# Patient Record
Sex: FEMALE | URBAN - METROPOLITAN AREA
[De-identification: names, ages, dates, MRNs, and addresses within clinical notes are randomized per-mention and may not be internally consistent; named-entity substitution may affect disease eponyms.]

---

## 2019-05-13 ENCOUNTER — TELEPHONE (OUTPATIENT)
Dept: TRANSPLANT | Facility: CLINIC | Age: 69
End: 2019-05-13

## 2019-05-13 NOTE — TELEPHONE ENCOUNTER
----- Message from Víctor Estrada sent at 5/13/2019  1:18 PM CDT -----    We have the pt records and they are now pending review by the referral nurse.  By:Víctor Estrada

## 2019-05-13 NOTE — TELEPHONE ENCOUNTER
"----- Message from Víctor Estrada sent at 5/13/2019 12:32 PM CDT -----     Returned call to pt daughter and told her that we did not rec any recs on the pt. Called Dr. Webre office and they will re-fax to [phone]. Will also send 'Post Tsp Care Agreement" Letter:    Miguel Angel Weber MD, Gastroenterology [address] [place] [address] Phone: [phone] Fax: [phone]    -------------------------------------------------------------------------------  Message   Received: Today   Message Contents   Celia Keen sent to [facility] Liver Referral Pool  Caller: Tegan [phone] (Today, 12:00 PM)         Calling to get status on referral that was sent by Dr. Miguel Angel Weber   Pt referred for liver and kidney xplant     NP   Michelle Ojeda   [birth year]     Contact Tegan       "

## 2019-05-14 ENCOUNTER — TELEPHONE (OUTPATIENT)
Dept: TRANSPLANT | Facility: CLINIC | Age: 69
End: 2019-05-14

## 2019-05-14 NOTE — TELEPHONE ENCOUNTER
----- Message from Víctor Estrada sent at 5/14/2019  3:48 PM CDT -----  Contact: pt daughter/Tegan    Returned call to pt prosper and told her that we did not need any other recs on her. The labs her GI Md sent had liver and kidney labs. Explained to her how the ref process works her and we once her chart has been reviewed the nurse will call her.     ----- Message -----  From: Arash Kraft  Sent: 5/14/2019   3:42 PM  To: [facility] Liver Referral Pool    Attn Víctor    Please call pt daughter at [phone]    Has a transplant related question    Thank you

## 2019-05-17 ENCOUNTER — TELEPHONE (OUTPATIENT)
Dept: TRANSPLANT | Facility: CLINIC | Age: 69
End: 2019-05-17

## 2019-05-17 NOTE — TELEPHONE ENCOUNTER
----- Message from Víctor Estrada sent at 5/17/2019  2:35 PM CDT -----  Contact: Tegan    Returned call to pt daughter, but there was no answer. LVM stating that the nurse still has her ref and once she is done with someone will giver her a call on whiter she will be coming for full liver txp workup, or consult only.     ----- Message -----  From: Lupillo Hagan  Sent: 5/17/2019   2:25 PM  To: [facility] Liver Referral Pool    Contact: Patient daughter Tegan    Message: Tegan calling about an updated status on her mother     Communication Preference: [phone]    Additional Information:     Thanks!

## 2019-05-21 ENCOUNTER — TELEPHONE (OUTPATIENT)
Dept: TRANSPLANT | Facility: CLINIC | Age: 69
End: 2019-05-21

## 2019-05-21 NOTE — TELEPHONE ENCOUNTER
----- Message from Víctor Estrada sent at 5/21/2019 10:29 AM CDT -----  Contact: Tegan John    Returned call to pt daughter at [phone] and she told me that she the pt had labs done. She will e-mail them to [email or web address]    ----- Message -----  From: Jeni Millard  Sent: 5/21/2019  10:16 AM  To: [facility] Liver Referral Pool    Needs Advice    Reason for call: Patient has updated lab work that Tegan wishes to discuss with Víctor.         Communication Preference: [phone]    Additional Information: n/a

## 2019-05-23 ENCOUNTER — TELEPHONE (OUTPATIENT)
Dept: TRANSPLANT | Facility: CLINIC | Age: 69
End: 2019-05-23

## 2019-05-23 NOTE — TELEPHONE ENCOUNTER
Pt referred for liver txp eval. Question need for kidney as well. Called pt and requested labs from last 3 mths with creatinine levels. She requested I call her daughter Tegan ranjeettrena with same request.

## 2019-05-24 ENCOUNTER — TELEPHONE (OUTPATIENT)
Dept: TRANSPLANT | Facility: CLINIC | Age: 69
End: 2019-05-24

## 2019-05-24 NOTE — TELEPHONE ENCOUNTER
----- Message from Víctor Estrada sent at 5/24/2019  1:54 PM CDT -----  Contact: Tegan pt's daughter     Returned call to pt daughter and told her that we have the info she sent and I put it on the nurse desk for her to review Monday.    ----- Message -----  From: Van Silva  Sent: 5/24/2019   1:42 PM  To: [facility] Liver Referral Pool    Needs Advice    Reason for call: Speak with Cande regarding additional labs and records that were sent         Communication Preference: [phone]    Additional Information: N/A

## 2019-05-29 ENCOUNTER — TELEPHONE (OUTPATIENT)
Dept: TRANSPLANT | Facility: CLINIC | Age: 69
End: 2019-05-29

## 2019-05-29 NOTE — TELEPHONE ENCOUNTER
----- Message from Víctor Estrada sent at 5/29/2019  2:30 PM CDT -----  Contact: Tegan (Daughter)    Returned call to pt daughter and told her that I will check with Cande for an update on the pt ref.     ----- Message -----  From: Jeni Millard  Sent: 5/29/2019   1:15 PM  To: [facility] Liver Referral Pool    Needs Advice    Reason for call: Called for an update on her mom's case review.        Communication Preference: [phone]    Additional Information: n/a

## 2019-05-31 ENCOUNTER — TELEPHONE (OUTPATIENT)
Dept: TRANSPLANT | Facility: CLINIC | Age: 69
End: 2019-05-31

## 2019-05-31 DIAGNOSIS — K74.60 LIVER CIRRHOSIS SECONDARY TO NASH: ICD-10-CM

## 2019-05-31 DIAGNOSIS — K75.81 LIVER CIRRHOSIS SECONDARY TO NASH: ICD-10-CM

## 2019-05-31 NOTE — TELEPHONE ENCOUNTER
Referral received from Dr Miguel Angel THOMAS MD  [facility] GASTROENTEROLOGY  [address], [address]  [place] [address]   [phone]  [phone]          Patient with IZAGUIRRE. MELD 27   ICD-10-CM: K75.81, K74.60    Referred for LIVER AND KIDNEY transplant for  EVALUATION.    Referral completed and forwarded to Transplant [facility].          Insurance:   PRIMARY: Medicare Part B [place]  ID: [id number]  Contact # [phone]    SECONDARY: [facility]  ID:[id number]  Contact # [phone]    [facility]

## 2019-06-03 ENCOUNTER — TELEPHONE (OUTPATIENT)
Dept: TRANSPLANT | Facility: CLINIC | Age: 69
End: 2019-06-03

## 2019-06-03 NOTE — TELEPHONE ENCOUNTER
----- Message from Celia Keen sent at 6/3/2019 12:43 PM CDT -----  Calling to speak w/Cande regarding result of the varices banding test     Pt contact [phone]

## 2019-06-03 NOTE — TELEPHONE ENCOUNTER
Returned call. Just wanted to verify receipt of records.  EGD will need to be redone as last was 4/2018. Informed will need updated EGD either before eval here or shortly after they return.

## 2019-06-06 ENCOUNTER — TELEPHONE (OUTPATIENT)
Dept: TRANSPLANT | Facility: CLINIC | Age: 69
End: 2019-06-06

## 2019-06-06 NOTE — TELEPHONE ENCOUNTER
----- Message from Víctor Estrada sent at 6/6/2019 10:17 AM CDT -----  Contact: Patient Daughter     Returned call to pt daughter and told her that we are still pending financial clr from her [employer]. Told her that we are booking in to July for FAST PASS and STANDARD appts.    ----- Message -----  From: Jeni Millard  Sent: 6/6/2019   9:30 AM  To: [facility] Liver Referral Pool    Needs Advice    Reason for call: Called to f/u with Cande about authorization for liver evaluations.          Communication Preference: Tegan [phone]    Additional Information: n/a

## 2019-06-11 ENCOUNTER — TELEPHONE (OUTPATIENT)
Dept: TRANSPLANT | Facility: CLINIC | Age: 69
End: 2019-06-11

## 2019-06-11 NOTE — TELEPHONE ENCOUNTER
----- Message from Víctor Estrada sent at 6/11/2019 11:35 AM CDT -----  Contact: pt daughter/Tgean    Returned call to pt daughter and told her that the insur is still pending. She also asked about donell appt. Told her that 7/11 is the 1st available for liver, but since the pt needs to see kidney that is not a guarantee that they will be abke to see her in that time frame.     ----- Message -----  From: Arash Kraft  Sent: 6/11/2019  11:27 AM  To: [facility] Liver Referral Pool    Attn Víctor    Please call pt daughter at [phone]    Patient daughter is returning your call from today    Thank you

## 2019-06-11 NOTE — TELEPHONE ENCOUNTER
----- Message from Víctor Estrada sent at 6/11/2019 11:16 AM CDT -----  Contact: Pt. Daughter     Returned call to pt daughter, but there was no answer. LVM for her stating that she was is still pending clr from finance     ----- Message -----  From: Celina Wright  Sent: 6/11/2019  11:12 AM  To: [facility] Pre-Liver Transplant Non-Clinical    Needs Advice    Reason for call: Calling to check the status of insurance clearance and finalize eval         Communication Preference: [phone]    Additional Information: n/a

## 2019-06-13 ENCOUNTER — TELEPHONE (OUTPATIENT)
Dept: TRANSPLANT | Facility: CLINIC | Age: 69
End: 2019-06-13

## 2019-06-13 NOTE — TELEPHONE ENCOUNTER
----- Message from Víctor Estrada sent at 6/13/2019 12:00 PM CDT -----    Pt daughter called asking for an update on the pt [place] clr. Told her that her ref has been sent to [facility]. Told her that I will send a message to the finance coord to give her a call to explain what that means.

## 2019-06-17 ENCOUNTER — TELEPHONE (OUTPATIENT)
Dept: TRANSPLANT | Facility: CLINIC | Age: 69
End: 2019-06-17

## 2019-06-17 NOTE — TELEPHONE ENCOUNTER
----- Message from Víctor Estrada sent at 6/17/2019  9:20 AM CDT -----    Rec'bryce call from pt daughter and She would like the pt to be sooner than later and she would prefer not to stay the weekend. She asked me what if they kept the appts for 7/11 & 7/12 that they would have a cancellation for the kidney appt for 7/11 & 7/12. Told her that would be up to her. She could keep the appts for 7/11 & 7/12 and maybe she gets an appt with kidney, or she keeps the appts donell'ed for 7/11 & 7/12 and then will have to come back on 7/22 to see kidney. She will call back and let me know.

## 2019-06-17 NOTE — TELEPHONE ENCOUNTER
----- Message from Víctor Estrada sent at 6/17/2019  9:12 AM CDT -----    Called pt daughter to let her know that we will not be able to donell the pt for FAST PASS: 7/11 & 7/12 since the kidney appt has been donell'ed for 7/22, but there was no answer. LVM stating that we will donell appts for 7/18 & 7/19

## 2019-06-19 ENCOUNTER — TELEPHONE (OUTPATIENT)
Dept: TRANSPLANT | Facility: CLINIC | Age: 69
End: 2019-06-19

## 2019-06-19 NOTE — TELEPHONE ENCOUNTER
----- Message from Víctor Estrada sent at 6/19/2019  4:46 PM CDT -----    Called pt daughter at [phone] to see if she was ready to donell appts, but there was no answer. LVM for her to call back.

## 2019-06-27 DIAGNOSIS — Z76.82 ORGAN TRANSPLANT CANDIDATE: ICD-10-CM

## 2019-06-27 DIAGNOSIS — K74.60 LIVER CIRRHOSIS SECONDARY TO NASH: Primary | ICD-10-CM

## 2019-06-27 DIAGNOSIS — K75.81 LIVER CIRRHOSIS SECONDARY TO NASH: Primary | ICD-10-CM

## 2019-07-02 DIAGNOSIS — K74.60 LIVER CIRRHOSIS SECONDARY TO NASH: Primary | ICD-10-CM

## 2019-07-02 DIAGNOSIS — K75.81 LIVER CIRRHOSIS SECONDARY TO NASH (NONALCOHOLIC STEATOHEPATITIS): Primary | ICD-10-CM

## 2019-07-02 DIAGNOSIS — Z76.82 ORGAN TRANSPLANT CANDIDATE: ICD-10-CM

## 2019-07-02 DIAGNOSIS — K74.60 LIVER CIRRHOSIS SECONDARY TO NASH (NONALCOHOLIC STEATOHEPATITIS): Primary | ICD-10-CM

## 2019-07-02 DIAGNOSIS — K75.81 LIVER CIRRHOSIS SECONDARY TO NASH: Primary | ICD-10-CM

## 2019-07-15 ENCOUNTER — TELEPHONE (OUTPATIENT)
Dept: TRANSPLANT | Facility: CLINIC | Age: 69
End: 2019-07-15

## 2019-07-15 NOTE — TELEPHONE ENCOUNTER
----- Message from Víctor Estrada sent at 7/15/2019 11:13 AM CDT -----  Contact: Tegan pt's daughter     Returned call to pt daughter and she just wanted to confirm that the pt appts donell'ed for 7/18-7/22. Told her that the pt appts are still donell'ed.    ----- Message -----  From: Van Silva  Sent: 7/15/2019  11:04 AM  To: [facility] Liver Referral Pool    Needs Advice    Reason for call: Speak with Víctor regarding pt's appts        Communication Preference: [phone]    Additional Information: N/A

## 2019-07-16 ENCOUNTER — TELEPHONE (OUTPATIENT)
Dept: TRANSPLANT | Facility: CLINIC | Age: 69
End: 2019-07-16

## 2019-07-16 NOTE — TELEPHONE ENCOUNTER
----- Message from Víctor Estrada sent at 7/16/2019  2:27 PM CDT -----  Contact: pts daughter Tegan     Returned call to pt daughter and told her that I moved all of the appts that were donell'ed on 7/22 to 7/18 and 7/19. E-mailed a new copy of the appts.     ----- Message -----  From: Antonietta Barber  Sent: 7/16/2019   2:05 PM  To: [facility] Pre-Liver Transplant Non-Clinical    Needs Advice    Reason for call: pts daughter was wanting to speak with Víctor in ref to the pts up coming appts in Transplant         Communication Preference: can you please pts daughter Tegan call pt [phone]    Additional Information: none    JOSEPH

## 2019-07-18 ENCOUNTER — HOSPITAL ENCOUNTER (INPATIENT)
Facility: HOSPITAL | Age: 69
LOS: 23 days | Discharge: HOSPICE | DRG: 432 | End: 2019-08-10
Attending: EMERGENCY MEDICINE | Admitting: HOSPITALIST
Payer: MEDICARE

## 2019-07-18 ENCOUNTER — TELEPHONE (OUTPATIENT)
Dept: TRANSPLANT | Facility: CLINIC | Age: 69
End: 2019-07-18

## 2019-07-18 ENCOUNTER — CLINICAL SUPPORT (OUTPATIENT)
Dept: TRANSPLANT | Facility: CLINIC | Age: 69
DRG: 432 | End: 2019-07-18
Payer: MEDICARE

## 2019-07-18 ENCOUNTER — OFFICE VISIT (OUTPATIENT)
Dept: TRANSPLANT | Facility: CLINIC | Age: 69
DRG: 432 | End: 2019-07-18
Payer: MEDICARE

## 2019-07-18 VITALS
TEMPERATURE: 98 F | DIASTOLIC BLOOD PRESSURE: 63 MMHG | SYSTOLIC BLOOD PRESSURE: 105 MMHG | WEIGHT: 125.44 LBS | DIASTOLIC BLOOD PRESSURE: 63 MMHG | HEIGHT: 60 IN | HEIGHT: 60 IN | OXYGEN SATURATION: 100 % | BODY MASS INDEX: 24.63 KG/M2 | HEART RATE: 71 BPM | OXYGEN SATURATION: 100 % | TEMPERATURE: 98 F | RESPIRATION RATE: 16 BRPM | WEIGHT: 125.44 LBS | RESPIRATION RATE: 16 BRPM | SYSTOLIC BLOOD PRESSURE: 105 MMHG | OXYGEN SATURATION: 100 % | DIASTOLIC BLOOD PRESSURE: 63 MMHG | RESPIRATION RATE: 16 BRPM | HEART RATE: 71 BPM | BODY MASS INDEX: 24.63 KG/M2 | HEART RATE: 71 BPM | TEMPERATURE: 98 F | WEIGHT: 125.44 LBS | BODY MASS INDEX: 24.63 KG/M2 | HEIGHT: 60 IN | SYSTOLIC BLOOD PRESSURE: 105 MMHG

## 2019-07-18 DIAGNOSIS — K74.60 LIVER CIRRHOSIS SECONDARY TO NASH (NONALCOHOLIC STEATOHEPATITIS): ICD-10-CM

## 2019-07-18 DIAGNOSIS — G93.41 ACUTE METABOLIC ENCEPHALOPATHY: ICD-10-CM

## 2019-07-18 DIAGNOSIS — R94.31 QT PROLONGATION: ICD-10-CM

## 2019-07-18 DIAGNOSIS — D61.818 PANCYTOPENIA: ICD-10-CM

## 2019-07-18 DIAGNOSIS — E87.1 HYPONATREMIA WITH DECREASED SERUM OSMOLALITY: ICD-10-CM

## 2019-07-18 DIAGNOSIS — Z76.82 KIDNEY TRANSPLANT CANDIDATE: ICD-10-CM

## 2019-07-18 DIAGNOSIS — D69.59 THROMBOCYTOPENIA DUE TO HYPERSPLENISM: ICD-10-CM

## 2019-07-18 DIAGNOSIS — D73.1 THROMBOCYTOPENIA DUE TO HYPERSPLENISM: ICD-10-CM

## 2019-07-18 DIAGNOSIS — K70.31 ASCITES DUE TO ALCOHOLIC CIRRHOSIS: ICD-10-CM

## 2019-07-18 DIAGNOSIS — K92.0 HEMATEMESIS, PRESENCE OF NAUSEA NOT SPECIFIED: ICD-10-CM

## 2019-07-18 DIAGNOSIS — N17.9 ACUTE KIDNEY INJURY SUPERIMPOSED ON CHRONIC KIDNEY DISEASE: ICD-10-CM

## 2019-07-18 DIAGNOSIS — K75.81 LIVER CIRRHOSIS SECONDARY TO NASH: Primary | ICD-10-CM

## 2019-07-18 DIAGNOSIS — K74.60 LIVER CIRRHOSIS SECONDARY TO NASH: ICD-10-CM

## 2019-07-18 DIAGNOSIS — Z01.818 ENCOUNTER FOR PRE-TRANSPLANT EVALUATION FOR CHRONIC LIVER DISEASE: Primary | ICD-10-CM

## 2019-07-18 DIAGNOSIS — D68.9 COAGULOPATHY: ICD-10-CM

## 2019-07-18 DIAGNOSIS — R13.19 OTHER DYSPHAGIA: ICD-10-CM

## 2019-07-18 DIAGNOSIS — Z76.82 ORGAN TRANSPLANT CANDIDATE: ICD-10-CM

## 2019-07-18 DIAGNOSIS — K75.81 LIVER CIRRHOSIS SECONDARY TO NASH: ICD-10-CM

## 2019-07-18 DIAGNOSIS — E87.1 HYPONATREMIA: ICD-10-CM

## 2019-07-18 DIAGNOSIS — E43 SEVERE MALNUTRITION: ICD-10-CM

## 2019-07-18 DIAGNOSIS — K76.6 PORTAL HYPERTENSION: ICD-10-CM

## 2019-07-18 DIAGNOSIS — J96.01 ACUTE HYPOXEMIC RESPIRATORY FAILURE: ICD-10-CM

## 2019-07-18 DIAGNOSIS — Z01.818 PRE-TRANSPLANT EVALUATION FOR LIVER TRANSPLANT: ICD-10-CM

## 2019-07-18 DIAGNOSIS — R53.81 PHYSICAL DEBILITY: ICD-10-CM

## 2019-07-18 DIAGNOSIS — R60.1 ANASARCA: ICD-10-CM

## 2019-07-18 DIAGNOSIS — N18.9 ACUTE KIDNEY INJURY SUPERIMPOSED ON CHRONIC KIDNEY DISEASE: ICD-10-CM

## 2019-07-18 DIAGNOSIS — N18.4 STAGE 4 CHRONIC KIDNEY DISEASE: ICD-10-CM

## 2019-07-18 DIAGNOSIS — K74.60 LIVER CIRRHOSIS SECONDARY TO NASH: Primary | ICD-10-CM

## 2019-07-18 DIAGNOSIS — K72.90 LIVER FAILURE: ICD-10-CM

## 2019-07-18 DIAGNOSIS — R18.8 OTHER ASCITES: ICD-10-CM

## 2019-07-18 DIAGNOSIS — K75.81 LIVER CIRRHOSIS SECONDARY TO NASH (NONALCOHOLIC STEATOHEPATITIS): ICD-10-CM

## 2019-07-18 DIAGNOSIS — K76.82 ACUTE HEPATIC ENCEPHALOPATHY: ICD-10-CM

## 2019-07-18 LAB
ALBUMIN SERPL BCP-MCNC: 2.8 G/DL (ref 3.5–5.2)
ALP SERPL-CCNC: 137 U/L (ref 55–135)
ALT SERPL W/O P-5'-P-CCNC: 44 U/L (ref 10–44)
ANION GAP SERPL CALC-SCNC: 8 MMOL/L (ref 8–16)
AST SERPL-CCNC: 49 U/L (ref 10–40)
BILIRUB SERPL-MCNC: 1.8 MG/DL (ref 0.1–1)
BILIRUB UR QL STRIP: NEGATIVE
BUN SERPL-MCNC: 34 MG/DL (ref 8–23)
CALCIUM SERPL-MCNC: 8.4 MG/DL (ref 8.7–10.5)
CHLORIDE SERPL-SCNC: 92 MMOL/L (ref 95–110)
CLARITY UR REFRACT.AUTO: CLEAR
CO2 SERPL-SCNC: 22 MMOL/L (ref 23–29)
COLOR UR AUTO: YELLOW
CREAT SERPL-MCNC: 1.9 MG/DL (ref 0.5–1.4)
EST. GFR  (AFRICAN AMERICAN): 30.4 ML/MIN/1.73 M^2
EST. GFR  (NON AFRICAN AMERICAN): 26.3 ML/MIN/1.73 M^2
GLUCOSE SERPL-MCNC: 183 MG/DL (ref 70–110)
GLUCOSE UR QL STRIP: NEGATIVE
HGB UR QL STRIP: NEGATIVE
KETONES UR QL STRIP: NEGATIVE
LEUKOCYTE ESTERASE UR QL STRIP: ABNORMAL
MICROSCOPIC COMMENT: ABNORMAL
NITRITE UR QL STRIP: NEGATIVE
OSMOLALITY SERPL: 262 MOSM/KG (ref 275–295)
OSMOLALITY UR: 299 MOSM/KG (ref 50–1200)
PH UR STRIP: 6 [PH] (ref 5–8)
POTASSIUM SERPL-SCNC: 4.5 MMOL/L (ref 3.5–5.1)
PROT SERPL-MCNC: 5.7 G/DL (ref 6–8.4)
PROT UR QL STRIP: NEGATIVE
RBC #/AREA URNS AUTO: 3 /HPF (ref 0–4)
SODIUM SERPL-SCNC: 122 MMOL/L (ref 136–145)
SODIUM UR-SCNC: <20 MMOL/L (ref 20–250)
SP GR UR STRIP: 1.01 (ref 1–1.03)
SQUAMOUS #/AREA URNS AUTO: 5 /HPF
URN SPEC COLLECT METH UR: ABNORMAL
WBC #/AREA URNS AUTO: 15 /HPF (ref 0–5)

## 2019-07-18 PROCEDURE — 99999 PR PBB SHADOW E&M-EST. PATIENT-LVL III: ICD-10-PCS | Mod: PBBFAC,TXP,,

## 2019-07-18 PROCEDURE — 99284 PR EMERGENCY DEPT VISIT,LEVEL IV: ICD-10-PCS | Mod: NTX,,, | Performed by: EMERGENCY MEDICINE

## 2019-07-18 PROCEDURE — 84300 ASSAY OF URINE SODIUM: CPT | Mod: NTX

## 2019-07-18 PROCEDURE — 99999 PR PBB SHADOW E&M-EST. PATIENT-LVL III: CPT | Mod: PBBFAC,TXP,, | Performed by: INTERNAL MEDICINE

## 2019-07-18 PROCEDURE — 99999 PR PBB SHADOW E&M-EST. PATIENT-LVL III: CPT | Mod: PBBFAC,TXP,,

## 2019-07-18 PROCEDURE — 83935 ASSAY OF URINE OSMOLALITY: CPT | Mod: NTX

## 2019-07-18 PROCEDURE — 99205 PR OFFICE/OUTPT VISIT, NEW, LEVL V, 60-74 MIN: ICD-10-PCS | Mod: S$PBB,TXP,, | Performed by: INTERNAL MEDICINE

## 2019-07-18 PROCEDURE — 99285 EMERGENCY DEPT VISIT HI MDM: CPT | Mod: 27,NTX,25

## 2019-07-18 PROCEDURE — 25000003 PHARM REV CODE 250: Mod: NTX | Performed by: HOSPITALIST

## 2019-07-18 PROCEDURE — P9047 ALBUMIN (HUMAN), 25%, 50ML: HCPCS | Mod: JG,NTX | Performed by: HOSPITALIST

## 2019-07-18 PROCEDURE — 36415 COLL VENOUS BLD VENIPUNCTURE: CPT | Mod: NTX

## 2019-07-18 PROCEDURE — 80053 COMPREHEN METABOLIC PANEL: CPT | Mod: NTX

## 2019-07-18 PROCEDURE — 99223 1ST HOSP IP/OBS HIGH 75: CPT | Mod: AI,NTX,, | Performed by: HOSPITALIST

## 2019-07-18 PROCEDURE — 99213 OFFICE O/P EST LOW 20 MIN: CPT | Mod: PBBFAC,27,TXP,25 | Performed by: INTERNAL MEDICINE

## 2019-07-18 PROCEDURE — 63600175 PHARM REV CODE 636 W HCPCS: Mod: JG,NTX | Performed by: HOSPITALIST

## 2019-07-18 PROCEDURE — 99223 PR INITIAL HOSPITAL CARE,LEVL III: ICD-10-PCS | Mod: AI,NTX,, | Performed by: HOSPITALIST

## 2019-07-18 PROCEDURE — 99284 EMERGENCY DEPT VISIT MOD MDM: CPT | Mod: NTX,,, | Performed by: EMERGENCY MEDICINE

## 2019-07-18 PROCEDURE — 99999 PR PBB SHADOW E&M-EST. PATIENT-LVL III: ICD-10-PCS | Mod: PBBFAC,NTX,,

## 2019-07-18 PROCEDURE — 99999 PR PBB SHADOW E&M-EST. PATIENT-LVL III: ICD-10-PCS | Mod: PBBFAC,TXP,, | Performed by: INTERNAL MEDICINE

## 2019-07-18 PROCEDURE — 99213 OFFICE O/P EST LOW 20 MIN: CPT | Mod: PBBFAC,25,27,NTX

## 2019-07-18 PROCEDURE — 99999 PR PBB SHADOW E&M-EST. PATIENT-LVL III: CPT | Mod: PBBFAC,NTX,,

## 2019-07-18 PROCEDURE — 83930 ASSAY OF BLOOD OSMOLALITY: CPT | Mod: NTX

## 2019-07-18 PROCEDURE — 99205 OFFICE O/P NEW HI 60 MIN: CPT | Mod: S$PBB,TXP,, | Performed by: INTERNAL MEDICINE

## 2019-07-18 PROCEDURE — 20600001 HC STEP DOWN PRIVATE ROOM: Mod: NTX

## 2019-07-18 PROCEDURE — 87086 URINE CULTURE/COLONY COUNT: CPT | Mod: NTX

## 2019-07-18 PROCEDURE — 99213 OFFICE O/P EST LOW 20 MIN: CPT | Mod: PBBFAC,TXP,25

## 2019-07-18 PROCEDURE — 81001 URINALYSIS AUTO W/SCOPE: CPT | Mod: NTX

## 2019-07-18 RX ORDER — GLUCAGON 1 MG
1 KIT INJECTION
Status: DISCONTINUED | OUTPATIENT
Start: 2019-07-18 | End: 2019-08-10 | Stop reason: HOSPADM

## 2019-07-18 RX ORDER — IBUPROFEN 200 MG
24 TABLET ORAL
Status: DISCONTINUED | OUTPATIENT
Start: 2019-07-18 | End: 2019-08-10 | Stop reason: HOSPADM

## 2019-07-18 RX ORDER — LEVOTHYROXINE SODIUM 88 UG/1
88 TABLET ORAL
Status: DISCONTINUED | OUTPATIENT
Start: 2019-07-19 | End: 2019-07-24

## 2019-07-18 RX ORDER — FUROSEMIDE 40 MG/1
40 TABLET ORAL DAILY
Status: ON HOLD | COMMUNITY
End: 2019-07-31 | Stop reason: HOSPADM

## 2019-07-18 RX ORDER — IBUPROFEN 200 MG
16 TABLET ORAL
Status: DISCONTINUED | OUTPATIENT
Start: 2019-07-18 | End: 2019-08-10 | Stop reason: HOSPADM

## 2019-07-18 RX ORDER — SODIUM CHLORIDE 0.9 % (FLUSH) 0.9 %
10 SYRINGE (ML) INJECTION
Status: DISCONTINUED | OUTPATIENT
Start: 2019-07-18 | End: 2019-08-05

## 2019-07-18 RX ORDER — LACTULOSE 10 G/15ML
30 SOLUTION ORAL 3 TIMES DAILY
Status: DISCONTINUED | OUTPATIENT
Start: 2019-07-18 | End: 2019-07-19

## 2019-07-18 RX ORDER — LEVOTHYROXINE SODIUM 88 UG/1
88 TABLET ORAL
COMMUNITY

## 2019-07-18 RX ORDER — ACETAMINOPHEN 500 MG
2000 TABLET ORAL DAILY
COMMUNITY

## 2019-07-18 RX ORDER — MAGNESIUM 250 MG
250 TABLET ORAL DAILY
Status: ON HOLD | COMMUNITY
End: 2019-07-31 | Stop reason: HOSPADM

## 2019-07-18 RX ORDER — SPIRONOLACTONE 50 MG/1
50 TABLET, FILM COATED ORAL DAILY
Status: ON HOLD | COMMUNITY
End: 2019-07-31 | Stop reason: HOSPADM

## 2019-07-18 RX ORDER — ELECTROLYTES/DEXTROSE
5000 SOLUTION, ORAL ORAL DAILY
COMMUNITY

## 2019-07-18 RX ORDER — ONDANSETRON 8 MG/1
8 TABLET, ORALLY DISINTEGRATING ORAL EVERY 8 HOURS PRN
Status: DISCONTINUED | OUTPATIENT
Start: 2019-07-18 | End: 2019-07-24

## 2019-07-18 RX ORDER — ALBUMIN HUMAN 250 G/1000ML
25 SOLUTION INTRAVENOUS 3 TIMES DAILY
Status: DISCONTINUED | OUTPATIENT
Start: 2019-07-18 | End: 2019-07-19

## 2019-07-18 RX ORDER — ACETAMINOPHEN 325 MG/1
650 TABLET ORAL EVERY 4 HOURS PRN
Status: DISCONTINUED | OUTPATIENT
Start: 2019-07-18 | End: 2019-07-22

## 2019-07-18 RX ORDER — RIFAXIMIN 200 MG/1
200 TABLET ORAL 2 TIMES DAILY
Refills: 2 | Status: ON HOLD | COMMUNITY
Start: 2019-07-03 | End: 2019-07-31 | Stop reason: HOSPADM

## 2019-07-18 RX ORDER — CIPROFLOXACIN 500 MG/1
500 TABLET ORAL DAILY
Refills: 0 | Status: ON HOLD | COMMUNITY
Start: 2019-07-17 | End: 2019-07-31 | Stop reason: HOSPADM

## 2019-07-18 RX ADMIN — RIFAXIMIN 550 MG: 550 TABLET ORAL at 09:07

## 2019-07-18 RX ADMIN — ALBUMIN (HUMAN) 25 G: 12.5 SOLUTION INTRAVENOUS at 03:07

## 2019-07-18 RX ADMIN — ALBUMIN (HUMAN) 25 G: 12.5 SOLUTION INTRAVENOUS at 09:07

## 2019-07-18 NOTE — TELEPHONE ENCOUNTER
Returned call to daughter, Tegan. Tegan wished to let the team know that pt is in the ER awaiting admission. Explained that once pt is admitted, she will be followed by the IML service & will likely have much of her evaluation completed inpatient. Team notified via email. Understanding expressed. No other questions at this time.

## 2019-07-18 NOTE — PROGRESS NOTES
Transplant Hepatology (Transplant Evaluation) Consult Note    Referring provider:   Chief complaint:   Chief Complaint   Patient presents with    Liver/Kidney Transplant Evaluation       HPI:  Michelle Ojeda is a 68 y.o. female that presents to Transplant Hepatology Clinic for consultation of had concerns including Liver/Kidney Transplant Evaluation.  She  is accompanied by her daughter and son-in-law.    Background  She was diagnosed with IZAGUIRRE cirrhosis by CT scan and blood tests in 2015. She had a liver biopsy in  - steatosis but ?fibrosis.  She has developed grade 1 hepatic encephalopathy, recently started on rifaximin. She has developed severe ascites, sarcopenia, esophageal varices and hyponatremia.    Alcohol: no, never  DUI: no  Rehab: no  Tobacco: no, never  THC use: no  IVDU: no  Intranasal cocaine: no  Tattoo: no  Blood transfusion prior to : no    Work: retired, office work at a lab.   Marital status: , she lives with her  - 83 y/o.  Family: 1 son and 3 daughters    PMH:  Diabetes type 2  Hypertension  Hypothyroidism    Denies CAD or heart attack or stroke.    PSH:  No prior abdominal surgeries    Family History:  Father:  at 92 year old from cancer  Mother:  of old age 88 year  Sister: hx of lung cancer      MELD-Na score: 27 at 2019  8:10 AM  MELD score: 18 at 2019  8:10 AM  Calculated from:  Serum Creatinine: 1.9 mg/dL at 2019  8:10 AM  Serum Sodium: 120 mmol/L (Rounded to 125 mmol/L) at 2019  8:10 AM  Total Bilirubin: 1.9 mg/dL at 2019  8:10 AM  INR(ratio): 1.3 at 2019  8:10 AM  Age: 68 years    Liver disease:                   IZAGUIRRE    MELD-Na:                         27  Child-Lopez Class:             C    Blood type:                        n/a    Transplant status:             under evaluation    Cirrhosis is decompensated with:    Ascites:                                                       yes large, LVP every 2 weeks since March  2019  Spontaneous bacterial peritonitis:              no   Hepatic Encephalopathy:                           yes, [place] grading grade 1-2  Portal bleeding:                                           Yes, 11/2015 - required blood transfusion, EVs - EVL done, now controlled, not on NSBB - due to large ascites/CPT C  Hepatocellular carcinoma:                          no    Hepatorenal syndrome:                              no  Hyponatremia:                                            yes  Sarcopenia:                                                yes   Portal vein thrombosis:                               no      Screening:  Last EGD:  6/19/19: small varices PHG.  4/6/18: at OSH - small varices    Last imaging study:   3/12/2019: US: massive ascites, small cirrhotic liver, no liver tumor.      Patient Active Problem List   Diagnosis    Liver cirrhosis secondary to IZAGUIRRE       Past Medical History:   Diagnosis Date    Cirrhosis     Diabetes mellitus, type 2     Disorder of kidney and ureter     Hypertension     Hypothyroidism     NAFLD (nonalcoholic fatty liver disease)        History reviewed. No pertinent surgical history.    No family history on file.    Social History     Socioeconomic History    Marital status: Unknown     Spouse name: Not on file    Number of children: Not on file    Years of education: Not on file    Highest education level: Not on file   Occupational History    Not on file   Social Needs    Financial resource strain: Not on file    Food insecurity:     Worry: Not on file     Inability: Not on file    Transportation needs:     Medical: Not on file     Non-medical: Not on file   Tobacco Use    Smoking status: Not on file   Substance and Sexual Activity    Alcohol use: Not on file    Drug use: Not on file    Sexual activity: Not on file   Lifestyle    Physical activity:     Days per week: Not on file     Minutes per session: Not on file    Stress: Not on file   Relationships     Social connections:     Talks on phone: Not on file     Gets together: Not on file     Attends Denominational service: Not on file     Active member of club or organization: Not on file     Attends meetings of clubs or organizations: Not on file     Relationship status: Not on file   Other Topics Concern    Not on file   Social History Narrative    Not on file       No current outpatient medications on file.     No current facility-administered medications for this visit.        Review of patient's allergies indicates:  Allergies not on file    Review of Systems   Constitutional: Positive for malaise/fatigue and weight loss. Negative for chills and fever.   HENT: Negative for congestion, nosebleeds and sore throat.    Eyes: Negative for blurred vision, double vision and photophobia.   Respiratory: Negative for cough and shortness of breath.    Cardiovascular: Positive for leg swelling. Negative for chest pain, palpitations and orthopnea.   Gastrointestinal: Positive for abdominal pain. Negative for blood in stool, constipation, diarrhea, melena and vomiting.   Genitourinary: Negative for dysuria.   Musculoskeletal: Negative for falls and joint pain.   Skin: Negative for itching and rash.   Neurological: Positive for weakness. Negative for dizziness and tremors.   Endo/Heme/Allergies: Does not bruise/bleed easily.   Psychiatric/Behavioral: Negative for depression and substance abuse. The patient has insomnia. The patient is not nervous/anxious.        Vitals:    07/18/19 0837   BP: 105/63   Pulse: 71   Resp: 16   Temp: 97.7 °F (36.5 °C)   TempSrc: Oral   SpO2: 100%   Weight: 56.9 kg (125 lb 7.1 oz)   Height: 5' (1.524 m)       Physical Exam   Constitutional: She is oriented to person, place, and time. She appears well-developed.   Chronically ill-appearing. Malnourished. Temporal wasting.     HENT:   Head: Normocephalic and atraumatic.   Mouth/Throat: Oropharynx is clear and moist.   Eyes: Conjunctivae are normal.  Scleral icterus is present.   Neck: Normal range of motion.   Cardiovascular: Normal rate, regular rhythm and normal heart sounds.   Pulmonary/Chest: Effort normal and breath sounds normal. No stridor. No respiratory distress. She has no rales.   Abdominal: Soft. Bowel sounds are normal. She exhibits distension. There is no tenderness. There is no guarding. No hernia.   Large ascites but not tense   Musculoskeletal: She exhibits edema.   Lymphadenopathy:     She has no cervical adenopathy.   Neurological: She is alert and oriented to person, place, and time.   Skin: Skin is warm and dry. No rash noted.   Scattered spider angiomata on upper chest. Palmar erythema noted.   Psychiatric: She has a normal mood and affect. Her behavior is normal. Her mood appears not anxious.   Nursing note and vitals reviewed.      LABS: I personally reviewed pertinent laboratory findings.    Lab Results   Component Value Date    ALT 57 (H) 07/18/2019    AST 65 (H) 07/18/2019    GGT 98 (H) 07/18/2019    ALKPHOS 174 (H) 07/18/2019    BILITOT 1.9 (H) 07/18/2019       Lab Results   Component Value Date    WBC 3.40 (L) 07/18/2019    HGB 10.6 (L) 07/18/2019    HCT 31.3 (L) 07/18/2019     (H) 07/18/2019    PLT 47 (L) 07/18/2019       Lab Results   Component Value Date     (L) 07/18/2019    K 4.4 07/18/2019    CL 92 (L) 07/18/2019    CO2 20 (L) 07/18/2019    BUN 33 (H) 07/18/2019    CREATININE 1.9 (H) 07/18/2019    CALCIUM 8.1 (L) 07/18/2019    ANIONGAP 8 07/18/2019    ESTGFRAFRICA 30.4 (A) 07/18/2019    EGFRNONAA 26.3 (A) 07/18/2019       Lab Results   Component Value Date    INR 1.3 (H) 07/18/2019       No results found for: SMOOTHMUSCAB, MITOAB  No results found for: IRON, TIBC, FERRITIN, SATURATEDIRO  No results found for: HAV, HEPAIGM, HEPBIGM, HEPBCAB, HBEAG, HEPCAB  No results found for: TSH  No results found for: HARPAL    No results found for: ABORH    No results found for: LABA1C, HGBA1C  No results found for: CHOL  No  results found for: HDL  No results found for: LDLCALC  No results found for: TRIG  No results found for: CHOLHDL      Imaging:   No results found for this or any previous visit.  I personally reviewed recent cardiology studies available on the chart and from outside medical records.  I personally reviewed recent imaging studies available on the chart and from outside medical records.      Assessment:  68 y.o. female presenting with     1. Encounter for pre-transplant evaluation for chronic liver disease    2. Liver cirrhosis secondary to IZAGUIRRE    3. Organ transplant candidate    4. Liver cirrhosis secondary to IZAGUIRRE (nonalcoholic steatohepatitis)    5. Portal hypertension    6. Thrombocytopenia due to hypersplenism    7. Hyponatremia with decreased serum osmolality    8. Stage 4 chronic kidney disease    9. Severe malnutrition      Child Lopez class: C    MELD-Na score: 27 at 7/18/2019  8:10 AM  MELD score: 18 at 7/18/2019  8:10 AM  Calculated from:  Serum Creatinine: 1.9 mg/dL at 7/18/2019  8:10 AM  Serum Sodium: 120 mmol/L (Rounded to 125 mmol/L) at 7/18/2019  8:10 AM  Total Bilirubin: 1.9 mg/dL at 7/18/2019  8:10 AM  INR(ratio): 1.3 at 7/18/2019  8:10 AM  Age: 68 years    Functional Status: 30% - Severely disabled: hospitalization is indicated, death not imminent  Physical Capacity: No Limitations    Transplant Candidacy: Patient is a 68 y.o. female with end-stage liver disease secondary to Cirrhosis: IZAGUIRRE with MELD-Na: 27 and Child-Lopez Class:C  here for evaluation for possible OLT. Based on available information, patient is a potential but high risk liver-kidney transplant candidate due to severe malnutrition and deconditioning. Patient will undergo liver transplant evaluation after financial approval is obtained. Patient will be presented to Liver Transplant Selection Committee after all tests and evaluations are complete.    Patient has hx of CKD4 ( GFR < 60 for more than 90 days) and most recent GFR is < 35 -  therefore, she would qualify for simultaneous liver-kidney transplantation.  Recommendation(s):  - will admit patient to IM-L service for the management of profound hyponatremia: Na: 120  - will need urine electrolytes check, albumin for volume expansion and strict free water restriction  - will need to hold diuretics  - will need to be evaluated by Inpatient Transplant Hepatology/Nephrology team  - she will need to complete SLK evaluation  - need to enhance nutritional status and sarcopenia, PT/OT consult to help with her deconditioning     - will need to change rifaximin dose: 550 mg BID, cipro 250 mg daily (instead of only with paracentesis)    A total of 60 minutes were spent face-to-face with the patient during this encounter and over half of that time was spent on counseling and coordination of care.  We discussed in depth the nature of the patient's liver disease, the management plan and liver transplant evaluation in details. I also educated the patient about lifestyle modifications which may improve hepatic steatosis, overweight/obesity, insulin resistance and high blood pressure issues. I have provided the patient with an opportunity to ask questions and have all questions answered to his satisfaction.     I have sent communication to the referring physician and/or primary care provider.    Mamadou Cannon MD  Staff Physician  Hepatology and Liver Transplant  [facility]  [facility]

## 2019-07-18 NOTE — LETTER
July 18, 2019        Miguel Angel Weber  [address]  [address]  [facility] [address]  Phone: [phone]  Fax: [phone]             [facility]  [address]  [facility] [address]  Phone: [phone]   Patient: Michelle Ojeda   MR Number: [id number]   YOB: 1950   Date of Visit: 7/18/2019       Dear Dr. Miguel Angel Weber    Thank you for referring Michelle Ojeda to me for evaluation. Attached you will find relevant portions of my assessment and plan of care.    If you have questions, please do not hesitate to call me. I look forward to following Michelle Ojeda along with you.    Sincerely,    Mamadou Cannon MD    Enclosure    If you would like to receive this communication electronically, please contact [email or web address] or [phone] to request [employer] Link access.    [employer] Link is a tool which provides read-only access to select patient information with whom you have a relationship. Its easy to use and provides real time access to review your patients record including encounter summaries, notes, results, and demographic information.    If you feel you have received this communication in error or would no longer like to receive these types of communications, please e-mail [email or web address]

## 2019-07-18 NOTE — PATIENT INSTRUCTIONS
Profound Hyponatremia: Na: 120  Will send patient to ER for admission to [facility] Liver service.  - needs urine lytes, UA, free water restriction, albumin for volume expansion and discontinuation of diuretics  - needs to change cipro to 250 mg daily, rifaximin to 550 mg BID    Patient is qualified for SLK due to history of CKD 4 for over 90 days and the most recent eGFR is 26  She will need to see KTM consult.      Cirrhosis Counseling  - strict abstinence of alcohol use  - compliance with lactulose and rifaximin if you have developed hepatic encephalopathy (confusion due to high ammonia level)  - avoid non-steroidal anti-inflammatory drugs (NSAIDs) such as ibuprofen, Motrin, naprosyn, Alleve due to the risk of kidney damage  - can take acetaminophen (Tylenol), no more than 2000 mg per day  - low sodium (salt) 2 gram per day diet  - nutrition: 25-30kcal (calorie per body body weight in kilogram) per day  - no need to restrict protein in diet  - high protein diet: 1.2-1.5 gram/kg (protein per body weight in kilogram) per day to prevent muscle mass loss  - avoid fasting  - Late night snack with 50 gram of complex carbohydrates and protein  - resistance exercises for muscle strength  - avoid raw seafoods due to the risk of fatal Vibrio vulnificus infection  - ultrasound or MRI of the liver every 6 months for liver cancer screening (you are at risk of developing liver cancer due to scar tissue in the liver)  - Upper endoscopy every 1-2 years to screen for varices in the stomach and foodpipe which can burst and cause fatal bleeding

## 2019-07-18 NOTE — TELEPHONE ENCOUNTER
Patient seen in clinic by Dr Cannon abnormal  labs.  Patient ans family member escorted to the ED.

## 2019-07-18 NOTE — ED PROVIDER NOTES
Encounter Date: 7/18/2019       History     Chief Complaint   Patient presents with    Abnormal Lab     low sodium     The patient was at a scheduled appointment this morning for evaluation of possible liver transplant candidacy and was sent to the ER with instructions to be admitted to liver-medicine team due to profound hyponatremia. The patient reports having generalized weakness and fatigue for several weeks, but otherwise she denies any physical complaints or additional concerns. She is accompanied by her family.         Review of patient's allergies indicates:  No Known Allergies  Past Medical History:   Diagnosis Date    Cirrhosis     Diabetes mellitus, type 2     Disorder of kidney and ureter     Hypertension     Hypothyroidism     NAFLD (nonalcoholic fatty liver disease)      History reviewed. No pertinent surgical history.  Family History   Problem Relation Age of Onset    No Known Problems Father      Social History     Tobacco Use    Smoking status: Never Smoker    Smokeless tobacco: Never Used   Substance Use Topics    Alcohol use: Not Currently    Drug use: Never     Review of Systems   Constitutional: Positive for fatigue. Negative for chills and fever.   HENT: Negative for sore throat and trouble swallowing.    Eyes: Negative for visual disturbance.   Respiratory: Negative for cough, chest tightness and shortness of breath.    Cardiovascular: Negative for chest pain and palpitations.   Gastrointestinal: Negative for abdominal pain, blood in stool, diarrhea, nausea and vomiting.   Genitourinary: Negative for difficulty urinating.   Musculoskeletal: Negative for arthralgias, gait problem and myalgias.   Skin: Negative for rash.   Neurological: Negative for dizziness, tremors, seizures, syncope, speech difficulty, weakness, light-headedness, numbness and headaches.   Psychiatric/Behavioral: Negative for confusion.       Physical Exam     Initial Vitals [07/18/19 1130]   BP Pulse Resp Temp  SpO2   114/61 75 18 97.6 °F (36.4 °C) 100 %      MAP       --         Physical Exam    Nursing note and vitals reviewed.  Constitutional: She appears well-developed and well-nourished. She is not diaphoretic.   HENT:   Head: Normocephalic.   Eyes: Conjunctivae are normal.   Cardiovascular: Normal rate.   Pulmonary/Chest: No respiratory distress.   Abdominal: Soft. There is no tenderness.   Musculoskeletal: Normal range of motion.   Neurological: She is alert and oriented to person, place, and time.   Skin: Skin is warm and dry.   Psychiatric: She has a normal mood and affect.         ED Course   Procedures  Labs Reviewed   URINALYSIS, REFLEX TO URINE CULTURE - Abnormal; Notable for the following components:       Result Value    Leukocytes, UA 1+ (*)     All other components within normal limits    Narrative:     Preferred Collection Type->Urine, Clean Catch   SODIUM, URINE, RANDOM - Abnormal; Notable for the following components:    Sodium Urine Random <20 (*)     All other components within normal limits    Narrative:     Preferred Collection Type->Urine, Clean Catch   URINALYSIS MICROSCOPIC - Abnormal; Notable for the following components:    WBC, UA 15 (*)     All other components within normal limits    Narrative:     Preferred Collection Type->Urine, Clean Catch   CULTURE, URINE     Results for orders placed or performed during the hospital encounter of 07/18/19   Urinalysis, Reflex to Urine Culture Urine, Clean Catch   Result Value Ref Range    Specimen UA Urine, Clean Catch     Color, UA Yellow Yellow, Straw, Shahla    Appearance, UA Clear Clear    pH, UA 6.0 5.0 - 8.0    Specific Gravity, UA 1.010 1.005 - 1.030    Protein, UA Negative Negative    Glucose, UA Negative Negative    Ketones, UA Negative Negative    Bilirubin (UA) Negative Negative    Occult Blood UA Negative Negative    Nitrite, UA Negative Negative    Leukocytes, UA 1+ (A) Negative   Sodium, urine, random   Result Value Ref Range    Sodium  Urine Random <20 (A) 20 - 250 mmol/L   Urinalysis Microscopic   Result Value Ref Range    RBC, UA 3 0 - 4 /hpf    WBC, UA 15 (H) 0 - 5 /hpf    Squam Epithel, UA 5 /hpf    Microscopic Comment SEE COMMENT             Imaging Results    None          Medical Decision Making:   History:   Old Medical Records: I decided to obtain old medical records.  Initial Assessment:   Pt sent from clinic to be admitted to liver-medicine team due to severe hyponatremia   Clinical Tests:   Lab Tests: Ordered and Reviewed  ED Management:  Notes/records reviewed   I discussed the case in detail with the ER attending physician   I discussed the case in detail with hospital medicine - requests admission to Dr Breaux                       Clinical Impression:       ICD-10-CM ICD-9-CM   1. Hyponatremia E87.1 276.1         Disposition:   Disposition: Admitted  Condition: Stable                        Chele Zendejas PA-C  07/18/19 0745

## 2019-07-18 NOTE — ED TRIAGE NOTES
Michelle Ojeda, rita 68 y.o. female presents to the ED w/ complaint of abnormal lab, low sodium pt reports weakness and fatigue.    Triage note:  Chief Complaint   Patient presents with    Abnormal Lab     low sodium     Review of patient's allergies indicates:  No Known Allergies  Past Medical History:   Diagnosis Date    Cirrhosis     Diabetes mellitus, type 2     Disorder of kidney and ureter     Hypertension     Hypothyroidism     NAFLD (nonalcoholic fatty liver disease)      Adult Physical Assessment  LOC: Michelle Ojeda, 68 y.o. female verified via two identifiers.  The patient is awake, alert, oriented and speaking appropriately at this time.   APPEARANCE: Patient resting comfortably and appears to be in no acute distress at this time. Patient is clean and well groomed, patient's clothing is properly fastened.  SKIN:The skin is warm and dry, color consistent with ethnicity, patient has normal skin turgor and moist mucus membranes, skin intact, no breakdown or brusing noted.  MUSCULOSKELETAL: Patient moving all extremities well, no obvious swelling or deformities noted. Pt reports fatigue and weakness. Pt able to walk but with increasing weakness, uses a wheelchair moreso.  RESPIRATORY: Airway is open and patent, respirations are spontaneous, patient has a normal effort and rate, no accessory muscle use noted.  CARDIAC: Patient has a normal rate and rhythm, no periphreal edema noted in any extremity, capillary refill < 3 seconds in all extremities  ABDOMEN: Soft and non tender to palpation, no abdominal distention noted. Bowel sounds present in all four quadrants.  NEUROLOGIC: Eyes open spontaneously, behavior appropriate to situation, follows commands, facial expression symmetrical, bilateral hand grasp equal and even, purposeful motor response noted, normal sensation in all extremities when touched with a finger.

## 2019-07-18 NOTE — PROGRESS NOTES
PHARM.D. PRE-TRANSPLANT NOTE:    This patient's medication therapy was evaluated as part of her pre-transplant evaluation.      The following general pharmacologic concerns were noted: none     The following pharmacologic concerns related to HCV therapy were noted: none      This patient's medication profile was reviewed for contraindications for DAA Hepatitis C therapy:    [x]  No current inducers of CYP 3A4 or PGP  [x]  No amiodarone on this patient's EMR profile in the last 24 months  [x]  No past or current atrial fibrillation on this patient's EMR profile       Current Outpatient Medications   Medication Sig Dispense Refill    biotin 5 mg Cap Take 5,000 mcg by mouth once daily.      calcium carbonate/vitamin D3 (CALTRATE-600 PLUS VITAMIN D3 ORAL) Take 1 tablet by mouth once daily.      CINNAMON BARK ORAL Take 2,000 mg by mouth once daily.      furosemide (LASIX) 40 MG tablet Take 40 mg by mouth once daily.      levothyroxine (SYNTHROID) 88 MCG tablet Take 88 mcg by mouth before breakfast.      magnesium 250 mg Tab Take 250 mg by mouth once daily.      spironolactone (ALDACTONE) 50 MG tablet Take 50 mg by mouth once daily.      XIFAXAN 200 mg Tab Take 200 mg by mouth 2 (two) times daily.  2    cholecalciferol, vitamin D3, 5,000 unit capsule Take 5,000 Units by mouth once daily.      ciprofloxacin HCl (CIPRO) 500 MG tablet Take 500 mg by mouth once daily. FOR PARACENTESIS PROCEDURES ONLY 3 DAYS POST  0     No current facility-administered medications for this visit.        Currently her caregiver is responsible for preparing / administering this patient's medications on a daily basis.  I am available for consultation and can be contacted, as needed by the other members of the Liver Transplant team.

## 2019-07-18 NOTE — TELEPHONE ENCOUNTER
----- Message from Maggie Mello sent at 7/18/2019  4:57 PM CDT -----  Contact: pt daughter/Tegan      ----- Message -----  From: Arash Kraft  Sent: 7/18/2019   4:54 PM  To: [facility] Pre-Kidney Transplant Non-Clinical, #    Please call pt daughter at [phone]    Patient daughter has questions about today's and tomorrow's appts    Thank you

## 2019-07-18 NOTE — Clinical Note
MELD 27, Na: 120, flew in from [place] yesterday for SLK eval. Sent to ER for admission. Consider inpatient eval. Thank you.

## 2019-07-19 PROBLEM — R18.8 ASCITES: Status: ACTIVE | Noted: 2019-07-19

## 2019-07-19 PROBLEM — D68.9 COAGULOPATHY: Status: ACTIVE | Noted: 2019-07-19

## 2019-07-19 PROBLEM — E44.0 MODERATE MALNUTRITION: Status: ACTIVE | Noted: 2019-07-19

## 2019-07-19 PROBLEM — R53.81 PHYSICAL DEBILITY: Status: ACTIVE | Noted: 2019-07-19

## 2019-07-19 PROBLEM — K76.82 ACUTE HEPATIC ENCEPHALOPATHY: Status: ACTIVE | Noted: 2019-07-19

## 2019-07-19 PROBLEM — D61.818 PANCYTOPENIA: Status: ACTIVE | Noted: 2019-07-19

## 2019-07-19 PROBLEM — E03.9 HYPOTHYROIDISM: Status: ACTIVE | Noted: 2019-07-19

## 2019-07-19 LAB
ALBUMIN FLD-MCNC: 0.8 G/DL
ALBUMIN SERPL BCP-MCNC: 3.1 G/DL (ref 3.5–5.2)
ALP SERPL-CCNC: 132 U/L (ref 55–135)
ALT SERPL W/O P-5'-P-CCNC: 40 U/L (ref 10–44)
AMMONIA PLAS-SCNC: 66 UMOL/L (ref 10–50)
ANION GAP SERPL CALC-SCNC: 8 MMOL/L (ref 8–16)
ANISOCYTOSIS BLD QL SMEAR: SLIGHT
APPEARANCE FLD: NORMAL
AST SERPL-CCNC: 47 U/L (ref 10–40)
BACTERIA UR CULT: NO GROWTH
BASOPHILS # BLD AUTO: 0.01 K/UL (ref 0–0.2)
BASOPHILS NFR BLD: 0.5 % (ref 0–1.9)
BILIRUB SERPL-MCNC: 1.6 MG/DL (ref 0.1–1)
BODY FLD TYPE: NORMAL
BUN SERPL-MCNC: 35 MG/DL (ref 8–23)
CALCIUM SERPL-MCNC: 8.2 MG/DL (ref 8.7–10.5)
CHLORIDE SERPL-SCNC: 94 MMOL/L (ref 95–110)
CO2 SERPL-SCNC: 20 MMOL/L (ref 23–29)
COLOR FLD: YELLOW
CREAT SERPL-MCNC: 1.6 MG/DL (ref 0.5–1.4)
DIFFERENTIAL METHOD: ABNORMAL
EOSINOPHIL # BLD AUTO: 0.1 K/UL (ref 0–0.5)
EOSINOPHIL NFR BLD: 2.5 % (ref 0–8)
ERYTHROCYTE [DISTWIDTH] IN BLOOD BY AUTOMATED COUNT: 14.3 % (ref 11.5–14.5)
EST. GFR  (AFRICAN AMERICAN): 37.4 ML/MIN/1.73 M^2
EST. GFR  (NON AFRICAN AMERICAN): 32.4 ML/MIN/1.73 M^2
FIBRINOGEN PPP-MCNC: 197 MG/DL (ref 182–366)
GLUCOSE SERPL-MCNC: 89 MG/DL (ref 70–110)
HAPTOGLOB SERPL-MCNC: 50 MG/DL (ref 30–250)
HCT VFR BLD AUTO: 24.6 % (ref 37–48.5)
HGB BLD-MCNC: 8.7 G/DL (ref 12–16)
HYPOCHROMIA BLD QL SMEAR: ABNORMAL
IMM GRANULOCYTES # BLD AUTO: 0.02 K/UL (ref 0–0.04)
IMM GRANULOCYTES NFR BLD AUTO: 1 % (ref 0–0.5)
INR PPP: 1.5 (ref 0.8–1.2)
LDH SERPL L TO P-CCNC: 147 U/L (ref 110–260)
LYMPHOCYTES # BLD AUTO: 0.4 K/UL (ref 1–4.8)
LYMPHOCYTES NFR BLD: 21.7 % (ref 18–48)
LYMPHOCYTES NFR FLD MANUAL: 77 %
MAGNESIUM SERPL-MCNC: 1.9 MG/DL (ref 1.6–2.6)
MCH RBC QN AUTO: 34.7 PG (ref 27–31)
MCHC RBC AUTO-ENTMCNC: 35.4 G/DL (ref 32–36)
MCV RBC AUTO: 98 FL (ref 82–98)
MESOTHL CELL NFR FLD MANUAL: 2 %
MONOCYTES # BLD AUTO: 0.3 K/UL (ref 0.3–1)
MONOCYTES NFR BLD: 13.8 % (ref 4–15)
MONOS+MACROS NFR FLD MANUAL: 13 %
NEUTROPHILS # BLD AUTO: 1.2 K/UL (ref 1.8–7.7)
NEUTROPHILS NFR BLD: 60.5 % (ref 38–73)
NEUTROPHILS NFR FLD MANUAL: 8 %
NRBC BLD-RTO: 0 /100 WBC
OVALOCYTES BLD QL SMEAR: ABNORMAL
PHOSPHATE SERPL-MCNC: 3.4 MG/DL (ref 2.7–4.5)
PLATELET # BLD AUTO: 36 K/UL (ref 150–350)
PMV BLD AUTO: 9.5 FL (ref 9.2–12.9)
POIKILOCYTOSIS BLD QL SMEAR: SLIGHT
POLYCHROMASIA BLD QL SMEAR: ABNORMAL
POTASSIUM SERPL-SCNC: 4.6 MMOL/L (ref 3.5–5.1)
PREALB SERPL-MCNC: 7 MG/DL (ref 20–43)
PROT FLD-MCNC: 1.4 G/DL
PROT SERPL-MCNC: 5.3 G/DL (ref 6–8.4)
PROTHROMBIN TIME: 14.6 SEC (ref 9–12.5)
RBC # BLD AUTO: 2.51 M/UL (ref 4–5.4)
SODIUM SERPL-SCNC: 122 MMOL/L (ref 136–145)
SPECIMEN SOURCE: NORMAL
SPECIMEN SOURCE: NORMAL
TSH SERPL DL<=0.005 MIU/L-ACNC: 1.05 UIU/ML (ref 0.4–4)
WBC # BLD AUTO: 2.03 K/UL (ref 3.9–12.7)
WBC # FLD: 129 /CU MM

## 2019-07-19 PROCEDURE — P9047 ALBUMIN (HUMAN), 25%, 50ML: HCPCS | Mod: JG,NTX | Performed by: HOSPITALIST

## 2019-07-19 PROCEDURE — 20600001 HC STEP DOWN PRIVATE ROOM: Mod: NTX

## 2019-07-19 PROCEDURE — 87075 CULTR BACTERIA EXCEPT BLOOD: CPT | Mod: NTX

## 2019-07-19 PROCEDURE — 82042 OTHER SOURCE ALBUMIN QUAN EA: CPT | Mod: NTX

## 2019-07-19 PROCEDURE — 84157 ASSAY OF PROTEIN OTHER: CPT | Mod: NTX

## 2019-07-19 PROCEDURE — 89051 BODY FLUID CELL COUNT: CPT | Mod: NTX

## 2019-07-19 PROCEDURE — 85025 COMPLETE CBC W/AUTO DIFF WBC: CPT | Mod: NTX

## 2019-07-19 PROCEDURE — 99222 PR INITIAL HOSPITAL CARE,LEVL II: ICD-10-PCS | Mod: NTX,,, | Performed by: INTERNAL MEDICINE

## 2019-07-19 PROCEDURE — 63600175 PHARM REV CODE 636 W HCPCS: Mod: NTX | Performed by: HOSPITALIST

## 2019-07-19 PROCEDURE — 84100 ASSAY OF PHOSPHORUS: CPT | Mod: NTX

## 2019-07-19 PROCEDURE — 80053 COMPREHEN METABOLIC PANEL: CPT | Mod: NTX

## 2019-07-19 PROCEDURE — 25000003 PHARM REV CODE 250: Mod: NTX | Performed by: HOSPITALIST

## 2019-07-19 PROCEDURE — 85610 PROTHROMBIN TIME: CPT | Mod: NTX

## 2019-07-19 PROCEDURE — 83010 ASSAY OF HAPTOGLOBIN QUANT: CPT | Mod: NTX

## 2019-07-19 PROCEDURE — 99233 PR SUBSEQUENT HOSPITAL CARE,LEVL III: ICD-10-PCS | Mod: NTX,,, | Performed by: HOSPITALIST

## 2019-07-19 PROCEDURE — 84443 ASSAY THYROID STIM HORMONE: CPT | Mod: NTX

## 2019-07-19 PROCEDURE — 82140 ASSAY OF AMMONIA: CPT | Mod: NTX

## 2019-07-19 PROCEDURE — 99233 SBSQ HOSP IP/OBS HIGH 50: CPT | Mod: NTX,,, | Performed by: HOSPITALIST

## 2019-07-19 PROCEDURE — 36415 COLL VENOUS BLD VENIPUNCTURE: CPT | Mod: NTX

## 2019-07-19 PROCEDURE — 87070 CULTURE OTHR SPECIMN AEROBIC: CPT | Mod: NTX

## 2019-07-19 PROCEDURE — 84134 ASSAY OF PREALBUMIN: CPT | Mod: NTX

## 2019-07-19 PROCEDURE — 83735 ASSAY OF MAGNESIUM: CPT | Mod: NTX

## 2019-07-19 PROCEDURE — 83615 LACTATE (LD) (LDH) ENZYME: CPT | Mod: NTX

## 2019-07-19 PROCEDURE — 99222 1ST HOSP IP/OBS MODERATE 55: CPT | Mod: NTX,,, | Performed by: INTERNAL MEDICINE

## 2019-07-19 PROCEDURE — 85384 FIBRINOGEN ACTIVITY: CPT | Mod: NTX

## 2019-07-19 RX ORDER — ALBUMIN HUMAN 250 G/1000ML
25 SOLUTION INTRAVENOUS ONCE
Status: COMPLETED | OUTPATIENT
Start: 2019-07-19 | End: 2019-07-19

## 2019-07-19 RX ORDER — LACTULOSE 10 G/15ML
45 SOLUTION ORAL 3 TIMES DAILY
Status: DISCONTINUED | OUTPATIENT
Start: 2019-07-19 | End: 2019-07-20

## 2019-07-19 RX ORDER — ALBUMIN HUMAN 250 G/1000ML
25 SOLUTION INTRAVENOUS ONCE
Status: DISCONTINUED | OUTPATIENT
Start: 2019-07-19 | End: 2019-07-22

## 2019-07-19 RX ORDER — ALBUMIN HUMAN 250 G/1000ML
25 SOLUTION INTRAVENOUS 3 TIMES DAILY
Status: DISPENSED | OUTPATIENT
Start: 2019-07-19 | End: 2019-07-20

## 2019-07-19 RX ORDER — ZINC SULFATE 50(220)MG
220 CAPSULE ORAL DAILY
Status: DISCONTINUED | OUTPATIENT
Start: 2019-07-19 | End: 2019-07-24

## 2019-07-19 RX ADMIN — LEVOTHYROXINE SODIUM 88 MCG: 88 TABLET ORAL at 06:07

## 2019-07-19 RX ADMIN — ALBUMIN (HUMAN) 25 G: 25 SOLUTION INTRAVENOUS at 10:07

## 2019-07-19 RX ADMIN — LACTULOSE 45 G: 20 SOLUTION ORAL at 11:07

## 2019-07-19 RX ADMIN — LACTULOSE 30 G: 20 SOLUTION ORAL at 02:07

## 2019-07-19 RX ADMIN — ALBUMIN (HUMAN) 25 G: 12.5 SOLUTION INTRAVENOUS at 09:07

## 2019-07-19 RX ADMIN — LACTULOSE 45 G: 20 SOLUTION ORAL at 09:07

## 2019-07-19 RX ADMIN — ALBUMIN (HUMAN) 25 G: 12.5 SOLUTION INTRAVENOUS at 02:07

## 2019-07-19 RX ADMIN — RIFAXIMIN 550 MG: 550 TABLET ORAL at 09:07

## 2019-07-19 RX ADMIN — PHYTONADIONE 10 MG: 10 INJECTION, EMULSION INTRAMUSCULAR; INTRAVENOUS; SUBCUTANEOUS at 09:07

## 2019-07-19 NOTE — ASSESSMENT & PLAN NOTE
Ms Ojeda is a 68 year old female with a history of IZAGUIRRE ESLD, with decompensations including Gr 1 HE, ascites, EV, hyponatremia, T2DM, HTN, hypothyroidism, who is being admitted from hepatology clinic due to concern for hyponatremia.    Plan  - will request inpatient transplant evaluation  - ascites: repeat paracentesis with cell counts. Continue cipro prophylaxis.  - diuretics: holding.  - CKD: unclear baseline. Holding diuretics and albumin therapy given hyponatremia. Trend Crt. May require SLK, will request renal evaluation.  - encephalopathy: alert and oriented. Some confusion to situation. Continue lactulose and rifaximin  - hyponatremia: fluid restriction 1L. Holding diuresis.  - malnutrition: nutrition consult. Supplements.  - PT/OT evaluation  - discuss transplantation evaluation process with patient's family. Discussed that they may need to move to [place] if she is approved for transplantation as she may decompensate if she goes back to [place] and be unable to receive a transplant.  Questions answered.

## 2019-07-19 NOTE — HPI
Ms Ojeda is a 68 year old female with a history of HERRMANN ESLD, with decompensations including Gr 1 HE, ascites, EV, hyponatremia, T2DM, HTN, hypothyroidism, who is being admitted from hepatology clinic due to concern for hyponatremia.    She presents to transplant Clinic for initial evaluation on 07/18 with Dr. Cannon for initial transplant evaluation.  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  Her cirrhosis has been complicated by grade 1 hepatic encephalopathy, recently started Aldo Dunn min but never been on lactulose, ascites, sarcopenia, esophageal varices status post ligation, hyponatremia.  She was previously evaluated at [place] with Dr. Mccormick who recommended evaluation at [facility].  Does not appear she was evaluated for transplant at that time.    Due to hyponatremia with sodium of 120 she was admitted to the hospital for further management.  Currently she reports she is feeling well without any complaints.  Denies any abdominal pain, nausea, vomiting, diarrhea.  Denies any fevers, chills, sweats or other infectious complaints.    Family who is present in the room note that she is doing extremely well until approximately 2 months prior to presentation when she began to be more frail, fatigue, increased peripheral edema, decreased oral intake.  They note they have been extremely careful to avoid T here to low-sodium diet.  She has never been hospitalized in the past for hyponatremia.  No other recent hospitalizations.  Regarding her ascites she has approximately received a paracentesis every 2 weeks.

## 2019-07-19 NOTE — PLAN OF CARE
CM met with patient's family, patient was having a test done. Daughter, Tegan, who is power of  provided information. Patient lives in a single story home with 2 steps with her spouse. [facility] My [facility] Packet given to patient's family. No discharge needs identified at this time.      07/19/19 0954   Discharge Assessment   Assessment Type Discharge Planning Assessment   Confirmed/corrected address and phone number on facesheet? Yes   Assessment information obtained from? Other  (daughter)   Expected Length of Stay (days) 2   Communicated expected length of stay with patient/caregiver no   Prior to hospitilization cognitive status: Alert/Oriented   Prior to hospitalization functional status: Assistive Equipment   Current cognitive status: Alert/Oriented   Current Functional Status: Assistive Equipment   Lives With spouse   Able to Return to Prior Arrangements yes   Is patient able to care for self after discharge? Yes   Who are your caregiver(s) and their phone number(s)? [phone]   Patient's perception of discharge disposition home or selfcare   Readmission Within the Last 30 Days no previous admission in last 30 days   Patient currently being followed by outpatient case management? No   Patient currently receives any other outside agency services? No   Equipment Currently Used at Home wheelchair  (as needed)   Do you have any problems affording any of your prescribed medications? No   Is the patient taking medications as prescribed? yes   Does the patient have transportation home? Yes   Transportation Anticipated family or friend will provide   Dialysis Name and Scheduled days N/A   Does the patient receive services at the Coumadin Clinic? No   Discharge Plan A Home with family   Discharge Plan B Home   DME Needed Upon Discharge  none   Patient/Family in Agreement with Plan yes

## 2019-07-19 NOTE — H&P
Radiology History & Physical      SUBJECTIVE:     Chief Complaint: abdominal distention    History of Present Illness:  Michelle Ojeda is a 68 y.o. female who presents for abdominal distention  Past Medical History:   Diagnosis Date    Cirrhosis     Diabetes mellitus, type 2     Disorder of kidney and ureter     Hypertension     Hypothyroidism     NAFLD (nonalcoholic fatty liver disease)      History reviewed. No pertinent surgical history.    Home Meds:   Prior to Admission medications    Medication Sig Start Date End Date Taking? Authorizing Provider   biotin 5 mg Cap Take 5,000 mcg by mouth once daily.   Yes Historical Provider, MD   calcium carbonate/vitamin D3 (CALTRATE-600 PLUS VITAMIN D3 ORAL) Take 1 tablet by mouth once daily.   Yes Historical Provider, MD   cholecalciferol, vitamin D3, (VITAMIN D3) 2,000 unit Cap Take 2,000 Units by mouth once daily.    Yes Historical Provider, MD   CINNAMON BARK ORAL Take 2,000 mg by mouth once daily.   Yes Historical Provider, MD   ciprofloxacin HCl (CIPRO) 500 MG tablet Take 500 mg by mouth once daily. FOR PARACENTESIS PROCEDURES ONLY 3 DAYS POST 7/17/19  Yes Historical Provider, MD   furosemide (LASIX) 40 MG tablet Take 40 mg by mouth once daily.   Yes Historical Provider, MD   levothyroxine (SYNTHROID) 88 MCG tablet Take 88 mcg by mouth before breakfast.   Yes Historical Provider, MD   magnesium 250 mg Tab Take 250 mg by mouth once daily.   Yes Historical Provider, MD   spironolactone (ALDACTONE) 50 MG tablet Take 50 mg by mouth once daily.   Yes Historical Provider, MD   XIFAXAN 200 mg Tab Take 200 mg by mouth 2 (two) times daily. 7/3/19  Yes Historical Provider, MD     Anticoagulants/Antiplatelets: no anticoagulation    Allergies: Review of patient's allergies indicates:  No Known Allergies  Sedation History:  no adverse reactions    Review of Systems:   Hematological: no known coagulopathies  Respiratory: no shortness of breath  Cardiovascular: no chest  pain  Gastrointestinal: no abdominal pain  Genito-Urinary: no dysuria  Musculoskeletal: negative  Neurological: no TIA or stroke symptoms         OBJECTIVE:     Vital Signs (Most Recent)  Temp: 96.8 °F (36 °C) (07/19/19 0424)  Pulse: 81 (07/19/19 0424)  Resp: 18 (07/19/19 0424)  BP: (!) 114/56 (07/19/19 0424)  SpO2: 100 % (07/19/19 0424)    Physical Exam:  ASA: 2  Mallampati: na    General: no acute distress  Mental Status: alert and oriented to person, place and time  HEENT: normocephalic, atraumatic  Chest: unlabored breathing  Heart: regular heart rate  Abdomen: distended  Extremity: moves all extremities    ASSESSMENT/PLAN:     Sedation Plan: local anesthesia  Patient will undergo US guided paracentesis

## 2019-07-19 NOTE — PLAN OF CARE
Problem: Adult Inpatient Plan of Care  Goal: Plan of Care Review  Outcome: Ongoing (interventions implemented as appropriate)  Recommendations     1. When medically feasible, resume Low sodium diet (fluid restriction per MD).   2. Add Boost Plus ONS & Beneprotein to aid in caloric/protein intake.   3. RD to monitor & follow-up.

## 2019-07-19 NOTE — PLAN OF CARE
Problem: Adult Inpatient Plan of Care  Goal: Plan of Care Review  Outcome: Ongoing (interventions implemented as appropriate)     07/19/19 9907   Plan of Care Review   Plan of Care Reviewed With patient;spouse   AAO X4, generalized weakness: low sodium levels , labs monitored : requires assist x1 with adl's : NPO for test & procedures today :safety rounds performed :  LETICIA GUTIÉRREZ

## 2019-07-19 NOTE — CONSULTS
[facility]  Adult Nutrition  Consult Note    SUMMARY     Recommendations    1. When medically feasible, resume Low sodium diet (fluid restriction per MD).   2. Add Boost Plus ONS & Beneprotein to aid in caloric/protein intake.   3. RD to monitor & follow-up.     Goals: PO intake >50%  Nutrition Goal Status: new  Communication of RD Recs: reviewed with RN    Reason for Assessment    Reason For Assessment: consult  Diagnosis: other (see comments)(Hyponatremia)  Relevant Medical History: DM, HTN, Cirrhosis  Interdisciplinary Rounds: did not attend    General Information Comments: Pt FELICITA at time of visit, spoke with family members at bedside. Pt consumed 100% of dinner last night, tolerated. Currently NPO for tests/procedures. PTA, family members report pt w/ poor appetite x 7 months 2/2 early satiety. Unsure of UBW but w/ current fluid status, pt's wt ranges 125-135#. Prior to poor appetite & fluid wt gain/loss, (x 7 months ago), pt weighed 145# (-7%). Low sodium, high protein diet education complete. Offered Beneprotein & Boost, willing to try. NFPE not complete 2/2 pt FELICITA, however RD feels pt meets moderate malnutrition criteria 2/2 wt loss & inadequate energy intake PTA. Please see PES statement for details. RD to complete NFPE at time of follow-up.  Nutrition Discharge Planning: Adequate PO intake    Nutrition/Diet History    Patient Reported Diet/Restrictions/Preferences: low salt  Spiritual, Cultural Beliefs, Muslim Practices, Values that Affect Care: no  Supplemental Drinks or Food Habits: Ensure Plus  Factors Affecting Nutritional Intake: NPO, decreased appetite, early satiety    Anthropometrics    Temp: 96.8 °F (36 °C)  Height Method: Stated  Height: 5' (152.4 cm)  Height (inches): 60 in  Weight Method: Bed Scale  Weight: 61.7 kg (136 lb 0.4 oz)  Weight (lb): 136.03 lb  Ideal Body Weight (IBW), Female: 100 lb  % Ideal Body Weight, Female (lb): 136.03 lb  BMI (Calculated): 26.6  BMI  Grade: 25 - 29.9 - overweight  Usual Body Weight (UBW), k kg  % Usual Body Weight: 93.68  % Weight Change From Usual Weight: -6.52 %    Lab/Procedures/Meds    Pertinent Labs Reviewed: reviewed  Pertinent Labs Comments: Na 122, BUN 35, Creat 1.6, GFR 32.4, Bili 1.6  Pertinent Medications Reviewed: reviewed  Pertinent Medications Comments: Lactulose    Estimated/Assessed Needs    Weight Used For Calorie Calculations: 61.7 kg (136 lb 0.4 oz)     Energy Calorie Requirements (kcal): 1323 kcal/d  Energy Need Method: [place]-St Jeor(1.25 PAL)     Protein Requirements: 74 g/d (1.2 g/kg)  Weight Used For Protein Calculations: 61.7 kg (136 lb 0.4 oz)     Estimated Fluid Requirement Method: other (see comments)(Per MD or 1 mL/kcal)    Nutrition Prescription Ordered    Current Diet Order: NPO  Nutrition Order Comments: Was on Low sodium    Evaluation of Received Nutrient/Fluid Intake    Comments: LBM:     Tolerance: tolerating    Nutrition Risk    Level of Risk/Frequency of Follow-up: (1x/week)     Assessment and Plan    Moderate malnutrition    Malnutrition in the context of Chronic Illness/Injury    Related to (etiology):  Inadequate energy intake    Signs and Symptoms (as evidenced by):  Energy Intake: <75% of estimated energy requirement for 4 months   Weight Loss: 7% x 7 months   RD to complete NFPE at time of follow-up.    Interventions/Recommendations (treatment strategy):  Collaboration of nutrition care w/ other providers     Nutrition Diagnosis Status:  New     Monitor and Evaluation    Food and Nutrient Intake: energy intake, food and beverage intake  Food and Nutrient Adminstration: diet order  Physical Activity and Function: nutrition-related ADLs and IADLs  Anthropometric Measurements: weight, weight change  Biochemical Data, Medical Tests and Procedures: glucose/endocrine profile, inflammatory profile, lipid profile, gastrointestinal profile, electrolyte and renal panel  Nutrition-Focused Physical  Findings: overall appearance     Malnutrition Assessment    Weight Loss (Malnutrition): other (see comments)(7% x 7 months)  Energy Intake (Malnutrition): less than 75% for greater than or equal to 3 months  *(RD to complete NFPE at time of follow-up.)     Nutrition Follow-Up    RD Follow-up?: Yes

## 2019-07-19 NOTE — PROGRESS NOTES
Progress Note   Hospital Medicine         Patient Name: Michelle Ojeda  MRN:  [id number]  Hospital Medicine Team: [facility] MED L Ty Breaux MD  Date of Admission:  7/18/2019     Length of Stay:  LOS: 1 day   Expected Discharge Date: 7/23/2019  Principal Problem:  Hyponatremia       Subjective:     Interval History/Overnight Events:  Patient doing ok today, still has not had a BM, increasing lactulose today; sodium upto 122 and Cr improved; continue albumin and will open up fluid restriction to 1L; patient approved for inpatient liver/kidney transplant evaluation;   - KTM states she would be a kidney tx candidate  - spoke to daughter and son in law about the plan  - went for IR paracentesis with 5L removed and negative for SBP  - numerous consults and testing ordered; will likely be here until Tuesday; needs aggressive nutrition and PT/OT as patient is frail; did tell family that patient needs to improve on those areas to be considered a better transplant candidate and they understand; they also asked if patient was not a transplant candidate how long she would have to live, currently gave about a 6 month window, however can not be certain; patient was extremely active until about 3 months ago when her HE hit and has not been treated appropriately for it until now;     Review of Systems   Constitutional: Negative for chills, fatigue, fever.   HENT: Negative for sore throat, trouble swallowing.    Eyes: Negative for photophobia, visual disturbance.   Respiratory: Negative for cough, shortness of breath.    Cardiovascular: Negative for chest pain, palpitations, leg swelling.   Gastrointestinal: Negative for abdominal pain, constipation, diarrhea, nausea, vomiting.   Endocrine: Negative for cold intolerance, heat intolerance.   Genitourinary: Negative for dysuria, frequency.   Musculoskeletal: Negative for arthralgias, myalgias.   Skin: Negative for rash, wound, erythema   Neurological: Negative for dizziness,  syncope, weakness, light-headedness.   Psychiatric/Behavioral: Negative for confusion, hallucinations, anxiety  All other systems reviewed and are negative.    Objective:     Temp:  [96.1 °F (35.6 °C)-98.7 °F (37.1 °C)]   Pulse:  [72-81]   Resp:  [16-18]   BP: ()/(55-61)   SpO2:  [98 %-100 %]       Physical Exam:  Constitutional: appears weak and ill  Head: Normocephalic and atraumatic.   Mouth/Throat: Oropharynx is clear and moist.   Eyes: EOM are normal. Pupils are equal, round, and reactive to light. No scleral icterus.   Neck: Normal range of motion. Neck supple.   Cardiovascular: Normal rate and regular rhythm.  No murmur heard.  Pulmonary/Chest: Effort normal and breath sounds normal. No respiratory distress. No wheezes, rales, or rhonchi  Abdominal: Soft. Bowel sounds are normal.  positive distension, no tenderness  Musculoskeletal: Normal range of motion. No edema.   Neurological: Alert and oriented to person, place, and time.   Skin: Skin is warm and dry.   Psychiatric: Normal mood and affect. Behavior is normal.     Recent Labs   Lab 07/18/19 0810 07/19/19  0341   WBC 3.40* 2.03*   HGB 10.6* 8.7*   HCT 31.3* 24.6*   PLT 47* 36*     Recent Labs   Lab 07/18/19  0810 07/18/19  2115 07/19/19  0341   * 122* 122*   K 4.4 4.5 4.6   CL 92* 92* 94*   CO2 20* 22* 20*   BUN 33* 34* 35*   CREATININE 1.9* 1.9* 1.6*   * 183* 89   CALCIUM 8.1* 8.4* 8.2*   MG  --   --  1.9   PHOS  --   --  3.4     Recent Labs   Lab 07/18/19  0810 07/18/19  2115 07/19/19  0341   ALKPHOS 174* 137* 132   ALT 57* 44 40   AST 65* 49* 47*   ALBUMIN 2.7* 2.8* 3.1*   PROT 5.9* 5.7* 5.3*   BILITOT 1.9* 1.8* 1.6*   INR 1.3*  --  1.5*     No results for input(s): POCTGLUCOSE in the last 168 hours.     albumin human 25%  25 g Intravenous TID    albumin human 25%  25 g Intravenous Once    lactulose  45 g Oral TID    levothyroxine  88 mcg Oral Before breakfast    phytonadione ((AQUA-MEPHYTON) IVPB  10 mg Intravenous Daily     rifAXImin  550 mg Oral BID    zinc sulfate  220 mg Oral Daily       Assessment and Plan     Ms. Michelle Ojeda is a 68 y.o. female who presented to [facility] on 7/18/2019 with     Hospital Course:    Ms. 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     # Hyponatremia  - fluid restrict to 1L, currently 122  - giving albumin   - urine and serum osmols  - stop diuretics     # Acute hepatic encephalopathy   - increasing lactulose to have 3 to 4 BMs daily, currently has not had any; if none by this evening, will give enema   - cont rifaximin adding zinc      # Decompensated IZAGUIRRE Cirrhosis with ascites  MELD-Na score: 26 at 7/19/2019  3:41 AM  MELD score: 17 at 7/19/2019  3:41 AM  Calculated from:  Serum Creatinine: 1.6 mg/dL at 7/19/2019  3:41 AM  Serum Sodium: 122 mmol/L (Rounded to 125 mmol/L) at 7/19/2019  3:41 AM  Total Bilirubin: 1.6 mg/dL at 7/19/2019  3:41 AM  INR(ratio): 1.5 at 7/19/2019  3:41 AM  Age: 68 years  - hepatology consulted  - being evaluated for SLK, KTM states a kidney tx candidate   - Liver U/S with doppler reviewed   - cleared for inpatient eval, tests and consults placed  - went for IR paracentesis with 5L removed on 7/19 and negative for SBP     # CKD stage 4  - U/A; renal U/S reviewed; Cr improved to 1.6; is a kidney tx candidate     # Coagulopathy due to liver disease   -  vitamin K times 3 days   - DIC labs     # Hypothyroidism  - cont levothyroxine      #  Moderate protein payton malnutrition  - PAB, novasource, dietary     # Pancytopenia  - DIC labs     # Physical debility  -PT/OT     Diet:  Low sodium  GI PPx:    DVT PPx:    Goals of Care:  full        Disposition:  Early next week, going to undergo liver/kidney tx    Ty Breaux MD  Medical Director [facility] Medicine  Spectra:  [phone]  Pager: [phone]

## 2019-07-19 NOTE — NURSING
Patient back on floor from completed ultrasound and paracentesis. AAOx4, vitals taken with daughter at bedside.

## 2019-07-19 NOTE — ASSESSMENT & PLAN NOTE
Nutrition Problem  Malnutrition in the context of Chronic Illness/Injury    Related to (etiology):  Inadequate energy intake    Signs and Symptoms (as evidenced by):  Energy Intake: <75% of estimated energy requirement for 4 months   Body Fat Depletion: moderate depletion of orbitals and triceps   Muscle Mass Depletion: moderate depletion of temples and clavicle region   Weight Loss: 7% x 7 months     Interventions/Recommendations (treatment strategy):  Collaboration of nutrition care w/ other providers     Nutrition Diagnosis Status:  Continues

## 2019-07-19 NOTE — SUBJECTIVE & OBJECTIVE
Review of Systems   Constitutional: Negative for activity change, appetite change, chills, diaphoresis, fatigue, fever and unexpected weight change.   HENT: Negative for sore throat and trouble swallowing.    Eyes: Negative for visual disturbance.   Respiratory: Negative for chest tightness and shortness of breath.    Cardiovascular: Negative for chest pain and leg swelling.   Gastrointestinal: Negative for abdominal distention, abdominal pain, anal bleeding, blood in stool, constipation, diarrhea, nausea and vomiting.   Genitourinary: Negative for dysuria and hematuria.   Musculoskeletal: Negative for arthralgias and myalgias.   Skin: Negative for rash.   Neurological: Negative for dizziness, weakness, light-headedness and headaches.   Psychiatric/Behavioral: Negative for agitation and confusion.       Past Medical History:   Diagnosis Date    Cirrhosis     Diabetes mellitus, type 2     Disorder of kidney and ureter     Hypertension     Hypothyroidism     NAFLD (nonalcoholic fatty liver disease)        History reviewed. No pertinent surgical history.    Family history of liver disease: No    Review of patient's allergies indicates:  No Known Allergies    Tobacco Use    Smoking status: Never Smoker    Smokeless tobacco: Never Used   Substance and Sexual Activity    Alcohol use: Not Currently    Drug use: Never    Sexual activity: Not on file       Medications Prior to Admission   Medication Sig Dispense Refill Last Dose    biotin 5 mg Cap Take 5,000 mcg by mouth once daily.   7/17/2019    calcium carbonate/vitamin D3 (CALTRATE-600 PLUS VITAMIN D3 ORAL) Take 1 tablet by mouth once daily.   7/17/2019    cholecalciferol, vitamin D3, (VITAMIN D3) 2,000 unit Cap Take 2,000 Units by mouth once daily.    7/17/2019    CINNAMON BARK ORAL Take 2,000 mg by mouth once daily.   7/17/2019    ciprofloxacin HCl (CIPRO) 500 MG tablet Take 500 mg by mouth once daily. FOR PARACENTESIS PROCEDURES ONLY 3 DAYS POST  0  Past Week    furosemide (LASIX) 40 MG tablet Take 40 mg by mouth once daily.   7/17/2019    levothyroxine (SYNTHROID) 88 MCG tablet Take 88 mcg by mouth before breakfast.   7/18/2019    magnesium 250 mg Tab Take 250 mg by mouth once daily.   7/17/2019    spironolactone (ALDACTONE) 50 MG tablet Take 50 mg by mouth once daily.   7/17/2019    XIFAXAN 200 mg Tab Take 200 mg by mouth 2 (two) times daily.  2 7/17/2019       Objective:     Vital Signs (Most Recent):  Temp: 98.7 °F (37.1 °C) (07/19/19 1305)  Pulse: 75 (07/19/19 1305)  Resp: 16 (07/19/19 1305)  BP: (!) 109/57 (07/19/19 1305)  SpO2: 100 % (07/19/19 1305) Vital Signs (24h Range):  Temp:  [96.1 °F (35.6 °C)-98.7 °F (37.1 °C)] 98.7 °F (37.1 °C)  Pulse:  [64-81] 75  Resp:  [14-18] 16  SpO2:  [98 %-100 %] 100 %  BP: ()/(55-69) 109/57     Weight: 61.7 kg (136 lb 0.4 oz) (07/19/19 1000)  Body mass index is 26.57 kg/m².    Physical Exam   Constitutional: She is oriented to person, place, and time. She appears well-developed and well-nourished. No distress.   Frail-appearing elderly female, appeared in no acute distress.   HENT:   Head: Normocephalic and atraumatic.   Mouth/Throat: Oropharynx is clear and moist. No oropharyngeal exudate.   Eyes: Conjunctivae are normal. No scleral icterus.   Cardiovascular: Normal rate, regular rhythm, normal heart sounds and intact distal pulses.   Pulmonary/Chest: Effort normal and breath sounds normal. No respiratory distress.   Abdominal: Soft. Bowel sounds are normal. She exhibits no distension and no mass. There is no tenderness. There is no rebound and no guarding.   Soft abdomen.  Status post paracentesis.  Tympanic.   Musculoskeletal: She exhibits edema (Mild). She exhibits no tenderness.   Lymphadenopathy:     She has no cervical adenopathy.   Neurological: She is alert and oriented to person, place, and time.   Mild asterixis.   Skin: Skin is warm. Capillary refill takes less than 2 seconds. No rash noted. She is  not diaphoretic.   Psychiatric: She has a normal mood and affect. Her behavior is normal. Judgment and thought content normal.   Nursing note and vitals reviewed.      MELD-Na score: 26 at 7/19/2019  3:41 AM  MELD score: 17 at 7/19/2019  3:41 AM  Calculated from:  Serum Creatinine: 1.6 mg/dL at 7/19/2019  3:41 AM  Serum Sodium: 122 mmol/L (Rounded to 125 mmol/L) at 7/19/2019  3:41 AM  Total Bilirubin: 1.6 mg/dL at 7/19/2019  3:41 AM  INR(ratio): 1.5 at 7/19/2019  3:41 AM  Age: 68 years    Significant Labs:  Labs within the past month have been reviewed.    Significant Imaging:  Labs: Reviewed

## 2019-07-19 NOTE — H&P
History and Physical  Hospital Medicine       Patient Name: Michelle Ojeda  MRN:  [id number]  Hospital Medicine [place] [facility] [place] Ty Breaux MD  Date of Admission:  7/18/2019     Principal Problem:  Hyponatremia   Primary Care Physician: Primary Doctor No      History of Present Illness:     Michelle Ojeda is a 68 y.o. female that presents to [facility] for hyponatremia.  She is here from [place] for consultation of had concerns including Liver/Kidney Transplant Evaluation.  She  is accompanied by her daughter and son-in-law.  She was diagnosed with IZAGUIRRE cirrhosis by CT scan and blood tests in July 2015. She had a liver biopsy in 1998 - steatosis but ?fibrosis.  She has developed grade 1 hepatic encephalopathy, recently started on rifaximin. She has developed severe ascites, sarcopenia, esophageal varices and hyponatremia.    She has had a 1 month decline of her functional status and has been feeling lethargic.  Requires biweekly paracentesis.  Patient was given rifaximin only for her HE, never lactulose.      Review of Systems   Constitutional: Negative for chills, fatigue, fever.   HENT: Negative for sore throat, trouble swallowing.    Eyes: Negative for photophobia, visual disturbance.   Respiratory: Negative for cough, shortness of breath.    Cardiovascular: Negative for chest pain, palpitations, leg swelling.   Gastrointestinal: Negative for abdominal pain, constipation, diarrhea, nausea, vomiting.   Endocrine: Negative for cold intolerance, heat intolerance.   Genitourinary: Negative for dysuria, frequency.   Musculoskeletal: Negative for arthralgias, myalgias.   Skin: Negative for rash, wound, erythema   Neurological: Negative for dizziness, syncope, weakness, light-headedness.   Psychiatric/Behavioral: Negative for confusion, hallucinations, anxiety  All other systems reviewed and are negative.      Past Medical History: Patient has a past medical history of  Cirrhosis, Diabetes mellitus, type 2, Disorder of kidney and ureter, Hypertension, Hypothyroidism, and NAFLD (nonalcoholic fatty liver disease).    Past Surgical History: Patient has no past surgical history on file.    Social History: Patient reports that she has never smoked. She has never used smokeless tobacco. She reports that she drank alcohol. She reports that she does not use drugs.    Family History: family history includes No Known Problems in her father.    Medications: Scheduled Meds:   albumin human 25%  25 g Intravenous TID    lactulose  30 g Oral TID    levothyroxine  88 mcg Oral Before breakfast    rifAXImin  550 mg Oral BID     Continuous Infusions:  PRN Meds:.acetaminophen, Dextrose 10% Bolus, Dextrose 10% Bolus, glucagon (human recombinant), glucose, glucose, ondansetron, sodium chloride 0.9%, sodium chloride 0.9%    Allergies: Patient has No Known Allergies.    Physical Exam:     Vital Signs (Most Recent):  Temp: 97.8 °F (36.6 °C) (07/18/19 2323)  Pulse: 75 (07/18/19 2323)  Resp: 16 (07/18/19 2323)  BP: (!) 92/55 (07/18/19 2323)  SpO2: 98 % (07/18/19 2323) Vital Signs Range (Last 24H):  Temp:  [96.1 °F (35.6 °C)-97.8 °F (36.6 °C)]   Pulse:  [64-80]   Resp:  [14-18]   BP: ()/(55-69)   SpO2:  [98 %-100 %]    Body mass index is 26.57 kg/m².     Physical Exam:  Constitutional: appears weak and ill  Head: Normocephalic and atraumatic.   Mouth/Throat: Oropharynx is clear and moist.   Eyes: EOM are normal. Pupils are equal, round, and reactive to light. positive scleral icterus.   Neck: Normal range of motion. Neck supple.   Cardiovascular: Normal rate and regular rhythm.  No murmur heard.  Pulmonary/Chest: Effort normal and breath sounds normal. No respiratory distress. No wheezes, rales, or rhonchi  Abdominal: Soft. Bowel sounds are normal.  positive distension, no tenderness  Musculoskeletal: Normal range of motion. Plus 1 pitting edema.   Neurological: Alert and oriented to person, place,  and time.   Skin: Skin is warm and dry.   Psychiatric: Normal mood and affect. Behavior is normal.   Vitals reviewed.    Recent Labs   Lab 07/18/19  0810   WBC 3.40*   HGB 10.6*   HCT 31.3*   PLT 47*       Recent Labs   Lab 07/18/19  0810 07/18/19  2115   * 122*   K 4.4 4.5   CL 92* 92*   CO2 20* 22*   BUN 33* 34*   CREATININE 1.9* 1.9*   * 183*   CALCIUM 8.1* 8.4*     Recent Labs   Lab 07/18/19  0810 07/18/19 2115   ALKPHOS 174* 137*   ALT 57* 44   AST 65* 49*   ALBUMIN 2.7* 2.8*   PROT 5.9* 5.7*   BILITOT 1.9* 1.8*   INR 1.3*  --       No results for input(s): POCTGLUCOSE in the last 168 hours.      Assessment and Plan:     Ms. Michelle Ojeda is a 68 y.o. 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     # Hyponatremia  - fluid restrict to 500 cc, currently 120  - giving albumin, went up to 122, continue  - urine and serum osmols  - stop diuretics     # Acute hepatic encephalopathy   - starting patient on lactulose to have 3 to 4 BMs daily  - cont rifaximin adding zinc     # Decompensated IZAGUIRRE Cirrhosis with ascites  MELD-Na score: 27 at 7/18/2019  9:15 PM  MELD score: 18 at 7/18/2019  9:15 PM  Calculated from:  Serum Creatinine: 1.9 mg/dL at 7/18/2019  9:15 PM  Serum Sodium: 122 mmol/L (Rounded to 125 mmol/L) at 7/18/2019  9:15 PM  Total Bilirubin: 1.8 mg/dL at 7/18/2019  9:15 PM  INR(ratio): 1.3 at 7/18/2019  8:10 AM  Age: 68  years  - hepatology consulted  - being evaluated for SLK  - Liver U/S with doppler  - will likely need inpatient clearance    # CKD stage 4  - U/A; renal U/S; trend Cr    # Hypothyroidism  - cont levothyroxine      # Severe malnutrition  - PAB, novasource, dietary    # Pancytopenia  - DIC labs    # Physical debility  -PT/OT    Diet:  Low sodium  GI PPx:    DVT PPx:    Goals of Care:  full      Disposition:  Early next week, going to undergo liver/kidney tx    Ty Breaux MD  Medical Director [facility] Medicine  Spectra:  [phone]  Pager: [phone]

## 2019-07-19 NOTE — NURSING
Patient tolerated procedure well, 5000 ml fluid removed, sample sent to lab, albumin given per protocol

## 2019-07-19 NOTE — PLAN OF CARE
Problem: Adult Inpatient Plan of Care  Goal: Plan of Care Review  POC reviewed with patient and family. US of kidneys and liver completed today, paracentesis and echo. Very productive day. Patient has family at bedside and denies any pain or discomfort. Safety maintianed, call light in reach with siderails up x 2.

## 2019-07-19 NOTE — PLAN OF CARE
GIOVANNI following for D/C needs. SW is in communication with patient's CM and patient's Care Team - IML. GIOVANNI will continue to follow.      07/19/19 0901   Post-Acute Status   Post-Acute Authorization Other   Other Status No Post-Acute Service Needs     Rossi Edward LMSW   - [facility]  Ext. [phone]

## 2019-07-19 NOTE — PROCEDURES
Radiology Post-Procedure Note    Pre Op Diagnosis: Ascites  Post Op Diagnosis: Same    Procedure: Ultrasound Guided Paracentesis    Procedure performed by: ANTHONY Gordon DNP  Written Informed Consent Obtained: Yes  Specimen Removed: YES clear yellow  Estimated Blood Loss: Minimal    Findings:   Successful paracentesis.  Albumin administered PRN per protocol.    Patient tolerated procedure well.

## 2019-07-19 NOTE — CONSULTS
[facility]  Hepatology  Consult Note    Patient Name: Michelle Ojeda  MRN: [id number]  Admission Date: 7/18/2019  Hospital Length of Stay: 1 days  Attending Provider: Ty Breaux MD   Primary Care Physician: Primary Doctor No  Principal Problem:Hyponatremia    Inpatient consult to Hepatology  Consult performed by: Benton Mae MD  Consult ordered by: Ty Breaux MD        Subjective:     Transplant status: Requesting inpatient evaluation    HPI:  Ms Ojeda is a 68 year old female with a history of HERRMANN ESLD, with decompensations including Gr 1 HE, ascites, EV, hyponatremia, T2DM, HTN, hypothyroidism, who is being admitted from hepatology clinic due to concern for hyponatremia.    She presents to transplant Clinic for initial evaluation on 07/18 with Dr. Cannon for initial transplant evaluation.  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  Her cirrhosis has been complicated by grade 1 hepatic encephalopathy, recently started Aldo Cheatham min but never been on lactulose, ascites, sarcopenia, esophageal varices status post ligation, hyponatremia.  She was previously evaluated at [place] with Dr. Mccormick who recommended evaluation at [facility].  Does not appear she was evaluated for transplant at that time.    Due to hyponatremia with sodium of 120 she was admitted to the hospital for further management.  Currently she reports she is feeling well without any complaints.  Denies any abdominal pain, nausea, vomiting, diarrhea.  Denies any fevers, chills, sweats or other infectious complaints.    Family who is present in the room note that she is doing extremely well until approximately 2 months prior to presentation when she began to be more frail, fatigue, increased peripheral edema, decreased oral  intake.  They note they have been extremely careful to avoid T here to low-sodium diet.  She has never been hospitalized in the past for hyponatremia.  No other recent hospitalizations.  Regarding her ascites she has approximately received a paracentesis every 2 weeks.      Review of Systems   Constitutional: Negative for activity change, appetite change, chills, diaphoresis, fatigue, fever and unexpected weight change.   HENT: Negative for sore throat and trouble swallowing.    Eyes: Negative for visual disturbance.   Respiratory: Negative for chest tightness and shortness of breath.    Cardiovascular: Negative for chest pain and leg swelling.   Gastrointestinal: Negative for abdominal distention, abdominal pain, anal bleeding, blood in stool, constipation, diarrhea, nausea and vomiting.   Genitourinary: Negative for dysuria and hematuria.   Musculoskeletal: Negative for arthralgias and myalgias.   Skin: Negative for rash.   Neurological: Negative for dizziness, weakness, light-headedness and headaches.   Psychiatric/Behavioral: Negative for agitation and confusion.       Past Medical History:   Diagnosis Date    Cirrhosis     Diabetes mellitus, type 2     Disorder of kidney and ureter     Hypertension     Hypothyroidism     NAFLD (nonalcoholic fatty liver disease)        History reviewed. No pertinent surgical history.    Family history of liver disease: No    Review of patient's allergies indicates:  No Known Allergies    Tobacco Use    Smoking status: Never Smoker    Smokeless tobacco: Never Used   Substance and Sexual Activity    Alcohol use: Not Currently    Drug use: Never    Sexual activity: Not on file       Medications Prior to Admission   Medication Sig Dispense Refill Last Dose    biotin 5 mg Cap Take 5,000 mcg by mouth once daily.   7/17/2019    calcium carbonate/vitamin D3 (CALTRATE-600 PLUS VITAMIN D3 ORAL) Take 1 tablet by mouth once daily.   7/17/2019    cholecalciferol, vitamin D3,  (VITAMIN D3) 2,000 unit Cap Take 2,000 Units by mouth once daily.    7/17/2019    CINNAMON BARK ORAL Take 2,000 mg by mouth once daily.   7/17/2019    ciprofloxacin HCl (CIPRO) 500 MG tablet Take 500 mg by mouth once daily. FOR PARACENTESIS PROCEDURES ONLY 3 DAYS POST  0 Past Week    furosemide (LASIX) 40 MG tablet Take 40 mg by mouth once daily.   7/17/2019    levothyroxine (SYNTHROID) 88 MCG tablet Take 88 mcg by mouth before breakfast.   7/18/2019    magnesium 250 mg Tab Take 250 mg by mouth once daily.   7/17/2019    spironolactone (ALDACTONE) 50 MG tablet Take 50 mg by mouth once daily.   7/17/2019    XIFAXAN 200 mg Tab Take 200 mg by mouth 2 (two) times daily.  2 7/17/2019       Objective:     Vital Signs (Most Recent):  Temp: 98.7 °F (37.1 °C) (07/19/19 1305)  Pulse: 75 (07/19/19 1305)  Resp: 16 (07/19/19 1305)  BP: (!) 109/57 (07/19/19 1305)  SpO2: 100 % (07/19/19 1305) Vital Signs (24h Range):  Temp:  [96.1 °F (35.6 °C)-98.7 °F (37.1 °C)] 98.7 °F (37.1 °C)  Pulse:  [64-81] 75  Resp:  [14-18] 16  SpO2:  [98 %-100 %] 100 %  BP: ()/(55-69) 109/57     Weight: 61.7 kg (136 lb 0.4 oz) (07/19/19 1000)  Body mass index is 26.57 kg/m².    Physical Exam   Constitutional: She is oriented to person, place, and time. She appears well-developed and well-nourished. No distress.   Frail-appearing elderly female, appeared in no acute distress.   HENT:   Head: Normocephalic and atraumatic.   Mouth/Throat: Oropharynx is clear and moist. No oropharyngeal exudate.   Eyes: Conjunctivae are normal. No scleral icterus.   Cardiovascular: Normal rate, regular rhythm, normal heart sounds and intact distal pulses.   Pulmonary/Chest: Effort normal and breath sounds normal. No respiratory distress.   Abdominal: Soft. Bowel sounds are normal. She exhibits no distension and no mass. There is no tenderness. There is no rebound and no guarding.   Soft abdomen.  Status post paracentesis.  Tympanic.   Musculoskeletal: She  exhibits edema (Mild). She exhibits no tenderness.   Lymphadenopathy:     She has no cervical adenopathy.   Neurological: She is alert and oriented to person, place, and time.   Mild asterixis.   Skin: Skin is warm. Capillary refill takes less than 2 seconds. No rash noted. She is not diaphoretic.   Psychiatric: She has a normal mood and affect. Her behavior is normal. Judgment and thought content normal.   Nursing note and vitals reviewed.      MELD-Na score: 26 at 7/19/2019  3:41 AM  MELD score: 17 at 7/19/2019  3:41 AM  Calculated from:  Serum Creatinine: 1.6 mg/dL at 7/19/2019  3:41 AM  Serum Sodium: 122 mmol/L (Rounded to 125 mmol/L) at 7/19/2019  3:41 AM  Total Bilirubin: 1.6 mg/dL at 7/19/2019  3:41 AM  INR(ratio): 1.5 at 7/19/2019  3:41 AM  Age: 68 years    Significant Labs:  Labs within the past month have been reviewed.    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    Plan  - will request inpatient transplant evaluation  - ascites: repeat paracentesis with cell counts. Continue cipro prophylaxis.  - diuretics: holding.  - CKD: unclear baseline. Holding diuretics and albumin therapy given hyponatremia. Trend Crt. May require SLK, will request renal evaluation.  - encephalopathy: alert and oriented. Some confusion to situation. Continue lactulose and rifaximin  - hyponatremia: fluid restriction 1L. Holding diuresis.  - malnutrition: nutrition consult. Supplements.  - PT/OT evaluation  - discuss transplantation evaluation process with patient's family. Discussed that they may need to move to [place] if she is approved for transplantation as she may decompensate if she goes back to [place] and be unable to receive a  transplant.  Questions answered.        Thank you for your consult. I will follow-up with patient. Please contact us if you have any additional questions.    Benton Mae MD  Hepatology  [facility]

## 2019-07-19 NOTE — NURSING
Report given by Priscilla Jacobs LPN. Patient is AAOx4 without any c/o or distress noted. Transport at room door, patient transported down to ultrasound with safety maintained.

## 2019-07-20 PROBLEM — K56.7 ILEUS: Status: ACTIVE | Noted: 2019-07-20

## 2019-07-20 LAB
ALBUMIN SERPL BCP-MCNC: 4.3 G/DL (ref 3.5–5.2)
ALP SERPL-CCNC: 106 U/L (ref 55–135)
ALT SERPL W/O P-5'-P-CCNC: 33 U/L (ref 10–44)
AMMONIA PLAS-SCNC: 45 UMOL/L (ref 10–50)
ANION GAP SERPL CALC-SCNC: 10 MMOL/L (ref 8–16)
ANISOCYTOSIS BLD QL SMEAR: SLIGHT
AST SERPL-CCNC: 46 U/L (ref 10–40)
BASOPHILS # BLD AUTO: 0.01 K/UL (ref 0–0.2)
BASOPHILS NFR BLD: 0.4 % (ref 0–1.9)
BILIRUB SERPL-MCNC: 2.1 MG/DL (ref 0.1–1)
BUN SERPL-MCNC: 33 MG/DL (ref 8–23)
CALCIUM SERPL-MCNC: 9 MG/DL (ref 8.7–10.5)
CHLORIDE SERPL-SCNC: 93 MMOL/L (ref 95–110)
CO2 SERPL-SCNC: 20 MMOL/L (ref 23–29)
CREAT SERPL-MCNC: 1.7 MG/DL (ref 0.5–1.4)
DIFFERENTIAL METHOD: ABNORMAL
EOSINOPHIL # BLD AUTO: 0 K/UL (ref 0–0.5)
EOSINOPHIL NFR BLD: 1.2 % (ref 0–8)
ERYTHROCYTE [DISTWIDTH] IN BLOOD BY AUTOMATED COUNT: 14 % (ref 11.5–14.5)
EST. GFR  (AFRICAN AMERICAN): 34.7 ML/MIN/1.73 M^2
EST. GFR  (NON AFRICAN AMERICAN): 30.1 ML/MIN/1.73 M^2
GLUCOSE SERPL-MCNC: 176 MG/DL (ref 70–110)
HCT VFR BLD AUTO: 26.8 % (ref 37–48.5)
HGB BLD-MCNC: 9.6 G/DL (ref 12–16)
IMM GRANULOCYTES # BLD AUTO: 0.02 K/UL (ref 0–0.04)
IMM GRANULOCYTES NFR BLD AUTO: 0.8 % (ref 0–0.5)
INR PPP: 1.5 (ref 0.8–1.2)
LACTATE SERPL-SCNC: 1.8 MMOL/L (ref 0.5–2.2)
LYMPHOCYTES # BLD AUTO: 0.2 K/UL (ref 1–4.8)
LYMPHOCYTES NFR BLD: 8.8 % (ref 18–48)
MAGNESIUM SERPL-MCNC: 2.1 MG/DL (ref 1.6–2.6)
MCH RBC QN AUTO: 34.9 PG (ref 27–31)
MCHC RBC AUTO-ENTMCNC: 35.8 G/DL (ref 32–36)
MCV RBC AUTO: 98 FL (ref 82–98)
MONOCYTES # BLD AUTO: 0.3 K/UL (ref 0.3–1)
MONOCYTES NFR BLD: 11.2 % (ref 4–15)
NEUTROPHILS # BLD AUTO: 2 K/UL (ref 1.8–7.7)
NEUTROPHILS NFR BLD: 77.6 % (ref 38–73)
NRBC BLD-RTO: 0 /100 WBC
PHOSPHATE SERPL-MCNC: 2.5 MG/DL (ref 2.7–4.5)
PLATELET # BLD AUTO: 33 K/UL (ref 150–350)
PLATELET BLD QL SMEAR: ABNORMAL
PMV BLD AUTO: 8.8 FL (ref 9.2–12.9)
POTASSIUM SERPL-SCNC: 3.7 MMOL/L (ref 3.5–5.1)
PROT SERPL-MCNC: 6 G/DL (ref 6–8.4)
PROTHROMBIN TIME: 15 SEC (ref 9–12.5)
RBC # BLD AUTO: 2.75 M/UL (ref 4–5.4)
SODIUM SERPL-SCNC: 123 MMOL/L (ref 136–145)
WBC # BLD AUTO: 2.51 K/UL (ref 3.9–12.7)

## 2019-07-20 PROCEDURE — 83735 ASSAY OF MAGNESIUM: CPT | Mod: NTX

## 2019-07-20 PROCEDURE — P9047 ALBUMIN (HUMAN), 25%, 50ML: HCPCS | Mod: JG,NTX | Performed by: HOSPITALIST

## 2019-07-20 PROCEDURE — 97530 THERAPEUTIC ACTIVITIES: CPT | Mod: NTX

## 2019-07-20 PROCEDURE — 36415 COLL VENOUS BLD VENIPUNCTURE: CPT | Mod: NTX

## 2019-07-20 PROCEDURE — 25000003 PHARM REV CODE 250: Mod: NTX | Performed by: NURSE PRACTITIONER

## 2019-07-20 PROCEDURE — B4185 PARENTERAL SOL 10 GM LIPIDS: HCPCS | Mod: NTX | Performed by: HOSPITALIST

## 2019-07-20 PROCEDURE — 87040 BLOOD CULTURE FOR BACTERIA: CPT | Mod: NTX

## 2019-07-20 PROCEDURE — 63600175 PHARM REV CODE 636 W HCPCS: Mod: NTX | Performed by: HOSPITALIST

## 2019-07-20 PROCEDURE — 84100 ASSAY OF PHOSPHORUS: CPT | Mod: NTX

## 2019-07-20 PROCEDURE — 99233 PR SUBSEQUENT HOSPITAL CARE,LEVL III: ICD-10-PCS | Mod: NTX,,, | Performed by: HOSPITALIST

## 2019-07-20 PROCEDURE — A4217 STERILE WATER/SALINE, 500 ML: HCPCS | Mod: NTX | Performed by: HOSPITALIST

## 2019-07-20 PROCEDURE — 97161 PT EVAL LOW COMPLEX 20 MIN: CPT | Mod: NTX

## 2019-07-20 PROCEDURE — 25000003 PHARM REV CODE 250: Mod: NTX | Performed by: HOSPITALIST

## 2019-07-20 PROCEDURE — 82140 ASSAY OF AMMONIA: CPT | Mod: NTX

## 2019-07-20 PROCEDURE — 99231 PR SUBSEQUENT HOSPITAL CARE,LEVL I: ICD-10-PCS | Mod: NTX,,, | Performed by: INTERNAL MEDICINE

## 2019-07-20 PROCEDURE — 83605 ASSAY OF LACTIC ACID: CPT | Mod: NTX

## 2019-07-20 PROCEDURE — 99231 SBSQ HOSP IP/OBS SF/LOW 25: CPT | Mod: NTX,,, | Performed by: INTERNAL MEDICINE

## 2019-07-20 PROCEDURE — 85025 COMPLETE CBC W/AUTO DIFF WBC: CPT | Mod: NTX

## 2019-07-20 PROCEDURE — 20600001 HC STEP DOWN PRIVATE ROOM: Mod: NTX

## 2019-07-20 PROCEDURE — 99233 SBSQ HOSP IP/OBS HIGH 50: CPT | Mod: NTX,,, | Performed by: HOSPITALIST

## 2019-07-20 PROCEDURE — 80053 COMPREHEN METABOLIC PANEL: CPT | Mod: NTX

## 2019-07-20 PROCEDURE — 85610 PROTHROMBIN TIME: CPT | Mod: NTX

## 2019-07-20 RX ORDER — DICYCLOMINE HYDROCHLORIDE 10 MG/1
10 CAPSULE ORAL 4 TIMES DAILY PRN
Status: DISCONTINUED | OUTPATIENT
Start: 2019-07-20 | End: 2019-08-03

## 2019-07-20 RX ORDER — LACTULOSE 10 G/15ML
200 SOLUTION ORAL; RECTAL ONCE
Status: COMPLETED | OUTPATIENT
Start: 2019-07-20 | End: 2019-07-20

## 2019-07-20 RX ORDER — CEFTRIAXONE 1 G/1
1 INJECTION, POWDER, FOR SOLUTION INTRAMUSCULAR; INTRAVENOUS
Status: DISCONTINUED | OUTPATIENT
Start: 2019-07-20 | End: 2019-07-20

## 2019-07-20 RX ORDER — ALBUMIN HUMAN 250 G/1000ML
25 SOLUTION INTRAVENOUS 3 TIMES DAILY
Status: COMPLETED | OUTPATIENT
Start: 2019-07-20 | End: 2019-07-20

## 2019-07-20 RX ORDER — SIMETHICONE 80 MG
1 TABLET,CHEWABLE ORAL 3 TIMES DAILY PRN
Status: DISCONTINUED | OUTPATIENT
Start: 2019-07-20 | End: 2019-08-10 | Stop reason: HOSPADM

## 2019-07-20 RX ADMIN — DICYCLOMINE HYDROCHLORIDE 10 MG: 10 CAPSULE ORAL at 04:07

## 2019-07-20 RX ADMIN — LACTULOSE 45 G: 20 SOLUTION ORAL at 02:07

## 2019-07-20 RX ADMIN — PHYTONADIONE 10 MG: 10 INJECTION, EMULSION INTRAMUSCULAR; INTRAVENOUS; SUBCUTANEOUS at 08:07

## 2019-07-20 RX ADMIN — ALBUMIN (HUMAN) 25 G: 12.5 SOLUTION INTRAVENOUS at 02:07

## 2019-07-20 RX ADMIN — CEFTRIAXONE SODIUM 1 G: 1 INJECTION, POWDER, FOR SOLUTION INTRAMUSCULAR; INTRAVENOUS at 11:07

## 2019-07-20 RX ADMIN — SOYBEAN OIL 250 ML: 20 INJECTION, SOLUTION INTRAVENOUS at 10:07

## 2019-07-20 RX ADMIN — LACTULOSE 45 G: 20 SOLUTION ORAL at 08:07

## 2019-07-20 RX ADMIN — RIFAXIMIN 550 MG: 550 TABLET ORAL at 08:07

## 2019-07-20 RX ADMIN — SIMETHICONE CHEW TAB 80 MG 80 MG: 80 TABLET ORAL at 06:07

## 2019-07-20 RX ADMIN — MAGNESIUM SULFATE HEPTAHYDRATE: 500 INJECTION, SOLUTION INTRAMUSCULAR; INTRAVENOUS at 10:07

## 2019-07-20 RX ADMIN — SIMETHICONE CHEW TAB 80 MG 80 MG: 80 TABLET ORAL at 10:07

## 2019-07-20 RX ADMIN — SIMETHICONE CHEW TAB 80 MG 80 MG: 80 TABLET ORAL at 02:07

## 2019-07-20 RX ADMIN — ACETAMINOPHEN 650 MG: 325 TABLET ORAL at 09:07

## 2019-07-20 RX ADMIN — ALBUMIN (HUMAN) 25 G: 12.5 SOLUTION INTRAVENOUS at 11:07

## 2019-07-20 RX ADMIN — LEVOTHYROXINE SODIUM 88 MCG: 88 TABLET ORAL at 06:07

## 2019-07-20 RX ADMIN — RIFAXIMIN 550 MG: 550 TABLET ORAL at 10:07

## 2019-07-20 RX ADMIN — ALBUMIN (HUMAN) 25 G: 12.5 SOLUTION INTRAVENOUS at 10:07

## 2019-07-20 RX ADMIN — Medication 220 MG: at 08:07

## 2019-07-20 RX ADMIN — ONDANSETRON 8 MG: 8 TABLET, ORALLY DISINTEGRATING ORAL at 10:07

## 2019-07-20 RX ADMIN — DICYCLOMINE HYDROCHLORIDE 10 MG: 10 CAPSULE ORAL at 06:07

## 2019-07-20 RX ADMIN — LACTULOSE 200 G: 10 SOLUTION ORAL at 11:07

## 2019-07-20 RX ADMIN — PROMETHAZINE HYDROCHLORIDE 12.5 MG: 25 INJECTION INTRAMUSCULAR; INTRAVENOUS at 11:07

## 2019-07-20 RX ADMIN — ONDANSETRON 8 MG: 8 TABLET, ORALLY DISINTEGRATING ORAL at 04:07

## 2019-07-20 RX ADMIN — DICYCLOMINE HYDROCHLORIDE 10 MG: 10 CAPSULE ORAL at 09:07

## 2019-07-20 RX ADMIN — ONDANSETRON 8 MG: 8 TABLET, ORALLY DISINTEGRATING ORAL at 06:07

## 2019-07-20 NOTE — PT/OT/SLP EVAL
"Physical Therapy Evaluation    Patient Name:  Michelle Ojeda   MRN:  [id number]    Recommendations:     Discharge Recommendations:  home health PT   Discharge Equipment Recommendations: walker, rolling   Barriers to discharge: Inaccessible home    Assessment:     Michelle Ojeda is a 68 y.o. female admitted with a medical diagnosis of Hyponatremia.  She presents with the following impairments/functional limitations:  weakness, impaired self care skills, impaired balance, impaired endurance, impaired functional mobilty, gait instability, pain.  Pt tolerated session c c/o weakness.  Performed functional mobility c S-CGA.  Pt able to amb in room and demo unsteadiness and shuffled date - improved c use of RW.  Pt limited by endurance and generalized weakness/pain.  Pt safe to amb in room c assistance of 1x person and RW (RW ordered for use while admitted). Pt would benefit from continued skilled acute PT 3x/wk to improve functional mobility.  Recommending pt receive PT services in HH setting following d/c from hospital once medically cleared.      Rehab Prognosis: Good; patient would benefit from acute skilled PT services to address these deficits and reach maximum level of function.    Recent Surgery: * No surgery found *      Plan:     During this hospitalization, patient to be seen 3 x/week to address the identified rehab impairments via gait training, therapeutic activities, therapeutic exercises, neuromuscular re-education and progress toward the following goals:    · Plan of Care Expires:  08/14/19    Subjective     Chief Complaint: weakness  Patient/Family Comments/goals: "Everything hurts."  Pain/Comfort:  · Pain Rating 1: (reports generalized pain but did not rate)    Patients cultural, spiritual, Yazidi conflicts given the current situation: no    Living Environment:  Pt lives c  in [facility] c [facility].  PTA not driving, not working and no falls/  Prior to admission, patients level of function was " independent c ADLs and furniture cruises for mobility.  Equipment used at home: wheelchair, grab bar.  DME owned (not currently used): none.  Upon discharge, patient will have assistance from family.    Objective:     Communicated with RN prior to session.  Patient found supine with peripheral IV, telemetry  upon PT entry to room.    General Precautions: Standard, fall   Orthopedic Precautions:N/A   Braces: N/A     Exams:  · Cognitive Exam:  Patient is oriented to Person, Place, Time and Situation  · RLE ROM: WFL  · RLE Strength: WFL  · LLE ROM: WFL  · LLE Strength: WFL    Functional Mobility:  · Bed Mobility:     · Rolling Right: supervision  · Scooting: supervision  · Supine to Sit: supervision  · Transfers:     · Sit to Stand:  contact guard assistance with hand-held assist  · Gait: 20ft c HHAx1 CGA-min A and occasional use of bedrailing for stability; 20ft c RW CGA  · Decreased gait speed, mild FFP, flat foot contact, narrow PRECIOUS, mild unsteadiness c improvement using RW  · Balance: sitting (S); standing (CGA)      Therapeutic Activities and Exercises:  Pt educated on: PT role/POC; safety c mobility; benefits of OOB activities; performing therex; d/c recs - v/u  -sit<>Stand from bedside chair 3x  -standing 1x2min  -RW ordered for use in room      AM-PAC 6 CLICK MOBILITY  Total Score:17     Patient left up in chair with all lines intact, call button in reach, RN notified and family present.    GOALS:   Multidisciplinary Problems     Physical Therapy Goals        Problem: Physical Therapy Goal    Goal Priority Disciplines Outcome Goal Variances Interventions   Physical Therapy Goal     PT, PT/OT Ongoing (interventions implemented as appropriate)     Description:  Goals to be met by: 2019     Patient will increase functional independence with mobility by performin. Supine to sit with Modified [place]  2. Sit to supine with Modified [place]  3. Sit to stand transfer with Supervision  4. Gait  x  150 feet with Supervision using Rolling Walker.   5. Ascend/descend 3 stair with bilateral Handrails Stand-by Assistance                     History:     Past Medical History:   Diagnosis Date    Cirrhosis     Diabetes mellitus, type 2     Disorder of kidney and ureter     Hypertension     Hypothyroidism     NAFLD (nonalcoholic fatty liver disease)        History reviewed. No pertinent surgical history.    Time Tracking:     PT Received On: 07/20/19  PT Start Time: 1105     PT Stop Time: 1128  PT Total Time (min): 23 min     Billable Minutes: Evaluation 10 min and Therapeutic Activity 10 min      Benton Norman, PT  07/20/2019

## 2019-07-20 NOTE — PLAN OF CARE
Problem: Adult Inpatient Plan of Care  Goal: Plan of Care Review  Outcome: Ongoing (interventions implemented as appropriate)     07/20/19 0323   Plan of Care Review   Plan of Care Reviewed With patient   AAO X4, generalized weakness: Abdomen distention:  lactulose regimen , pt c/o abdomen discomfort , gas & cramping : bm x2 : administered  Simethicone 80 mg , pt noted belching and emesis x1 with particles of medication noted : Family & pt voices concerned of toe nails digging into pt skin , this was ongoing issue prior to this admission : Pt requesting for toe nails to be cut : trace edema noted to lower ext: bedside commode provided d/t generalized weakness: camera in place to provided safety:  rounds performed :  VSS, LETICIA .

## 2019-07-20 NOTE — SUBJECTIVE & OBJECTIVE
Interval History:   - endorses difficulty overnight due to nausea and vomiting. Endorses abdominal pain  - negative diagnostic paracentesis on 7/19  - labs CBC stable. INR stable 1.5, T bili increasing to 2.1.    Current Facility-Administered Medications   Medication    acetaminophen tablet 650 mg    albumin human 25% bottle 25 g    albumin human 25% bottle 25 g    [START ON 7/21/2019] cefTRIAXone (ROCEPHIN) 2 g in dextrose 5 % 50 mL IVPB    dextrose 10% (D10W) Bolus    dextrose 10% (D10W) Bolus    dicyclomine capsule 10 mg    fat emulsion 20% infusion 250 mL    glucagon (human recombinant) injection 1 mg    glucose chewable tablet 16 g    glucose chewable tablet 24 g    lactulose 20 gram/30 mL solution Soln 45 g    levothyroxine tablet 88 mcg    ondansetron disintegrating tablet 8 mg    phytonadione vitamin k (AQUA-MEPHYTON) 10 mg in dextrose 5 % 50 mL IVPB    rifAXIMin tablet 550 mg    simethicone chewable tablet 80 mg    sodium chloride 0.9% flush 10 mL    sodium chloride 0.9% flush 10 mL    Standard [employer] ADULT TPN for patient with NO electrolyte abnormality or NO renal dysfunction (PERIPHERAL)    zinc sulfate capsule 220 mg       Objective:     Vital Signs (Most Recent):  Temp: 98.6 °F (37 °C) (07/20/19 1455)  Pulse: 77 (07/20/19 1455)  Resp: 18 (07/20/19 1455)  BP: 136/65 (07/20/19 1455)  SpO2: 97 % (07/20/19 1455) Vital Signs (24h Range):  Temp:  [98.2 °F (36.8 °C)-98.6 °F (37 °C)] 98.6 °F (37 °C)  Pulse:  [72-79] 77  Resp:  [16-19] 18  SpO2:  [96 %-100 %] 97 %  BP: ()/(53-67) 136/65     Weight: 61.7 kg (136 lb) (07/19/19 1643)  Body mass index is 26.56 kg/m².    Physical Exam   Constitutional: She is oriented to person, place, and time. No distress.   Confused appearing. Family at bedside. Frail elderly female.   HENT:   Head: Normocephalic and atraumatic.   Mouth/Throat: No oropharyngeal exudate.   Eyes: Conjunctivae are normal. No scleral icterus.   Pulmonary/Chest:  Effort normal. No respiratory distress.   Abdominal: Soft. Bowel sounds are normal. She exhibits no distension. There is no tenderness. There is no guarding.   Soft, non-tender. Tympanic abdomen.   Musculoskeletal: She exhibits edema (mild). She exhibits no tenderness.   Neurological: She is alert and oriented to person, place, and time.   Oriented but confused.   Skin: Skin is warm. Capillary refill takes less than 2 seconds. She is not diaphoretic.   Psychiatric: She has a normal mood and affect. Thought content normal.   Nursing note and vitals reviewed.      MELD-Na score: 27 at 7/20/2019  4:39 AM  MELD score: 19 at 7/20/2019  4:39 AM  Calculated from:  Serum Creatinine: 1.7 mg/dL at 7/20/2019  4:39 AM  Serum Sodium: 123 mmol/L (Rounded to 125 mmol/L) at 7/20/2019  4:39 AM  Total Bilirubin: 2.1 mg/dL at 7/20/2019  4:39 AM  INR(ratio): 1.5 at 7/20/2019  4:39 AM  Age: 68 years    Significant Labs:  Labs within the past month have been reviewed.    Significant Imaging:  Labs: Reviewed

## 2019-07-20 NOTE — NURSING
Dr Breaux phoned regarding patient vomitus x 3, nausea and abdominal discomfort. New orders to be implemented by MD.

## 2019-07-20 NOTE — ASSESSMENT & PLAN NOTE
Ms Ojeda is a 68 year old female with a history of IZAGUIRRE ESLD, with decompensations including Gr 1 HE, ascites, EV, hyponatremia, T2DM, HTN, hypothyroidism, who is being admitted from hepatology clinic due to concern for hyponatremia.    Plan  - re-culture and start empiric antibiotics due to acute worsening on 7/20  - ascites:  Negative for SBP on 07/19. Total protein 1.5. Consider prophylactic antibiotics  - diuretics: hold  - CKD: unclear baseline. Holding diuretics and albumin therapy given hyponatremia.   - encephalopathy: alert and oriented. Worsening confusion on 7/20. Continue lactulose and rifaximin  - hyponatremia: slowly improving with fluid restriction. continue fluid restriction 1L. Holding diuresis.  - malnutrition: nutrition consult. Supplements.  - PT/OT evaluation  - inpatient evaluation requested

## 2019-07-20 NOTE — PLAN OF CARE
Problem: Fall Injury Risk  Goal: Absence of Fall and Fall-Related Injury  Patient remained free from falls and injuries with family at bedside

## 2019-07-20 NOTE — PLAN OF CARE
Problem: Physical Therapy Goal  Goal: Physical Therapy Goal  Goals to be met by: 2019     Patient will increase functional independence with mobility by performin. Supine to sit with Modified [place]  2. Sit to supine with Modified [place]  3. Sit to stand transfer with Supervision  4. Gait  x 150 feet with Supervision using Rolling Walker.   5. Ascend/descend 3 stair with bilateral Handrails Stand-by Assistance   Outcome: Ongoing (interventions implemented as appropriate)  Eval completed and POC established    Benton Norman, PT,DPT  2019

## 2019-07-20 NOTE — PHARMACY MED REC
"Admission Medication Reconciliation - Pharmacy Consult Note    The home medication history was taken by Sveta Dan, Pharmacy Technician.  Based on information gathered and subsequent review by the clinical pharmacist, the items below may need attention.    You may go to "Admission" then "Reconcile Home Medications" tabs to review and/or act upon these items.      No issues noted with the medication reconciliation.      Please address this information as you see fit.  Feel free to contact us if you have any questions or require assistance.    Jeannie Abarca, InesD  [id number]                  .    .          "

## 2019-07-20 NOTE — PLAN OF CARE
Problem: Adult Inpatient Plan of Care  Goal: Plan of Care Review  POC reviewed with patient, daughter and son in law this shift. Frequent rounds has been completed throughout the shift. IV start in right hand 2o gauge x 1 attempt successful,saline locked. Patient to start on lipids tonight per md order. NGT inserted in right nare without difficulty with placement confirmed per xray, connected to low cont suction, brownish drainage noted in canister, small amount. Patient has received lactulose enema with good results of large brown,soft stool. AAOX4 with vomitus x 3 today MD notified, Saeid. Patient comfortable at this time, care continued with safety maintained. Will continue to monitor with call light in reach with siderails up x2.

## 2019-07-20 NOTE — PROGRESS NOTES
[facility]  Hepatology  Progress Note    Patient Name: Michelle Ojeda  MRN: [id number]  Admission Date: 7/18/2019  Hospital Length of Stay: 2 days  Attending Provider: Ty Breaux MD   Primary Care Physician: Primary Doctor No  Principal Problem:Hyponatremia    Subjective:     Transplant status: No    HPI: Ms Ojeda is a 68 year old female with a history of HERRMANN ESLD, with decompensations including Gr 1 HE, ascites, EV, hyponatremia, T2DM, HTN, hypothyroidism, who is being admitted from hepatology clinic due to concern for hyponatremia.    She presents to transplant Clinic for initial evaluation on 07/18 with Dr. Cannon for initial transplant evaluation.  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  Her cirrhosis has been complicated by grade 1 hepatic encephalopathy, recently started Aldo [place] min but never been on lactulose, ascites, sarcopenia, esophageal varices status post ligation, hyponatremia.  She was previously evaluated at [place] with Dr. Mccormick who recommended evaluation at [facility].  Does not appear she was evaluated for transplant at that time.    Due to hyponatremia with sodium of 120 she was admitted to the hospital for further management.  Currently she reports she is feeling well without any complaints.  Denies any abdominal pain, nausea, vomiting, diarrhea.  Denies any fevers, chills, sweats or other infectious complaints.    Family who is present in the room note that she is doing extremely well until approximately 2 months prior to presentation when she began to be more frail, fatigue, increased peripheral edema, decreased oral intake.  They note they have been extremely careful to avoid T here to low-sodium diet.  She has never been hospitalized in the past for hyponatremia.  No  other recent hospitalizations.  Regarding her ascites she has approximately received a paracentesis every 2 weeks.      Interval History:   - endorses difficulty overnight due to nausea and vomiting. Endorses abdominal pain  - negative diagnostic paracentesis on 7/19  - labs CBC stable. INR stable 1.5, T bili increasing to 2.1.    Current Facility-Administered Medications   Medication    acetaminophen tablet 650 mg    albumin human 25% bottle 25 g    albumin human 25% bottle 25 g    [START ON 7/21/2019] cefTRIAXone (ROCEPHIN) 2 g in dextrose 5 % 50 mL IVPB    dextrose 10% (D10W) Bolus    dextrose 10% (D10W) Bolus    dicyclomine capsule 10 mg    fat emulsion 20% infusion 250 mL    glucagon (human recombinant) injection 1 mg    glucose chewable tablet 16 g    glucose chewable tablet 24 g    lactulose 20 gram/30 mL solution Soln 45 g    levothyroxine tablet 88 mcg    ondansetron disintegrating tablet 8 mg    phytonadione vitamin k (AQUA-MEPHYTON) 10 mg in dextrose 5 % 50 mL IVPB    rifAXIMin tablet 550 mg    simethicone chewable tablet 80 mg    sodium chloride 0.9% flush 10 mL    sodium chloride 0.9% flush 10 mL    Standard Custom Day One ADULT TPN for patient with NO electrolyte abnormality or NO renal dysfunction (PERIPHERAL)    zinc sulfate capsule 220 mg       Objective:     Vital Signs (Most Recent):  Temp: 98.6 °F (37 °C) (07/20/19 1455)  Pulse: 77 (07/20/19 1455)  Resp: 18 (07/20/19 1455)  BP: 136/65 (07/20/19 1455)  SpO2: 97 % (07/20/19 1455) Vital Signs (24h Range):  Temp:  [98.2 °F (36.8 °C)-98.6 °F (37 °C)] 98.6 °F (37 °C)  Pulse:  [72-79] 77  Resp:  [16-19] 18  SpO2:  [96 %-100 %] 97 %  BP: ()/(53-67) 136/65     Weight: 61.7 kg (136 lb) (07/19/19 1643)  Body mass index is 26.56 kg/m².    Physical Exam   Constitutional: She is oriented to person, place, and time. No distress.   Confused appearing. Family at bedside. Frail elderly female.   HENT:   Head: Normocephalic and  atraumatic.   Mouth/Throat: No oropharyngeal exudate.   Eyes: Conjunctivae are normal. No scleral icterus.   Pulmonary/Chest: Effort normal. No respiratory distress.   Abdominal: Soft. Bowel sounds are normal. She exhibits no distension. There is no tenderness. There is no guarding.   Soft, non-tender. Tympanic abdomen.   Musculoskeletal: She exhibits edema (mild). She exhibits no tenderness.   Neurological: She is alert and oriented to person, place, and time.   Oriented but confused.   Skin: Skin is warm. Capillary refill takes less than 2 seconds. She is not diaphoretic.   Psychiatric: She has a normal mood and affect. Thought content normal.   Nursing note and vitals reviewed.      MELD-Na score: 27 at 7/20/2019  4:39 AM  MELD score: 19 at 7/20/2019  4:39 AM  Calculated from:  Serum Creatinine: 1.7 mg/dL at 7/20/2019  4:39 AM  Serum Sodium: 123 mmol/L (Rounded to 125 mmol/L) at 7/20/2019  4:39 AM  Total Bilirubin: 2.1 mg/dL at 7/20/2019  4:39 AM  INR(ratio): 1.5 at 7/20/2019  4:39 AM  Age: 68 years    Significant Labs:  Labs within the past month have been reviewed.    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    Plan  - re-culture and start empiric antibiotics due to acute worsening on 7/20  - ascites:  Negative for SBP on 07/19. Total protein 1.5. Consider prophylactic antibiotics  - diuretics: hold  - CKD: unclear baseline. Holding diuretics and albumin therapy given hyponatremia.   - encephalopathy: alert and oriented. Worsening confusion on 7/20. Continue lactulose and rifaximin  - hyponatremia: slowly improving with fluid restriction. continue fluid restriction 1L. Holding diuresis.  - malnutrition: nutrition consult.  Supplements.  - PT/OT evaluation  - inpatient evaluation requested          Thank you for your consult. I will follow-up with patient. Please contact us if you have any additional questions.    Benton Mae MD  Hepatology  [facility]

## 2019-07-21 PROBLEM — Z76.82 KIDNEY TRANSPLANT CANDIDATE: Status: ACTIVE | Noted: 2019-07-21

## 2019-07-21 LAB
ALBUMIN SERPL BCP-MCNC: 4 G/DL (ref 3.5–5.2)
ALP SERPL-CCNC: 80 U/L (ref 55–135)
ALT SERPL W/O P-5'-P-CCNC: 27 U/L (ref 10–44)
ANION GAP SERPL CALC-SCNC: 11 MMOL/L (ref 8–16)
ANISOCYTOSIS BLD QL SMEAR: SLIGHT
ASCENDING AORTA: 2.84 CM
AST SERPL-CCNC: 28 U/L (ref 10–40)
BASOPHILS # BLD AUTO: 0.01 K/UL (ref 0–0.2)
BASOPHILS NFR BLD: 0.1 % (ref 0–1.9)
BILIRUB SERPL-MCNC: 2.4 MG/DL (ref 0.1–1)
BSA FOR ECHO PROCEDURE: 1.62 M2
BUN SERPL-MCNC: 36 MG/DL (ref 8–23)
CALCIUM SERPL-MCNC: 9.2 MG/DL (ref 8.7–10.5)
CHLORIDE SERPL-SCNC: 90 MMOL/L (ref 95–110)
CO2 SERPL-SCNC: 21 MMOL/L (ref 23–29)
CREAT SERPL-MCNC: 1.8 MG/DL (ref 0.5–1.4)
CV ECHO LV RWT: 0.36 CM
DIFFERENTIAL METHOD: ABNORMAL
DOP CALC LVOT AREA: 2.7 CM2
DOP CALC LVOT DIAMETER: 1.85 CM
DOP CALC LVOT PEAK VEL: 0.89 M/S
DOP CALC LVOT STROKE VOLUME: 56.31 CM3
DOP CALCLVOT PEAK VEL VTI: 20.96 CM
E WAVE DECELERATION TIME: 179.18 MSEC
E/A RATIO: 1.07
E/E' RATIO: 16.57 M/S
ECHO LV POSTERIOR WALL: 0.82 CM (ref 0.6–1.1)
EOSINOPHIL # BLD AUTO: 0 K/UL (ref 0–0.5)
EOSINOPHIL NFR BLD: 0.1 % (ref 0–8)
ERYTHROCYTE [DISTWIDTH] IN BLOOD BY AUTOMATED COUNT: 14.6 % (ref 11.5–14.5)
EST. GFR  (AFRICAN AMERICAN): 32.4 ML/MIN/1.73 M^2
EST. GFR  (NON AFRICAN AMERICAN): 28.1 ML/MIN/1.73 M^2
FRACTIONAL SHORTENING: 49 % (ref 28–44)
GLUCOSE SERPL-MCNC: 243 MG/DL (ref 70–110)
HCT VFR BLD AUTO: 29.2 % (ref 37–48.5)
HGB BLD-MCNC: 10.1 G/DL (ref 12–16)
HYPOCHROMIA BLD QL SMEAR: ABNORMAL
IMM GRANULOCYTES # BLD AUTO: 0.06 K/UL (ref 0–0.04)
IMM GRANULOCYTES NFR BLD AUTO: 0.9 % (ref 0–0.5)
INR PPP: 1.5 (ref 0.8–1.2)
INTERVENTRICULAR SEPTUM: 0.63 CM (ref 0.6–1.1)
IVRT: 0.06 MSEC
LA MAJOR: 4.88 CM
LA MINOR: 5.13 CM
LA WIDTH: 3.75 CM
LEFT ATRIUM SIZE: 3.6 CM
LEFT ATRIUM VOLUME INDEX: 36.2 ML/M2
LEFT ATRIUM VOLUME: 57.4 CM3
LEFT INTERNAL DIMENSION IN SYSTOLE: 2.32 CM (ref 2.1–4)
LEFT VENTRICLE DIASTOLIC VOLUME INDEX: 59.83 ML/M2
LEFT VENTRICLE DIASTOLIC VOLUME: 94.8 ML
LEFT VENTRICLE MASS INDEX: 64 G/M2
LEFT VENTRICLE SYSTOLIC VOLUME INDEX: 11.6 ML/M2
LEFT VENTRICLE SYSTOLIC VOLUME: 18.42 ML
LEFT VENTRICULAR INTERNAL DIMENSION IN DIASTOLE: 4.55 CM (ref 3.5–6)
LEFT VENTRICULAR MASS: 101.94 G
LV LATERAL E/E' RATIO: 16.57 M/S
LV SEPTAL E/E' RATIO: 16.57 M/S
LYMPHOCYTES # BLD AUTO: 0.3 K/UL (ref 1–4.8)
LYMPHOCYTES NFR BLD: 4.4 % (ref 18–48)
MAGNESIUM SERPL-MCNC: 2 MG/DL (ref 1.6–2.6)
MCH RBC QN AUTO: 34.6 PG (ref 27–31)
MCHC RBC AUTO-ENTMCNC: 34.6 G/DL (ref 32–36)
MCV RBC AUTO: 100 FL (ref 82–98)
MONOCYTES # BLD AUTO: 0.4 K/UL (ref 0.3–1)
MONOCYTES NFR BLD: 6 % (ref 4–15)
MV PEAK A VEL: 1.08 M/S
MV PEAK E VEL: 1.16 M/S
NEUTROPHILS # BLD AUTO: 6.1 K/UL (ref 1.8–7.7)
NEUTROPHILS NFR BLD: 88.5 % (ref 38–73)
NRBC BLD-RTO: 0 /100 WBC
OVALOCYTES BLD QL SMEAR: ABNORMAL
PHOSPHATE SERPL-MCNC: 3 MG/DL (ref 2.7–4.5)
PISA TR MAX VEL: 2.95 M/S
PLATELET # BLD AUTO: 31 K/UL (ref 150–350)
PLATELET BLD QL SMEAR: ABNORMAL
PMV BLD AUTO: 9.7 FL (ref 9.2–12.9)
POIKILOCYTOSIS BLD QL SMEAR: SLIGHT
POTASSIUM SERPL-SCNC: 4.1 MMOL/L (ref 3.5–5.1)
PROT SERPL-MCNC: 5.8 G/DL (ref 6–8.4)
PROTHROMBIN TIME: 15.3 SEC (ref 9–12.5)
PULM VEIN S/D RATIO: 2.22
PV PEAK D VEL: 0.37 M/S
PV PEAK S VEL: 0.82 M/S
RA MAJOR: 5.07 CM
RA PRESSURE: 3 MMHG
RA WIDTH: 4.15 CM
RBC # BLD AUTO: 2.92 M/UL (ref 4–5.4)
RIGHT VENTRICULAR END-DIASTOLIC DIMENSION: 3.4 CM
RV TISSUE DOPPLER FREE WALL SYSTOLIC VELOCITY 1 (APICAL 4 CHAMBER VIEW): 13.1 CM/S
SINUS: 2.37 CM
SODIUM SERPL-SCNC: 122 MMOL/L (ref 136–145)
STJ: 2.31 CM
TDI LATERAL: 0.07 M/S
TDI SEPTAL: 0.07 M/S
TDI: 0.07 M/S
TR MAX PG: 35 MMHG
TRICUSPID ANNULAR PLANE SYSTOLIC EXCURSION: 1.95 CM
TV REST PULMONARY ARTERY PRESSURE: 38 MMHG
WBC # BLD AUTO: 6.87 K/UL (ref 3.9–12.7)

## 2019-07-21 PROCEDURE — 97165 OT EVAL LOW COMPLEX 30 MIN: CPT | Mod: NTX

## 2019-07-21 PROCEDURE — 80053 COMPREHEN METABOLIC PANEL: CPT | Mod: NTX

## 2019-07-21 PROCEDURE — 84100 ASSAY OF PHOSPHORUS: CPT | Mod: NTX

## 2019-07-21 PROCEDURE — 25000003 PHARM REV CODE 250: Mod: NTX | Performed by: HOSPITALIST

## 2019-07-21 PROCEDURE — 83735 ASSAY OF MAGNESIUM: CPT | Mod: NTX

## 2019-07-21 PROCEDURE — 85610 PROTHROMBIN TIME: CPT | Mod: NTX

## 2019-07-21 PROCEDURE — 99223 PR INITIAL HOSPITAL CARE,LEVL III: ICD-10-PCS | Mod: GC,NTX,, | Performed by: INTERNAL MEDICINE

## 2019-07-21 PROCEDURE — 99231 PR SUBSEQUENT HOSPITAL CARE,LEVL I: ICD-10-PCS | Mod: NTX,,, | Performed by: INTERNAL MEDICINE

## 2019-07-21 PROCEDURE — 36415 COLL VENOUS BLD VENIPUNCTURE: CPT | Mod: NTX

## 2019-07-21 PROCEDURE — 63600175 PHARM REV CODE 636 W HCPCS: Mod: NTX | Performed by: HOSPITALIST

## 2019-07-21 PROCEDURE — 20600001 HC STEP DOWN PRIVATE ROOM: Mod: NTX

## 2019-07-21 PROCEDURE — 97535 SELF CARE MNGMENT TRAINING: CPT | Mod: NTX

## 2019-07-21 PROCEDURE — 97530 THERAPEUTIC ACTIVITIES: CPT | Mod: NTX

## 2019-07-21 PROCEDURE — 85025 COMPLETE CBC W/AUTO DIFF WBC: CPT | Mod: NTX

## 2019-07-21 PROCEDURE — 99223 1ST HOSP IP/OBS HIGH 75: CPT | Mod: GC,NTX,, | Performed by: INTERNAL MEDICINE

## 2019-07-21 PROCEDURE — 99231 SBSQ HOSP IP/OBS SF/LOW 25: CPT | Mod: NTX,,, | Performed by: INTERNAL MEDICINE

## 2019-07-21 PROCEDURE — 99233 SBSQ HOSP IP/OBS HIGH 50: CPT | Mod: NTX,,, | Performed by: HOSPITALIST

## 2019-07-21 PROCEDURE — A4217 STERILE WATER/SALINE, 500 ML: HCPCS | Mod: NTX | Performed by: HOSPITALIST

## 2019-07-21 PROCEDURE — 99233 PR SUBSEQUENT HOSPITAL CARE,LEVL III: ICD-10-PCS | Mod: NTX,,, | Performed by: HOSPITALIST

## 2019-07-21 RX ORDER — LACTULOSE 10 G/15ML
200 SOLUTION ORAL; RECTAL ONCE
Status: COMPLETED | OUTPATIENT
Start: 2019-07-21 | End: 2019-07-21

## 2019-07-21 RX ADMIN — Medication 220 MG: at 09:07

## 2019-07-21 RX ADMIN — RIFAXIMIN 550 MG: 550 TABLET ORAL at 09:07

## 2019-07-21 RX ADMIN — LEVOTHYROXINE SODIUM 88 MCG: 88 TABLET ORAL at 06:07

## 2019-07-21 RX ADMIN — LACTULOSE 200 G: 10 SOLUTION ORAL at 06:07

## 2019-07-21 RX ADMIN — PHYTONADIONE 10 MG: 10 INJECTION, EMULSION INTRAMUSCULAR; INTRAVENOUS; SUBCUTANEOUS at 09:07

## 2019-07-21 RX ADMIN — ASCORBIC ACID, VITAMIN A PALMITATE, CHOLECALCIFEROL, THIAMINE HYDROCHLORIDE, RIBOFLAVIN-5 PHOSPHATE SODIUM, PYRIDOXINE HYDROCHLORIDE, NIACINAMIDE, DEXPANTHENOL, ALPHA-TOCOPHEROL ACETATE, VITAMIN K1, FOLIC ACID, BIOTIN, CYANOCOBALAMIN: 200; 3300; 200; 6; 3.6; 6; 40; 15; 10; 150; 600; 60; 5 INJECTION, SOLUTION INTRAVENOUS at 09:07

## 2019-07-21 RX ADMIN — CEFTRIAXONE 2 G: 2 INJECTION, POWDER, FOR SOLUTION INTRAMUSCULAR; INTRAVENOUS at 12:07

## 2019-07-21 NOTE — ASSESSMENT & PLAN NOTE
Ms Michelle Ojeda is at the cut off to meet criteria for combined Liver Kidney transplant by CKD criteria which states GRF<60ml/min > or = to 90 days and a GFR < 30 ml/min at the time of listing. What makes her case difficult to assess is her low muscle mass as she generates less creatinine over estimating her actual GFR. Labs eGFR is estimated based on CKD-EPI formula, therefore is an estimation. Her eGFR has been up and down which suggest liver failure and HRS physiology. Currently she presents with TERA and recommend Nephrology consult in this case to optimize her renal function. In HRS physiology with fluctuating sCr, makes a case for possible recovery with la new functioning liver.   By TERA criteria she does not meet criteria at this point.  Would like to emphasize the concern regarding her fragility

## 2019-07-21 NOTE — SUBJECTIVE & OBJECTIVE
Interval History:   - reports she is feeling somewhat better after placement of the NG tube.  Had a lactulose enema with significant output overnight.  - denies any abdominal pain, nausea.  - family at bedside reports that stayed with her last night and that she was able to sleep well and appeared comfortable.    Current Facility-Administered Medications   Medication    acetaminophen tablet 650 mg    albumin human 25% bottle 25 g    cefTRIAXone (ROCEPHIN) 2 g in dextrose 5 % 50 mL IVPB    dextrose 10% (D10W) Bolus    dextrose 10% (D10W) Bolus    dicyclomine capsule 10 mg    glucagon (human recombinant) injection 1 mg    glucose chewable tablet 16 g    glucose chewable tablet 24 g    levothyroxine tablet 88 mcg    ondansetron disintegrating tablet 8 mg    rifAXIMin tablet 550 mg    simethicone chewable tablet 80 mg    sodium chloride 0.9% flush 10 mL    sodium chloride 0.9% flush 10 mL    Standard Custom Day One ADULT TPN for patient with NO electrolyte abnormality or NO renal dysfunction (PERIPHERAL)    TPN ADULT PERIPHERAL CUSTOM    zinc sulfate capsule 220 mg       Objective:     Vital Signs (Most Recent):  Temp: 98.8 °F (37.1 °C) (07/21/19 1236)  Pulse: 79 (07/21/19 1236)  Resp: 18 (07/21/19 1236)  BP: (!) 113/57 (07/21/19 1236)  SpO2: 97 % (07/21/19 1236) Vital Signs (24h Range):  Temp:  [97 °F (36.1 °C)-99.3 °F (37.4 °C)] 98.8 °F (37.1 °C)  Pulse:  [77-94] 79  Resp:  [18] 18  SpO2:  [95 %-100 %] 97 %  BP: (107-136)/(57-70) 113/57     Weight: 61.7 kg (136 lb) (07/19/19 1643)  Body mass index is 26.56 kg/m².    Physical Exam   Constitutional: She is oriented to person, place, and time. No distress.   Her eyes were closed during interview however will open then follow commands and is oriented to location.  Some confusion but improved from prior day.  Family at bedside.   HENT:   Head: Normocephalic and atraumatic.   Mouth/Throat: No oropharyngeal exudate.   Eyes: Conjunctivae are normal. Scleral  icterus is present.   Pulmonary/Chest: Effort normal. No respiratory distress.   Abdominal: Soft. She exhibits no distension. There is no tenderness. There is no guarding.   Soft, non-tender. Tympanic.  Hypoactive bowel sounds   Musculoskeletal: She exhibits edema (mild). She exhibits no tenderness.   Neurological: She is alert and oriented to person, place, and time.   Oriented but confused.   Skin: Skin is warm. Capillary refill takes less than 2 seconds. She is not diaphoretic.   Psychiatric: She has a normal mood and affect. Thought content normal.   Nursing note and vitals reviewed.      MELD-Na score: 28 at 7/21/2019  4:02 AM  MELD score: 20 at 7/21/2019  4:02 AM  Calculated from:  Serum Creatinine: 1.8 mg/dL at 7/21/2019  4:02 AM  Serum Sodium: 122 mmol/L (Rounded to 125 mmol/L) at 7/21/2019  4:02 AM  Total Bilirubin: 2.4 mg/dL at 7/21/2019  4:02 AM  INR(ratio): 1.5 at 7/21/2019  4:02 AM  Age: 68 years    Significant Labs:  Labs within the past month have been reviewed.    Significant Imaging:  Labs: Reviewed

## 2019-07-21 NOTE — PROGRESS NOTES
Progress Note   Hospital Medicine         Patient Name: Michelle Ojeda  MRN:  [id number]  [facility] Medicine Team: [facility] Ty Breaux MD  Date of Admission:  7/18/2019     Length of Stay:  LOS: 3 days   Expected Discharge Date: 7/23/2019  Principal Problem:  Hyponatremia       Subjective:     Interval History/Overnight Events:  Patient is doing much better today; abdominal X ray is much improved and patient is more alert and feels much better; abdominal distension has improved; had a large BM yesterday; worked well with PT today; will continue NG tube to intermittent suction and PPN; patient feels stronger; continue liver transplant eval, however will push back echo stress until Tuesday; will give 1 dose of lactulose enema today  - planning for another IR paracentesis tomorrow     Review of Systems   Constitutional: Negative for chills, fatigue, fever.   HENT: Negative for sore throat, trouble swallowing.    Eyes: Negative for photophobia, visual disturbance.   Respiratory: Negative for cough, shortness of breath.    Cardiovascular: Negative for chest pain, palpitations, leg swelling.   Gastrointestinal: positive for abdominal pain, negative constipation, diarrhea, positive nausea, vomiting.   Endocrine: Negative for cold intolerance, heat intolerance.   Genitourinary: Negative for dysuria, frequency.   Musculoskeletal: Negative for arthralgias, myalgias.   Skin: Negative for rash, wound, erythema   Neurological: Negative for dizziness, syncope, weakness, light-headedness.   Psychiatric/Behavioral: Negative for confusion, hallucinations, anxiety  All other systems reviewed and are negative.    Objective:     Temp:  [98.3 °F (36.8 °C)-99.3 °F (37.4 °C)]   Pulse:  [79-94]   Resp:  [18]   BP: (107-131)/(57-67)   SpO2:  [95 %-100 %]       Physical Exam:  Constitutional: appears weak and ill  Head: Normocephalic and atraumatic.   Mouth/Throat: Oropharynx is clear and moist.   Eyes: EOM are normal. Pupils are  equal, round, and reactive to light. No scleral icterus.   Neck: Normal range of motion. Neck supple.   Cardiovascular: Normal rate and regular rhythm.  No murmur heard.  Pulmonary/Chest: Effort normal and breath sounds normal. No respiratory distress. No wheezes, rales, or rhonchi  Abdominal: Soft. Bowel sounds are normal.  positive distension, no tenderness  Musculoskeletal: Normal range of motion. No edema.   Neurological: Alert and oriented to person, place, and time.   Skin: Skin is warm and dry.   Psychiatric: Normal mood and affect. Behavior is normal.     Recent Labs   Lab 07/18/19  0810 07/19/19  0341 07/20/19  0439 07/21/19  0402   WBC 3.40* 2.03* 2.51* 6.87   HGB 10.6* 8.7* 9.6* 10.1*   HCT 31.3* 24.6* 26.8* 29.2*   PLT 47* 36* 33* 31*     Recent Labs   Lab 07/19/19  0341 07/20/19  0439 07/21/19  0402   * 123* 122*   K 4.6 3.7 4.1   CL 94* 93* 90*   CO2 20* 20* 21*   BUN 35* 33* 36*   CREATININE 1.6* 1.7* 1.8*   GLU 89 176* 243*   CALCIUM 8.2* 9.0 9.2   MG 1.9 2.1 2.0   PHOS 3.4 2.5* 3.0     Recent Labs   Lab 07/19/19  0341 07/20/19  0439 07/21/19  0402   ALKPHOS 132 106 80   ALT 40 33 27   AST 47* 46* 28   ALBUMIN 3.1* 4.3 4.0   PROT 5.3* 6.0 5.8*   BILITOT 1.6* 2.1* 2.4*   INR 1.5* 1.5* 1.5*     No results for input(s): POCTGLUCOSE in the last 168 hours.     albumin human 25%  25 g Intravenous Once    cefTRIAXone (ROCEPHIN) IVPB  2 g Intravenous Q24H    lactulose  200 g Rectal Once    levothyroxine  88 mcg Oral Before breakfast    rifAXImin  550 mg Oral BID    zinc sulfate  220 mg Oral Daily       Assessment and Plan     Ms. Michelle Ojeda is a 68 y.o. female who presented to [facility] on 7/18/2019 with     Hospital Course:    Ms. 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     # Ileus   - N/V episodes; seen on ab x ray  - NG tube inserted on 7/20 with 700 cc out initially  - feeling much better today; ab x ray has improved; will continue NG tube for another day and daily x rays    # Hyponatremia  - fluid restrict to 1L, currently 122, likely worsened due to TPN  - giving albumin   - urine and serum osmols  - stop diuretics   - nephrology consult     # Acute hepatic encephalopathy   - lactulose enema and finally had a large BM , will give another today  - cont rifaximin adding zinc      # Decompensated IZAGUIRRE Cirrhosis with ascites  MELD-Na score: 28 at 7/21/2019  4:02 AM  MELD score: 20 at 7/21/2019  4:02 AM  Calculated from:  Serum Creatinine: 1.8 mg/dL at 7/21/2019  4:02 AM  Serum Sodium: 122 mmol/L (Rounded to 125 mmol/L) at 7/21/2019  4:02 AM  Total Bilirubin: 2.4 mg/dL at 7/21/2019  4:02 AM  INR(ratio): 1.5 at 7/21/2019  4:02 AM  Age: 68 years  - hepatology consulted  - being evaluated for SLK, KTM states a kidney tx candidate   - Liver U/S with doppler reviewed   - cleared for inpatient eval, tests and consults placed  - went for IR paracentesis with 5L removed on 7/19 and negative for SBP  - planning for another IR tap in AM     # CKD stage 4  - U/A; renal U/S reviewed; Cr improved to 1.7; is a kidney tx candidate     # Coagulopathy due to liver disease   - vitamin K times 3 days   - DIC labs     # Hypothyroidism  - cont levothyroxine      #  Moderate protein payton malnutrition  - PAB,  novasource, dietary; PPN started      # Pancytopenia  - DIC labs     # Physical debility  -PT/OT     Diet:  Low sodium  GI PPx:    DVT PPx:    Goals of Care:  full        Disposition:   next week, going to undergo liver/kidney tx    Ty Breaux MD  Medical Director [facility] Medicine  Spectra:  [phone]  Pager: [phone]

## 2019-07-21 NOTE — PT/OT/SLP EVAL
Occupational Therapy   Evaluation/Treatment    Name: Michelle Ojeda  MRN: [id number]  Admitting Diagnosis:  Hyponatremia      Recommendations:     Discharge Recommendations: home health OT  Discharge Equipment Recommendations:  walker, rolling  Barriers to discharge:  None    Assessment:     Michelle Ojeda is a 68 y.o. female with a medical diagnosis of Hyponatremia.  She presents somnolent however easy to arouse for participation in OT evaluation. Upon arrival, pt found supine with family in room.  Performance deficits affecting function: weakness, impaired endurance, gait instability, impaired balance, impaired self care skills, impaired functional mobilty, decreased safety awareness. She tolerated evaluation well and has good family support in room.  At this time, pt requires increased level of skilled assistance with ADl and functional mobility. She would benefit from HHOT following d/c to continue to progress towards goals and improve quality of life.     Rehab Prognosis: Good; patient would benefit from acute skilled OT services to address these deficits and reach maximum level of function.       Plan:     Patient to be seen 3 x/week to address the above listed problems via self-care/home management, therapeutic activities, therapeutic exercises, neuromuscular re-education  · Plan of Care Expires: 08/19/19  · Plan of Care Reviewed with: patient, family    Subjective     Chief Complaint: No complaints  Patient/Family Comments/goals: Return home    Occupational Profile:  Living Environment: Pt lives with , 1SH with 3STE and BHR; bathroom contains walk-in shower with shower chair  Previous level of function: PTA, pt independent with ADl and functional mobility ( short house hold distances)   Roles and Routines: Home dweller, does not drive or work, mother  Equipment Used at Home:  wheelchair, grab bar(raised toilet), shower chair  Assistance upon Discharge: Pt will have assistance from family      Pain/Comfort:  · Pain Rating 1: 0/10  · Pain Rating Post-Intervention 2: 0/10    Patients cultural, spiritual, Lutheran conflicts given the current situation: no    Objective:     Communicated with: RN prior to session.  Patient found supine with peripheral IV, telemetry, NG tube(NG to suction) upon OT entry to room.    General Precautions: Standard, fall   Orthopedic Precautions:N/A   Braces: N/A     Occupational Performance:    Bed Mobility:    · Patient completed Rolling/Turning to Left with  CGA  · Patient completed Supine to Sit with minimum assistance    Functional Mobility/Transfers:  · Patient completed Sit <> Stand Transfer with contact guard assistance  with  rolling walker   · Patient completed Toilet Transfer Stand Pivot technique with contact guard assistance with  rolling walker  · Functional Mobility: Pt completed functional mobility in hallway ( house hold distance) with RW and CGA. Pt demo's short stride length and required cues for RW management and forward gaze.     Activities of Daily Living:  · Grooming: stand by assistance with set-up to wash face while seated EOB  · Upper Body Dressing: moderate assistance to gabbi fresh gown in front and back while seated EOB  · Toileting: CGA with set-up assistance to complete toileting from bedside commode    Cognitive/Visual Perceptual:  Cognitive/Psychosocial Skills:     -       Oriented to: Person, Place and Time   -       Follows Commands/attention:Easily distracted and Follows multistep  commands  -       Communication: clear/fluent  -       Safety awareness/insight to disability: intact   -       Mood/Affect/Coping skills/emotional control: Flat affect  Visual/Perceptual:      -Intact     Physical Exam:  Postural examination/scapula alignment:    -       Rounded shoulders  Skin integrity: Visible skin intact  Edema:  Mild R hand   Sensation:    -       Intact  Dominant hand:    -       RUE  Upper Extremity Range of Motion:     -       Right Upper  Extremity: WFL  -       Left Upper Extremity: WFL  Upper Extremity Strength:    -       Right Upper Extremity: WFL 4+/5  -       Left Upper Extremity: WFL  4+/5   Strength:    -       Right Upper Extremity: WFL 4/5  -       Left Upper Extremity: WFL  4/5  Fine Motor Coordination:    -       Intact    AMPAC 6 Click ADL:  AMPAC Total Score: 16    Treatment & Education:  -Pt edu on OT role/POC; discussed with family   -Importance of OOB activity with staff assistance  -Safety during functional t/f and mobility ( RW management/proper hand placement)  -White board updated  - Multiple self care tasks completed--as noted above  - All questions/concerns answered within OT scope of practice  - RW ordered for room-- use only with staff assistance  - Edu pt/family on RUE AROM/positioning for edema management-- pt demo'd understanding     Education:    Patient left up in chair with all lines intact, call button in reach and RN notified    GOALS:   Multidisciplinary Problems     Occupational Therapy Goals        Problem: Occupational Therapy Goal    Goal Priority Disciplines Outcome Interventions   Occupational Therapy Goal     OT, PT/OT Ongoing (interventions implemented as appropriate)    Description:  Goals to be met by: 7/31/19    Patient will increase functional independence with ADLs by performing:    Feeding with [place].  UE Dressing with Stand-by Assistance.  LE Dressing with Stand-by Assistance.  Grooming while standing at sink with Stand-by Assistance.  Toileting from toilet with Stand-by Assistance for hygiene and clothing management.   Supine to sit with Modified [place].  Toilet transfer to toilet with Stand-by Assistance.  Upper extremity exercise program  per handout, with independence.                      History:     Past Medical History:   Diagnosis Date    Cirrhosis     Diabetes mellitus, type 2     Disorder of kidney and ureter     Hypertension     Hypothyroidism     NAFLD (nonalcoholic  fatty liver disease)        History reviewed. No pertinent surgical history.    Time Tracking:     OT Date of Treatment: 07/21/19  OT Start Time: 1022  OT Stop Time: 1119  OT Total Time (min): 57 min    Billable Minutes:Evaluation 20  Self Care/Home Management 25  Therapeutic Activity 12    Keila Cazares OT  7/21/2019

## 2019-07-21 NOTE — SUBJECTIVE & OBJECTIVE
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 She presented to Liver transplant Clinic for initial evaluation on 07/18 and was a direct admit secondary to HE abd Hyponatremia.  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 Her cirrhosis has been complicated by recurrent hepatic encephalopathy, recently started Xifaxan but never been on lactulose, ascites, sarcopenia, esophageal varices status post ligation, hyponatremia.  She was previously evaluated at [facility] who recommended evaluation at [facility]. She had nausea this am and had 4 epidoses of emesis with noted abdominal distenton. She was given Phergan and fell asleep. When see with Dr Bradley she was hard to arouse. She had NGT placed with 700 cc of gastric content not connected to LIS. Family express when talking to them that she had been doing well until approximately 2 months prior to presentation when she began to get weaker, more frail, fatigue, increased peripheral edema, decreased oral intake. They endorsed she recieves paracentesis every 2 weeks. Her eGFR has been < 60 ml/min since 12/28/2015 that was found in careverywhere, 42 ml/min at that time. The rest of her labs found in care everywhere show eGFR < 60 ml/min up to today but her most recent seems to be trending down. She presents with TERA sCr 1.9 mg/dL and eGRF of 26 .3 ml/min that has improved > 30 ml/min but not reach a steady state.    KTM consulted for evaluation for combine liver kidney transplant.    Ms. Ojeda is a 68 y.o. 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  She is undergoing workup for a liver transplant.      Past Medical and Surgical History: Ms. Ojeda has a past medical history of  Cirrhosis, Diabetes mellitus, type 2, Disorder of kidney and ureter, Hypertension, Hypothyroidism, and NAFLD (nonalcoholic fatty liver disease).  She has no past surgical history on file.    Past Social and Family History: Ms. Ojeda reports that she has never smoked. She has never used smokeless tobacco. She reports that she drank alcohol. She reports that she does not use drugs.Her family history includes No Known Problems in her father.    Intake/Output - Last 3 Shifts       07/19 0700 - 07/20 0659 07/20 0700 - 07/21 0659 07/21 0700 - 07/22 0659    P.O. 480 560     I.V. (mL/kg) 100 (1.6)      IV Piggyback 50 50     TPN  438.9     Total Intake(mL/kg) 630 (10.2) 1048.9 (17)     Urine (mL/kg/hr) 200 (0.1) 0 (0)     Emesis/NG output 502 700     Drains  250     Other 5000      Stool       Total Output 5702 950     Net -5072 +98.9            Urine Occurrence  4 x     Stool Occurrence  0 x            Review of Systems   Reason unable to perform ROS: very difficult to obatain secondary to lethargy please see HPI for further detail      Objective:     Vital Signs (Most Recent):  Temp: 98.7 °F (37.1 °C) (07/21/19 0348)  Pulse: 82 (07/21/19 0348)  Resp: 18 (07/21/19 0348)  BP: 126/61 (07/21/19 0348)  SpO2: 95 % (07/21/19 0348) Vital Signs (24h Range):  Temp:  [97 °F (36.1 °C)-99.3 °F (37.4 °C)] 98.7 °F (37.1 °C)  Pulse:  [77-94] 82  Resp:  [18] 18  SpO2:  [95 %-100 %] 95 %  BP: (116-136)/(57-76) 126/61     Weight: 61.7 kg (136 lb)  Height: 5' (152.4 cm)  Body mass index is 26.56 kg/m².    Physical Exam   Constitutional: She appears lethargic. She appears cachectic. No distress.   HENT:   Head: Normocephalic and atraumatic.   Eyes: Pupils are equal, round, and reactive to light. Scleral icterus is present.   Neck: Trachea normal. No JVD present.   Cardiovascular: Normal rate, regular rhythm, S1 normal, S2 normal, normal heart sounds, intact distal pulses and normal pulses. Exam reveals no gallop and no friction rub.   No  murmur heard.  Pulmonary/Chest: Effort normal and breath sounds normal.   Abdominal: Soft. Bowel sounds are normal. She exhibits distension and ascites.   Musculoskeletal: Normal range of motion. She exhibits no edema.   Neurological: She appears lethargic.   Un able to examine Lethargic   Skin: Skin is warm and dry. Capillary refill takes less than 2 seconds.   Psychiatric:   Lethargic   Vitals reviewed.      Significant Labs:  CBC:   Recent Labs   Lab 07/19/19 0341 07/20/19 0439 07/21/19  0402   WBC 2.03* 2.51* 6.87   RBC 2.51* 2.75* 2.92*   HGB 8.7* 9.6* 10.1*   HCT 24.6* 26.8* 29.2*   PLT 36* 33* 31*   MCV 98 98 100*   MCH 34.7* 34.9* 34.6*   MCHC 35.4 35.8 34.6     BMP:   Recent Labs   Lab 07/19/19 0341 07/20/19 0439 07/21/19  0402   GLU 89 176* 243*   * 123* 122*   K 4.6 3.7 4.1   CL 94* 93* 90*   CO2 20* 20* 21*   BUN 35* 33* 36*   CREATININE 1.6* 1.7* 1.8*   CALCIUM 8.2* 9.0 9.2     CMP:   Recent Labs   Lab 07/19/19 0341 07/20/19  0439 07/21/19  0402   GLU 89 176* 243*   CALCIUM 8.2* 9.0 9.2   ALBUMIN 3.1* 4.3 4.0   PROT 5.3* 6.0 5.8*   * 123* 122*   K 4.6 3.7 4.1   CO2 20* 20* 21*   CL 94* 93* 90*   BUN 35* 33* 36*   CREATININE 1.6* 1.7* 1.8*   ALKPHOS 132 106 80   ALT 40 33 27   AST 47* 46* 28     Coagulation: No results for input(s): PT, APTT in the last 168 hours.  Labs within the past 24 hours have been reviewed.    Diagnostics:  None

## 2019-07-21 NOTE — ASSESSMENT & PLAN NOTE
Ms Ojeda is a 68 year old female with a history of IZAGUIRRE ESLD, with decompensations including Gr 1 HE, ascites, EV, hyponatremia, T2DM, HTN, hypothyroidism, who is being admitted from hepatology clinic due to concern for hyponatremia.    Plan  - ileus on imaging.  NG tube in place.  Abdomen soft.  Appears to be improving on repeat KUB.  Will continue conservative management and repeat KUB in the morning  - repeat infectious workup pending.  On empiric antibiotics.  - ascites:  Negative for SBP on 07/19. Total protein 1.5. Consider prophylactic coverage for SBP  - diuretics: hold  - CKD: unclear baseline. Holding diuretics and albumin therapy given hyponatremia.   - encephalopathy: alert and oriented. Worsening confusion on 7/20. Continue lactulose and rifaximin  - hyponatremia: slowly improving with fluid restriction. continue fluid restriction 1L. Holding diuresis.  Nephrology consult.  - malnutrition: nutrition consult. Supplements.  On PPN  - PT/OT evaluation  - inpatient evaluation started.  Appreciate renal evaluation re: EDWARD

## 2019-07-21 NOTE — PROGRESS NOTES
Progress Note   Hospital Medicine         Patient Name: Michelle Ojeda  MRN:  [id number]  [facility] Medicine Team: [facility] MED L Ty Breaux MD  Date of Admission:  7/18/2019     Length of Stay:  LOS: 2 days   Expected Discharge Date: 7/23/2019  Principal Problem:  Hyponatremia       Subjective:     Interval History/Overnight Events:  Patient began having N/V times 3 episodes today and zofran did not relieve her symptoms; given phenergan and felt lethargy after that; abdomen was distended, ab x ray was done and showed concern for ileus; NG tube was placed and immediately 700 cc of bile returned; will keep on intermittent suction;   - patient also given a lactulose enema and had a large BM  - patient started on PPN to help with nutrition as patient will remain npo and will stop oral lactulose; serial ab x rays  - family at bedside and updated on the plan;    Review of Systems   Constitutional: Negative for chills, fatigue, fever.   HENT: Negative for sore throat, trouble swallowing.    Eyes: Negative for photophobia, visual disturbance.   Respiratory: Negative for cough, shortness of breath.    Cardiovascular: Negative for chest pain, palpitations, leg swelling.   Gastrointestinal: positive for abdominal pain, negative constipation, diarrhea, positive nausea, vomiting.   Endocrine: Negative for cold intolerance, heat intolerance.   Genitourinary: Negative for dysuria, frequency.   Musculoskeletal: Negative for arthralgias, myalgias.   Skin: Negative for rash, wound, erythema   Neurological: Negative for dizziness, syncope, weakness, light-headedness.   Psychiatric/Behavioral: Negative for confusion, hallucinations, anxiety  All other systems reviewed and are negative.    Objective:     Temp:  [97 °F (36.1 °C)-98.6 °F (37 °C)]   Pulse:  [72-92]   Resp:  [18-19]   BP: (101-136)/(53-76)   SpO2:  [96 %-98 %]       Physical Exam:  Constitutional: appears weak and ill  Head: Normocephalic and atraumatic.   Mouth/Throat:  Oropharynx is clear and moist.   Eyes: EOM are normal. Pupils are equal, round, and reactive to light. No scleral icterus.   Neck: Normal range of motion. Neck supple.   Cardiovascular: Normal rate and regular rhythm.  No murmur heard.  Pulmonary/Chest: Effort normal and breath sounds normal. No respiratory distress. No wheezes, rales, or rhonchi  Abdominal: Soft. Bowel sounds are normal.  positive distension, positive tenderness  Musculoskeletal: Normal range of motion. No edema.   Neurological: Alert and oriented to person, place, and time.   Skin: Skin is warm and dry.   Psychiatric: Normal mood and affect. Behavior is normal.     Recent Labs   Lab 07/18/19  0810 07/19/19  0341 07/20/19  0439   WBC 3.40* 2.03* 2.51*   HGB 10.6* 8.7* 9.6*   HCT 31.3* 24.6* 26.8*   PLT 47* 36* 33*     Recent Labs   Lab 07/18/19 2115 07/19/19 0341 07/20/19  0439   * 122* 123*   K 4.5 4.6 3.7   CL 92* 94* 93*   CO2 22* 20* 20*   BUN 34* 35* 33*   CREATININE 1.9* 1.6* 1.7*   * 89 176*   CALCIUM 8.4* 8.2* 9.0   MG  --  1.9 2.1   PHOS  --  3.4 2.5*     Recent Labs   Lab 07/18/19  0810 07/18/19 2115 07/19/19 0341 07/20/19  0439   ALKPHOS 174* 137* 132 106   ALT 57* 44 40 33   AST 65* 49* 47* 46*   ALBUMIN 2.7* 2.8* 3.1* 4.3   PROT 5.9* 5.7* 5.3* 6.0   BILITOT 1.9* 1.8* 1.6* 2.1*   INR 1.3*  --  1.5* 1.5*     No results for input(s): POCTGLUCOSE in the last 168 hours.     albumin human 25%  25 g Intravenous Once    albumin human 25%  25 g Intravenous TID    [START ON 7/21/2019] cefTRIAXone (ROCEPHIN) IVPB  2 g Intravenous Q24H    fat emulsion 20%  250 mL Intravenous Daily    levothyroxine  88 mcg Oral Before breakfast    phytonadione ((AQUA-MEPHYTON) IVPB  10 mg Intravenous Daily    rifAXImin  550 mg Oral BID    zinc sulfate  220 mg Oral Daily       Assessment and Plan     Ms. Michelle Ojeda is a 68 y.o. female who presented to [facility] on 7/18/2019 with     Hospital Course:    Ms. 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     # Ileus   - N/V episodes; seen on ab x ray  - NG tube inserted and placed on intermittent suction; 700 cc returned; check daily AM X rays for resolution     # Hyponatremia  - fluid restrict to 1L, currently 123  - giving albumin   - urine and serum osmols  - stop diuretics     # Acute hepatic encephalopathy   - lactulose enema and finally had a large BM   - cont rifaximin adding zinc      # Decompensated IZAGUIRRE Cirrhosis with ascites  MELD-Na score: 27 at 7/20/2019  4:39 AM  MELD score: 19 at 7/20/2019  4:39 AM  Calculated from:  Serum Creatinine: 1.7 mg/dL at 7/20/2019  4:39 AM  Serum Sodium: 123 mmol/L (Rounded to 125 mmol/L) at 7/20/2019  4:39 AM  Total Bilirubin: 2.1 mg/dL at 7/20/2019  4:39 AM  INR(ratio): 1.5 at 7/20/2019  4:39 AM  Age: 68 years  - hepatology consulted  - being evaluated for SLK, KTM states a kidney tx candidate   - Liver U/S with doppler reviewed   - cleared for inpatient eval, tests and consults placed  - went for IR paracentesis with 5L removed on 7/19 and negative for SBP     # CKD stage 4  - U/A; renal U/S reviewed; Cr improved to 1.7; is a kidney tx candidate     # Coagulopathy due to liver disease   - vitamin K times 3 days   - DIC labs     # Hypothyroidism  - cont levothyroxine      #  Moderate protein payton malnutrition  - PAB,  novasource, dietary     # Pancytopenia  - DIC labs     # Physical debility  -PT/OT     Diet:  Low sodium  GI PPx:    DVT PPx:    Goals of Care:  full     Patient is low threshold for the ICU     Disposition:  Early next week, going to undergo liver/kidney tx    Ty Breaux MD  Medical Director [facility]  Spectra:  [phone]  Pager: [phone]

## 2019-07-21 NOTE — PLAN OF CARE
Problem: Occupational Therapy Goal  Goal: Occupational Therapy Goal  Goals to be met by: 7/31/19    Patient will increase functional independence with ADLs by performing:    Feeding with [place].  UE Dressing with Stand-by Assistance.  LE Dressing with Stand-by Assistance.  Grooming while standing at sink with Stand-by Assistance.  Toileting from toilet with Stand-by Assistance for hygiene and clothing management.   Supine to sit with Modified [place].  Toilet transfer to toilet with Stand-by Assistance.  Upper extremity exercise program  per handout, with independence.    Outcome: Ongoing (interventions implemented as appropriate)  Evaluation completed. Initiate POC.   Keila Cazares OT  7/21/2019

## 2019-07-21 NOTE — CONSULTS
[facility]  Kidney Transplant  Consult Note    Inpatient consult to Kidney/Pancreas Transplant Medicine  Consult performed by: Donny Quiros MD  Consult ordered by: Ty Breaux MD  Reason for consult: simultaneous liver kidney evaluation            Subjective:     History of Present Illness:   Ms Michelle Ojeda is a 68 year old female with a PMHx relevant for HERRMANN ESLD dx on 7/2015, with decompensations including Gr 1 HE, ascites, EV, hyponatremia, T2DM, HTN, hypothyroidism, and hyponatremia [Na 120]. She presented to Liver transplant Clinic for initial evaluation on 07/18 and was a direct admit secondary to HE abd Hyponatremia.  She has a history of Herrmann cirrhosis with a history of biopsy in 1998 with steatosis unclear fibrosis stage. Her cirrhosis has been complicated by recurrent hepatic encephalopathy, recently started Xifaxan but never been on lactulose, ascites, sarcopenia, esophageal varices status post ligation, hyponatremia.  She was previously evaluated at [place] who recommended evaluation at [facility]. She had nausea this am and had 4 epidoses of emesis with noted abdominal distenton. She was given Phergan and fell asleep. When see with Dr Bradley she was hard to arouse. She had NGT placed with 700 cc of gastric content not connected to LIS. Family express when talking to them that she had been doing well until approximately 2 months prior to presentation when she began to get weaker, more frail, fatigue, increased peripheral edema, decreased oral intake. They endorsed she recieves paracentesis every 2 weeks. Her eGFR has been < 60 ml/min since 12/28/2015 that was found in careverywhere, 42 ml/min at that time. The rest of her labs found in care everywhere show eGFR < 60 ml/min up to today but her most recent seems to be trending down. She presents with TERA sCr 1.9 mg/dL and eGRF of 26 .3 ml/min that has improved > 30 ml/min but not reach a steady state.    KTM consulted  for evaluation for combine liver kidney transplant.    Ms. Ojeda is a 68 y.o. 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  She is undergoing workup for a liver transplant.      Past Medical and Surgical History: Ms. Ojeda has a past medical history of Cirrhosis, Diabetes mellitus, type 2, Disorder of kidney and ureter, Hypertension, Hypothyroidism, and NAFLD (nonalcoholic fatty liver disease).  She has no past surgical history on file.    Past Social and Family History: Ms. Ojeda reports that she has never smoked. She has never used smokeless tobacco. She reports that she drank alcohol. She reports that she does not use drugs.Her family history includes No Known Problems in her father.    Intake/Output - Last 3 Shifts       07/19 0700 - 07/20 0659 07/20 0700 - 07/21 0659 07/21 0700 - 07/22 0659    P.O. 480 560     I.V. (mL/kg) 100 (1.6)      IV Piggyback 50 50     TPN  438.9     Total Intake(mL/kg) 630 (10.2) 1048.9 (17)     Urine (mL/kg/hr) 200 (0.1) 0 (0)     Emesis/NG output 502 700     Drains  250     Other 5000      Stool       Total Output 5702 950     Net -5072 +98.9            Urine Occurrence  4 x     Stool Occurrence  0 x            Review of Systems   Reason unable to perform ROS: very difficult to obatain secondary to lethargy please see HPI for further detail      Objective:     Vital Signs (Most Recent):  Temp: 98.7 °F (37.1 °C) (07/21/19 0348)  Pulse: 82 (07/21/19 0348)  Resp: 18 (07/21/19 0348)  BP: 126/61 (07/21/19 0348)  SpO2: 95 % (07/21/19 0348) Vital Signs (24h Range):  Temp:  [97 °F (36.1 °C)-99.3 °F (37.4 °C)] 98.7 °F (37.1 °C)  Pulse:  [77-94] 82  Resp:  [18] 18  SpO2:  [95 %-100 %] 95 %  BP: (116-136)/(57-76) 126/61     Weight: 61.7 kg (136 lb)  Height: 5' (152.4 cm)  Body mass index is 26.56 kg/m².    Physical Exam   Constitutional: She appears lethargic. She appears cachectic. No distress.   HENT:   Head: Normocephalic and atraumatic.   Eyes: Pupils are equal,  round, and reactive to light. Scleral icterus is present.   Neck: Trachea normal. No JVD present.   Cardiovascular: Normal rate, regular rhythm, S1 normal, S2 normal, normal heart sounds, intact distal pulses and normal pulses. Exam reveals no gallop and no friction rub.   No murmur heard.  Pulmonary/Chest: Effort normal and breath sounds normal.   Abdominal: Soft. Bowel sounds are normal. She exhibits distension and ascites.   Musculoskeletal: Normal range of motion. She exhibits no edema.   Neurological: She appears lethargic.   Un able to examine Lethargic   Skin: Skin is warm and dry. Capillary refill takes less than 2 seconds.   Psychiatric:   Lethargic   Vitals reviewed.      Significant Labs:  CBC:   Recent Labs   Lab 07/19/19 0341 07/20/19 0439 07/21/19  0402   WBC 2.03* 2.51* 6.87   RBC 2.51* 2.75* 2.92*   HGB 8.7* 9.6* 10.1*   HCT 24.6* 26.8* 29.2*   PLT 36* 33* 31*   MCV 98 98 100*   MCH 34.7* 34.9* 34.6*   MCHC 35.4 35.8 34.6     BMP:   Recent Labs   Lab 07/19/19 0341 07/20/19  0439 07/21/19  0402   GLU 89 176* 243*   * 123* 122*   K 4.6 3.7 4.1   CL 94* 93* 90*   CO2 20* 20* 21*   BUN 35* 33* 36*   CREATININE 1.6* 1.7* 1.8*   CALCIUM 8.2* 9.0 9.2     CMP:   Recent Labs   Lab 07/19/19  0341 07/20/19  0439 07/21/19  0402   GLU 89 176* 243*   CALCIUM 8.2* 9.0 9.2   ALBUMIN 3.1* 4.3 4.0   PROT 5.3* 6.0 5.8*   * 123* 122*   K 4.6 3.7 4.1   CO2 20* 20* 21*   CL 94* 93* 90*   BUN 35* 33* 36*   CREATININE 1.6* 1.7* 1.8*   ALKPHOS 132 106 80   ALT 40 33 27   AST 47* 46* 28     Coagulation: No results for input(s): PT, APTT in the last 168 hours.  Labs within the past 24 hours have been reviewed.    Diagnostics:  None    Assessment/Plan:     * Hyponatremia  Hyponatremia in setting of ESLD plus TERA strongly recommend Nephrology consult.       Kidney transplant candidate  Ms Michelle Ojeda is at the cut off to meet criteria for combined Liver Kidney transplant by CKD criteria which states  GRF<60ml/min > or = to 90 days and a GFR < 30 ml/min at the time of listing. What makes her case difficult to assess is her low muscle mass as she generates less creatinine over estimating her actual GFR. Labs eGFR is estimated based on CKD-EPI formula, therefore is an estimation. Her eGFR has been up and down which suggest liver failure and HRS physiology. Currently she presents with TERA and recommend Nephrology consult in this case to optimize her renal function. In HRS physiology with fluctuating sCr, makes a case for possible recovery with la new functioning liver.   By TERA criteria she does not meet criteria at this point.  Would like to emphasize the concern regarding her fragility         Discharge Planning:  As per primary team      Donny Quiros MD  Kidney Transplant  [facility]

## 2019-07-21 NOTE — PLAN OF CARE
Problem: Adult Inpatient Plan of Care  Goal: Plan of Care Review  POC reviewed with patient and family. IV sites intact x 2 without any redness or swelling. All MD orders implemented. Patient has been up in chair today and in halls with therapy. No distress noted, NGT to LIWS. Safety maintained call light in reach with siderails up x 2.

## 2019-07-21 NOTE — HPI
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 She presented to Liver transplant Clinic for initial evaluation on 07/18 and was a direct admit secondary to HE abd Hyponatremia.  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 Her cirrhosis has been complicated by recurrent hepatic encephalopathy, recently started Xifaxan but never been on lactulose, ascites, sarcopenia, esophageal varices status post ligation, hyponatremia.  She was previously evaluated at [place] who recommended evaluation at [facility]. She had nausea this am and had 4 epidoses of emesis with noted abdominal distenton. She was given Phergan and fell asleep. When see with Dr Bradley she was hard to arouse. She had NGT placed with 700 cc of gastric content not connected to LIS. Family express when talking to them that she had been doing well until approximately 2 months prior to presentation when she began to get weaker, more frail, fatigue, increased peripheral edema, decreased oral intake. They endorsed she recieves paracentesis every 2 weeks. Her eGFR has been < 60 ml/min since 12/28/2015 that was found in careverywhere, 42 ml/min at that time. The rest of her labs found in care everywhere show eGFR < 60 ml/min up to today but her most recent seems to be trending down. She presents with TERA sCr 1.9 mg/dL and eGRF of 26 .3 ml/min that has improved > 30 ml/min but not reach a steady state.    KTM consulted for evaluation for combine liver kidney transplant.

## 2019-07-21 NOTE — PROGRESS NOTES
[facility]  Hepatology  Progress Note    Patient Name: Michelle Ojeda  MRN: [id number]  Admission Date: 7/18/2019  Hospital Length of Stay: 3 days  Attending Provider: Ty Breaux MD   Primary Care Physician: Primary Doctor No  Principal Problem:Hyponatremia    Subjective:     Transplant status: evaluation started    HPI: Ms Ojeda is a 68 year old female with a history of HERRMANN ESLD, with decompensations including Gr 1 HE, ascites, EV, hyponatremia, T2DM, HTN, hypothyroidism, who is being admitted from hepatology clinic due to concern for hyponatremia.    She presents to transplant Clinic for initial evaluation on 07/18 with Dr. Cannon for initial transplant evaluation.  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  Her cirrhosis has been complicated by grade 1 hepatic encephalopathy, recently started Aldo [place] min but never been on lactulose, ascites, sarcopenia, esophageal varices status post ligation, hyponatremia.  She was previously evaluated at [facility] with Dr. Mccormick who recommended evaluation at [facility].  Does not appear she was evaluated for transplant at that time.    Due to hyponatremia with sodium of 120 she was admitted to the hospital for further management.  Currently she reports she is feeling well without any complaints.  Denies any abdominal pain, nausea, vomiting, diarrhea.  Denies any fevers, chills, sweats or other infectious complaints.    Family who is present in the room note that she is doing extremely well until approximately 2 months prior to presentation when she began to be more frail, fatigue, increased peripheral edema, decreased oral intake.  They note they have been extremely careful to avoid T here to low-sodium diet.  She has never been hospitalized in the past for  hyponatremia.  No other recent hospitalizations.  Regarding her ascites she has approximately received a paracentesis every 2 weeks.      Interval History:   - reports she is feeling somewhat better after placement of the NG tube.  Had a lactulose enema with significant output overnight.  - denies any abdominal pain, nausea.  - family at bedside reports that stayed with her last night and that she was able to sleep well and appeared comfortable.    Current Facility-Administered Medications   Medication    acetaminophen tablet 650 mg    albumin human 25% bottle 25 g    cefTRIAXone (ROCEPHIN) 2 g in dextrose 5 % 50 mL IVPB    dextrose 10% (D10W) Bolus    dextrose 10% (D10W) Bolus    dicyclomine capsule 10 mg    glucagon (human recombinant) injection 1 mg    glucose chewable tablet 16 g    glucose chewable tablet 24 g    levothyroxine tablet 88 mcg    ondansetron disintegrating tablet 8 mg    rifAXIMin tablet 550 mg    simethicone chewable tablet 80 mg    sodium chloride 0.9% flush 10 mL    sodium chloride 0.9% flush 10 mL    Standard Custom Day One ADULT TPN for patient with NO electrolyte abnormality or NO renal dysfunction (PERIPHERAL)    TPN ADULT PERIPHERAL CUSTOM    zinc sulfate capsule 220 mg       Objective:     Vital Signs (Most Recent):  Temp: 98.8 °F (37.1 °C) (07/21/19 1236)  Pulse: 79 (07/21/19 1236)  Resp: 18 (07/21/19 1236)  BP: (!) 113/57 (07/21/19 1236)  SpO2: 97 % (07/21/19 1236) Vital Signs (24h Range):  Temp:  [97 °F (36.1 °C)-99.3 °F (37.4 °C)] 98.8 °F (37.1 °C)  Pulse:  [77-94] 79  Resp:  [18] 18  SpO2:  [95 %-100 %] 97 %  BP: (107-136)/(57-70) 113/57     Weight: 61.7 kg (136 lb) (07/19/19 1643)  Body mass index is 26.56 kg/m².    Physical Exam   Constitutional: She is oriented to person, place, and time. No distress.   Her eyes were closed during interview however will open then follow commands and is oriented to location.  Some confusion but improved from prior day.  Family at  bedside.   HENT:   Head: Normocephalic and atraumatic.   Mouth/Throat: No oropharyngeal exudate.   Eyes: Conjunctivae are normal. Scleral icterus is present.   Pulmonary/Chest: Effort normal. No respiratory distress.   Abdominal: Soft. She exhibits no distension. There is no tenderness. There is no guarding.   Soft, non-tender. Tympanic.  Hypoactive bowel sounds   Musculoskeletal: She exhibits edema (mild). She exhibits no tenderness.   Neurological: She is alert and oriented to person, place, and time.   Oriented but confused.   Skin: Skin is warm. Capillary refill takes less than 2 seconds. She is not diaphoretic.   Psychiatric: She has a normal mood and affect. Thought content normal.   Nursing note and vitals reviewed.      MELD-Na score: 28 at 7/21/2019  4:02 AM  MELD score: 20 at 7/21/2019  4:02 AM  Calculated from:  Serum Creatinine: 1.8 mg/dL at 7/21/2019  4:02 AM  Serum Sodium: 122 mmol/L (Rounded to 125 mmol/L) at 7/21/2019  4:02 AM  Total Bilirubin: 2.4 mg/dL at 7/21/2019  4:02 AM  INR(ratio): 1.5 at 7/21/2019  4:02 AM  Age: 68 years    Significant Labs:  Labs within the past month have been reviewed.    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    Plan  - ileus on imaging.  NG tube in place.  Abdomen soft.  Appears to be improving on repeat KUB.  Will continue conservative management and repeat KUB in the morning  - repeat infectious workup pending.  On empiric antibiotics.  - ascites:  Negative for SBP on 07/19. Total protein 1.5. Consider prophylactic coverage for SBP  - diuretics: hold  - CKD: unclear baseline. Holding diuretics and albumin therapy given hyponatremia.   - encephalopathy: alert and oriented. Worsening  confusion on 7/20. Continue lactulose and rifaximin  - hyponatremia: slowly improving with fluid restriction. continue fluid restriction 1L. Holding diuresis.  Nephrology consult.  - malnutrition: nutrition consult. Supplements.  On PPN  - PT/OT evaluation  - inpatient evaluation started.  Appreciate renal evaluation re: EDWARD          Thank you for your consult. I will follow-up with patient. Please contact us if you have any additional questions.    Benton Mae MD  Hepatology  [facility]

## 2019-07-22 ENCOUNTER — DOCUMENTATION ONLY (OUTPATIENT)
Dept: CARDIOLOGY | Facility: CLINIC | Age: 69
End: 2019-07-22

## 2019-07-22 ENCOUNTER — DOCUMENTATION ONLY (OUTPATIENT)
Dept: TRANSPLANT | Facility: CLINIC | Age: 69
End: 2019-07-22

## 2019-07-22 ENCOUNTER — TELEPHONE (OUTPATIENT)
Dept: TRANSPLANT | Facility: CLINIC | Age: 69
End: 2019-07-22

## 2019-07-22 LAB
ABO + RH BLD: NORMAL
ALBUMIN SERPL BCP-MCNC: 3.3 G/DL (ref 3.5–5.2)
ALP SERPL-CCNC: 76 U/L (ref 55–135)
ALT SERPL W/O P-5'-P-CCNC: 24 U/L (ref 10–44)
AMMONIA PLAS-SCNC: 64 UMOL/L (ref 10–50)
ANION GAP SERPL CALC-SCNC: 9 MMOL/L (ref 8–16)
ANISOCYTOSIS BLD QL SMEAR: SLIGHT
AST SERPL-CCNC: 19 U/L (ref 10–40)
BACTERIA SPEC AEROBE CULT: NO GROWTH
BASOPHILS # BLD AUTO: 0.01 K/UL (ref 0–0.2)
BASOPHILS NFR BLD: 0.2 % (ref 0–1.9)
BILIRUB SERPL-MCNC: 2.4 MG/DL (ref 0.1–1)
BLD GP AB SCN CELLS X3 SERPL QL: NORMAL
BUN SERPL-MCNC: 45 MG/DL (ref 8–23)
CALCIUM SERPL-MCNC: 8.4 MG/DL (ref 8.7–10.5)
CHLORIDE SERPL-SCNC: 90 MMOL/L (ref 95–110)
CO2 SERPL-SCNC: 24 MMOL/L (ref 23–29)
CREAT SERPL-MCNC: 1.8 MG/DL (ref 0.5–1.4)
DIFFERENTIAL METHOD: ABNORMAL
EOSINOPHIL # BLD AUTO: 0.1 K/UL (ref 0–0.5)
EOSINOPHIL NFR BLD: 1.3 % (ref 0–8)
ERYTHROCYTE [DISTWIDTH] IN BLOOD BY AUTOMATED COUNT: 14.5 % (ref 11.5–14.5)
EST. GFR  (AFRICAN AMERICAN): 32.4 ML/MIN/1.73 M^2
EST. GFR  (NON AFRICAN AMERICAN): 28.1 ML/MIN/1.73 M^2
GLUCOSE SERPL-MCNC: 182 MG/DL (ref 70–110)
HCT VFR BLD AUTO: 25.3 % (ref 37–48.5)
HGB BLD-MCNC: 8.8 G/DL (ref 12–16)
HYPOCHROMIA BLD QL SMEAR: ABNORMAL
IMM GRANULOCYTES # BLD AUTO: 0.07 K/UL (ref 0–0.04)
IMM GRANULOCYTES NFR BLD AUTO: 1.5 % (ref 0–0.5)
INR PPP: 1.5 (ref 0.8–1.2)
LYMPHOCYTES # BLD AUTO: 0.4 K/UL (ref 1–4.8)
LYMPHOCYTES NFR BLD: 8.4 % (ref 18–48)
MAGNESIUM SERPL-MCNC: 2.1 MG/DL (ref 1.6–2.6)
MCH RBC QN AUTO: 34.8 PG (ref 27–31)
MCHC RBC AUTO-ENTMCNC: 34.8 G/DL (ref 32–36)
MCV RBC AUTO: 100 FL (ref 82–98)
MONOCYTES # BLD AUTO: 0.4 K/UL (ref 0.3–1)
MONOCYTES NFR BLD: 8 % (ref 4–15)
NEUTROPHILS # BLD AUTO: 3.8 K/UL (ref 1.8–7.7)
NEUTROPHILS NFR BLD: 80.6 % (ref 38–73)
NRBC BLD-RTO: 0 /100 WBC
OVALOCYTES BLD QL SMEAR: ABNORMAL
PHOSPHATE SERPL-MCNC: 3.5 MG/DL (ref 2.7–4.5)
PLATELET # BLD AUTO: 27 K/UL (ref 150–350)
PMV BLD AUTO: 10.1 FL (ref 9.2–12.9)
POIKILOCYTOSIS BLD QL SMEAR: SLIGHT
POLYCHROMASIA BLD QL SMEAR: ABNORMAL
POTASSIUM SERPL-SCNC: 3.9 MMOL/L (ref 3.5–5.1)
PROT SERPL-MCNC: 5 G/DL (ref 6–8.4)
PROTHROMBIN TIME: 15 SEC (ref 9–12.5)
RBC # BLD AUTO: 2.53 M/UL (ref 4–5.4)
SODIUM SERPL-SCNC: 123 MMOL/L (ref 136–145)
WBC # BLD AUTO: 4.76 K/UL (ref 3.9–12.7)

## 2019-07-22 PROCEDURE — 99223 1ST HOSP IP/OBS HIGH 75: CPT | Mod: GC,NTX,, | Performed by: INTERNAL MEDICINE

## 2019-07-22 PROCEDURE — 99223 PR INITIAL HOSPITAL CARE,LEVL III: ICD-10-PCS | Mod: GC,NTX,, | Performed by: INTERNAL MEDICINE

## 2019-07-22 PROCEDURE — 25500020 PHARM REV CODE 255: Mod: NTX | Performed by: STUDENT IN AN ORGANIZED HEALTH CARE EDUCATION/TRAINING PROGRAM

## 2019-07-22 PROCEDURE — 82140 ASSAY OF AMMONIA: CPT | Mod: NTX

## 2019-07-22 PROCEDURE — 86901 BLOOD TYPING SEROLOGIC RH(D): CPT | Mod: NTX

## 2019-07-22 PROCEDURE — 84100 ASSAY OF PHOSPHORUS: CPT | Mod: NTX

## 2019-07-22 PROCEDURE — 83735 ASSAY OF MAGNESIUM: CPT | Mod: NTX

## 2019-07-22 PROCEDURE — 99233 PR SUBSEQUENT HOSPITAL CARE,LEVL III: ICD-10-PCS | Mod: NTX,,, | Performed by: HOSPITALIST

## 2019-07-22 PROCEDURE — 99233 SBSQ HOSP IP/OBS HIGH 50: CPT | Mod: NTX,,, | Performed by: HOSPITALIST

## 2019-07-22 PROCEDURE — 20600001 HC STEP DOWN PRIVATE ROOM: Mod: NTX

## 2019-07-22 PROCEDURE — 85610 PROTHROMBIN TIME: CPT | Mod: NTX

## 2019-07-22 PROCEDURE — 99223 1ST HOSP IP/OBS HIGH 75: CPT | Mod: NTX,,, | Performed by: PHYSICIAN ASSISTANT

## 2019-07-22 PROCEDURE — 99223 PR INITIAL HOSPITAL CARE,LEVL III: ICD-10-PCS | Mod: NTX,,, | Performed by: PHYSICIAN ASSISTANT

## 2019-07-22 PROCEDURE — 36415 COLL VENOUS BLD VENIPUNCTURE: CPT | Mod: NTX

## 2019-07-22 PROCEDURE — 25000003 PHARM REV CODE 250: Mod: NTX | Performed by: HOSPITALIST

## 2019-07-22 PROCEDURE — 80053 COMPREHEN METABOLIC PANEL: CPT | Mod: NTX

## 2019-07-22 PROCEDURE — 63600175 PHARM REV CODE 636 W HCPCS: Mod: NTX | Performed by: HOSPITALIST

## 2019-07-22 PROCEDURE — 85025 COMPLETE CBC W/AUTO DIFF WBC: CPT | Mod: NTX

## 2019-07-22 RX ORDER — ACETAMINOPHEN 325 MG/1
325 TABLET ORAL EVERY 4 HOURS PRN
Status: DISCONTINUED | OUTPATIENT
Start: 2019-07-22 | End: 2019-08-10 | Stop reason: HOSPADM

## 2019-07-22 RX ADMIN — RIFAXIMIN 550 MG: 550 TABLET ORAL at 08:07

## 2019-07-22 RX ADMIN — CEFTRIAXONE 2 G: 2 INJECTION, POWDER, FOR SOLUTION INTRAMUSCULAR; INTRAVENOUS at 01:07

## 2019-07-22 RX ADMIN — LEVOTHYROXINE SODIUM 88 MCG: 88 TABLET ORAL at 06:07

## 2019-07-22 RX ADMIN — Medication 220 MG: at 08:07

## 2019-07-22 RX ADMIN — RIFAXIMIN 550 MG: 550 TABLET ORAL at 09:07

## 2019-07-22 RX ADMIN — IOHEXOL 15 ML: 350 INJECTION, SOLUTION INTRAVENOUS at 03:07

## 2019-07-22 NOTE — CONSULTS
[facility]  Nephrology  Consult Note    Patient Name: Michelle Ojeda  MRN: [id number]  Admission Date: 7/18/2019  Hospital Length of Stay: 4 days  Attending Provider: Ty Breaux MD   Primary Care Physician: Primary Doctor No  Principal Problem:Hyponatremia    Inpatient consult to Nephrology  Consult performed by: Josh Sampson MD  Consult ordered by: Ty Breaux MD        Subjective:     HPI: Rusty Martinez is 67 yo F with CKD stage 4 presenting from transplant hepatology clinic for hyponatremia on 7/18/19. She is originally from [place], here for liver transplant evaluation. She was diagnosed with IZAGUIRRE in grade 1 hepatic encephalopathy with severe ascites and esophogeal varices. During the month prior to admission she felt more lethargic and was requiring biweekly paracentesis. Of note, she is also being treated for Ileus her acute hepatic encephalopathy with lactulose.      Past Medical History:   Diagnosis Date    Cirrhosis     Diabetes mellitus, type 2     Disorder of kidney and ureter     Hypertension     Hypothyroidism     NAFLD (nonalcoholic fatty liver disease)        History reviewed. No pertinent surgical history.    Review of patient's allergies indicates:  No Known Allergies  Current Facility-Administered Medications   Medication Frequency    acetaminophen tablet 325 mg Q4H PRN    cefTRIAXone (ROCEPHIN) 2 g in dextrose 5 % 50 mL IVPB Q24H    dextrose 10% (D10W) Bolus PRN    dextrose 10% (D10W) Bolus PRN    dicyclomine capsule 10 mg QID PRN    glucagon (human recombinant) injection 1 mg PRN    glucose chewable tablet 16 g PRN    glucose chewable tablet 24 g PRN    iohexol (OMNIPAQUE) oral solution 15 mL PRN    levothyroxine tablet 88 mcg Before breakfast    ondansetron disintegrating tablet 8 mg Q8H PRN    rifAXIMin tablet 550 mg BID    simethicone chewable tablet 80 mg TID PRN    sodium chloride 0.9% flush 10 mL PRN    sodium chloride 0.9% flush 10  mL PRN    TPN ADULT PERIPHERAL CUSTOM Continuous    zinc sulfate capsule 220 mg Daily     Family History     Problem Relation (Age of Onset)    No Known Problems Father        Tobacco Use    Smoking status: Never Smoker    Smokeless tobacco: Never Used   Substance and Sexual Activity    Alcohol use: Not Currently    Drug use: Never    Sexual activity: Not on file     Review of Systems   Reason unable to perform ROS: very difficult to obatain secondary to lethargy please see HPI for further detail    Constitutional: Negative for activity change.   Eyes: Negative for visual disturbance.   Respiratory: Negative for chest tightness and shortness of breath.    Cardiovascular: Negative for chest pain and leg swelling.   Gastrointestinal: Positive for abdominal distention and constipation.   Skin: Negative for rash.   Neurological: Positive for weakness.     Objective:     Vital Signs (Most Recent):  Temp: 97.9 °F (36.6 °C) (07/22/19 0318)  Pulse: 77 (07/22/19 0318)  Resp: 18 (07/22/19 0318)  BP: (!) 110/57 (07/22/19 0318)  SpO2: 96 % (07/22/19 0318)  O2 Device (Oxygen Therapy): room air (07/22/19 0318) Vital Signs (24h Range):  Temp:  [97.9 °F (36.6 °C)-98.8 °F (37.1 °C)] 97.9 °F (36.6 °C)  Pulse:  [76-81] 77  Resp:  [16-18] 18  SpO2:  [95 %-100 %] 96 %  BP: ()/(57-58) 110/57     Weight: 61.7 kg (136 lb) (07/19/19 1643)  Body mass index is 26.56 kg/m².  Body surface area is 1.62 meters squared.    I/O last 3 completed shifts:  In: 488.9 [IV Piggyback:50]  Out: 651 [Urine:400; Drains:250; Stool:1]    Physical Exam   Constitutional: She is oriented to person, place, and time. No distress.   Her eyes were closed during interview however will open then follow commands and is oriented to location.  Some confusion but improved from prior day.  Family at bedside.   HENT:   Head: Normocephalic and atraumatic.   Mouth/Throat: No oropharyngeal exudate.   Eyes: Conjunctivae are normal. Scleral icterus is present.    Cardiovascular: Normal rate, regular rhythm and normal heart sounds.   No murmur heard.  Pulmonary/Chest: Effort normal and breath sounds normal. No respiratory distress.   Abdominal: Soft. She exhibits distension (Prominent fluid noted in peritoneal cavity ). There is no tenderness. There is no guarding.   Soft, non-tender. Tympanic.  Hypoactive bowel sounds   Musculoskeletal: She exhibits no edema (mild) or tenderness.   Neurological: She is alert and oriented to person, place, and time.   Oriented but confused.   Skin: Skin is warm. She is not diaphoretic.   Nursing note and vitals reviewed.      Significant Labs:  ABGs: No results for input(s): PH, PCO2, HCO3, POCSATURATED, BE in the last 168 hours.  BMP:   Recent Labs   Lab 07/22/19 0448   *   CL 90*   CO2 24   BUN 45*   CREATININE 1.8*   CALCIUM 8.4*   MG 2.1     CBC:   Recent Labs   Lab 07/22/19 0448   WBC 4.76   RBC 2.53*   HGB 8.8*   HCT 25.3*   PLT 27*   *   MCH 34.8*   MCHC 34.8     CMP:   Recent Labs   Lab 07/22/19 0448   *   CALCIUM 8.4*   ALBUMIN 3.3*   PROT 5.0*   *   K 3.9   CO2 24   CL 90*   BUN 45*   CREATININE 1.8*   ALKPHOS 76   ALT 24   AST 19   BILITOT 2.4*     LFTs:   Recent Labs   Lab 07/22/19  0448   ALT 24   AST 19   ALKPHOS 76   BILITOT 2.4*   PROT 5.0*   ALBUMIN 3.3*     All labs within the past 24 hours have been reviewed.    Significant Imaging:  X-Ray: Reviewed  US: Reviewed  US demonstrates suspected moderate renal disease     Assessment/Plan:     * Hyponatremia  Rusty is a 69 yo F with liver cirrhosis and CKD admitted for hyponatremia. Hyponatremia may be related to several factors primarily liver cirrhosis requiring biweekly paracentesis. This leads to a hypovolemic hyponatremic state in which fluid is pulled out of the vasculature. Additional contributing factors related to ileus, NPO status, and TPN nutrition. Extra sodium in TPN has increased sodium. Baseline Na+ from labs in [place] in the  low 120s. She is near her baseline now at 123 (7/22/19).   - Change D5 given with ceftriaxone to NS given with ceftriaxone  - Consider Chest XR to evaluate volume status  - If volume expansion required give albumin   - Add Midodrine to keep MAP>75  - Strict I/O monitoring     Acute hepatic encephalopathy  Treatment plan per primary team     Stage 4 chronic kidney disease  Labs from [place] nephrology demonstrated Cr of 1.77 in 3/12/19, 1.67 on 4/5/19, 1.96 in 6/13/19, and 1.83 on 6/24. Patient may meet criteria fro kidney transplant.   - Cr is 1.8 today (7/22/19) which appears to be near her baseline.   - Monitor with daily BMP  - Nephrology will continue to follow closely.     Liver cirrhosis secondary to IZAGUIRRE  Treatment plan per primary team         Thank you for your consult. I will follow-up with patient. Please contact us if you have any additional questions.    Josh Sampson MD  Nephrology  [facility]

## 2019-07-22 NOTE — PROGRESS NOTES
Patient received to us for paracentesis.Procedure cancelled per M ASIM Zhang. Not enough fluid to drain via us guidance per MASIM Johnson.Report called to floor nurse. Pt transported to room.

## 2019-07-22 NOTE — CONSULTS
[facility]  Infectious Disease  Consult Note    Patient Name: Michelle Ojeda  MRN: [id number]  Admission Date: 7/18/2019  Hospital Length of Stay: 4 days  Attending Physician: Ty Breaux MD  Primary Care Provider: Primary Doctor No     Isolation Status: No active isolations    Inpatient consult to Infectious Diseases  Consult performed by: Amelia Moreno PA-C  Consult ordered by: Ty Breaux MD        ID consult received. Chart being reviewed. Full note with recommendations to follow.    Thank you,  Amelia Moreno PA-C

## 2019-07-22 NOTE — ASSESSMENT & PLAN NOTE
Rusty is a 67 yo F with liver cirrhosis and CKD admitted for hyponatremia. Hyponatremia may be related to several factors primarily liver cirrhosis requiring biweekly paracentesis. This leads to a hypovolemic hyponatremic state in which fluid is pulled out of the vasculature. Additional contributing factors related to ileus, NPO status, and TPN nutrition. Extra sodium in TPN has increased sodium. Baseline Na+ from labs in [place] in the low 120s. She is near her baseline now at 123 (7/22/19).   - Change D5 given with ceftriaxone to NS given with ceftriaxone  - Consider Chest XR to evaluate volume status  - If volume expansion required give albumin   - Add Midodrine to keep MAP>75  - Strict I/O monitoring

## 2019-07-22 NOTE — PROGRESS NOTES
Transplant Recipient Adult Psychosocial Assessment - Inpatient Evaluation     Patient's son-in-law, Rodolfo and daughter, Tegan, was present for evaluation.     Michelle DURBIN [address]  [facility] [address]  Telephone Information:   Mobile [phone]   Home  [phone] (home)  Work  [phone] (work)  E-mail  [email or web address]    Sex: female  YOB: 1950  Age: 68 y.o.    Encounter Date: 2019  [place] Citizen: yes  Primary Language: English   Needed: no    Emergency Contact:  Name: Tegan Mahajan (45 yo)  Relationship: daughter  Address: [address] [place] [address]  Phone Numbers:  [phone] (mobile)    Family/Social Support:   Number of dependents/: none  Marital history: patient and spouse have been  for 21 years.   Other family dynamics: Patient reported that her parents are . Patient reported having four children that live in [place]. Patient also has a sister that lives in [place].     Household Composition:  Patient and spouse, Wilder Ojeda (82 yo), live in home together.     Do you and your caregivers have access to reliable transportation? yes  PRIMARY CAREGIVER: Tegan Matias will be primary caregiver, phone number [phone].      provided in-depth information to patient and caregiver regarding pre- and post-transplant caregiver role.   strongly encourages patient and caregiver to have concrete plan regarding post-transplant care giving, including back-up caregiver(s) to ensure care giving needs are met as needed.    Patient and Caregiver states understanding all aspects of caregiver role/commitment and is able/willing/committed to being caregiver to the fullest extent necessary.    Patient and Caregiver verbalizes understanding of the education provided today and caregiver responsibilities.         remains available. Patient and Caregiver agree to contact   in a timely manner if concerns arise.      Able to take time off work without financial concerns: yes. Caregiver has been recently approved for FMLA. Caregiver works as a .     Additional Significant Others who will Assist with Transplant:  Name: Imelda Dorsey # [phone]  Age: 48  City: [place] State: [place]  Relationship: daughter  Does person drive? yes   Caregiver works, but will be using FMLA. Caregiver confirmed.    Living Will: yes  Healthcare Power of : yes  Advance Directives on file: <Not Received per medical record.  Verbally reviewed LW/HCPA information.   provided patient with copy of LW/HCPA documents and provided education on completion of forms.    Living Donors: No.    Highest Education Level: High School (9-12) or GED  Reading Ability: 12th grade  Reports difficulty with: N/A  Learns Best By:  Written, verbal, and demonstration     Status: no  VA Benefits: no     Working for Income: No  If no, reason not working: Patient Choice - Retired  Patient is retired from working in [employer] at a [employer]. Pt did this for about 30 years. .    Spouse/Significant Other Employment: retired.     Disabled: no    Monthly Income:  SS Reitrement: $2000 (spouse and pt FDC)  Able to afford all costs now and if transplanted, including medications: yes  Patient and Caregiver verbalizes understanding of personal responsibilities related to transplant costs and the importance of having a financial plan to ensure that patients transplant costs are fully covered.      provided fundraising information/education.  Patient and Caregiver verbalizes understanding.   remains available.    Insurance:   Payor/Plan Subscr  Sex Relation Sub. Ins. ID Effective Group Num   1. MEDICARE - ME* MATHEW TYSON 1950 Female Self [id number]                                    [address]   2. BLUE CROSS BL* MATHEW TYSON  1950 Female  [id number] 9/1/13 [id number]                                   [address] [address]     Primary Insurance (for UNOS reporting): Public Insurance - Medicare FFS (Fee For Service)  Secondary Insurance (for UNOS reporting): Private Insurance  Patient and Caregiver verbalizes clear understanding that patient may experience difficulty obtaining and/or be denied insurance coverage post-surgery. This includes and is not limited to disability insurance, life insurance, health insurance, burial insurance, long term care insurance, and other insurances.    Patient and Caregiver also reports understanding that future health concerns related to or unrelated to transplantation may not be covered by patient's insurance.  Resources and information provided and reviewed.      Patient and Caregiver provides verbal permission to release any necessary information to outside resources for patient care and discharge planning.  Resources and information provided are reviewed.      Dialysis Adherence:  Patient and Caregiver reports that patient does not require dialysis.     Infusion Service: patient utilizing? no  Home Health: patient utilizing? no  DME: yes; wheelchair   Pulmonary/Cardiac Rehab: none  ADLS:  Patient reported that she was driving before being admitted to the hospital. Pt reported having some ambulating issues if walking long distances.     Adherence:   Patient and caregiver reported that patient has been compliant with medical recommendations and regimens..  Adherence education and counseling provided.     Per History Section:  Past Medical History:   Diagnosis Date    Cirrhosis     Diabetes mellitus, type 2     Disorder of kidney and ureter     Hypertension     Hypothyroidism     NAFLD (nonalcoholic fatty liver disease)      Social History     Tobacco Use    Smoking status: Never Smoker    Smokeless tobacco: Never Used   Substance Use Topics    Alcohol use: Not Currently     Social History     Substance  and Sexual Activity   Drug Use Never     Social History     Substance and Sexual Activity   Sexual Activity Not on file       Per Today's Psychosocial:  Tobacco: none, patient denies any use.  Alcohol: none, patient denies any use.  Illicit Drugs/Non-prescribed Medications: none, patient denies any use.    Patient and Caregiver states clear understanding of the potential impact of substance use as it relates to transplant candidacy and is aware of possible random substance screening.  Substance abstinence/cessation counseling, education and resources provided and reviewed.     Arrests/DWI/Treatment/Rehab: patient denies    Psychiatric History:    Mental Health: none  Psychiatrist/Counselor: none  Medications:  Pt denies   Suicide/Homicide Issues: patient denies past and current SI/HI  Safety at home: patient reported living in a safe and appropriate environment.      Knowledge: Patient and Caregiver states having clear understanding and realistic expectations regarding the potential risks and potential benefits of organ transplantation and organ donation, agrees to discuss with health care team members and support system members and to utilize available resources and express questions and/or concerns in order to further facilitate the pt informed decision-making.  Resources and information provided and reviewed.     Patient and Caregiver is aware of [facility]'s affiliation and/or partnership with agencies in home health care, LTAC, SNF, [facility], and other hospitals and clinics.    Understanding: Patient and Caregiver reports having a clear understanding of the many lifetime commitments involved with being a transplant recipient, including costs, compliance, medications, lab work, procedures, appointments, concrete and financial planning, preparedness, timely and appropriate communication of concerns, abstinence (ETOH, tobacco, illicit non-prescribed drugs), adherence to all health care team recommendations, support system  and caregiver involvement, appropriate and timely resource utilization and follow-through, mental health counseling as needed/recommended, and patient and caregiver responsibilities.  Social Service Handbook, resources and detailed educational information provided and reviewed.  Educational information provided.    Patient and Caregiver also reports current and expected compliance with health care regime and states having a clear understanding of the importance of compliance.      Patient and Caregiver reports a clear understanding that risks and benefits may be involved with organ transplantation and with organ donation.      Patient and Caregiver also reports clear understanding that psychosocial risk factors may affect patient, and include but are not limited to feelings of depression, generalized anxiety, anxiety regarding dependence on others, post traumatic stress disorder, feelings of guilt and other emotional and/or mental concerns, and/or exacerbation of existing mental health concerns.  Detailed resources provided and discussed.     Patient and Caregiver agrees to access appropriate resources in a timely manner as needed and/or as recommended, and to communicate concerns appropriately.  Patient and Caregiver also reports a clear understanding of treatment options available.      reviewed education, provided additional information, and answered questions.    Feelings or Concerns: patient denied having any concerns related to transplant.    Coping: pt reported adequate coping with the support of family.     Goals: Pt reported wanting to go back to [facility].  Patient referred to Vocational Rehabilitation.    Interview Behavior: Patient and Caregiver presents as alert and oriented x 4, pleasant, good eye contact, well groomed, recall good, concentration/judgement good, average intelligence, calm, communicative, cooperative and asking and answering questions appropriately.          Transplant Social  Work - Candidacy  Assessment/Plan:     Psychosocial Suitability: Patient presents as an excellent candidate for liver transplant at this time. Based on psychosocial risk factors, patient presents as low risk, due to suitable and adeqaute caregiver plan, adeqaute insurance, and no reported substance abuse/dependency. Patient reported having limited income; however, pt's children are available to assist patient financially.     Recommendations/Additional Comments:   -fundraising  -local lodging     Avelino Valiente LMSW

## 2019-07-22 NOTE — CONSULTS
Infectious Disease Pre- LIVER Transplant Evaluation Note:      Consulting Physician:   Reason for Consult: Pre- LIVER Transplant evaluation    Assessment, Plan and Counseling:  Transplant Candidacy: Based on available information, there are no unusual risks of infection or identified significant barriers to transplantation from an infectious disease standpoint at this time subject to the following.    - Serologies still in process:   HIV, RPR, Hepatitis C negative. Strongyloides and quantiferron gold TB negative. Will check Coccidiodes as lives in [place]. If positive please reconsult ID.     Immunizations:  The patient's immunization history and serologies were reviewed.  Based on the patients immunization history and serologies, immunizations were ordered:      Pt up to date with vaccinations    The patient was encouraged to contact us about any problems that may develop after immunization and possible side effects were reviewed.       Counseling: discussed with the patient the risk for increased susceptibility to infections following transplantation including increased risk for infection right after transplant and if rejection should occur.  The patients has been counseled on the importance of vaccinations including but not limited to a yearly flu vaccine. Specific guidance has been provided to the patient regarding the patients occupation, hobbies and activities to avoid future infectious complications including but not limited to avoiding undercooked meats and seafood, proper hygiene, and contact with animals.    Final determination of transplant candidacy will be made once evaluation is complete and reviewed by the LIVER Transplant Selection Committee.    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       Chief Complaint: LIVER Transplant Evaluation    History of Present Illness:  MS. TYSON is a 68 y.o.-year-old female with advanced LIVER disease. Infectious disease is consulted for Pre- LIVER transplant evaluation. Etiology of liver disease is IZAGUIRRE cirrhosis. +ascites and paracentesis. Negative SBP. Previously on lactulose.      none      The infectious disease pre-transplant questionnaire was reviewed.   Patient denies recent fevers, chills, infective illnesses.      No history of diabetes.  No current or long term steroid use.   Denies splenectomy    Denies history of chronic, recurring infections of sinuses, throat, bladder/kidneys, intestines, skin, reproductive organs, teeth or gums.     Patient has had chickenpox.  No shingles.   Denies history of syphilis, gonorrhea, other STDs/viral hepatitis/ or other infective illnesses.     Denies known exposure to TB  history of residence in coccidioides endemic areas ([place])  No foreign travel    Pet/Animal exposures:  Indoor dog  Food: Denies eating raw/undercooked food  Occupational: office job  Hobbies: No risky hobbies identified    Immunization History:  Usual childhood vaccines:   Influenza:  Recommend yearly  Pneumococcal (PCV 13): up to date  Pneumococcal (PPSV 23): up to date  Tetanus (TdaP): up to date  Hepatitis A: immune  Hepatitis B: immune  Shingles: up to date    Serologies:  Viral pathogens  -HIV: Negative.  -Hepatitis C: Negative.  -Hepatitis B: positive immunity    -Hepatitis A: immune  -Varicella zoster: Positive  -CMV serology: Negative serology. High risk for invasive disease if he receives a CMV positive donor organ.  -HSV: Positive for HSV-1  -EBV: Positive for EBV     Bacterial pathogens  -Tetanus: up to date  -Pneumococcus: PCV13, PPSV23 recommmended 8 weeks after PCV 13  -Tuberculosis: Quantiferon gold negative   -Syphilis: RPR Negative     Fungal pathogens  -Coccidioides:pending     Parasitic pathogens  -Strongyloides: negative serology.  -Toxoplasmosis: Negative      Past Medical History:  Past Medical History:   Diagnosis Date    Cirrhosis     Diabetes mellitus, type 2     Disorder of kidney and ureter     Hypertension     Hypothyroidism     NAFLD (nonalcoholic fatty liver disease)        Past Surgical History:  History reviewed. No pertinent surgical history.    Family History:  Family History   Problem Relation Age of Onset    No Known Problems Father        Social History:  Social History     Socioeconomic History    Marital status: Unknown     Spouse name: Not on file    Number of children: Not on file    Years of education: Not on file    Highest education level: Not on file   Occupational History    Not on file   Social Needs    Financial resource strain: Not on file    Food insecurity:     Worry: Not on file     Inability: Not on file    Transportation needs:     Medical: Not on file     Non-medical: Not on file   Tobacco Use    Smoking status: Never Smoker    Smokeless tobacco: Never Used   Substance and Sexual Activity    Alcohol use: Not Currently    Drug use: Never    Sexual activity: Not on file   Lifestyle    Physical activity:     Days per week: Not on file     Minutes per session: Not on file    Stress: Not on file   Relationships    Social connections:     Talks on phone: Not on file     Gets together: Not on file     Attends Oriental orthodox service: Not on file     Active member of club or organization: Not on file     Attends  meetings of clubs or organizations: Not on file     Relationship status: Not on file   Other Topics Concern    Not on file   Social History Narrative    Not on file       Allergies:  Review of patient's allergies indicates:  No Known Allergies    Immunizations:    There is no immunization history on file for this patient.     Review of Symptoms:  Review of Systems   Constitution: Positive for decreased appetite and malaise/fatigue. Negative for chills, fever, night sweats, weight gain and weight loss.   HENT: Negative for congestion, ear pain, hearing loss, hoarse voice, sore throat and tinnitus.    Eyes: Negative for blurred vision, pain, vision loss in left eye, vision loss in right eye and visual disturbance.   Cardiovascular: Positive for dyspnea on exertion and leg swelling. Negative for chest pain and palpitations.   Respiratory: Negative for cough, shortness of breath, sputum production and wheezing.    Skin: Positive for dry skin and itching. Negative for rash and suspicious lesions.   Musculoskeletal: Positive for back pain. Negative for joint pain, myalgias and neck pain.   Gastrointestinal: Positive for bloating. Negative for abdominal pain, constipation, diarrhea, heartburn, nausea and vomiting.   Genitourinary: Negative for dysuria, flank pain, frequency, hematuria, hesitancy and urgency.   Neurological: Negative for dizziness, headaches, numbness, paresthesias and weakness.   Psychiatric/Behavioral: Negative for depression and memory loss. The patient does not have insomnia and is not nervous/anxious.      Pertinent Medications:  Antibiotics:   Antibiotics (From admission, onward)    Start     Stop Route Frequency Ordered    07/21/19 1230  cefTRIAXone (ROCEPHIN) 2 g in dextrose 5 % 50 mL IVPB      -- IV Every 24 hours (non-standard times) 07/20/19 1156    07/18/19 2100  rifAXIMin tablet 550 mg      -- Oral 2 times daily 07/18/19 1459          Physical Exam:  VS (24h):   Vitals:    07/22/19 0318   BP:  (!) 110/57   Pulse: 77   Resp: 18   Temp: 97.9 °F (36.6 °C)     Temp:  [97.9 °F (36.6 °C)-98.8 °F (37.1 °C)]     Physical Exam   Constitutional: She is oriented to person, place, and time. She appears well-developed and well-nourished. No distress.   HENT:   Head: Normocephalic and atraumatic.   Eyes: Pupils are equal, round, and reactive to light. EOM are normal.   Neck: Normal range of motion. Neck supple.   Cardiovascular: Normal rate, regular rhythm, normal heart sounds and intact distal pulses. Exam reveals no gallop and no friction rub.   No murmur heard.  Pulmonary/Chest: Effort normal and breath sounds normal. No respiratory distress. She has no wheezes. She has no rales. She exhibits no tenderness.   Abdominal: Soft. Bowel sounds are normal. She exhibits distension. She exhibits no mass. There is no tenderness. There is no rebound and no guarding. No hernia.   Musculoskeletal: Normal range of motion. She exhibits edema (2+ pedal). She exhibits no tenderness or deformity.   Neurological: She is alert and oriented to person, place, and time. No cranial nerve deficit. Coordination normal.   Skin: Skin is warm and dry. She is not diaphoretic. No erythema. No pallor.   Psychiatric: She has a normal mood and affect. Her behavior is normal. Thought content normal.   Nursing note and vitals reviewed.      Lines:                                              Labs:  CBC:   Lab Results   Component Value Date    WBC 4.76 07/22/2019    WBC 6.87 07/21/2019    WBC 2.51 (L) 07/20/2019    WBC 2.03 (L) 07/19/2019    WBC 3.40 (L) 07/18/2019    HGB 8.8 (L) 07/22/2019    HCT 25.3 (L) 07/22/2019     (H) 07/22/2019    PLT 27 (LL) 07/22/2019       BMP:   Recent Labs   Lab 07/22/19  0448   *   *   K 3.9   CL 90*   CO2 24   BUN 45*   CREATININE 1.8*   CALCIUM 8.4*   MG 2.1       LFT:   Lab Results   Component Value Date    ALT 24 07/22/2019    AST 19 07/22/2019    GGT 98 (H) 07/18/2019    ALKPHOS 76 07/22/2019     BILITOT 2.4 (H) 07/22/2019       RPR:   Lab Results   Component Value Date    RPR Non-reactive 07/18/2019        Quantiferon Gold Tb: No results found for: QUANTIFERON    Hepatitis Serologies:   Lab Results   Component Value Date    HEPBCAB Negative 07/18/2019    HEPCAB Negative 07/18/2019        Microbiology x 7d:   Microbiology Results (last 7 days)     Procedure Component Value Units Date/Time    Aerobic culture [[id number]] Collected:  07/19/19 0927    Order Status:  Completed Specimen:  Body Fluid from Abdomen Updated:  07/22/19 1009     Aerobic Bacterial Culture No growth    Culture, Anaerobe [[id number]] Collected:  07/19/19 0927    Order Status:  Completed Specimen:  Body Fluid from Abdomen Updated:  07/22/19 0743     Anaerobic Culture Culture in progress    Blood culture [[id number]] Collected:  07/20/19 1356    Order Status:  Completed Specimen:  Blood Updated:  07/21/19 1612     Blood Culture, Routine No Growth to date      No Growth to date    Blood culture [[id number]] Collected:  07/20/19 1356    Order Status:  Completed Specimen:  Blood Updated:  07/21/19 1612     Blood Culture, Routine No Growth to date      No Growth to date    Urine culture [[id number]] Collected:  07/18/19 1212    Order Status:  Completed Specimen:  Urine Updated:  07/19/19 2107     Urine Culture, Routine No growth    Narrative:       Preferred Collection Type->Urine, Clean Catch          Imaging:    CT ABDOMEN/PELVIS PENDING       Assessment/Plan - Please see top of page

## 2019-07-22 NOTE — PROGRESS NOTES
[facility]  Hepatology  Progress Note    Patient Name: Michelle Ojeda  MRN: [id number]  Admission Date: 7/18/2019  Hospital Length of Stay: 4 days  Attending Provider: Ty Breaux MD   Primary Care Physician: Primary Doctor No  Principal Problem:Hyponatremia    Subjective:     Transplant status: No    HPI: Ms Ojeda is a 68 year old female with a history of HERRMANN ESLD, with decompensations including Gr 1 HE, ascites, EV, hyponatremia, T2DM, HTN, hypothyroidism, who is being admitted from hepatology clinic due to concern for hyponatremia.    She presents to transplant Clinic for initial evaluation on 07/18 with Dr. Cannon for initial transplant evaluation.  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  Her cirrhosis has been complicated by grade 1 hepatic encephalopathy, recently started Aldo [place] min but never been on lactulose, ascites, sarcopenia, esophageal varices status post ligation, hyponatremia.  She was previously evaluated at [place] with Dr. Mccormick who recommended evaluation at [facility].  Does not appear she was evaluated for transplant at that time.    Due to hyponatremia with sodium of 120 she was admitted to the hospital for further management.  Currently she reports she is feeling well without any complaints.  Denies any abdominal pain, nausea, vomiting, diarrhea.  Denies any fevers, chills, sweats or other infectious complaints.    Family who is present in the room note that she is doing extremely well until approximately 2 months prior to presentation when she began to be more frail, fatigue, increased peripheral edema, decreased oral intake.  They note they have been extremely careful to avoid T here to low-sodium diet.  She has never been hospitalized in the past for hyponatremia.  No  other recent hospitalizations.  Regarding her ascites she has approximately received a paracentesis every 2 weeks.      Interval History:   Working with therapy, walking with walker, but still feeling weak.  On PPN.    Current Facility-Administered Medications   Medication    acetaminophen tablet 325 mg    cefTRIAXone (ROCEPHIN) 2 g in dextrose 5 % 50 mL IVPB    dextrose 10% (D10W) Bolus    dextrose 10% (D10W) Bolus    dicyclomine capsule 10 mg    glucagon (human recombinant) injection 1 mg    glucose chewable tablet 16 g    glucose chewable tablet 24 g    iohexol (OMNIPAQUE) oral solution 15 mL    levothyroxine tablet 88 mcg    ondansetron disintegrating tablet 8 mg    rifAXIMin tablet 550 mg    simethicone chewable tablet 80 mg    sodium chloride 0.9% flush 10 mL    sodium chloride 0.9% flush 10 mL    TPN ADULT PERIPHERAL CUSTOM    zinc sulfate capsule 220 mg       Objective:     Vital Signs (Most Recent):  Temp: 97.9 °F (36.6 °C) (07/22/19 0318)  Pulse: 77 (07/22/19 0318)  Resp: 18 (07/22/19 0318)  BP: (!) 110/57 (07/22/19 0318)  SpO2: 96 % (07/22/19 0318) Vital Signs (24h Range):  Temp:  [97.9 °F (36.6 °C)-98.8 °F (37.1 °C)] 97.9 °F (36.6 °C)  Pulse:  [76-81] 77  Resp:  [16-18] 18  SpO2:  [95 %-100 %] 96 %  BP: ()/(57-58) 110/57     Weight: 61.7 kg (136 lb) (07/19/19 1643)  Body mass index is 26.56 kg/m².    Physical Exam   Constitutional: She is oriented to person, place, and time. No distress.   HENT:   Head: Normocephalic and atraumatic.   Mouth/Throat: No oropharyngeal exudate.   Pulmonary/Chest: Effort normal. No respiratory distress.   Abdominal: Soft. She exhibits distension.   Musculoskeletal: She exhibits edema.   Neurological: She is alert and oriented to person, place, and time.   Oriented but confused   Psychiatric: She has a normal mood and affect. Her behavior is normal.   Nursing note and vitals reviewed.      MELD-Na score: 28 at 7/22/2019  4:48 AM  MELD score: 20 at  7/22/2019  4:48 AM  Calculated from:  Serum Creatinine: 1.8 mg/dL at 7/22/2019  4:48 AM  Serum Sodium: 123 mmol/L (Rounded to 125 mmol/L) at 7/22/2019  4:48 AM  Total Bilirubin: 2.4 mg/dL at 7/22/2019  4:48 AM  INR(ratio): 1.5 at 7/22/2019  4:48 AM  Age: 68 years    Significant Labs:  Labs within the past month have been reviewed.    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    Plan  - ileus on imaging. NG tube in place.  Abdomen soft.  - repeat infectious workup pending.  On empiric antibiotics.  - ascites: Negative for SBP on 07/19. Total protein 1.5. Continue ppx SBP treatment. Repeat paracentesis today.  - diuretics: hold  - CKD: unclear baseline. Holding diuretics and albumin therapy given hyponatremia.   - encephalopathy: alert and oriented. Worsening confusion on 7/20. Continue lactulose and rifaximin  - hyponatremia: slowly improving with fluid restriction. continue fluid restriction 1L. Holding diuresis. Avoid free water additives. Nephrology consult.  - malnutrition: nutrition consult. Calorie count. Supplements. On PPN. NG tube feeds when ileus resolves.  - PT/OT  - inpatient transplant evaluation started. Nuclear stress test when improves, ID consult, CT a/p without contrast, NM renal scan and will discuss with transplant team tomorrow  - Appreciate renal evaluation re: SLK given GFR <35          Thank you for your consult. I will follow-up with patient. Please contact us if you have any additional questions.    Andrea Eaton MD  Hepatology  [facility]

## 2019-07-22 NOTE — PROGRESS NOTES
Progress Note   Hospital Medicine         Patient Name: Michelle Ojeda  MRN:  [id number]  [facility] Medicine Team: [facility] MED L Ty Breaux MD  Date of Admission:  7/18/2019     Length of Stay:  LOS: 4 days   Expected Discharge Date: 7/26/2019  Principal Problem:  Hyponatremia       Subjective:     Interval History/Overnight Events:  Patient is doing better today, Ab x ray shows resolution of obstruction, will stop TPN and pull NG tube out for obstruction and allow patient to eat; we discussed with family and patient and will plan on placing a dayanna tube tonight for feedings at night to help nutritional status; finishing liver/kidney transplant eval; nuclear stress for tomorrow; plan on presentation Wednesday    Review of Systems   Constitutional: Negative for chills, fatigue, fever.   HENT: Negative for sore throat, trouble swallowing.    Eyes: Negative for photophobia, visual disturbance.   Respiratory: Negative for cough, shortness of breath.    Cardiovascular: Negative for chest pain, palpitations, leg swelling.   Gastrointestinal: positive for abdominal pain, negative constipation, diarrhea, positive nausea, vomiting.   Endocrine: Negative for cold intolerance, heat intolerance.   Genitourinary: Negative for dysuria, frequency.   Musculoskeletal: Negative for arthralgias, myalgias.   Skin: Negative for rash, wound, erythema   Neurological: Negative for dizziness, syncope, weakness, light-headedness.   Psychiatric/Behavioral: Negative for confusion, hallucinations, anxiety  All other systems reviewed and are negative.    Objective:     Temp:  [97.9 °F (36.6 °C)-98.2 °F (36.8 °C)]   Pulse:  [76-81]   Resp:  [16-18]   BP: ()/(57-58)   SpO2:  [95 %-100 %]       Physical Exam:  Constitutional: appears weak and ill  Head: Normocephalic and atraumatic.   Mouth/Throat: Oropharynx is clear and moist.   Eyes: EOM are normal. Pupils are equal, round, and reactive to light. No scleral icterus.   Neck: Normal range  of motion. Neck supple.   Cardiovascular: Normal rate and regular rhythm.  No murmur heard.  Pulmonary/Chest: Effort normal and breath sounds normal. No respiratory distress. No wheezes, rales, or rhonchi  Abdominal: Soft. Bowel sounds are normal.  positive distension, no tenderness  Musculoskeletal: Normal range of motion. No edema.   Neurological: Alert and oriented to person, place, and time.   Skin: Skin is warm and dry.   Psychiatric: Normal mood and affect. Behavior is normal.     Recent Labs   Lab 07/18/19  0810 07/19/19  0341 07/20/19  0439 07/21/19  0402 07/22/19  0448   WBC 3.40* 2.03* 2.51* 6.87 4.76   HGB 10.6* 8.7* 9.6* 10.1* 8.8*   HCT 31.3* 24.6* 26.8* 29.2* 25.3*   PLT 47* 36* 33* 31* 27*     Recent Labs   Lab 07/20/19  0439 07/21/19  0402 07/22/19  0448   * 122* 123*   K 3.7 4.1 3.9   CL 93* 90* 90*   CO2 20* 21* 24   BUN 33* 36* 45*   CREATININE 1.7* 1.8* 1.8*   * 243* 182*   CALCIUM 9.0 9.2 8.4*   MG 2.1 2.0 2.1   PHOS 2.5* 3.0 3.5     Recent Labs   Lab 07/20/19  0439 07/21/19  0402 07/22/19  0448   ALKPHOS 106 80 76   ALT 33 27 24   AST 46* 28 19   ALBUMIN 4.3 4.0 3.3*   PROT 6.0 5.8* 5.0*   BILITOT 2.1* 2.4* 2.4*   INR 1.5* 1.5* 1.5*     No results for input(s): POCTGLUCOSE in the last 168 hours.     cefTRIAXone (ROCEPHIN) IVPB  2 g Intravenous Q24H    levothyroxine  88 mcg Oral Before breakfast    rifAXImin  550 mg Oral BID    zinc sulfate  220 mg Oral Daily       Assessment and Plan     Ms. Michelle Ojeda is a 68 y.o. female who presented to [facility] on 7/18/2019 with     Hospital Course:    Ms. 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     # Ileus   - N/V episodes; seen on ab x ray  - NG tube inserted on 7/20 with 700 cc out initially  - feeling much better today; ab x ray has improved and resolved ileus  - will pull NG tube today and allow patient to eat    # Hyponatremia  - fluid restrict to 1L, currently 123,    - nephrology consult   - checking serum cortisol in AM    # Acute hepatic encephalopathy   - lactulose enema and finally had a large BM , will give another today  - cont rifaximin adding zinc      # Decompensated IZAGUIRRE Cirrhosis with ascites  MELD-Na score: 28 at 7/22/2019  4:48 AM  MELD score: 20 at 7/22/2019  4:48 AM  Calculated from:  Serum Creatinine: 1.8 mg/dL at 7/22/2019  4:48 AM  Serum Sodium: 123 mmol/L (Rounded to 125 mmol/L) at 7/22/2019  4:48 AM  Total Bilirubin: 2.4 mg/dL at 7/22/2019  4:48 AM  INR(ratio): 1.5 at 7/22/2019  4:48 AM  Age: 68 years  - hepatology consulted  - being evaluated for SLK, KTM states a kidney tx candidate   - Liver U/S with doppler reviewed   - cleared for inpatient eval, tests and consults placed  - went for IR paracentesis with 5L removed on 7/19 and negative for SBP  - planning for another IR tap today  - plan for presentation Wednesday; ID consult, CT ab/pelv; gyn; spect tomorrow     # CKD stage 4  - U/A; renal U/S reviewed; Cr improved to 1.7; is a kidney tx candidate     # Coagulopathy due to liver disease   - vitamin K times 3 days   - DIC labs     # Hypothyroidism  - cont levothyroxine      #  Moderate protein payton malnutrition  - PAB, novasource, dietary; planning on providing dayanna tube feeds at night      # Pancytopenia  - DIC  labs     # Physical debility  -PT/OT     Diet:  Low sodium  GI PPx:    DVT PPx:    Goals of Care:  full        Disposition:   later this week, going to undergo liver/kidney tx; planning for presentation wednesday    Ty Breaux MD  Medical Director [facility] Medicine  Spectra:  [phone]  Pager: [phone]

## 2019-07-22 NOTE — PLAN OF CARE
Problem: Adult Inpatient Plan of Care  Goal: Plan of Care Review  POC reviewed with patient and family with understanding verbalized. IV sites intact. NGT removed this shift per MD order with all MD orders implemented. Family has been at bedside. Paracentesis attempted with  No fluid noted to remove. Patient has not  Verbalized any issues and denies any pain or discomfort. All MD orders implemented.

## 2019-07-22 NOTE — HPI
Rusty Martinez is 69 yo F with CKD stage 4 presenting from transplant hepatology clinic for hyponatremia on 7/18/19. She is originally from [place], here for liver transplant evaluation. She was diagnosed with IZAGUIRRE in grade 1 hepatic encephalopathy with severe ascites and esophogeal varices (s/p banding). During the month prior to admission she felt more lethargic and was requiring biweekly paracentesis. Of note, she is also being treated for Ileus her acute hepatic encephalopathy with lactulose. Recent 24 hour cr clearance study was near 21. Reported baseline Cr 1.6-1.8 and chronic hyponatremia range between 123-124.

## 2019-07-22 NOTE — PLAN OF CARE
Problem: Adult Inpatient Plan of Care  Goal: Plan of Care Review  Outcome: Ongoing (interventions implemented as appropriate)  Patient AAo x4, calm and cooperative. No acute events overnight. PPN infusing. NG tube to low intermittent suction. No falls or injuries overnight. No complaints of pain overnight. Patient to have paracentesis today. Will continue to monitor.

## 2019-07-22 NOTE — ASSESSMENT & PLAN NOTE
Ms Ojeda is a 68 year old female with a history of IZAGUIRRE ESLD, with decompensations including Gr 1 HE, ascites, EV, hyponatremia, T2DM, HTN, hypothyroidism, who is being admitted from hepatology clinic due to concern for hyponatremia.    Plan  - ileus on imaging. NG tube in place.  Abdomen soft.  - repeat infectious workup pending.  On empiric antibiotics.  - ascites: Negative for SBP on 07/19. Total protein 1.5. Continue ppx SBP treatment. Repeat paracentesis today.  - diuretics: hold  - CKD: unclear baseline. Holding diuretics and albumin therapy given hyponatremia.   - encephalopathy: alert and oriented. Worsening confusion on 7/20. Continue lactulose and rifaximin  - hyponatremia: slowly improving with fluid restriction. continue fluid restriction 1L. Holding diuresis. Avoid free water additives. Nephrology consult.  - malnutrition: nutrition consult. Calorie count. Supplements. On PPN. NG tube feeds when ileus resolves.  - PT/OT  - inpatient transplant evaluation started. Nuclear stress test when improves, ID consult, CT a/p without contrast, NM renal scan and will discuss with transplant team tomorrow  - Appreciate renal evaluation re: SLK given GFR <35

## 2019-07-22 NOTE — SUBJECTIVE & OBJECTIVE
Interval History:   Working with therapy, walking with walker, but still feeling weak.  On PPN.    Current Facility-Administered Medications   Medication    acetaminophen tablet 325 mg    cefTRIAXone (ROCEPHIN) 2 g in dextrose 5 % 50 mL IVPB    dextrose 10% (D10W) Bolus    dextrose 10% (D10W) Bolus    dicyclomine capsule 10 mg    glucagon (human recombinant) injection 1 mg    glucose chewable tablet 16 g    glucose chewable tablet 24 g    iohexol (OMNIPAQUE) oral solution 15 mL    levothyroxine tablet 88 mcg    ondansetron disintegrating tablet 8 mg    rifAXIMin tablet 550 mg    simethicone chewable tablet 80 mg    sodium chloride 0.9% flush 10 mL    sodium chloride 0.9% flush 10 mL    TPN ADULT PERIPHERAL CUSTOM    zinc sulfate capsule 220 mg       Objective:     Vital Signs (Most Recent):  Temp: 97.9 °F (36.6 °C) (07/22/19 0318)  Pulse: 77 (07/22/19 0318)  Resp: 18 (07/22/19 0318)  BP: (!) 110/57 (07/22/19 0318)  SpO2: 96 % (07/22/19 0318) Vital Signs (24h Range):  Temp:  [97.9 °F (36.6 °C)-98.8 °F (37.1 °C)] 97.9 °F (36.6 °C)  Pulse:  [76-81] 77  Resp:  [16-18] 18  SpO2:  [95 %-100 %] 96 %  BP: ()/(57-58) 110/57     Weight: 61.7 kg (136 lb) (07/19/19 1643)  Body mass index is 26.56 kg/m².    Physical Exam   Constitutional: She is oriented to person, place, and time. No distress.   HENT:   Head: Normocephalic and atraumatic.   Mouth/Throat: No oropharyngeal exudate.   Pulmonary/Chest: Effort normal. No respiratory distress.   Abdominal: Soft. She exhibits distension.   Musculoskeletal: She exhibits edema.   Neurological: She is alert and oriented to person, place, and time.   Oriented but confused   Psychiatric: She has a normal mood and affect. Her behavior is normal.   Nursing note and vitals reviewed.      MELD-Na score: 28 at 7/22/2019  4:48 AM  MELD score: 20 at 7/22/2019  4:48 AM  Calculated from:  Serum Creatinine: 1.8 mg/dL at 7/22/2019  4:48 AM  Serum Sodium: 123 mmol/L (Rounded to  125 mmol/L) at 7/22/2019  4:48 AM  Total Bilirubin: 2.4 mg/dL at 7/22/2019  4:48 AM  INR(ratio): 1.5 at 7/22/2019  4:48 AM  Age: 68 years    Significant Labs:  Labs within the past month have been reviewed.    Significant Imaging:  Reviewed

## 2019-07-22 NOTE — SUBJECTIVE & OBJECTIVE
Past Medical History:   Diagnosis Date    Cirrhosis     Diabetes mellitus, type 2     Disorder of kidney and ureter     Hypertension     Hypothyroidism     NAFLD (nonalcoholic fatty liver disease)        History reviewed. No pertinent surgical history.    Review of patient's allergies indicates:  No Known Allergies  Current Facility-Administered Medications   Medication Frequency    acetaminophen tablet 325 mg Q4H PRN    cefTRIAXone (ROCEPHIN) 2 g in dextrose 5 % 50 mL IVPB Q24H    dextrose 10% (D10W) Bolus PRN    dextrose 10% (D10W) Bolus PRN    dicyclomine capsule 10 mg QID PRN    glucagon (human recombinant) injection 1 mg PRN    glucose chewable tablet 16 g PRN    glucose chewable tablet 24 g PRN    iohexol (OMNIPAQUE) oral solution 15 mL PRN    levothyroxine tablet 88 mcg Before breakfast    ondansetron disintegrating tablet 8 mg Q8H PRN    rifAXIMin tablet 550 mg BID    simethicone chewable tablet 80 mg TID PRN    sodium chloride 0.9% flush 10 mL PRN    sodium chloride 0.9% flush 10 mL PRN    TPN ADULT PERIPHERAL CUSTOM Continuous    zinc sulfate capsule 220 mg Daily     Family History     Problem Relation (Age of Onset)    No Known Problems Father        Tobacco Use    Smoking status: Never Smoker    Smokeless tobacco: Never Used   Substance and Sexual Activity    Alcohol use: Not Currently    Drug use: Never    Sexual activity: Not on file     Review of Systems   Reason unable to perform ROS: very difficult to obatain secondary to lethargy please see HPI for further detail    Constitutional: Negative for activity change.   Eyes: Negative for visual disturbance.   Respiratory: Negative for chest tightness and shortness of breath.    Cardiovascular: Negative for chest pain and leg swelling.   Gastrointestinal: Positive for abdominal distention and constipation.   Skin: Negative for rash.   Neurological: Positive for weakness.     Objective:     Vital Signs (Most Recent):  Temp: 97.9  °F (36.6 °C) (07/22/19 0318)  Pulse: 77 (07/22/19 0318)  Resp: 18 (07/22/19 0318)  BP: (!) 110/57 (07/22/19 0318)  SpO2: 96 % (07/22/19 0318)  O2 Device (Oxygen Therapy): room air (07/22/19 0318) Vital Signs (24h Range):  Temp:  [97.9 °F (36.6 °C)-98.8 °F (37.1 °C)] 97.9 °F (36.6 °C)  Pulse:  [76-81] 77  Resp:  [16-18] 18  SpO2:  [95 %-100 %] 96 %  BP: ()/(57-58) 110/57     Weight: 61.7 kg (136 lb) (07/19/19 1643)  Body mass index is 26.56 kg/m².  Body surface area is 1.62 meters squared.    I/O last 3 completed shifts:  In: 488.9 [IV Piggyback:50]  Out: 651 [Urine:400; Drains:250; Stool:1]    Physical Exam   Constitutional: She is oriented to person, place, and time. No distress.   Her eyes were closed during interview however will open then follow commands and is oriented to location.  Some confusion but improved from prior day.  Family at bedside.   HENT:   Head: Normocephalic and atraumatic.   Mouth/Throat: No oropharyngeal exudate.   Eyes: Conjunctivae are normal. Scleral icterus is present.   Cardiovascular: Normal rate, regular rhythm and normal heart sounds.   No murmur heard.  Pulmonary/Chest: Effort normal and breath sounds normal. No respiratory distress.   Abdominal: Soft. She exhibits distension (Prominent fluid noted in peritoneal cavity ). There is no tenderness. There is no guarding.   Soft, non-tender. Tympanic.  Hypoactive bowel sounds   Musculoskeletal: She exhibits no edema (mild) or tenderness.   Neurological: She is alert and oriented to person, place, and time.   Oriented but confused.   Skin: Skin is warm. She is not diaphoretic.   Nursing note and vitals reviewed.      Significant Labs:  ABGs: No results for input(s): PH, PCO2, HCO3, POCSATURATED, BE in the last 168 hours.  BMP:   Recent Labs   Lab 07/22/19 0448   *   CL 90*   CO2 24   BUN 45*   CREATININE 1.8*   CALCIUM 8.4*   MG 2.1     CBC:   Recent Labs   Lab 07/22/19 0448   WBC 4.76   RBC 2.53*   HGB 8.8*   HCT 25.3*    PLT 27*   *   MCH 34.8*   MCHC 34.8     CMP:   Recent Labs   Lab 07/22/19  0448   *   CALCIUM 8.4*   ALBUMIN 3.3*   PROT 5.0*   *   K 3.9   CO2 24   CL 90*   BUN 45*   CREATININE 1.8*   ALKPHOS 76   ALT 24   AST 19   BILITOT 2.4*     LFTs:   Recent Labs   Lab 07/22/19  0448   ALT 24   AST 19   ALKPHOS 76   BILITOT 2.4*   PROT 5.0*   ALBUMIN 3.3*     All labs within the past 24 hours have been reviewed.    Significant Imaging:  X-Ray: Reviewed  US: Reviewed  US demonstrates suspected moderate renal disease

## 2019-07-22 NOTE — ASSESSMENT & PLAN NOTE
Labs from [place] nephrology demonstrated Cr of 1.77 in 3/12/19, 1.67 on 4/5/19, 1.96 in 6/13/19, and 1.83 on 6/24. Patient may meet criteria fro kidney transplant.   - Cr is 1.8 today (7/22/19) which appears to be near her baseline.   - Monitor with daily BMP  - Nephrology will continue to follow closely.

## 2019-07-22 NOTE — CONSULTS
History & Physical  Gynecology    SUBJECTIVE:     Chief Complaint: Abnormal Lab (low sodium)       History of Present Illness:  Michelle Ojeda is a 68 y.o. with a pmh of NAFLD who presented to the hospital for abdominal distension and hyponatremia, now being considered for liver/ kidney transplant eval. GYN consulted for well woman exam.     She denies any GYN complaints currently. Went through menopause around age 50-55, and never had problems with her cycle. She is  s/p 4 spontaneous vaginal deliveries with no complications. She denies any urinary symptoms.     She is not currently sexually active. She denies a history of abnormal pap smears, and states she had a recent pap with her primary OBGYN on 2019    She denies h/o abnl mammograms, and states she had one within the last 6 months    She denies h/o abnormal colonoscopies, and states she had one within the last 6 months    She has a family hx of ovarian cancer in her sister at age 50, but denies any family h/o other GYN, colon, gastric, or pancreatic cancers.     She does not know if she received the gardasil vaccine, but reports she does not think so.     She does not use tobacco products.  She denies alcohol  She does not use recreational or other drugs.        Review of patient's allergies indicates:  No Known Allergies    Past Medical History:   Diagnosis Date    Cirrhosis     Diabetes mellitus, type 2     Disorder of kidney and ureter     Hypertension     Hypothyroidism     NAFLD (nonalcoholic fatty liver disease)      History reviewed. No pertinent surgical history.  OB History    None       Family History   Problem Relation Age of Onset    No Known Problems Father      Social History     Tobacco Use    Smoking status: Never Smoker    Smokeless tobacco: Never Used   Substance Use Topics    Alcohol use: Not Currently    Drug use: Never       Current Facility-Administered Medications   Medication    acetaminophen tablet 325 mg     cefTRIAXone (ROCEPHIN) 2 g in dextrose 5 % 50 mL IVPB    dextrose 10% (D10W) Bolus    dextrose 10% (D10W) Bolus    dicyclomine capsule 10 mg    glucagon (human recombinant) injection 1 mg    glucose chewable tablet 16 g    glucose chewable tablet 24 g    iohexol (OMNIPAQUE) oral solution 15 mL    levothyroxine tablet 88 mcg    ondansetron disintegrating tablet 8 mg    rifAXIMin tablet 550 mg    simethicone chewable tablet 80 mg    sodium chloride 0.9% flush 10 mL    sodium chloride 0.9% flush 10 mL    TPN ADULT PERIPHERAL CUSTOM    zinc sulfate capsule 220 mg       Review of Systems:  Constitutional: Positive for activity change, fatigue negative for fever.   Eyes: Negative for visual disturbance.   Respiratory: Negative for cough and shortness of breath.    Cardiovascular: Negative for chest pain and palpitations.   Gastrointestinal: Positive for abd swelling; Negative for abd pain, constipation, diarrhea, nausea and vomiting.   Genitourinary: Negative for dysuria, frequency, menorrhagia, pelvic pain, vaginal discharge, vaginal pain, dysmenorrhea, urinary incontinence and vaginal odor.   Neurological: Negative for headaches.   Psychiatric/Behavioral: Negative for depression.   Breast: Negative for breast mass, breast pain, nipple discharge and skin changes        OBJECTIVE:      Temp:  [97.9 °F (36.6 °C)-98.8 °F (37.1 °C)] 97.9 °F (36.6 °C)  Pulse:  [76-81] 77  Resp:  [16-18] 18  SpO2:  [95 %-100 %] 96 %  BP: ()/(57-58) 110/57    Physical Exam:  Constitutional: She is oriented to person, place, and time. She appears well-developed and well-nourished. No distress.   HENT: Head: Normocephalic and atraumatic.   Cardiovascular: Normal rate   Pulmonary/Chest: Effort normal. No respiratory distress. She exhibits no tenderness.   Breasts: No inverted nipple, no mass, no nipple discharge, no skin change and no tenderness.   Abdominal:  She exhibits distension and thickening of the skin of her abdomen  but no mass. There is no tenderness.   Genitourinary: SSE and SVE deferred due to recent pap/ bimanual with primary OB that was wnl.   Neurological: She is alert and oriented to person, place, and time.   Skin: Skin is warm and dry. She is not diaphoretic.   Psychiatric: She has a normal mood and affect. Her behavior is normal. Judgment and thought content normal.   Nursing note and vitals reviewed.     Recent Labs   Lab 07/20/19  0439 07/21/19  0402 07/22/19  0448   WBC 2.51* 6.87 4.76   HGB 9.6* 10.1* 8.8*   HCT 26.8* 29.2* 25.3*   MCV 98 100* 100*   PLT 33* 31* 27*        PAP Result from OSH from 6/5/2019  - NILM (Negative for intraepithelial lesion or malignancy), with HPV not detected.   ASSESSMENT:     Michelle Ojeda is a 68 y.o. woman being worked up for possible liver/ kidney transplant. GYN consulted for well woman exam.     Plan:      - Pt with recent well woman exam from primary OB > NORMAL PAP RESULTS as above. Pt has records of this encounter with her.   - No concern for h/o abnl pap or abnl mammo   - All well woman care utd.     Counseling time: 30 minutes    Myla Marcos M.D.   OB/GYN  PGY-1

## 2019-07-23 PROBLEM — N28.89 RIGHT KIDNEY MASS: Status: ACTIVE | Noted: 2019-07-23

## 2019-07-23 LAB
ALBUMIN SERPL BCP-MCNC: 3.2 G/DL (ref 3.5–5.2)
ALP SERPL-CCNC: 127 U/L (ref 55–135)
ALT SERPL W/O P-5'-P-CCNC: 26 U/L (ref 10–44)
ANION GAP SERPL CALC-SCNC: 8 MMOL/L (ref 8–16)
ANISOCYTOSIS BLD QL SMEAR: SLIGHT
AST SERPL-CCNC: 31 U/L (ref 10–40)
BASOPHILS # BLD AUTO: 0.01 K/UL (ref 0–0.2)
BASOPHILS NFR BLD: 0.3 % (ref 0–1.9)
BILIRUB SERPL-MCNC: 1.9 MG/DL (ref 0.1–1)
BUN SERPL-MCNC: 56 MG/DL (ref 8–23)
CALCIUM SERPL-MCNC: 8.4 MG/DL (ref 8.7–10.5)
CHLORIDE SERPL-SCNC: 89 MMOL/L (ref 95–110)
CO2 SERPL-SCNC: 27 MMOL/L (ref 23–29)
CORTIS SERPL-MCNC: 15.4 UG/DL
CREAT SERPL-MCNC: 2 MG/DL (ref 0.5–1.4)
DAT IGG-SP REAG RBC-IMP: NORMAL
DIFFERENTIAL METHOD: ABNORMAL
EOSINOPHIL # BLD AUTO: 0 K/UL (ref 0–0.5)
EOSINOPHIL NFR BLD: 0.5 % (ref 0–8)
ERYTHROCYTE [DISTWIDTH] IN BLOOD BY AUTOMATED COUNT: 14.2 % (ref 11.5–14.5)
EST. GFR  (AFRICAN AMERICAN): 28.5 ML/MIN/1.73 M^2
EST. GFR  (NON AFRICAN AMERICAN): 24.8 ML/MIN/1.73 M^2
FIBRINOGEN PPP-MCNC: 264 MG/DL (ref 182–366)
GLUCOSE SERPL-MCNC: 229 MG/DL (ref 70–110)
HCT VFR BLD AUTO: 25.8 % (ref 37–48.5)
HGB BLD-MCNC: 8.8 G/DL (ref 12–16)
IMM GRANULOCYTES # BLD AUTO: 0.06 K/UL (ref 0–0.04)
IMM GRANULOCYTES NFR BLD AUTO: 1.6 % (ref 0–0.5)
INR PPP: 1.3 (ref 0.8–1.2)
LYMPHOCYTES # BLD AUTO: 0.3 K/UL (ref 1–4.8)
LYMPHOCYTES NFR BLD: 6.5 % (ref 18–48)
MAGNESIUM SERPL-MCNC: 2.2 MG/DL (ref 1.6–2.6)
MCH RBC QN AUTO: 34 PG (ref 27–31)
MCHC RBC AUTO-ENTMCNC: 34.1 G/DL (ref 32–36)
MCV RBC AUTO: 100 FL (ref 82–98)
MONOCYTES # BLD AUTO: 0.4 K/UL (ref 0.3–1)
MONOCYTES NFR BLD: 10.1 % (ref 4–15)
NEUTROPHILS # BLD AUTO: 3.1 K/UL (ref 1.8–7.7)
NEUTROPHILS NFR BLD: 81 % (ref 38–73)
NRBC BLD-RTO: 0 /100 WBC
PHOSPHATE SERPL-MCNC: 3.4 MG/DL (ref 2.7–4.5)
PLATELET # BLD AUTO: 33 K/UL (ref 150–350)
PLATELET BLD QL SMEAR: ABNORMAL
PMV BLD AUTO: 10.3 FL (ref 9.2–12.9)
POTASSIUM SERPL-SCNC: 3.8 MMOL/L (ref 3.5–5.1)
PROT SERPL-MCNC: 5.3 G/DL (ref 6–8.4)
PROTHROMBIN TIME: 13.3 SEC (ref 9–12.5)
RBC # BLD AUTO: 2.59 M/UL (ref 4–5.4)
SODIUM SERPL-SCNC: 124 MMOL/L (ref 136–145)
URATE SERPL-MCNC: 9.2 MG/DL (ref 2.4–5.7)
WBC # BLD AUTO: 3.87 K/UL (ref 3.9–12.7)

## 2019-07-23 PROCEDURE — 99223 1ST HOSP IP/OBS HIGH 75: CPT | Mod: NTX,,, | Performed by: UROLOGY

## 2019-07-23 PROCEDURE — 85025 COMPLETE CBC W/AUTO DIFF WBC: CPT | Mod: NTX

## 2019-07-23 PROCEDURE — 25000003 PHARM REV CODE 250: Mod: NTX | Performed by: HOSPITALIST

## 2019-07-23 PROCEDURE — 86635 COCCIDIOIDES ANTIBODY: CPT | Mod: NTX

## 2019-07-23 PROCEDURE — 99232 PR SUBSEQUENT HOSPITAL CARE,LEVL II: ICD-10-PCS | Mod: NTX,,, | Performed by: HOSPITALIST

## 2019-07-23 PROCEDURE — 86880 COOMBS TEST DIRECT: CPT | Mod: NTX

## 2019-07-23 PROCEDURE — 99233 PR SUBSEQUENT HOSPITAL CARE,LEVL III: ICD-10-PCS | Mod: GC,NTX,, | Performed by: INTERNAL MEDICINE

## 2019-07-23 PROCEDURE — 84550 ASSAY OF BLOOD/URIC ACID: CPT | Mod: NTX

## 2019-07-23 PROCEDURE — 99232 SBSQ HOSP IP/OBS MODERATE 35: CPT | Mod: NTX,,, | Performed by: HOSPITALIST

## 2019-07-23 PROCEDURE — 97535 SELF CARE MNGMENT TRAINING: CPT | Mod: NTX

## 2019-07-23 PROCEDURE — 20600001 HC STEP DOWN PRIVATE ROOM: Mod: NTX

## 2019-07-23 PROCEDURE — 82533 TOTAL CORTISOL: CPT | Mod: NTX

## 2019-07-23 PROCEDURE — 83735 ASSAY OF MAGNESIUM: CPT | Mod: NTX

## 2019-07-23 PROCEDURE — 80053 COMPREHEN METABOLIC PANEL: CPT | Mod: NTX

## 2019-07-23 PROCEDURE — 99233 SBSQ HOSP IP/OBS HIGH 50: CPT | Mod: GC,NTX,, | Performed by: INTERNAL MEDICINE

## 2019-07-23 PROCEDURE — 99223 PR INITIAL HOSPITAL CARE,LEVL III: ICD-10-PCS | Mod: NTX,,, | Performed by: UROLOGY

## 2019-07-23 PROCEDURE — 36415 COLL VENOUS BLD VENIPUNCTURE: CPT | Mod: NTX

## 2019-07-23 PROCEDURE — 97110 THERAPEUTIC EXERCISES: CPT | Mod: NTX

## 2019-07-23 PROCEDURE — 85610 PROTHROMBIN TIME: CPT | Mod: NTX

## 2019-07-23 PROCEDURE — 85384 FIBRINOGEN ACTIVITY: CPT | Mod: NTX

## 2019-07-23 PROCEDURE — 94761 N-INVAS EAR/PLS OXIMETRY MLT: CPT | Mod: NTX

## 2019-07-23 PROCEDURE — 84100 ASSAY OF PHOSPHORUS: CPT | Mod: NTX

## 2019-07-23 PROCEDURE — 63600175 PHARM REV CODE 636 W HCPCS: Mod: NTX | Performed by: HOSPITALIST

## 2019-07-23 PROCEDURE — 25000003 PHARM REV CODE 250: Mod: NTX | Performed by: STUDENT IN AN ORGANIZED HEALTH CARE EDUCATION/TRAINING PROGRAM

## 2019-07-23 RX ORDER — MIDODRINE HYDROCHLORIDE 2.5 MG/1
2.5 TABLET ORAL 3 TIMES DAILY
Status: DISCONTINUED | OUTPATIENT
Start: 2019-07-23 | End: 2019-07-23

## 2019-07-23 RX ORDER — POLYETHYLENE GLYCOL 3350 17 G/17G
17 POWDER, FOR SOLUTION ORAL 3 TIMES DAILY
Status: DISCONTINUED | OUTPATIENT
Start: 2019-07-23 | End: 2019-07-24

## 2019-07-23 RX ORDER — MIDODRINE HYDROCHLORIDE 5 MG/1
5 TABLET ORAL 3 TIMES DAILY
Status: DISCONTINUED | OUTPATIENT
Start: 2019-07-23 | End: 2019-07-24

## 2019-07-23 RX ADMIN — MIDODRINE HYDROCHLORIDE 5 MG: 5 TABLET ORAL at 08:07

## 2019-07-23 RX ADMIN — LEVOTHYROXINE SODIUM 88 MCG: 88 TABLET ORAL at 06:07

## 2019-07-23 RX ADMIN — MIDODRINE HYDROCHLORIDE 2.5 MG: 2.5 TABLET ORAL at 12:07

## 2019-07-23 RX ADMIN — RIFAXIMIN 550 MG: 550 TABLET ORAL at 10:07

## 2019-07-23 RX ADMIN — POLYETHYLENE GLYCOL 3350 17 G: 17 POWDER, FOR SOLUTION ORAL at 08:07

## 2019-07-23 RX ADMIN — RIFAXIMIN 550 MG: 550 TABLET ORAL at 08:07

## 2019-07-23 RX ADMIN — MIDODRINE HYDROCHLORIDE 5 MG: 5 TABLET ORAL at 05:07

## 2019-07-23 RX ADMIN — Medication 220 MG: at 10:07

## 2019-07-23 RX ADMIN — POLYETHYLENE GLYCOL 3350 17 G: 17 POWDER, FOR SOLUTION ORAL at 05:07

## 2019-07-23 RX ADMIN — CEFTRIAXONE 2 G: 2 INJECTION, POWDER, FOR SOLUTION INTRAMUSCULAR; INTRAVENOUS at 12:07

## 2019-07-23 NOTE — PROGRESS NOTES
[facility]  Nephrology  Progress Note    Patient Name: Michelle Ojeda  MRN: [id number]  Admission Date: 7/18/2019  Hospital Length of Stay: 5 days  Attending Provider: Stefany Jasso MD   Primary Care Physician: Primary Doctor No  Principal Problem:Hyponatremia    Subjective:     HPI: Rusty Martinez is 67 yo F with CKD stage 4 presenting from transplant hepatology clinic for hyponatremia on 7/18/19. She is originally from [place], here for liver transplant evaluation. She was diagnosed with IZAGUIRRE in grade 1 hepatic encephalopathy with severe ascites and esophogeal varices (s/p banding). During the month prior to admission she felt more lethargic and was requiring biweekly paracentesis. Of note, she is also being treated for Ileus her acute hepatic encephalopathy with lactulose. Recent 24 hour cr clearance study was near 21. Reported baseline Cr 1.6-1.8 and chronic hyponatremia range between 123-124.     Interval History: Patient ambulating from bathroom with walker with daughter present. She continues to urinate without issue and daughter believes it is improving.     Review of patient's allergies indicates:  No Known Allergies  Current Facility-Administered Medications   Medication Frequency    acetaminophen tablet 325 mg Q4H PRN    cefTRIAXone (ROCEPHIN) 2 g in dextrose 5 % 50 mL IVPB Q24H    dextrose 10% (D10W) Bolus PRN    dextrose 10% (D10W) Bolus PRN    dicyclomine capsule 10 mg QID PRN    glucagon (human recombinant) injection 1 mg PRN    glucose chewable tablet 16 g PRN    glucose chewable tablet 24 g PRN    iohexol (OMNIPAQUE) oral solution 15 mL PRN    levothyroxine tablet 88 mcg Before breakfast    midodrine tablet 2.5 mg TID    ondansetron disintegrating tablet 8 mg Q8H PRN    polyethylene glycol packet 17 g TID    rifAXIMin tablet 550 mg BID    simethicone chewable tablet 80 mg TID PRN    sodium chloride 0.9% flush 10 mL PRN    sodium chloride 0.9% flush 10  mL PRN    zinc sulfate capsule 220 mg Daily       Objective:     Vital Signs (Most Recent):  Temp: 98 °F (36.7 °C) (07/23/19 1234)  Pulse: 83 (07/23/19 1234)  Resp: 18 (07/23/19 1234)  BP: (!) 105/53 (07/23/19 1234)  SpO2: 99 % (07/23/19 1234)  O2 Device (Oxygen Therapy): room air (07/23/19 0900) Vital Signs (24h Range):  Temp:  [97.4 °F (36.3 °C)-98.5 °F (36.9 °C)] 98 °F (36.7 °C)  Pulse:  [72-83] 83  Resp:  [16-18] 18  SpO2:  [97 %-99 %] 99 %  BP: ()/(53-59) 105/53     Weight: 56 kg (123 lb 7.3 oz) (07/23/19 0629)  Body mass index is 24.11 kg/m².  Body surface area is 1.54 meters squared.    I/O last 3 completed shifts:  In: 60 [P.O.:60]  Out: 800 [Urine:800]    Physical Exam   Constitutional: No distress.   Her eyes were closed during interview however will open then follow commands and is oriented to location.  Some confusion but improved from prior day.  Family at bedside.   HENT:   Head: Normocephalic and atraumatic.   Mouth/Throat: No oropharyngeal exudate.   Eyes: Conjunctivae are normal. Scleral icterus is present.   Cardiovascular: Normal rate, regular rhythm and normal heart sounds.   No murmur heard.  Pulmonary/Chest: Effort normal and breath sounds normal. No respiratory distress.   Abdominal: Soft. She exhibits distension (Prominent fluid noted in peritoneal cavity ). There is no tenderness. There is no guarding.   Soft, non-tender. Tympanic.  Hypoactive bowel sounds   Musculoskeletal: She exhibits no edema (mild) or tenderness.   Neurological: She is alert.   Skin: Skin is warm. She is not diaphoretic.   Nursing note and vitals reviewed.      Significant Labs:  ABGs: No results for input(s): PH, PCO2, HCO3, POCSATURATED, BE in the last 168 hours.  BMP:   Recent Labs   Lab 07/23/19  0355   *   CL 89*   CO2 27   BUN 56*   CREATININE 2.0*   CALCIUM 8.4*   MG 2.2     CBC:   Recent Labs   Lab 07/23/19  0355   WBC 3.87*   RBC 2.59*   HGB 8.8*   HCT 25.8*   PLT 33*   *   MCH 34.0*   MCHC  34.1     CMP:   Recent Labs   Lab 07/23/19  0355   *   CALCIUM 8.4*   ALBUMIN 3.2*   PROT 5.3*   *   K 3.8   CO2 27   CL 89*   BUN 56*   CREATININE 2.0*   ALKPHOS 127   ALT 26   AST 31   BILITOT 1.9*     Recent Labs   Lab 07/18/19  1212   COLORU Yellow   SPECGRAV 1.010   PHUR 6.0   PROTEINUA Negative   NITRITE Negative   LEUKOCYTESUR 1+*        Significant Imaging:  CT abd: 1.  Small nodular contour liver in keeping with history of cirrhosis.  Splenomegaly, ascites, and upper abdominal collateral vessels suggesting portal hypertension.    2.  Abnormal rounded contour of the upper pole of the right kidney concerning for possible underlying solid mass.  Further evaluation is advised with either retroperitoneal ultrasound or contrast enhanced CT examination.    3.  Mild wall thickening of the cecum and ascending colon favored to relate to portal colopathy.    4.  Small volume left-sided pleural effusion with associated compressive atelectasis.    5.  Cholelithiasis.  Mild gallbladder wall thickening favored to relate to underlying hepatic dysfunction and ascites.        Assessment/Plan:     * Hyponatremia  Rusty is a 67 yo F with liver cirrhosis and CKD admitted for hyponatremia. Hyponatremia may be related to several factors primarily liver cirrhosis requiring biweekly paracentesis. This leads to a hypovolemic hyponatremic state in which fluid is pulled out of the vasculature. Additional contributing factors related to ileus, NPO status, and TPN nutrition. Extra sodium in TPN has increased sodium. Baseline Na+ from labs in [place] in the low 120s. She is at her baseline 124 (7/23/19). Abd Ct noted small left pleural effusion but patient is satting well and does not appear to be in resp distress alluding to controlled volume status.   - Change D5 given with ceftriaxone to NS given with ceftriaxone  - If volume expansion required give albumin   - Add Midodrine 2.5mg TID scheduled to keep MAP>75  -  Strict I/O monitoring     Right kidney mass  Patient has new Right renal mass found on CT abd on 7/22/19. Previous Ct from 2016 and MRI studies in [place] did not demonstrate this finding.     Recommendations  - Consult urology for possible biopsy   - Would prefer not expose kidneys to contrast but will discuss with other teams if necessary     Stage 4 chronic kidney disease  Labs from [place] nephrology demonstrated Cr of 1.77 in 3/12/19, 1.67 on 4/5/19, 1.96 in 6/13/19, and 1.83 on 6/24. Patient may meet criteria fro kidney transplant. Cr is 2.0 today (7/23/19) above her baseline may be related to hypotension overnight into the SBP 90s.   - Midodrine added to prevent hypoperfusion to kidneys  - Monitor with daily BMP  - Nephrology will continue to follow closely.   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 I will follow-up with patient. Please contact us if you have any additional questions.    Josh Sampson MD  Nephrology  [facility]

## 2019-07-23 NOTE — PT/OT/SLP PROGRESS
"     Physical Therapy  Pt Not Seen    Patient Name:  Michelle Ojeda   MRN:  [id number]    2:32 pm  Patient not seen today secondary to  Patient unwilling to participate(Pt refused tx session stating "I'm tired.  I already went walking and did leg exercises with the OT earlier today"). Will follow-up on next scheduled visit.    Matilde Francis, PTA  7/23/2019    "

## 2019-07-23 NOTE — ASSESSMENT & PLAN NOTE
Ms Ojeda is a 68 year old female with a history of IZAGUIRRE ESLD, with decompensations including Gr 1 HE, ascites, EV, hyponatremia, T2DM, HTN, hypothyroidism, who is being admitted from hepatology clinic due to concern for hyponatremia. Now with incidentally discovered R renal mass.    Plan  - R renal mass: per transplant team discussion today, recommend Urology consult and U/S retroperitoneum. Thrombocytopenia is a concern too especially if biopsy necessary.  - ileus on imaging, now resolved. NGT removed.  - repeat infectious workup pending unremarkable, off empiric Abx  - ascites: Negative for SBP on 07/19. Total protein 1.5.  - diuretics: hold  - CKD: unclear baseline. Holding diuretics and albumin therapy given hyponatremia.   - encephalopathy: Worsening confusion on 7/20. Hold lactulose in setting of ileus. Continue rifaximin. Ok to start Miralax 17g bid.  - hyponatremia: slowly improving with fluid restriction. continue fluid restriction 800mL. Holding diuresis. Avoid free water additives. Nephrology consult.  - malnutrition: nutrition consult. Calorie count. Supplements. NG tube feeds now that ileus resolved.  - PT/OT  - inpatient transplant evaluation started. Nuclear stress test and renal scan completed, ID consult, now needs further evaluation of R renal mass  - Appreciate renal evaluation re: SLK given GFR <35

## 2019-07-23 NOTE — SUBJECTIVE & OBJECTIVE
Interval History:   NG tube taken out, [place] tube placed. Still weak, poor po intake. Had nuclear stress test this morning. CT with new R renal upper pole mass but resolution of SBO.    Current Facility-Administered Medications   Medication    acetaminophen tablet 325 mg    dextrose 10% (D10W) Bolus    dextrose 10% (D10W) Bolus    dicyclomine capsule 10 mg    glucagon (human recombinant) injection 1 mg    glucose chewable tablet 16 g    glucose chewable tablet 24 g    iohexol (OMNIPAQUE) oral solution 15 mL    levothyroxine tablet 88 mcg    midodrine tablet 5 mg    ondansetron disintegrating tablet 8 mg    polyethylene glycol packet 17 g    rifAXIMin tablet 550 mg    simethicone chewable tablet 80 mg    sodium chloride 0.9% flush 10 mL    sodium chloride 0.9% flush 10 mL    zinc sulfate capsule 220 mg       Objective:     Vital Signs (Most Recent):  Temp: 98 °F (36.7 °C) (07/23/19 1234)  Pulse: 83 (07/23/19 1234)  Resp: 18 (07/23/19 1234)  BP: (!) 105/53 (07/23/19 1234)  SpO2: 99 % (07/23/19 1234) Vital Signs (24h Range):  Temp:  [97.4 °F (36.3 °C)-98.5 °F (36.9 °C)] 98 °F (36.7 °C)  Pulse:  [72-83] 83  Resp:  [16-18] 18  SpO2:  [97 %-99 %] 99 %  BP: ()/(53-59) 105/53     Weight: 56 kg (123 lb 7.3 oz) (07/23/19 0629)  Body mass index is 24.11 kg/m².    Physical Exam   Constitutional: She has a sickly appearance.   Pulmonary/Chest: Effort normal. No respiratory distress.   Abdominal: Soft. She exhibits no distension. There is no tenderness.   Neurological: She is alert.   Skin: Skin is warm and dry.   Psychiatric: She has a normal mood and affect. Her behavior is normal.   Nursing note and vitals reviewed.      MELD-Na score: 27 at 7/23/2019  3:55 AM  MELD score: 18 at 7/23/2019  3:55 AM  Calculated from:  Serum Creatinine: 2.0 mg/dL at 7/23/2019  3:55 AM  Serum Sodium: 124 mmol/L (Rounded to 125 mmol/L) at 7/23/2019  3:55 AM  Total Bilirubin: 1.9 mg/dL at 7/23/2019  3:55 AM  INR(ratio): 1.3 at  7/23/2019  3:55 AM  Age: 68 years    Significant Labs:  Labs within the past month have been reviewed.    Significant Imaging:  Reviewed

## 2019-07-23 NOTE — ASSESSMENT & PLAN NOTE
Rusty is a 69 yo F with liver cirrhosis and CKD admitted for hyponatremia. Hyponatremia may be related to several factors primarily liver cirrhosis requiring biweekly paracentesis. This leads to a hypovolemic hyponatremic state in which fluid is pulled out of the vasculature. Additional contributing factors related to ileus, NPO status, and TPN nutrition. Extra sodium in TPN has increased sodium. Baseline Na+ from labs in [place] in the low 120s. She is at her baseline 124 (7/23/19). Abd Ct noted small left pleural effusion but patient is satting well and does not appear to be in resp distress alluding to controlled volume status.   - Change D5 given with ceftriaxone to NS given with ceftriaxone  - If volume expansion required give albumin   - Add Midodrine 2.5mg TID scheduled to keep MAP>75  - Strict I/O monitoring

## 2019-07-23 NOTE — ASSESSMENT & PLAN NOTE
Patient has new Right renal mass found on CT abd on 7/22/19. Previous Ct from 2016 and MRI studies in [place] did not demonstrate this finding.     Recommendations  - Consult urology for possible biopsy   - Would prefer not expose kidneys to contrast but will discuss with other teams if necessary

## 2019-07-23 NOTE — PLAN OF CARE
Problem: Occupational Therapy Goal  Goal: Occupational Therapy Goal  Goals to be met by: 7/31/19    Patient will increase functional independence with ADLs by performing:    Feeding with [place].  UE Dressing with Stand-by Assistance.   LE Dressing with Stand-by Assistance.  Grooming while standing at sink with Stand-by Assistance.   Toileting from toilet with Stand-by Assistance for hygiene and clothing management.   Supine to sit with Modified [place].  Toilet transfer to toilet with Stand-by Assistance.  Upper extremity exercise program  per handout, with independence.      Outcome: Ongoing (interventions implemented as appropriate)  Pt progressing well with goals. Continue with POC.   Keila Cazares, OT  7/23/2019

## 2019-07-23 NOTE — SUBJECTIVE & OBJECTIVE
Past Medical History:   Diagnosis Date    Cirrhosis     Diabetes mellitus, type 2     Disorder of kidney and ureter     Hypertension     Hypothyroidism     NAFLD (nonalcoholic fatty liver disease)        History reviewed. No pertinent surgical history.    Review of patient's allergies indicates:  No Known Allergies    Family History     Problem Relation (Age of Onset)    No Known Problems Father          Tobacco Use    Smoking status: Never Smoker    Smokeless tobacco: Never Used   Substance and Sexual Activity    Alcohol use: Not Currently    Drug use: Never    Sexual activity: Not on file       Review of Systems   Constitutional: Positive for activity change, appetite change (decreased) and fatigue. Negative for fever.   HENT: Negative for facial swelling.    Eyes: Negative for discharge and itching.   Respiratory: Negative for apnea and shortness of breath.    Cardiovascular: Negative for chest pain and leg swelling.   Gastrointestinal: Positive for abdominal distention, nausea and vomiting.   Genitourinary: Negative for decreased urine volume, difficulty urinating, flank pain, frequency and hematuria.   Musculoskeletal: Negative for arthralgias, back pain and neck stiffness.   Skin: Positive for color change (jaundiced).   Neurological: Negative for dizziness and facial asymmetry.   Psychiatric/Behavioral: Negative for agitation, behavioral problems and confusion.       Objective:     Temp:  [97.4 °F (36.3 °C)-98.5 °F (36.9 °C)] 98 °F (36.7 °C)  Pulse:  [75-83] 80  Resp:  [16-18] 16  SpO2:  [97 %-99 %] 99 %  BP: (102-122)/(53-59) 115/57     Body mass index is 24.11 kg/m².    Date 07/23/19 0700 - 07/24/19 0659   Shift 6081-1761 3058-3184 6563-2445 24 Hour Total   INTAKE   Shift Total(mL/kg)       OUTPUT   Urine(mL/kg/hr) 500(1.1)   500   Shift Total(mL/kg) 500(8.9)   500(8.9)   Weight (kg) 56 56 56 56          Drains     Drain                 NG/OG Tube 07/23/19 0930 small diameter Left nostril less  than 1 day                Physical Exam   Constitutional: She appears well-developed.   Lethargic   HENT:   Head: Normocephalic and atraumatic.   Eyes: Scleral icterus is present.   Neck: No tracheal deviation present.   Cardiovascular: Normal rate.    Pulmonary/Chest: Effort normal. No respiratory distress.   Abdominal: She exhibits distension. There is no tenderness.   Musculoskeletal: Normal range of motion.   Neurological: She is alert.   Appropriately responsive to questions    Skin: Skin is warm and dry.     Jaundiced   Psychiatric: She has a normal mood and affect. Her behavior is normal.       Significant Labs:    BMP:  Recent Labs   Lab 07/21/19  0402 07/22/19 0448 07/23/19  0355   * 123* 124*   K 4.1 3.9 3.8   CL 90* 90* 89*   CO2 21* 24 27   BUN 36* 45* 56*   CREATININE 1.8* 1.8* 2.0*   CALCIUM 9.2 8.4* 8.4*       CBC:  Recent Labs   Lab 07/21/19 0402 07/22/19 0448 07/23/19  0355   WBC 6.87 4.76 3.87*   HGB 10.1* 8.8* 8.8*   HCT 29.2* 25.3* 25.8*   PLT 31* 27* 33*       All pertinent labs results from the past 24 hours have been reviewed.    Significant Imaging:  CT: I have reviewed all results within the past 24 hours and my personal findings are:  Mass in the superior pole of the right kidney measuring 2.4 x 2.3 x 2.3 cm.  No hydronephrosis bilaterally. No kidney stones seen.

## 2019-07-23 NOTE — PLAN OF CARE
Problem: Adult Inpatient Plan of Care  Goal: Plan of Care Review  POC reviewed with patient, daughter, son-in-law. VSS. A&Ox4. Bruising noted across sacral region. I&O's maintained. Pt NPO at 0001 for Stress test. LEANDER tube to be placed by IR today per Dr. Breaux. Hematology called at 0559 with critical lab value Platelets = 33,000, TEJA Vazquez notified, no new orders placed. Pt remained free of falls. Will continue to monitor.

## 2019-07-23 NOTE — CONSULTS
"  Received nutrition consult stating "calorie count."  Please see note from 7/19 for full RD assessment; RD to follow-up w/ reassessment on 7/26.  Envelope placed on door. RNRobb, notified. Calorie count initiated. RD to follow-up tomorrow w/ results.     Also noted, keotube to placed today for nocturnal TFs. Recommend Novasource @ 50 mL/hr x 12 hours to provide 1200 calories, 54 grams of protein, 430 mL fluid. This would meet 91% EEN, 73% EPN. Remaining needs to be met via PO intake.    Thanks!   Rachel, DARINEL, LDN  "

## 2019-07-23 NOTE — PLAN OF CARE
Problem: Adult Inpatient Plan of Care  Goal: Plan of Care Review  Outcome: Ongoing (interventions implemented as appropriate)  Patient went for [place] tube placement and stress test this morning and US of right kidney this afternoon. Patient will be NPO after midnight for a possible kidney biopsy tomorrow and a pap smear. Patient started on tube feedings at 50.

## 2019-07-23 NOTE — ASSESSMENT & PLAN NOTE
Labs from [place] nephrology demonstrated Cr of 1.77 in 3/12/19, 1.67 on 4/5/19, 1.96 in 6/13/19, and 1.83 on 6/24. Patient may meet criteria fro kidney transplant. Cr is 2.0 today (7/23/19) above her baseline may be related to hypotension overnight into the SBP 90s.   - Midodrine added to prevent hypoperfusion to kidneys  - Monitor with daily BMP  - Nephrology will continue to follow closely.   - Considering dialysis with worsening Cr

## 2019-07-23 NOTE — PLAN OF CARE
07/23/19 1508   Discharge Reassessment   Assessment Type Discharge Planning Reassessment   Discharge Plan A Home Health   Discharge Plan B Home with family   DME Needed Upon Discharge  none   Anticipated Discharge Disposition Home-Health

## 2019-07-23 NOTE — SUBJECTIVE & OBJECTIVE
Interval History: Patient ambulating from bathroom with walker with daughter present. She continues to urinate without issue and daughter believes it is improving.     Review of patient's allergies indicates:  No Known Allergies  Current Facility-Administered Medications   Medication Frequency    acetaminophen tablet 325 mg Q4H PRN    cefTRIAXone (ROCEPHIN) 2 g in dextrose 5 % 50 mL IVPB Q24H    dextrose 10% (D10W) Bolus PRN    dextrose 10% (D10W) Bolus PRN    dicyclomine capsule 10 mg QID PRN    glucagon (human recombinant) injection 1 mg PRN    glucose chewable tablet 16 g PRN    glucose chewable tablet 24 g PRN    iohexol (OMNIPAQUE) oral solution 15 mL PRN    levothyroxine tablet 88 mcg Before breakfast    midodrine tablet 2.5 mg TID    ondansetron disintegrating tablet 8 mg Q8H PRN    polyethylene glycol packet 17 g TID    rifAXIMin tablet 550 mg BID    simethicone chewable tablet 80 mg TID PRN    sodium chloride 0.9% flush 10 mL PRN    sodium chloride 0.9% flush 10 mL PRN    zinc sulfate capsule 220 mg Daily       Objective:     Vital Signs (Most Recent):  Temp: 98 °F (36.7 °C) (07/23/19 1234)  Pulse: 83 (07/23/19 1234)  Resp: 18 (07/23/19 1234)  BP: (!) 105/53 (07/23/19 1234)  SpO2: 99 % (07/23/19 1234)  O2 Device (Oxygen Therapy): room air (07/23/19 0900) Vital Signs (24h Range):  Temp:  [97.4 °F (36.3 °C)-98.5 °F (36.9 °C)] 98 °F (36.7 °C)  Pulse:  [72-83] 83  Resp:  [16-18] 18  SpO2:  [97 %-99 %] 99 %  BP: ()/(53-59) 105/53     Weight: 56 kg (123 lb 7.3 oz) (07/23/19 0629)  Body mass index is 24.11 kg/m².  Body surface area is 1.54 meters squared.    I/O last 3 completed shifts:  In: 60 [P.O.:60]  Out: 800 [Urine:800]    Physical Exam   Constitutional: No distress.   Her eyes were closed during interview however will open then follow commands and is oriented to location.  Some confusion but improved from prior day.  Family at bedside.   HENT:   Head: Normocephalic and atraumatic.    Mouth/Throat: No oropharyngeal exudate.   Eyes: Conjunctivae are normal. Scleral icterus is present.   Cardiovascular: Normal rate, regular rhythm and normal heart sounds.   No murmur heard.  Pulmonary/Chest: Effort normal and breath sounds normal. No respiratory distress.   Abdominal: Soft. She exhibits distension (Prominent fluid noted in peritoneal cavity ). There is no tenderness. There is no guarding.   Soft, non-tender. Tympanic.  Hypoactive bowel sounds   Musculoskeletal: She exhibits no edema (mild) or tenderness.   Neurological: She is alert.   Skin: Skin is warm. She is not diaphoretic.   Nursing note and vitals reviewed.      Significant Labs:  ABGs: No results for input(s): PH, PCO2, HCO3, POCSATURATED, BE in the last 168 hours.  BMP:   Recent Labs   Lab 07/23/19  0355   *   CL 89*   CO2 27   BUN 56*   CREATININE 2.0*   CALCIUM 8.4*   MG 2.2     CBC:   Recent Labs   Lab 07/23/19  0355   WBC 3.87*   RBC 2.59*   HGB 8.8*   HCT 25.8*   PLT 33*   *   MCH 34.0*   MCHC 34.1     CMP:   Recent Labs   Lab 07/23/19  0355   *   CALCIUM 8.4*   ALBUMIN 3.2*   PROT 5.3*   *   K 3.8   CO2 27   CL 89*   BUN 56*   CREATININE 2.0*   ALKPHOS 127   ALT 26   AST 31   BILITOT 1.9*     Recent Labs   Lab 07/18/19  1212   COLORU Yellow   SPECGRAV 1.010   PHUR 6.0   PROTEINUA Negative   NITRITE Negative   LEUKOCYTESUR 1+*        Significant Imaging:  CT abd: 1.  Small nodular contour liver in keeping with history of cirrhosis.  Splenomegaly, ascites, and upper abdominal collateral vessels suggesting portal hypertension.    2.  Abnormal rounded contour of the upper pole of the right kidney concerning for possible underlying solid mass.  Further evaluation is advised with either retroperitoneal ultrasound or contrast enhanced CT examination.    3.  Mild wall thickening of the cecum and ascending colon favored to relate to portal colopathy.    4.  Small volume left-sided pleural effusion with associated  compressive atelectasis.    5.  Cholelithiasis.  Mild gallbladder wall thickening favored to relate to underlying hepatic dysfunction and ascites.

## 2019-07-23 NOTE — PROGRESS NOTES
[facility]  Hepatology  Progress Note    Patient Name: Michelle Ojeda  MRN: [id number]  Admission Date: 7/18/2019  Hospital Length of Stay: 5 days  Attending Provider: Stefany Jasso MD   Primary Care Physician: Primary Doctor No  Principal Problem:Hyponatremia    Subjective:     Transplant status: No    HPI: Ms Ojeda is a 68 year old female with a history of HERRMANN ESLD, with decompensations including Gr 1 HE, ascites, EV, hyponatremia, T2DM, HTN, hypothyroidism, who is being admitted from hepatology clinic due to concern for hyponatremia.    She presents to transplant Clinic for initial evaluation on 07/18 with Dr. Cannon for initial transplant evaluation.  She has a history of Herrmann cirrhosis with a history of biopsy in 1998 with steatosis unclear fibrosis both diagnosed with cirrhosis in July of 2015 in the setting of decompensation due while in [place] with encephalopathy and hematemesis.  Her cirrhosis has been complicated by grade 1 hepatic encephalopathy, recently started Aldo [place] min but never been on lactulose, ascites, sarcopenia, esophageal varices status post ligation, hyponatremia.  She was previously evaluated at [place] with Dr. Mccormick who recommended evaluation at [facility].  Does not appear she was evaluated for transplant at that time.    Due to hyponatremia with sodium of 120 she was admitted to the hospital for further management.  Currently she reports she is feeling well without any complaints.  Denies any abdominal pain, nausea, vomiting, diarrhea.  Denies any fevers, chills, sweats or other infectious complaints.    Family who is present in the room note that she is doing extremely well until approximately 2 months prior to presentation when she began to be more frail, fatigue, increased peripheral edema, decreased oral intake.  They note they have been extremely careful to avoid T here to low-sodium diet.  She has never been hospitalized in the past for hyponatremia.   No other recent hospitalizations.  Regarding her ascites she has approximately received a paracentesis every 2 weeks.      Interval History:   NG tube taken out, [place] tube placed. Still weak, poor po intake. Had nuclear stress test this morning. CT with new R renal upper pole mass but resolution of SBO.    Current Facility-Administered Medications   Medication    acetaminophen tablet 325 mg    dextrose 10% (D10W) Bolus    dextrose 10% (D10W) Bolus    dicyclomine capsule 10 mg    glucagon (human recombinant) injection 1 mg    glucose chewable tablet 16 g    glucose chewable tablet 24 g    iohexol (OMNIPAQUE) oral solution 15 mL    levothyroxine tablet 88 mcg    midodrine tablet 5 mg    ondansetron disintegrating tablet 8 mg    polyethylene glycol packet 17 g    rifAXIMin tablet 550 mg    simethicone chewable tablet 80 mg    sodium chloride 0.9% flush 10 mL    sodium chloride 0.9% flush 10 mL    zinc sulfate capsule 220 mg       Objective:     Vital Signs (Most Recent):  Temp: 98 °F (36.7 °C) (07/23/19 1234)  Pulse: 83 (07/23/19 1234)  Resp: 18 (07/23/19 1234)  BP: (!) 105/53 (07/23/19 1234)  SpO2: 99 % (07/23/19 1234) Vital Signs (24h Range):  Temp:  [97.4 °F (36.3 °C)-98.5 °F (36.9 °C)] 98 °F (36.7 °C)  Pulse:  [72-83] 83  Resp:  [16-18] 18  SpO2:  [97 %-99 %] 99 %  BP: ()/(53-59) 105/53     Weight: 56 kg (123 lb 7.3 oz) (07/23/19 0629)  Body mass index is 24.11 kg/m².    Physical Exam   Constitutional: She has a sickly appearance.   Pulmonary/Chest: Effort normal. No respiratory distress.   Abdominal: Soft. She exhibits no distension. There is no tenderness.   Neurological: She is alert.   Skin: Skin is warm and dry.   Psychiatric: She has a normal mood and affect. Her behavior is normal.   Nursing note and vitals reviewed.      MELD-Na score: 27 at 7/23/2019  3:55 AM  MELD score: 18 at 7/23/2019  3:55 AM  Calculated from:  Serum Creatinine: 2.0 mg/dL at 7/23/2019  3:55 AM  Serum Sodium: 124  mmol/L (Rounded to 125 mmol/L) at 7/23/2019  3:55 AM  Total Bilirubin: 1.9 mg/dL at 7/23/2019  3:55 AM  INR(ratio): 1.3 at 7/23/2019  3:55 AM  Age: 68 years    Significant Labs:  Labs within the past month have been reviewed.    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 Now with incidentally discovered R renal mass.    Plan  - R renal mass: per transplant team discussion today, recommend Urology consult and U/S retroperitoneum. Thrombocytopenia is a concern too especially if biopsy necessary.  - ileus on imaging, now resolved. NGT removed.  - repeat infectious workup pending unremarkable, off empiric Abx  - ascites: Negative for SBP on 07/19. Total protein 1.5.  - diuretics: hold  - CKD: unclear baseline. Holding diuretics and albumin therapy given hyponatremia.   - encephalopathy: Worsening confusion on 7/20. Hold lactulose in setting of ileus. Continue rifaximin. Ok to start Miralax 17g bid.  - hyponatremia: slowly improving with fluid restriction. continue fluid restriction 800mL. Holding diuresis. Avoid free water additives. Nephrology consult.  - malnutrition: nutrition consult. Calorie count. Supplements. NG tube feeds now that ileus resolved.  - PT/OT  - inpatient transplant evaluation started. Nuclear stress test and renal scan completed, ID consult, now needs further evaluation of R renal mass  - Appreciate renal evaluation re: SLK given GFR <35          Thank you for your consult. I will follow-up with patient. Please contact us if you have any additional questions.    Andrea Eaton MD  Hepatology  [facility]

## 2019-07-23 NOTE — PT/OT/SLP PROGRESS
Occupational Therapy   Treatment    Name: Michelle Ojeda  MRN: [id number]  Admitting Diagnosis:  Hyponatremia       Recommendations:     Discharge Recommendations: home health OT  Discharge Equipment Recommendations:  walker, rolling  Barriers to discharge:  None    Assessment:     Michelle Ojeda is a 68 y.o. female with a medical diagnosis of Hyponatremia.  She presents somnolent with difficulty maintaining eyes open. Upon arrival, pt found seated UIC with family present.  Performance deficits affecting function are weakness, impaired endurance, impaired self care skills, impaired functional mobilty, impaired balance, gait instability. At this time, pt is requiring increased level of skilled assistance with ADL and functional mobility. She would benefit from HHOT following d/c to continue to progress towards goals and improve quality of life. Pt will have good family support upon discharge.     Rehab Prognosis:  Good; patient would benefit from acute skilled OT services to address these deficits and reach maximum level of function.       Plan:     Patient to be seen 3 x/week to address the above listed problems via self-care/home management, therapeutic exercises, therapeutic activities  · Plan of Care Expires: 08/19/19  · Plan of Care Reviewed with: patient, daughter(son-in-law)    Subjective     Pain/Comfort:  · Pain Rating 1: 0/10  · Pain Rating Post-Intervention 1: 0/10    Objective:     Communicated with: RN prior to session.  Patient found up in chair with telemetry, peripheral IV, NG tube upon OT entry to room.    General Precautions: Standard, fall   Orthopedic Precautions:N/A   Braces: N/A     Occupational Performance:     Bed Mobility:    · NT, pt found seated UIC upon arrival    Functional Mobility/Transfers:  · Patient completed Sit <> Stand Transfer with minimum assistance  with  rolling walker from bedside chair ( lower surface)   · Functional Mobility: Pt completed functional mobility in hallway (  ~100 ft) with RW and CGA. She tolerated well with no LOB. Pt required cues for longer strides and RW management.     Activities of Daily Living:  · Upper Body Dressing: minimum assistance to gabbi gown like jacket while seated UIC  · Lower Body Dressing: minimum assistance to gabbi B shoes while seated UIC    Exercise:   Pt completed BUE AROM exercises while seated UIC including: 1x15 reps in shoulder flexion, elbow flexion/extension, chest press, and alternating punches. Pt tolerated well with min rest breaks between each exercise.     [facility] 6 Click ADL: 15    Treatment & Education:  -Pt edu on OT role/POC  -Importance of OOB activity with staff assistance ( UIC during each meal)   -Safety during functional t/f and mobility ( RW management)   -White board updated  - Multiple self care tasks completed--as noted above  - All questions/concerns answered within OT scope of practice    Patient left up in chair with all lines intact, call button in reach and RN notifiedEducation:      GOALS:   Multidisciplinary Problems     Occupational Therapy Goals        Problem: Occupational Therapy Goal    Goal Priority Disciplines Outcome Interventions   Occupational Therapy Goal     OT, PT/OT Ongoing (interventions implemented as appropriate)    Description:  Goals to be met by: 7/31/19    Patient will increase functional independence with ADLs by performing:    Feeding with [place].  UE Dressing with Stand-by Assistance.   LE Dressing with Stand-by Assistance.  Grooming while standing at sink with Stand-by Assistance.   Toileting from toilet with Stand-by Assistance for hygiene and clothing management.   Supine to sit with Modified [place].  Toilet transfer to toilet with Stand-by Assistance.  Upper extremity exercise program  per handout, with independence.                        Time Tracking:     OT Date of Treatment: 07/23/19  OT Start Time: 1226  OT Stop Time: 1304  OT Total Time (min): 38 min    Billable  Minutes:Self Care/Home Management 12  Therapeutic Exercise 26    Keila Cazares, OT  7/23/2019

## 2019-07-23 NOTE — PLAN OF CARE
Problem: Occupational Therapy Goal  Goal: Occupational Therapy Goal  Goals to be met by: 7/31/19    Patient will increase functional independence with ADLs by performing:    Feeding with [place]. Met 7/23  UE Dressing with Stand-by Assistance. Met 7/23  Revised: UE dressing with supervision.  LE Dressing with Stand-by Assistance.  Grooming while standing at sink with Stand-by Assistance. Met 7/23  Revised: Grooming while standing with supervision.  Toileting from toilet with Stand-by Assistance for hygiene and clothing management.   Supine to sit with Modified [place].  Toilet transfer to toilet with Stand-by Assistance.  Upper extremity exercise program  per handout, with independence.     Outcome: Ongoing (interventions implemented as appropriate)  Pt progressing well with goals. Continue with POC.   Keila Cazares OT  7/23/2019

## 2019-07-24 ENCOUNTER — DOCUMENTATION ONLY (OUTPATIENT)
Dept: TRANSPLANT | Facility: CLINIC | Age: 69
End: 2019-07-24

## 2019-07-24 PROBLEM — J96.01 ACUTE HYPOXEMIC RESPIRATORY FAILURE: Status: ACTIVE | Noted: 2019-07-24

## 2019-07-24 PROBLEM — K76.82 HEPATIC ENCEPHALOPATHY: Status: ACTIVE | Noted: 2019-07-24

## 2019-07-24 PROBLEM — G93.41 ACUTE METABOLIC ENCEPHALOPATHY: Status: ACTIVE | Noted: 2019-07-19

## 2019-07-24 PROBLEM — R73.9 HYPERGLYCEMIA: Status: ACTIVE | Noted: 2019-07-24

## 2019-07-24 PROBLEM — R57.9 SHOCK, UNSPECIFIED: Status: ACTIVE | Noted: 2019-07-24

## 2019-07-24 PROBLEM — G93.40 ACUTE ENCEPHALOPATHY: Status: ACTIVE | Noted: 2019-07-19

## 2019-07-24 LAB
ALBUMIN SERPL BCP-MCNC: 3.1 G/DL (ref 3.5–5.2)
ALBUMIN SERPL BCP-MCNC: 3.8 G/DL (ref 3.5–5.2)
ALLENS TEST: ABNORMAL
ALP SERPL-CCNC: 140 U/L (ref 55–135)
ALP SERPL-CCNC: 143 U/L (ref 55–135)
ALT SERPL W/O P-5'-P-CCNC: 27 U/L (ref 10–44)
ALT SERPL W/O P-5'-P-CCNC: 31 U/L (ref 10–44)
AMMONIA PLAS-SCNC: 171 UMOL/L (ref 10–50)
ANION GAP SERPL CALC-SCNC: 10 MMOL/L (ref 8–16)
ANION GAP SERPL CALC-SCNC: 10 MMOL/L (ref 8–16)
ANISOCYTOSIS BLD QL SMEAR: SLIGHT
APPEARANCE FLD: NORMAL
AST SERPL-CCNC: 34 U/L (ref 10–40)
AST SERPL-CCNC: 37 U/L (ref 10–40)
BASOPHILS # BLD AUTO: 0.01 K/UL (ref 0–0.2)
BASOPHILS # BLD AUTO: 0.02 K/UL (ref 0–0.2)
BASOPHILS NFR BLD: 0.2 % (ref 0–1.9)
BASOPHILS NFR BLD: 0.3 % (ref 0–1.9)
BILIRUB SERPL-MCNC: 1.6 MG/DL (ref 0.1–1)
BILIRUB SERPL-MCNC: 2.7 MG/DL (ref 0.1–1)
BILIRUB UR QL STRIP: NEGATIVE
BODY FLD TYPE: NORMAL
BUN SERPL-MCNC: 66 MG/DL (ref 8–23)
BUN SERPL-MCNC: 73 MG/DL (ref 8–23)
CALCIUM SERPL-MCNC: 8.3 MG/DL (ref 8.7–10.5)
CALCIUM SERPL-MCNC: 9 MG/DL (ref 8.7–10.5)
CHLORIDE SERPL-SCNC: 89 MMOL/L (ref 95–110)
CHLORIDE SERPL-SCNC: 90 MMOL/L (ref 95–110)
CLARITY UR REFRACT.AUTO: CLEAR
CO2 SERPL-SCNC: 26 MMOL/L (ref 23–29)
CO2 SERPL-SCNC: 26 MMOL/L (ref 23–29)
COLOR FLD: NORMAL
COLOR UR AUTO: ABNORMAL
CREAT SERPL-MCNC: 1.7 MG/DL (ref 0.5–1.4)
CREAT SERPL-MCNC: 2 MG/DL (ref 0.5–1.4)
DIFFERENTIAL METHOD: ABNORMAL
DIFFERENTIAL METHOD: ABNORMAL
DOHLE BOD BLD QL SMEAR: PRESENT
EOSINOPHIL # BLD AUTO: 0 K/UL (ref 0–0.5)
EOSINOPHIL # BLD AUTO: 0 K/UL (ref 0–0.5)
EOSINOPHIL NFR BLD: 0.1 % (ref 0–8)
EOSINOPHIL NFR BLD: 1 % (ref 0–8)
ERYTHROCYTE [DISTWIDTH] IN BLOOD BY AUTOMATED COUNT: 14.1 % (ref 11.5–14.5)
ERYTHROCYTE [DISTWIDTH] IN BLOOD BY AUTOMATED COUNT: 14.6 % (ref 11.5–14.5)
EST. GFR  (AFRICAN AMERICAN): 28.5 ML/MIN/1.73 M^2
EST. GFR  (AFRICAN AMERICAN): 34.7 ML/MIN/1.73 M^2
EST. GFR  (NON AFRICAN AMERICAN): 24.8 ML/MIN/1.73 M^2
EST. GFR  (NON AFRICAN AMERICAN): 30.1 ML/MIN/1.73 M^2
GLUCOSE SERPL-MCNC: 274 MG/DL (ref 70–110)
GLUCOSE SERPL-MCNC: 296 MG/DL (ref 70–110)
GLUCOSE UR QL STRIP: ABNORMAL
GRAM STN SPEC: NORMAL
GRAM STN SPEC: NORMAL
HCO3 UR-SCNC: 24.6 MMOL/L (ref 24–28)
HCT VFR BLD AUTO: 26.8 % (ref 37–48.5)
HCT VFR BLD AUTO: 30.3 % (ref 37–48.5)
HGB BLD-MCNC: 10.5 G/DL (ref 12–16)
HGB BLD-MCNC: 9.3 G/DL (ref 12–16)
HGB UR QL STRIP: NEGATIVE
IMM GRANULOCYTES # BLD AUTO: 0.02 K/UL (ref 0–0.04)
IMM GRANULOCYTES # BLD AUTO: 0.06 K/UL (ref 0–0.04)
IMM GRANULOCYTES NFR BLD AUTO: 0.5 % (ref 0–0.5)
IMM GRANULOCYTES NFR BLD AUTO: 0.7 % (ref 0–0.5)
INR PPP: 1.3 (ref 0.8–1.2)
KETONES UR QL STRIP: NEGATIVE
LACTATE SERPL-SCNC: 1.4 MMOL/L (ref 0.5–2.2)
LACTATE SERPL-SCNC: 2.3 MMOL/L (ref 0.5–2.2)
LEUKOCYTE ESTERASE UR QL STRIP: NEGATIVE
LYMPHOCYTES # BLD AUTO: 0.3 K/UL (ref 1–4.8)
LYMPHOCYTES # BLD AUTO: 0.5 K/UL (ref 1–4.8)
LYMPHOCYTES NFR BLD: 6.1 % (ref 18–48)
LYMPHOCYTES NFR BLD: 6.5 % (ref 18–48)
LYMPHOCYTES NFR FLD MANUAL: 22 %
MAGNESIUM SERPL-MCNC: 2.4 MG/DL (ref 1.6–2.6)
MAGNESIUM SERPL-MCNC: 2.7 MG/DL (ref 1.6–2.6)
MCH RBC QN AUTO: 33.7 PG (ref 27–31)
MCH RBC QN AUTO: 34.6 PG (ref 27–31)
MCHC RBC AUTO-ENTMCNC: 34.7 G/DL (ref 32–36)
MCHC RBC AUTO-ENTMCNC: 34.7 G/DL (ref 32–36)
MCV RBC AUTO: 100 FL (ref 82–98)
MCV RBC AUTO: 97 FL (ref 82–98)
MESOTHL CELL NFR FLD MANUAL: 4 %
MONOCYTES # BLD AUTO: 0.5 K/UL (ref 0.3–1)
MONOCYTES # BLD AUTO: 1.1 K/UL (ref 0.3–1)
MONOCYTES NFR BLD: 12.5 % (ref 4–15)
MONOCYTES NFR BLD: 12.6 % (ref 4–15)
MONOS+MACROS NFR FLD MANUAL: 41 %
NEUTROPHILS # BLD AUTO: 3.1 K/UL (ref 1.8–7.7)
NEUTROPHILS # BLD AUTO: 7.2 K/UL (ref 1.8–7.7)
NEUTROPHILS NFR BLD: 79.1 % (ref 38–73)
NEUTROPHILS NFR BLD: 80.4 % (ref 38–73)
NEUTROPHILS NFR FLD MANUAL: 33 %
NITRITE UR QL STRIP: NEGATIVE
NRBC BLD-RTO: 0 /100 WBC
NRBC BLD-RTO: 0 /100 WBC
PCO2 BLDA: 34.9 MMHG (ref 35–45)
PH SMN: 7.46 [PH] (ref 7.35–7.45)
PH UR STRIP: 5 [PH] (ref 5–8)
PHOSPHATE SERPL-MCNC: 2.8 MG/DL (ref 2.7–4.5)
PLATELET # BLD AUTO: 35 K/UL (ref 150–350)
PLATELET # BLD AUTO: 55 K/UL (ref 150–350)
PLATELET BLD QL SMEAR: ABNORMAL
PMV BLD AUTO: 10.2 FL (ref 9.2–12.9)
PMV BLD AUTO: 11.8 FL (ref 9.2–12.9)
PO2 BLDA: 153 MMHG (ref 80–100)
POC BE: 1 MMOL/L
POC SATURATED O2: 99 % (ref 95–100)
POC TCO2: 26 MMOL/L (ref 23–27)
POCT GLUCOSE: 310 MG/DL (ref 70–110)
POLYCHROMASIA BLD QL SMEAR: ABNORMAL
POTASSIUM SERPL-SCNC: 3.3 MMOL/L (ref 3.5–5.1)
POTASSIUM SERPL-SCNC: 3.8 MMOL/L (ref 3.5–5.1)
PROT SERPL-MCNC: 5.6 G/DL (ref 6–8.4)
PROT SERPL-MCNC: 6 G/DL (ref 6–8.4)
PROT UR QL STRIP: NEGATIVE
PROTHROMBIN TIME: 13.3 SEC (ref 9–12.5)
RBC # BLD AUTO: 2.69 M/UL (ref 4–5.4)
RBC # BLD AUTO: 3.12 M/UL (ref 4–5.4)
SAMPLE: ABNORMAL
SITE: ABNORMAL
SODIUM SERPL-SCNC: 125 MMOL/L (ref 136–145)
SODIUM SERPL-SCNC: 126 MMOL/L (ref 136–145)
SP GR UR STRIP: 1.01 (ref 1–1.03)
TOXIC GRANULES BLD QL SMEAR: PRESENT
URN SPEC COLLECT METH UR: ABNORMAL
WBC # BLD AUTO: 3.97 K/UL (ref 3.9–12.7)
WBC # BLD AUTO: 8.89 K/UL (ref 3.9–12.7)
WBC # FLD: 483 /CU MM

## 2019-07-24 PROCEDURE — 63600175 PHARM REV CODE 636 W HCPCS: Mod: JG,NTX | Performed by: HOSPITALIST

## 2019-07-24 PROCEDURE — 94761 N-INVAS EAR/PLS OXIMETRY MLT: CPT | Mod: NTX

## 2019-07-24 PROCEDURE — 31500 PR INSERT, EMERGENCY ENDOTRACH AIRWAY: ICD-10-PCS | Mod: GC,NTX,, | Performed by: INTERNAL MEDICINE

## 2019-07-24 PROCEDURE — 99291 CRITICAL CARE FIRST HOUR: CPT | Mod: NTX,,, | Performed by: HOSPITALIST

## 2019-07-24 PROCEDURE — 87070 CULTURE OTHR SPECIMN AEROBIC: CPT | Mod: NTX

## 2019-07-24 PROCEDURE — 51702 INSERT TEMP BLADDER CATH: CPT | Mod: NTX

## 2019-07-24 PROCEDURE — 95819 EEG AWAKE AND ASLEEP: CPT | Mod: NTX

## 2019-07-24 PROCEDURE — 87040 BLOOD CULTURE FOR BACTERIA: CPT | Mod: NTX

## 2019-07-24 PROCEDURE — 99233 SBSQ HOSP IP/OBS HIGH 50: CPT | Mod: GC,NTX,, | Performed by: INTERNAL MEDICINE

## 2019-07-24 PROCEDURE — 82803 BLOOD GASES ANY COMBINATION: CPT | Mod: NTX

## 2019-07-24 PROCEDURE — 83615 LACTATE (LD) (LDH) ENZYME: CPT | Mod: NTX

## 2019-07-24 PROCEDURE — 82945 GLUCOSE OTHER FLUID: CPT | Mod: NTX

## 2019-07-24 PROCEDURE — 99291 PR CRITICAL CARE, E/M 30-74 MINUTES: ICD-10-PCS | Mod: 25,NTX,, | Performed by: PHYSICIAN ASSISTANT

## 2019-07-24 PROCEDURE — 25000003 PHARM REV CODE 250: Mod: NTX | Performed by: PHYSICIAN ASSISTANT

## 2019-07-24 PROCEDURE — 87070 CULTURE OTHR SPECIMN AEROBIC: CPT | Mod: 59,NTX

## 2019-07-24 PROCEDURE — 99900035 HC TECH TIME PER 15 MIN (STAT): Mod: NTX

## 2019-07-24 PROCEDURE — 99232 SBSQ HOSP IP/OBS MODERATE 35: CPT | Mod: GC,NTX,, | Performed by: INTERNAL MEDICINE

## 2019-07-24 PROCEDURE — 94002 VENT MGMT INPAT INIT DAY: CPT | Mod: NTX

## 2019-07-24 PROCEDURE — 31500 INSERT EMERGENCY AIRWAY: CPT | Mod: GC,NTX,, | Performed by: INTERNAL MEDICINE

## 2019-07-24 PROCEDURE — 85025 COMPLETE CBC W/AUTO DIFF WBC: CPT | Mod: 91,NTX

## 2019-07-24 PROCEDURE — 83605 ASSAY OF LACTIC ACID: CPT | Mod: NTX

## 2019-07-24 PROCEDURE — P9047 ALBUMIN (HUMAN), 25%, 50ML: HCPCS | Mod: JG,NTX | Performed by: HOSPITALIST

## 2019-07-24 PROCEDURE — 87040 BLOOD CULTURE FOR BACTERIA: CPT | Mod: 59,NTX

## 2019-07-24 PROCEDURE — 63600175 PHARM REV CODE 636 W HCPCS: Mod: NTX | Performed by: STUDENT IN AN ORGANIZED HEALTH CARE EDUCATION/TRAINING PROGRAM

## 2019-07-24 PROCEDURE — 87205 SMEAR GRAM STAIN: CPT | Mod: NTX

## 2019-07-24 PROCEDURE — 84100 ASSAY OF PHOSPHORUS: CPT | Mod: NTX

## 2019-07-24 PROCEDURE — 81003 URINALYSIS AUTO W/O SCOPE: CPT | Mod: NTX

## 2019-07-24 PROCEDURE — 99292 CRITICAL CARE ADDL 30 MIN: CPT | Mod: 25,NTX,, | Performed by: INTERNAL MEDICINE

## 2019-07-24 PROCEDURE — C1729 CATH, DRAINAGE: HCPCS | Mod: NTX

## 2019-07-24 PROCEDURE — 84157 ASSAY OF PROTEIN OTHER: CPT | Mod: NTX

## 2019-07-24 PROCEDURE — 82140 ASSAY OF AMMONIA: CPT | Mod: NTX

## 2019-07-24 PROCEDURE — 49082 ABD PARACENTESIS: CPT | Mod: 59,NTX,, | Performed by: PHYSICIAN ASSISTANT

## 2019-07-24 PROCEDURE — 49082 PR ABD PARACENTESIS: ICD-10-PCS | Mod: 59,NTX,, | Performed by: PHYSICIAN ASSISTANT

## 2019-07-24 PROCEDURE — 99291 PR CRITICAL CARE, E/M 30-74 MINUTES: ICD-10-PCS | Mod: NTX,,, | Performed by: HOSPITALIST

## 2019-07-24 PROCEDURE — S0028 INJECTION, FAMOTIDINE, 20 MG: HCPCS | Mod: NTX | Performed by: NURSE PRACTITIONER

## 2019-07-24 PROCEDURE — 63600175 PHARM REV CODE 636 W HCPCS: Mod: NTX | Performed by: NURSE PRACTITIONER

## 2019-07-24 PROCEDURE — 95822 EEG COMA OR SLEEP ONLY: CPT | Mod: 26,,, | Performed by: PSYCHIATRY & NEUROLOGY

## 2019-07-24 PROCEDURE — 36600 WITHDRAWAL OF ARTERIAL BLOOD: CPT | Mod: NTX

## 2019-07-24 PROCEDURE — 99232 PR SUBSEQUENT HOSPITAL CARE,LEVL II: ICD-10-PCS | Mod: GC,NTX,, | Performed by: INTERNAL MEDICINE

## 2019-07-24 PROCEDURE — 49082 ABD PARACENTESIS: CPT | Mod: NTX

## 2019-07-24 PROCEDURE — 25000003 PHARM REV CODE 250: Mod: NTX | Performed by: HOSPITALIST

## 2019-07-24 PROCEDURE — 87205 SMEAR GRAM STAIN: CPT | Mod: 59,NTX

## 2019-07-24 PROCEDURE — 36415 COLL VENOUS BLD VENIPUNCTURE: CPT | Mod: NTX

## 2019-07-24 PROCEDURE — 63600175 PHARM REV CODE 636 W HCPCS: Mod: NTX | Performed by: HOSPITALIST

## 2019-07-24 PROCEDURE — 83735 ASSAY OF MAGNESIUM: CPT | Mod: NTX

## 2019-07-24 PROCEDURE — 63600175 PHARM REV CODE 636 W HCPCS: Mod: NTX | Performed by: PHYSICIAN ASSISTANT

## 2019-07-24 PROCEDURE — 80053 COMPREHEN METABOLIC PANEL: CPT | Mod: NTX

## 2019-07-24 PROCEDURE — 99291 CRITICAL CARE FIRST HOUR: CPT | Mod: 25,NTX,, | Performed by: PHYSICIAN ASSISTANT

## 2019-07-24 PROCEDURE — 95822 PR EEG,COMA/SLEEP RECORD ONLY: ICD-10-PCS | Mod: 26,,, | Performed by: PSYCHIATRY & NEUROLOGY

## 2019-07-24 PROCEDURE — 83605 ASSAY OF LACTIC ACID: CPT | Mod: 91,NTX

## 2019-07-24 PROCEDURE — 99233 PR SUBSEQUENT HOSPITAL CARE,LEVL III: ICD-10-PCS | Mod: GC,NTX,, | Performed by: INTERNAL MEDICINE

## 2019-07-24 PROCEDURE — 99900026 HC AIRWAY MAINTENANCE (STAT): Mod: NTX

## 2019-07-24 PROCEDURE — 25000003 PHARM REV CODE 250: Mod: NTX | Performed by: STUDENT IN AN ORGANIZED HEALTH CARE EDUCATION/TRAINING PROGRAM

## 2019-07-24 PROCEDURE — 83735 ASSAY OF MAGNESIUM: CPT | Mod: 91,NTX

## 2019-07-24 PROCEDURE — 99292 PR CRITICAL CARE, ADDL 30 MIN: ICD-10-PCS | Mod: 25,NTX,, | Performed by: INTERNAL MEDICINE

## 2019-07-24 PROCEDURE — S5571 INSULIN DISPOS PEN 3 ML: HCPCS | Mod: NTX | Performed by: HOSPITALIST

## 2019-07-24 PROCEDURE — 87075 CULTR BACTERIA EXCEPT BLOOD: CPT | Mod: NTX

## 2019-07-24 PROCEDURE — 20000000 HC ICU ROOM: Mod: NTX

## 2019-07-24 PROCEDURE — 25000003 PHARM REV CODE 250: Mod: NTX | Performed by: NURSE PRACTITIONER

## 2019-07-24 PROCEDURE — 89051 BODY FLUID CELL COUNT: CPT | Mod: NTX

## 2019-07-24 PROCEDURE — 82042 OTHER SOURCE ALBUMIN QUAN EA: CPT | Mod: NTX

## 2019-07-24 PROCEDURE — 85610 PROTHROMBIN TIME: CPT | Mod: NTX

## 2019-07-24 PROCEDURE — 43752 NASAL/OROGASTRIC W/TUBE PLMT: CPT | Mod: NTX

## 2019-07-24 PROCEDURE — 80053 COMPREHEN METABOLIC PANEL: CPT | Mod: 91,NTX

## 2019-07-24 PROCEDURE — 27000221 HC OXYGEN, UP TO 24 HOURS: Mod: NTX

## 2019-07-24 PROCEDURE — 25000003 PHARM REV CODE 250: Mod: NTX

## 2019-07-24 RX ORDER — ONDANSETRON 2 MG/ML
4 INJECTION INTRAMUSCULAR; INTRAVENOUS EVERY 6 HOURS PRN
Status: DISCONTINUED | OUTPATIENT
Start: 2019-07-24 | End: 2019-08-10 | Stop reason: HOSPADM

## 2019-07-24 RX ORDER — LIDOCAINE HYDROCHLORIDE 10 MG/ML
1 INJECTION INFILTRATION; PERINEURAL ONCE
Status: COMPLETED | OUTPATIENT
Start: 2019-07-24 | End: 2019-07-24

## 2019-07-24 RX ORDER — NOREPINEPHRINE BITARTRATE/D5W 4MG/250ML
0.02 PLASTIC BAG, INJECTION (ML) INTRAVENOUS CONTINUOUS
Status: DISCONTINUED | OUTPATIENT
Start: 2019-07-24 | End: 2019-07-26

## 2019-07-24 RX ORDER — LIDOCAINE HYDROCHLORIDE 10 MG/ML
INJECTION, SOLUTION EPIDURAL; INFILTRATION; INTRACAUDAL; PERINEURAL
Status: COMPLETED
Start: 2019-07-24 | End: 2019-07-24

## 2019-07-24 RX ORDER — FENTANYL CITRATE 50 UG/ML
50 INJECTION, SOLUTION INTRAMUSCULAR; INTRAVENOUS
Status: DISCONTINUED | OUTPATIENT
Start: 2019-07-28 | End: 2019-07-25

## 2019-07-24 RX ORDER — LACTULOSE 10 G/15ML
200 SOLUTION ORAL; RECTAL ONCE
Status: COMPLETED | OUTPATIENT
Start: 2019-07-24 | End: 2019-07-24

## 2019-07-24 RX ORDER — INSULIN ASPART 100 [IU]/ML
0-5 INJECTION, SOLUTION INTRAVENOUS; SUBCUTANEOUS EVERY 6 HOURS PRN
Status: DISCONTINUED | OUTPATIENT
Start: 2019-07-24 | End: 2019-07-26

## 2019-07-24 RX ORDER — FAMOTIDINE 10 MG/ML
20 INJECTION INTRAVENOUS DAILY
Status: DISCONTINUED | OUTPATIENT
Start: 2019-07-24 | End: 2019-07-24

## 2019-07-24 RX ORDER — MIDODRINE HYDROCHLORIDE 5 MG/1
5 TABLET ORAL 3 TIMES DAILY
Status: DISCONTINUED | OUTPATIENT
Start: 2019-07-24 | End: 2019-07-25

## 2019-07-24 RX ORDER — FENTANYL CITRATE 50 UG/ML
50 INJECTION, SOLUTION INTRAMUSCULAR; INTRAVENOUS
Status: COMPLETED | OUTPATIENT
Start: 2019-07-24 | End: 2019-07-25

## 2019-07-24 RX ORDER — DEXMEDETOMIDINE HYDROCHLORIDE 4 UG/ML
0.2 INJECTION, SOLUTION INTRAVENOUS CONTINUOUS
Status: DISCONTINUED | OUTPATIENT
Start: 2019-07-24 | End: 2019-07-25

## 2019-07-24 RX ORDER — LACTULOSE 10 G/15ML
45 SOLUTION ORAL
Status: DISCONTINUED | OUTPATIENT
Start: 2019-07-24 | End: 2019-07-24

## 2019-07-24 RX ORDER — LEVOTHYROXINE SODIUM 88 UG/1
88 TABLET ORAL
Status: DISCONTINUED | OUTPATIENT
Start: 2019-07-25 | End: 2019-07-30

## 2019-07-24 RX ORDER — VANCOMYCIN HCL IN 5 % DEXTROSE 1G/250ML
20 PLASTIC BAG, INJECTION (ML) INTRAVENOUS ONCE
Status: COMPLETED | OUTPATIENT
Start: 2019-07-24 | End: 2019-07-24

## 2019-07-24 RX ORDER — ALBUMIN HUMAN 250 G/1000ML
25 SOLUTION INTRAVENOUS 3 TIMES DAILY
Status: COMPLETED | OUTPATIENT
Start: 2019-07-24 | End: 2019-07-24

## 2019-07-24 RX ORDER — CHLORHEXIDINE GLUCONATE ORAL RINSE 1.2 MG/ML
15 SOLUTION DENTAL 2 TIMES DAILY
Status: DISCONTINUED | OUTPATIENT
Start: 2019-07-24 | End: 2019-07-27

## 2019-07-24 RX ORDER — ZINC SULFATE 50(220)MG
220 CAPSULE ORAL DAILY
Status: DISCONTINUED | OUTPATIENT
Start: 2019-07-25 | End: 2019-07-30

## 2019-07-24 RX ORDER — LACTULOSE 10 G/15ML
20 SOLUTION ORAL 3 TIMES DAILY
Status: DISCONTINUED | OUTPATIENT
Start: 2019-07-24 | End: 2019-07-27

## 2019-07-24 RX ORDER — FAMOTIDINE 20 MG/1
20 TABLET, FILM COATED ORAL DAILY
Status: DISCONTINUED | OUTPATIENT
Start: 2019-07-25 | End: 2019-07-27

## 2019-07-24 RX ORDER — SUCCINYLCHOLINE CHLORIDE 20 MG/ML
1 INJECTION INTRAMUSCULAR; INTRAVENOUS ONCE
Status: COMPLETED | OUTPATIENT
Start: 2019-07-24 | End: 2019-07-24

## 2019-07-24 RX ORDER — ETOMIDATE 2 MG/ML
15 INJECTION INTRAVENOUS ONCE
Status: COMPLETED | OUTPATIENT
Start: 2019-07-24 | End: 2019-07-24

## 2019-07-24 RX ADMIN — LACTULOSE 20 G: 20 SOLUTION ORAL at 04:07

## 2019-07-24 RX ADMIN — RIFAXIMIN 550 MG: 550 TABLET ORAL at 10:07

## 2019-07-24 RX ADMIN — FAMOTIDINE 20 MG: 10 INJECTION, SOLUTION INTRAVENOUS at 01:07

## 2019-07-24 RX ADMIN — ONDANSETRON 8 MG: 8 TABLET, ORALLY DISINTEGRATING ORAL at 05:07

## 2019-07-24 RX ADMIN — LIDOCAINE HYDROCHLORIDE 10 MG: 10 INJECTION, SOLUTION EPIDURAL; INFILTRATION; INTRACAUDAL; PERINEURAL at 02:07

## 2019-07-24 RX ADMIN — DEXMEDETOMIDINE HYDROCHLORIDE 0.2 MCG/KG/HR: 4 INJECTION, SOLUTION INTRAVENOUS at 01:07

## 2019-07-24 RX ADMIN — INSULIN ASPART 4 UNITS: 100 INJECTION, SOLUTION INTRAVENOUS; SUBCUTANEOUS at 06:07

## 2019-07-24 RX ADMIN — MIDODRINE HYDROCHLORIDE 5 MG: 5 TABLET ORAL at 08:07

## 2019-07-24 RX ADMIN — LIDOCAINE HYDROCHLORIDE 10 MG: 10 INJECTION INFILTRATION; PERINEURAL at 02:07

## 2019-07-24 RX ADMIN — ONDANSETRON 4 MG: 2 INJECTION INTRAMUSCULAR; INTRAVENOUS at 12:07

## 2019-07-24 RX ADMIN — LACTULOSE 200 G: 10 SOLUTION ORAL at 09:07

## 2019-07-24 RX ADMIN — ALBUMIN (HUMAN) 25 G: 12.5 SOLUTION INTRAVENOUS at 04:07

## 2019-07-24 RX ADMIN — LEVOTHYROXINE SODIUM 88 MCG: 88 TABLET ORAL at 05:07

## 2019-07-24 RX ADMIN — CHLORHEXIDINE GLUCONATE 0.12% ORAL RINSE 15 ML: 1.2 LIQUID ORAL at 10:07

## 2019-07-24 RX ADMIN — VANCOMYCIN HYDROCHLORIDE 1000 MG: 1 INJECTION, POWDER, LYOPHILIZED, FOR SOLUTION INTRAVENOUS at 02:07

## 2019-07-24 RX ADMIN — MIDODRINE HYDROCHLORIDE 5 MG: 5 TABLET ORAL at 04:07

## 2019-07-24 RX ADMIN — ALBUMIN (HUMAN) 25 G: 12.5 SOLUTION INTRAVENOUS at 09:07

## 2019-07-24 RX ADMIN — Medication 220 MG: at 08:07

## 2019-07-24 RX ADMIN — POLYETHYLENE GLYCOL 3350 17 G: 17 POWDER, FOR SOLUTION ORAL at 08:07

## 2019-07-24 RX ADMIN — MIDODRINE HYDROCHLORIDE 5 MG: 5 TABLET ORAL at 10:07

## 2019-07-24 RX ADMIN — Medication 0.05 MCG/KG/MIN: at 03:07

## 2019-07-24 RX ADMIN — INSULIN DETEMIR 10 UNITS: 100 INJECTION, SOLUTION SUBCUTANEOUS at 09:07

## 2019-07-24 RX ADMIN — PIPERACILLIN AND TAZOBACTAM 4.5 G: 4; .5 INJECTION, POWDER, LYOPHILIZED, FOR SOLUTION INTRAVENOUS; PARENTERAL at 07:07

## 2019-07-24 RX ADMIN — FENTANYL CITRATE 50 MCG: 50 INJECTION INTRAMUSCULAR; INTRAVENOUS at 01:07

## 2019-07-24 RX ADMIN — LACTULOSE 45 G: 20 SOLUTION ORAL at 09:07

## 2019-07-24 RX ADMIN — LACTULOSE 20 G: 20 SOLUTION ORAL at 10:07

## 2019-07-24 RX ADMIN — ALBUMIN (HUMAN) 25 G: 12.5 SOLUTION INTRAVENOUS at 10:07

## 2019-07-24 RX ADMIN — SUCCINYLCHOLINE CHLORIDE 90 MG: 20 INJECTION, SOLUTION INTRAMUSCULAR; INTRAVENOUS at 01:07

## 2019-07-24 RX ADMIN — RIFAXIMIN 550 MG: 550 TABLET ORAL at 08:07

## 2019-07-24 RX ADMIN — ETOMIDATE 10 MG: 40 INJECTION, SOLUTION INTRAVENOUS at 01:07

## 2019-07-24 NOTE — SUBJECTIVE & OBJECTIVE
Interval History:   Worsening mental status overnight, now unresponsive to sternal rub. ICU consulted. Lactulose was being held due to ileus.    Current Facility-Administered Medications   Medication    acetaminophen tablet 325 mg    albumin human 25% bottle 25 g    chlorhexidine 0.12 % solution 15 mL    dexmedetomidine (PRECEDEX) 400mcg/100mL 0.9% NaCL infusion    dextrose 10% (D10W) Bolus    dextrose 10% (D10W) Bolus    dicyclomine capsule 10 mg    etomidate injection 15 mg    famotidine (PF) injection 20 mg    glucagon (human recombinant) injection 1 mg    glucose chewable tablet 16 g    glucose chewable tablet 24 g    insulin aspart U-100 pen 0-5 Units    insulin detemir U-100 pen 10 Units    iohexol (OMNIPAQUE) oral solution 15 mL    lactulose 20 gram/30 mL solution Soln 45 g    levothyroxine tablet 88 mcg    midodrine tablet 5 mg    ondansetron injection 4 mg    rifAXIMin tablet 550 mg    simethicone chewable tablet 80 mg    sodium chloride 0.9% flush 10 mL    sodium chloride 0.9% flush 10 mL    succinylcholine injection 56 mg    zinc sulfate capsule 220 mg       Objective:     Vital Signs (Most Recent):  Temp: 97.5 °F (36.4 °C) (07/24/19 0717)  Pulse: 68 (07/24/19 1126)  Resp: 16 (07/24/19 0930)  BP: (!) 114/58 (07/24/19 0930)  SpO2: 96 % (07/24/19 0930) Vital Signs (24h Range):  Temp:  [97.5 °F (36.4 °C)-98.4 °F (36.9 °C)] 97.5 °F (36.4 °C)  Pulse:  [68-88] 68  Resp:  [16] 16  SpO2:  [96 %-100 %] 96 %  BP: (111-131)/(55-60) 114/58     Weight: 56 kg (123 lb 7.3 oz) (07/23/19 0629)  Body mass index is 24.11 kg/m².    Physical Exam   Constitutional: She has a sickly appearance.   Cardiovascular: Normal rate and regular rhythm.   Pulmonary/Chest: Effort normal. No respiratory distress.   Abdominal: Soft. Bowel sounds are normal. She exhibits distension.   Neurological: She is unresponsive.   Nursing note and vitals reviewed.      MELD-Na score: 25 at 7/24/2019  4:09 AM  MELD score: 16 at  7/24/2019  4:09 AM  Calculated from:  Serum Creatinine: 1.7 mg/dL at 7/24/2019  4:09 AM  Serum Sodium: 126 mmol/L at 7/24/2019  4:09 AM  Total Bilirubin: 1.6 mg/dL at 7/24/2019  4:09 AM  INR(ratio): 1.3 at 7/24/2019  4:09 AM  Age: 68 years    Significant Labs:  Labs within the past month have been reviewed.    Significant Imaging:  Reviewed

## 2019-07-24 NOTE — SIGNIFICANT EVENT
[facility]    [address]    [facility] [address]    Phone:  [phone]       Thank You for choosing us for your health care visit. We are glad to serve you and happy to provide you with this summary of your visit. Please help us to ensure we have accurate records. If you find anything that needs to be changed, please let our staff know as soon as possible.          Your Demographic Information     Patient Name Sex     Vinicio Lacy Male 1953       Ethnic Group Patient Race    Not of  or  Origin White      Your Visit Details     Date & Time Provider Department    2017 9:15 AM LEONOR Jackson [facility]      Your Upcoming Appointment*(Max 10)     2017  9:00 AM CDT   Follow-up Visit with Norman Seals MD   [facility] ([facility])    [address] [place] [address] [address] [place] [address]   [phone]            2017 11:15 AM CDT   Follow-up Visit with Kale Mancera MD   [facility] ([facility])     [facility] [address]   [phone]              Your Vitals Were     BP Height Weight BMI Smoking Status       110/68 6' 1\" (1.854 m) 217 lb (98.4 kg) 28.63 kg/m2 Former Smoker       Medications Prescribed or Re-Ordered Today     None      Your Current Medications Are        Disp Refills Start End    diltiazem (CARDIZEM CD) 120 MG 24 hr capsule 90 capsule 3 2017     Sig: TAKE ONE CAPSULE BY MOUTH DAILY    Class: Eprescribe    Cosign for Ordering: Accepted by Norman Seals MD on 2017 12:00 PM    acarbose (PRECOSE) 25 MG tablet 90 tablet 1 2016     Sig: TAKE ONE TABLET BY MOUTH THREE TIMES DAILY WITH MEALS    Class: Eprescribe    Notes to Pharmacy: Labs needed before further refills.    mirtazapine (REMERON) 15 MG tablet  RAPID RESPONSE NURSE NOTE     Admit Date: 2019  LOS: 6  Code Status: Full Code   Date of Consult: 2019  : 1950  Age: 68 y.o.  Weight:   Wt Readings from Last 1 Encounters:   19 56 kg (123 lb 7.3 oz)     Sex: female  Race: Unknown   Bed: [address] [place] [address] A:   MRN: [id number]  Time Rapid Response Team page Received: 1240  Time Rapid Response Team at Bedside: 1242  Time Rapid Response Team left Bedside: 1305  Was the patient discharged from an ICU this admission?   no  Was the patient discharged from a PACU within last 24 hours?  no  Did the patient receive conscious sedation/general anesthesia within last 24 hours?  no  Was the patient in the ED within the past 24 hours?  no  Was the patient started on NIPPV within the past 24 hours?  no  Did this progress into an ARC or CPA:  no  Attending Physician: Stefany Jasso MD  Primary Service: [facility]  Consult Requested By: Stefany Jasso MD     SITUATION     Reason for Call: AMS  Called to evaluate the patient for Neuro    BACKGROUND     Why is the patient in the hospital?: Hyponatremia    Patient has a past medical history of Cirrhosis, Diabetes mellitus, type 2, Disorder of kidney and ureter, Hypertension, Hypothyroidism, and NAFLD (nonalcoholic fatty liver disease).    ASSESSMENT/INTERVENTIONS     BP (!) 114/58 (BP Location: Right arm, Patient Position: Lying)   Pulse 68   Temp 97.5 °F (36.4 °C) (Axillary)   Resp 16   Ht 5' (1.524 m)   Wt 56 kg (123 lb 7.3 oz)   LMP  (LMP Unknown)   SpO2 96%   Breastfeeding? No   BMI 24.11 kg/m²     What did you find: Upon arrival to bedside, Critical Care MD and MEREDITH with patient. Pt noted to be obtunded and vomiting. Sitting up at 90 to prevent aspiration. Plan to move pt to ICU for intubation and closer monitoring. VSS. Pt placed on 3L NC. Pt transported to [facility] by Rapid RN x1 and CCS MD and MEREDITH.     RECOMMENDATIONS     We recommend: Closer monitoring and airway  90 tablet 4 11/17/2016     Sig - Route: Take 1 tablet by mouth nightly. - Oral    Class: Eprescribe    metoPROLOL (TOPROL-XL) 25 MG 24 hr tablet 90 tablet 3 11/16/2016     Sig: TAKE ONE TABLET BY MOUTH DAILY    Class: Tari Scott for Ordering: Accepted by Norman Seals MD on 11/16/2016 12:29 PM    rivaroxaban (XARELTO) 20 MG Tab 90 tablet 1 10/25/2016     Sig - Route: Take 1 tablet by mouth daily (with dinner). - Oral    Class: Eprescribe    aspirin 81 MG tablet 90 tablet 3 10/14/2016     Sig - Route: Take 1 tablet by mouth daily. - Oral    Class: Eprescribe    traMADOL (ULTRAM) 50 MG tablet 90 tablet 5 7/14/2016     Sig: One tablet every 6 hours prn pain    Class: Script Not Printed    Notes to Pharmacy: Called to [facility]    Tyler for Ordering: Accepted by Norman Seals MD on 7/14/2016 10:04 AM    pravastatin (PRAVACHOL) 40 MG tablet 90 tablet 3 5/25/2016     Sig: TAKE ONE TABLET BY MOUTH DAILY    Class: Eprescribe    omeprazole (PRILOSEC) 20 MG capsule 180 capsule 3 4/12/2016     Sig - Route: Take 1 capsule by mouth 2 times daily. - Oral    Class: Eprescribe    fluticasone (CUTIVATE) 0.05 % cream 15 g 1 11/18/2015     Sig: Apply to affected areas twice daily for 2 weeks.    Class: Eprescribe    Notes to Pharmacy: Ok to dispense generic.    Glucose Blood (ZENIA CONTOUR TEST) strip 270 each 3 11/18/2015     Sig: Test blood sugars 3 times daily.    Class: Eprescribe    vitamin B-12 (CYANOCOBALAMIN) 1000 MCG tablet        Sig - Route: Take 1,000 mcg by mouth daily. - Oral    Class: Historical Med    ferrous sulfate 325 (65 FE) MG tablet 100 tablet 3 8/21/2013     Sig - Route: Take 1 tablet by mouth 3 times daily. - Oral    Class: Script Not Printed    ascorbic acid (VITAMIN C) 250 MG tablet   8/21/2013     Sig - Route: Take 1 tablet by mouth 3 times daily. Take with Ferrous sulfate. - Oral    Class: Historical Med      Discontinued Medications        Reason for Discontinue     protection.    FOLLOW-UP/CONTINGENCY PLAN     Patient needs a second visit at : N/A    Call the Rapid Response Nurse, Sun Hunt RN at x [phone] for additional questions or concerns.    PHYSICIAN ESCALATION     Orders received and case discussed with TEJA Hathaway.    Disposition: Tx in ICU bed 6072.       HYDROcodone-acetaminophen (NORCO) 5-325 MG per tablet Therapy Completed      Allergies     Dye [Contrast Media]     Iodinated radiocontrast dyes  Patient states has had dye in the past without problems    Iodine     Pt states only seafood. Iodine scrubs OK    Shrimp HIVES, SWELLING, PRURITUS    Dyspnea and respiratory abnormalities      Immunizations History as of 1/27/2017     Name Date    HERPES ZOSTER SHINGLES 8/25/2014    Influenza 9/21/2015, 11/6/2013, 9/19/2012, 10/31/2011, 12/3/2010, 10/6/2009, 9/29/2009, 12/29/2004, 12/4/2000    Influenza A novel H1N1 12/18/2009    Influenza Preservative Free 11/6/2013    Influenza Quadrivalent Preservative Free 10/12/2016    Pneumococcal Conjugate 13 Valent 10/12/2016    Pneumococcal Polysaccharide Adult 11/27/2013, 10/3/2005    Td:Adult type tetanus/diphtheria 10/11/2009, 1/22/2005, 1/22/2001      Problem List as of 1/27/2017     Liver function study, abnormal    Other and unspecified alcohol dependence, unspecified drinking behavior    Alcohol withdrawal    Iron deficiency anemia    Angular cheilitis    Lumbago    Carpal tunnel syndrome    Diabetic neuropathy    Foot drop    Herniated disc    Inguinal hernia without mention of obstruction or gangrene, unilateral or unspecified, (not specified as recurrent)    Leukopenia    Pernicious anemia    Retinal hemorrhage    Rotator cuff (capsule) sprain    Sensorineural hearing loss, unspecified    Ulnar neuropathy    Elevated INR    Other primary cardiomyopathies    Hyperlipidemia    Trigger middle finger of right hand    Shukla's esophagus without dysplasia    Trigger thumb of left hand    Type 2 diabetes mellitus without complication, without long-term current use of insulin    Paroxysmal atrial fibrillation    Cervical radiculopathy at C5-noted on EMG/NCS (4/18/13)    Cholelithiasis - seen on CT scan 8/15/2013    Chronic kidney disease, stage 3            Patient Instructions     None

## 2019-07-24 NOTE — ASSESSMENT & PLAN NOTE
Patient has new Right renal mass found on CT abd on 7/22/19. Previous Ct from 2016 and MRI studies from 2019 in [place] did not demonstrate this finding.     Recommendations  - Urology recommends f/u outpatient as she is not a good surgical or biopsy candidate.   - Would prefer not expose kidneys to contrast but will discuss with other teams if necessary.

## 2019-07-24 NOTE — PROGRESS NOTES
[facility]  Hepatology  Progress Note    Patient Name: Michelle Ojeda  MRN: [id number]  Admission Date: 7/18/2019  Hospital Length of Stay: 6 days  Attending Provider: Stefany Jasso MD   Primary Care Physician: Primary Doctor No  Principal Problem:Hyponatremia    Subjective:     Transplant status: No    HPI: Ms Ojeda is a 68 year old female with a history of HERRMANN ESLD, with decompensations including Gr 1 HE, ascites, EV, hyponatremia, T2DM, HTN, hypothyroidism, who is being admitted from hepatology clinic due to concern for hyponatremia.    She presents to transplant Clinic for initial evaluation on 07/18 with Dr. Cannon for initial transplant evaluation.  She has a history of Herrmann cirrhosis with a history of biopsy in 1998 with steatosis unclear fibrosis both diagnosed with cirrhosis in July of 2015 in the setting of decompensation due while in [place] with encephalopathy and hematemesis.  Her cirrhosis has been complicated by grade 1 hepatic encephalopathy, recently started Aldo [place] min but never been on lactulose, ascites, sarcopenia, esophageal varices status post ligation, hyponatremia.  She was previously evaluated at [facility] with Dr. Mccormick who recommended evaluation at [facility].  Does not appear she was evaluated for transplant at that time.    Due to hyponatremia with sodium of 120 she was admitted to the hospital for further management.  Currently she reports she is feeling well without any complaints.  Denies any abdominal pain, nausea, vomiting, diarrhea.  Denies any fevers, chills, sweats or other infectious complaints.    Family who is present in the room note that she is doing extremely well until approximately 2 months prior to presentation when she began to be more frail, fatigue, increased peripheral edema, decreased oral intake.  They note they have been extremely careful to avoid T here to low-sodium diet.  She has never been hospitalized in the past for hyponatremia.   No other recent hospitalizations.  Regarding her ascites she has approximately received a paracentesis every 2 weeks.      Interval History:   Worsening mental status overnight, now unresponsive to sternal rub. ICU consulted. Lactulose was being held due to ileus.    Current Facility-Administered Medications   Medication    acetaminophen tablet 325 mg    albumin human 25% bottle 25 g    chlorhexidine 0.12 % solution 15 mL    dexmedetomidine (PRECEDEX) 400mcg/100mL 0.9% NaCL infusion    dextrose 10% (D10W) Bolus    dextrose 10% (D10W) Bolus    dicyclomine capsule 10 mg    etomidate injection 15 mg    famotidine (PF) injection 20 mg    glucagon (human recombinant) injection 1 mg    glucose chewable tablet 16 g    glucose chewable tablet 24 g    insulin aspart U-100 pen 0-5 Units    insulin detemir U-100 pen 10 Units    iohexol (OMNIPAQUE) oral solution 15 mL    lactulose 20 gram/30 mL solution Soln 45 g    levothyroxine tablet 88 mcg    midodrine tablet 5 mg    ondansetron injection 4 mg    rifAXIMin tablet 550 mg    simethicone chewable tablet 80 mg    sodium chloride 0.9% flush 10 mL    sodium chloride 0.9% flush 10 mL    succinylcholine injection 56 mg    zinc sulfate capsule 220 mg       Objective:     Vital Signs (Most Recent):  Temp: 97.5 °F (36.4 °C) (07/24/19 0717)  Pulse: 68 (07/24/19 1126)  Resp: 16 (07/24/19 0930)  BP: (!) 114/58 (07/24/19 0930)  SpO2: 96 % (07/24/19 0930) Vital Signs (24h Range):  Temp:  [97.5 °F (36.4 °C)-98.4 °F (36.9 °C)] 97.5 °F (36.4 °C)  Pulse:  [68-88] 68  Resp:  [16] 16  SpO2:  [96 %-100 %] 96 %  BP: (111-131)/(55-60) 114/58     Weight: 56 kg (123 lb 7.3 oz) (07/23/19 0629)  Body mass index is 24.11 kg/m².    Physical Exam   Constitutional: She has a sickly appearance.   Cardiovascular: Normal rate and regular rhythm.   Pulmonary/Chest: Effort normal. No respiratory distress.   Abdominal: Soft. Bowel sounds are normal. She exhibits distension.    Neurological: She is unresponsive.   Nursing note and vitals reviewed.      MELD-Na score: 25 at 7/24/2019  4:09 AM  MELD score: 16 at 7/24/2019  4:09 AM  Calculated from:  Serum Creatinine: 1.7 mg/dL at 7/24/2019  4:09 AM  Serum Sodium: 126 mmol/L at 7/24/2019  4:09 AM  Total Bilirubin: 1.6 mg/dL at 7/24/2019  4:09 AM  INR(ratio): 1.3 at 7/24/2019  4:09 AM  Age: 68 years    Significant Labs:  Labs within the past month have been reviewed.    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 Found to have incidentally discovered R renal mass. Today, with worsening encephalopathy requiring ICU transfer.    Plan  - encephalopathy: Worsening confusion on 7/20, acutely worsened today. Restart lactulose q1hr (via Conor) until patient has a bowel movement, then continue tid with goal of 4-5 BMs daily. Ammonia 171. Continue rifaximin. Discontinue miralax. Infectious workup.  - R renal mass: per transplant team, recommend Urology consult and U/S retroperitoneum. Urology recommending active surveillance. High risk for surgery or biopsy. U/S retroperitoneum with mass but unable to characterize. Thrombocytopenia is a concern especially if biopsy necessary.  - ileus on imaging, now resolved. NGT removed.  - repeat infectious workup pending unremarkable, off empiric Abx  - ascites: Negative for SBP on 07/19. Total protein 1.5.  - diuretics: hold  - CKD: unclear baseline. Holding diuretics and albumin therapy given hyponatremia.   - hyponatremia: slowly improving with fluid restriction. continue fluid restriction 800mL. Holding diuresis. Avoid free water additives. Nephrology consult.  - malnutrition: nutrition consult. Calorie count. Supplements. NG tube  feeds now that ileus resolved.  - PT/OT  - inpatient transplant evaluation started. Nuclear stress test and renal scan completed, ID consult, now needs further evaluation of R renal mass and improvement in mental status  - Appreciate renal evaluation re: SLK given GFR <35          Thank you for your consult. I will follow-up with patient. Please contact us if you have any additional questions.    Andrea Eaton MD  Hepatology  [facility]

## 2019-07-24 NOTE — ASSESSMENT & PLAN NOTE
R kidney mass measures 2.4 cm x 2.3 cm found on CT abd/pelvis during transplant workup  --Urology following with plans to hold on any further work up in setting of acute decompensation

## 2019-07-24 NOTE — CARE UPDATE
RAPID RESPONSE NURSE PROACTIVE ROUNDING NOTE     Time of Visit: 09:00    Admit Date: 2019  LOS: 6  Code Status: Full Code   Date of Visit: 2019  : 1950  Age: 68 y.o.  Sex: female  Race: Unknown  Bed: 80/Replaced by [facility] A:   MRN: [id number]  Was the patient discharged from an ICU this admission? no   Was the patient discharged from a PACU within last 24 hours?  no  Did the patient receive conscious sedation/general anesthesia in last 24 hours?  no  Was the patient in the ED within the past 24 hours?  no  Was the patient started on NIPPV within the past 24 hours?  no  Attending Physician: Stefany Jasso MD  Primary Service: [facility] MED L    ASSESSMENT     Diagnosis: Hyponatremia    Abnormal Vital Signs: BP (!) 114/58 (BP Location: Right arm, Patient Position: Lying)   Pulse 82   Temp 97.5 °F (36.4 °C) (Axillary)   Resp 16   Ht 5' (1.524 m)   Wt 56 kg (123 lb 7.3 oz)   LMP  (LMP Unknown)   SpO2 96%   Breastfeeding? No   BMI 24.11 kg/m²      Clinical Issues: Neuro    Patient  has a past medical history of Cirrhosis, Diabetes mellitus, type 2, Disorder of kidney and ureter, Hypertension, Hypothyroidism, and NAFLD (nonalcoholic fatty liver disease).    Less responsive than on previous shift, ammonia level 171. Patient NPO secondary to ileus.      INTERVENTIONS/ RECOMMENDATIONS     Resume lactulose.     Discussed plan of care with RNRobb.    PHYSICIAN ESCALATION     Yes/No  yes    Orders received and case discussed with Dr. Jasso.    Disposition: Remain in room 8098.    FOLLOW-UP     Call back the Rapid Response Nurse, Dennis Murphy RN at [phone] for additional questions or concerns.

## 2019-07-24 NOTE — PT/OT/SLP PROGRESS
"     Physical Therapy  Pt Not Seen    Patient Name:  Michelle Ojeda   MRN:  [id number]    Patient not seen today secondary to  Other (Comment)(RN (Robb) reports "she's not appropriate for PT today because of AMS" (10:43 am)  Later, pt was transferred to ICU.).     Matilde Francis, PTA  7/24/2019      "

## 2019-07-24 NOTE — ASSESSMENT & PLAN NOTE
Rusty is a 69 yo F with liver cirrhosis and CKD admitted for hyponatremia. Hyponatremia may be related to several factors primarily liver cirrhosis requiring biweekly paracentesis. This leads to a hypovolemic hyponatremic state in which fluid is pulled out of the vasculature. Additional contributing factors related to ileus, NPO status, and TPN nutrition. Extra sodium in TPN has increased sodium. Now on tube feeds. Baseline Na+ from labs in [place] between 123-124. She is above her baseline at 126 (7/24/19). Abd Ct noted small left pleural effusion but patient is satting well in mid 90s ORA.   - If volume expansion required recommend albumin   - Added Midodrine 2.5mg TID scheduled to keep MAP>75  - Strict I/O monitoring

## 2019-07-24 NOTE — ASSESSMENT & PLAN NOTE
Labs from [place] nephrology demonstrated Cr of 1.77 in 3/12/19, 1.67 on 4/5/19, 1.96 in 6/13/19, and 1.83 on 6/24. Patient may meet criteria fro kidney transplant. Cr is 1.7 today (7/24/19) near her baseline.  - Midodrine added to prevent hypoperfusion to kidneys  - Monitor with daily BMP  - Nephrology will continue to follow closely.   - Considering dialysis if worsening Cr

## 2019-07-24 NOTE — CARE UPDATE
Rapid Response Respiratory Therapy Proactive Rounding Note      Time of visit: 09    Code Status: Full Code   : 1950  Age: 68 y.o.  Weight:   Wt Readings from Last 1 Encounters:   19 56 kg (123 lb 7.3 oz)     Sex: female  Race: Unknown   Bed: 8098/8098 A:   MRN: [id number]    SITUATION     Evaluated patient for: Neuro    BACKGROUND     Patient has a past medical history of Cirrhosis, Diabetes mellitus, type 2, Disorder of kidney and ureter, Hypertension, Hypothyroidism, and NAFLD (nonalcoholic fatty liver disease).  Pulmonary Hx: None  Clinically Significant Surgical Hx: None    ASSESSMENT     Pulse: Pulse: 68 Respiratory rate: Resp: 16 Temperature: Temp: 97.5 °F (36.4 °C) BP: BP: (!) 114/58 SpO2:SpO2: 96 %   Level of Consciousness: Level of Consciousness (AVPU): alert  Respiratory Effort:    Expansion/Accessory Muscle Usage: Expansion/Accessory Muscles/Retractions: expansion symmetric  All Lung Field Breath Sounds: All Lung Fields Breath Sounds: diminished  Cough Type: Cough Type: dry  Mobility at time of assessment: General Mobility: moderately impaired  O2 Device/Concentration: O2 Device (Oxygen Therapy): room air   Flow (L/min): 0 Oxygen Concentration (%): 0    Current Respiratory Care Orders:   19 0903  Pulse Oximetry Continuous Continuous      19 0904   Unscheduled  Oxygen PRN Use PRN (0 of 22730 released)    Release   References: Oxygen Titration Protocol   Question Answer Comment   Device type: Low flow    Device: Nasal Cannula (1- 5 Liters)    LPM: 2    Titrate O2 per Oxygen Titration Protocol: Yes    To maintain SpO2 goal of: >= 90%    Notify MD of: Inability to achieve desired SpO2; Sudden change in patient status and requires 20% increase in FiO2; Patient requires >60% FiO2               NIPPV: No  Surgical airway: No  ETCO2 monitored:    Ambu at bedside: Ambu bag with the patient?: Yes, Adult Ambu    INTERVENTIONS/RECOMMENDATIONS     Resume lactulose. Call with any acute  events. Will continue to monitor.     Discussed plan of care with  primary RTDipti     FOLLOW-UP     Please call back the Rapid Response RTElena, PATITO at x [phone] for any questions or concerns.

## 2019-07-24 NOTE — HPI
Michelle Ojeda is a 68 year old female with history of IZAGUIRRE cirrhosis, HTN and hypothyroidism who presented to [facility] on 7/18/2019 as a direct admit from Transplant Hepatology Clinic for hyponatremia. Patient is from [place] and requesting liver/kidney transplant evaluation. Her cirrhosis is complicated by ascites requiring biweekly paracentesis, sarcopenia, esophageal varices, hyponatremia and hepatic encephalopathy. Per daughter, patient with functional decline over the last 2 months with complaints of lethargy, decreased oral intake, and peripheral edema. Patient initially admitted to hospital medicine for treatment of hyponatremia with fluid restriction. During liver work up, incidental renal mass found and urology was consulted. Patient developed an ileus and unable to tolerate lactulose while on the floor. With acute respiratory failure, hepatic encephalopathy, and N/V, patient intubated on 7/24. Patient intubated during her ICU stay, extubated - stepped down to -L team (on 7/30) where her course has now been complicated by worsening hepatic encephalopathy due to lack of oral lactulose and worsening uremia as her renal function is again worsening. Patient became more confused on 8/5 with regular BM and work-up for infection. NG tube placed since patient not tolerating enemas for lactulose but started having bleeding concerning for varices. NG now removed. Patient remains altered.     Family initially expressed interest in DNR status and comfort measures but now feel they would like to attempt dialysis for her encephalopathy and electrolyte abnormalities with the end goal of getting patient to [place] with family. Daughter and son understand patient's condition and would like to remain with no chest compressions but are interested in a short course of intubation for acute respiratory issues, while patient's children fly in to [place]. No new episodes of hematemesis after OG tube removed but  patient remains altered today.

## 2019-07-24 NOTE — ASSESSMENT & PLAN NOTE
Ms Ojeda is a 68 year old female with a history of IZAGUIRRE ESLD, with decompensations including Gr 1 HE, ascites, EV, hyponatremia, T2DM, HTN, hypothyroidism, who is being admitted from hepatology clinic due to concern for hyponatremia. Found to have incidentally discovered R renal mass. Today, with worsening encephalopathy requiring ICU transfer.    Plan  - encephalopathy: Worsening confusion on 7/20, acutely worsened today. Restart lactulose q1hr (via Conor) until patient has a bowel movement, then continue tid with goal of 4-5 BMs daily. Ammonia 171. Continue rifaximin. Discontinue miralax. Infectious workup.  - R renal mass: per transplant team, recommend Urology consult and U/S retroperitoneum. Urology recommending active surveillance. High risk for surgery or biopsy. U/S retroperitoneum with mass but unable to characterize. Thrombocytopenia is a concern especially if biopsy necessary.  - ileus on imaging, now resolved. NGT removed.  - repeat infectious workup pending unremarkable, off empiric Abx  - ascites: Negative for SBP on 07/19. Total protein 1.5.  - diuretics: hold  - CKD: unclear baseline. Holding diuretics and albumin therapy given hyponatremia.   - hyponatremia: slowly improving with fluid restriction. continue fluid restriction 800mL. Holding diuresis. Avoid free water additives. Nephrology consult.  - malnutrition: nutrition consult. Calorie count. Supplements. NG tube feeds now that ileus resolved.  - PT/OT  - inpatient transplant evaluation started. Nuclear stress test and renal scan completed, ID consult, now needs further evaluation of R renal mass and improvement in mental status  - Appreciate renal evaluation re: SLK given GFR <35

## 2019-07-24 NOTE — PROGRESS NOTES
Pharmacokinetic Initial Assessment: IV Vancomycin    Assessment/Plan:    - Initiate intravenous vancomycin with a loading dose of 1000 mg followed by a maintenance dose of 750 mg every 24 hours.    - Patient has a history of CKD4 (reported baseline SCr 1.6 - 1.8 mg/dl).  - A random level is ordered prior to the next dose tomorrow 7/25 at 14:30 to ensure clearance.   - Goal trough level 15-20 mg/dl.     Pharmacy will continue to follow and monitor vancomycin.      Thank you for the consult,   Tatyana Rosa, PharmD, [facility]  [phone]     Patient brief summary:  Michelle Ojeda is a 68 y.o. female initiated on antimicrobial therapy with IV Vancomycin for treatment of suspected bacteremia.     Drug Allergies:   Review of patient's allergies indicates:  No Known Allergies    Actual Body Weight:   56 kg    Renal Function:   Estimated Creatinine Clearance: 24.2 mL/min (A) (based on SCr of 1.7 mg/dL (H)).,     Dialysis Method (if applicable):  Not applicable     CBC (last 72 hours):  Recent Labs   Lab Result Units 07/22/19 0448 07/23/19 0355 07/24/19  0409 07/24/19  1410   WBC K/uL 4.76 3.87* 3.97 8.89   Hemoglobin g/dL 8.8* 8.8* 9.3* 10.5*   Hematocrit % 25.3* 25.8* 26.8* 30.3*   Platelets K/uL 27* 33* 35* 55*   Gran% % 80.6* 81.0* 79.1* 80.4*   Lymph% % 8.4* 6.5* 6.5* 6.1*   Mono% % 8.0 10.1 12.6 12.5   Eosinophil% % 1.3 0.5 1.0 0.1   Basophil% % 0.2 0.3 0.3 0.2   Differential Method  Automated Automated Automated Automated       Metabolic Panel (last 72 hours):  Recent Labs   Lab Result Units 07/22/19 0448 07/23/19 0355 07/24/19  0409   Sodium mmol/L 123* 124* 126*   Potassium mmol/L 3.9 3.8 3.8   Chloride mmol/L 90* 89* 90*   CO2 mmol/L 24 27 26   Glucose mg/dL 182* 229* 296*   BUN, Bld mg/dL 45* 56* 66*   Creatinine mg/dL 1.8* 2.0* 1.7*   Albumin g/dL 3.3* 3.2* 3.1*   Total Bilirubin mg/dL 2.4* 1.9* 1.6*   Alkaline Phosphatase U/L 76 127 140*   AST U/L 19 31 34   ALT U/L 24 26 27   Magnesium mg/dL 2.1 2.2 2.4    Phosphorus mg/dL 3.5 3.4 2.8       Drug levels (last 3 results):  No results for input(s): VANCOMYCINRA, VANCOMYCINPE, VANCOMYCINTR in the last 72 hours.    Microbiologic Results:  Microbiology Results (last 7 days)     Procedure Component Value Units Date/Time    Blood culture [[id number]] Collected:  07/24/19 1352    Order Status:  Sent Specimen:  Blood from Peripheral, Antecubital, Right Updated:  07/24/19 1402    Blood culture [[id number]] Collected:  07/24/19 1351    Order Status:  Sent Specimen:  Blood from Peripheral, Antecubital, Left Updated:  07/24/19 1401    Culture, Respiratory with Gram Stain [[id number]] Collected:  07/24/19 1333    Order Status:  Sent Specimen:  Respiratory from Endotracheal Aspirate Updated:  07/24/19 1337    Blood culture [[id number]] Collected:  07/24/19 0930    Order Status:  Sent Specimen:  Blood Updated:  07/24/19 0941    Blood culture [[id number]] Collected:  07/24/19 0930    Order Status:  Sent Specimen:  Blood Updated:  07/24/19 0941    Blood culture [[id number]] Collected:  07/20/19 1356    Order Status:  Completed Specimen:  Blood Updated:  07/23/19 1612     Blood Culture, Routine No Growth to date      No Growth to date      No Growth to date      No Growth to date    Blood culture [[id number]] Collected:  07/20/19 1356    Order Status:  Completed Specimen:  Blood Updated:  07/23/19 1612     Blood Culture, Routine No Growth to date      No Growth to date      No Growth to date      No Growth to date    Culture, Anaerobe [[id number]] Collected:  07/19/19 0927    Order Status:  Completed Specimen:  Body Fluid from Abdomen Updated:  07/23/19 1118     Anaerobic Culture Culture in progress    Aerobic culture [[id number]] Collected:  07/19/19 0927    Order Status:  Completed Specimen:  Body Fluid from Abdomen Updated:  07/22/19 1009     Aerobic Bacterial Culture No growth    Urine culture [[id number]] Collected:  07/18/19 1212    Order Status:  Completed Specimen:  Urine Updated:   07/19/19 2107     Urine Culture, Routine No growth    Narrative:       Preferred Collection Type->Urine, Clean Catch

## 2019-07-24 NOTE — ASSESSMENT & PLAN NOTE
Likely 2/2 hepatic encephalopathy with ammonia 171 this AM (64 on 7/22).   --CT head pending  --EEG pending  --Unable to give lactulose while on the hospital floor  --OG tube placed  --Continue lactulose  --Continue precedex gtt and fentanyl pushes as needed for toleration of ET tube

## 2019-07-24 NOTE — ASSESSMENT & PLAN NOTE
Intubated for airway protection in setting of severe encephalopathy and episodes of emesis / concern for aspiration  --Started empiric abx for possible aspiration PNA  --Sputum culture pending  --No focal signs of consolidation on CXR  --Minimal ventilator requirements  --Wean supplemental O2 for goal SpO2 >88%

## 2019-07-24 NOTE — SUBJECTIVE & OBJECTIVE
Past Medical History:   Diagnosis Date    Cirrhosis     Diabetes mellitus, type 2     Disorder of kidney and ureter     Hypertension     Hypothyroidism     NAFLD (nonalcoholic fatty liver disease)        History reviewed. No pertinent surgical history.    Review of patient's allergies indicates:  No Known Allergies    Family History     Problem Relation (Age of Onset)    No Known Problems Father        Tobacco Use    Smoking status: Never Smoker    Smokeless tobacco: Never Used   Substance and Sexual Activity    Alcohol use: Not Currently    Drug use: Never    Sexual activity: Not on file      Review of Systems   Unable to perform ROS: Mental status change     Objective:     Vital Signs (Most Recent):  Temp: 97.4 °F (36.3 °C) (07/24/19 1500)  Pulse: 65 (07/24/19 1828)  Resp: 18 (07/24/19 1500)  BP: (!) 108/52 (07/24/19 1800)  SpO2: 100 % (07/24/19 1828) Vital Signs (24h Range):  Temp:  [97.2 °F (36.2 °C)-98.4 °F (36.9 °C)] 97.4 °F (36.3 °C)  Pulse:  [55-92] 65  Resp:  [14-40] 18  SpO2:  [96 %-100 %] 100 %  BP: ()/(41-75) 108/52   Weight: 56 kg (123 lb 7.3 oz)  Body mass index is 24.11 kg/m².      Intake/Output Summary (Last 24 hours) at 7/24/2019 1844  Last data filed at 7/24/2019 1800  Gross per 24 hour   Intake 301.39 ml   Output 820 ml   Net -518.61 ml       Physical Exam   Constitutional: Vital signs are normal. She appears well-developed and well-nourished. She has a sickly appearance.   HENT:   Head: Normocephalic and atraumatic.   Eyes: Pupils are equal, round, and reactive to light. Conjunctivae and EOM are normal.   Neck: No tracheal deviation present. No thyromegaly present.   Cardiovascular: Normal rate and regular rhythm. Exam reveals no gallop and no friction rub.   No murmur heard.  Pulmonary/Chest: Effort normal and breath sounds normal. No accessory muscle usage or stridor. No tachypnea and no bradypnea. No respiratory distress. She has no decreased breath sounds. She has no wheezes.  She has no rhonchi. She has no rales.   Abdominal: Soft. Bowel sounds are normal. She exhibits distension and fluid wave. There is hepatomegaly.   4 episodes of emesis during examination   Musculoskeletal: Normal range of motion.   Neurological: She is unresponsive. No sensory deficit.   Only wince to pain in all four extremities. Not following commands.    Skin: Skin is warm and dry.       Vents:  Vent Mode: A/C (07/24/19 1828)  Ventilator Initiated: Yes (07/24/19 1312)  Set Rate: 18 bmp (07/24/19 1828)  Vt Set: 300 mL (07/24/19 1828)  Pressure Support: 0 cmH20 (07/24/19 1828)  PEEP/CPAP: 5 cmH20 (07/24/19 1828)  Oxygen Concentration (%): 30 (07/24/19 1828)  Peak Airway Pressure: 30 cmH2O (07/24/19 1828)  Plateau Pressure: 0 cmH20 (07/24/19 1828)  Total Ve: 8.26 mL (07/24/19 1828)  F/VT Ratio<105 (RSBI): 129.45 (07/24/19 1401)  Lines/Drains/Airways     Drain                 NG/OG Tube 07/24/19 1314 orogastric 14 Fr. Right mouth less than 1 day         Urethral Catheter 07/24/19 1346 Non-latex 16 Fr. less than 1 day          Airway                 Airway - Non-Surgical 07/24/19 1310 Endotracheal Tube less than 1 day          Peripheral Intravenous Line                 Peripheral IV - Single Lumen 07/21/19 1100 20 G Distal;Left;Lateral Forearm 3 days         Peripheral IV - Single Lumen 07/24/19 1315 20 G Anterior;Distal;Right Forearm less than 1 day              Significant Labs:    CBC/Anemia Profile:  Recent Labs   Lab 07/23/19  0355 07/24/19  0409 07/24/19  1410   WBC 3.87* 3.97 8.89   HGB 8.8* 9.3* 10.5*   HCT 25.8* 26.8* 30.3*   PLT 33* 35* 55*   * 100* 97   RDW 14.2 14.1 14.6*        Chemistries:  Recent Labs   Lab 07/23/19  0355 07/24/19  0409 07/24/19  1512   * 126* 125*   K 3.8 3.8 3.3*   CL 89* 90* 89*   CO2 27 26 26   BUN 56* 66* 73*   CREATININE 2.0* 1.7* 2.0*   CALCIUM 8.4* 8.3* 9.0   ALBUMIN 3.2* 3.1* 3.8   PROT 5.3* 5.6* 6.0   BILITOT 1.9* 1.6* 2.7*   ALKPHOS 127 140* 143*   ALT 26 27 31    AST 31 34 37   MG 2.2 2.4 2.7*   PHOS 3.4 2.8  --        All pertinent labs within the past 24 hours have been reviewed.    Significant Imaging: I have reviewed and interpreted all pertinent imaging results/findings within the past 24 hours.

## 2019-07-24 NOTE — PLAN OF CARE
Problem: Adult Inpatient Plan of Care  Goal: Plan of Care Review  Outcome: Ongoing (interventions implemented as appropriate)  Pt AAOx3, afebrile, free of falls. Pt withdrawn overnight. Family at bedside at start of shift, attentive to pt. Pt denies pain/distress. NPO at midnight for possible kidney biospy; Tube feeds at 50cc/hr(goal)  turned off at that time. No residuals overnight. Pt rested well most of shift. No acute changes, WCTM.

## 2019-07-24 NOTE — PLAN OF CARE
Problem: Adult Inpatient Plan of Care  Goal: Plan of Care Review  Pt was a Rapid Response for increase lethargy. See vital signs and assessments in flowsheets. See below for updates on today's progress.      Pulmonary: placed on vent today, ET 7.5, 22 @ lip, AC 18, PEEP 5, FiO2 30%, , blood tinged secretions     Cardiovascular: NSR with heart rate ranges from 60-70's, -120s mmHg and MAP goal 65mmHg, on Lev, and had 2 Jett bumps after intubation     Neurological: Pupils reactive, Localizes pain, but doesn't follow commands, EEG done, CT of head pending     Gastrointestinal: NPO, OG tube clamped, Paracentesis done with 4 liters removed     Genitourinary: Webster draining 40-75cc/hr.     Skin/Bath: Bruises and skin tear              Date of last CHG bath given:07/24/2018 @1800    Infusions: Precedex and Levo     Plan of care communicated and reviewed with Michellelayla Ojeda and family. All concerns addressed. Will continue to monitor.

## 2019-07-24 NOTE — SUBJECTIVE & OBJECTIVE
Interval History: Patient not responding to questions. Per daughter, patient started feeling more lethargic last night. Lactulose was actively being pushed per primary team during my examination.     Review of patient's allergies indicates:  No Known Allergies  Current Facility-Administered Medications   Medication Frequency    acetaminophen tablet 325 mg Q4H PRN    albumin human 25% bottle 25 g TID    dextrose 10% (D10W) Bolus PRN    dextrose 10% (D10W) Bolus PRN    dicyclomine capsule 10 mg QID PRN    glucagon (human recombinant) injection 1 mg PRN    glucose chewable tablet 16 g PRN    glucose chewable tablet 24 g PRN    insulin aspart U-100 pen 0-5 Units Q6H PRN    insulin detemir U-100 pen 10 Units Daily    iohexol (OMNIPAQUE) oral solution 15 mL PRN    lactulose 10 gram/15 mL solution (enema) 200 g Once    lactulose 20 gram/30 mL solution Soln 45 g Q2H    levothyroxine tablet 88 mcg Before breakfast    midodrine tablet 5 mg TID    ondansetron disintegrating tablet 8 mg Q8H PRN    rifAXIMin tablet 550 mg BID    simethicone chewable tablet 80 mg TID PRN    sodium chloride 0.9% flush 10 mL PRN    sodium chloride 0.9% flush 10 mL PRN    zinc sulfate capsule 220 mg Daily       Objective:     Vital Signs (Most Recent):  Temp: 97.5 °F (36.4 °C) (07/24/19 0717)  Pulse: 82 (07/24/19 0930)  Resp: 16 (07/24/19 0930)  BP: (!) 114/58 (07/24/19 0930)  SpO2: 96 % (07/24/19 0930)  O2 Device (Oxygen Therapy): room air (07/24/19 0930) Vital Signs (24h Range):  Temp:  [97.5 °F (36.4 °C)-98.4 °F (36.9 °C)] 97.5 °F (36.4 °C)  Pulse:  [75-88] 82  Resp:  [16-18] 16  SpO2:  [96 %-100 %] 96 %  BP: (105-131)/(53-60) 114/58     Weight: 56 kg (123 lb 7.3 oz) (07/23/19 0629)  Body mass index is 24.11 kg/m².  Body surface area is 1.54 meters squared.    I/O last 3 completed shifts:  In: 60 [P.O.:60]  Out: 900 [Urine:900]    Physical Exam   HENT:   Head: Normocephalic and atraumatic.   Eyes: Scleral icterus is present.    Cardiovascular: Normal rate, regular rhythm and normal heart sounds.   No murmur heard.  Pulmonary/Chest: Effort normal. No respiratory distress.   Abdominal: She exhibits distension (Ascites ).   Hyperactive BS throughout    Musculoskeletal: She exhibits no edema.   Neurological:   Not responding to commands   Skin: Skin is warm and dry. She is not diaphoretic.       Significant Labs:  CBC:   Recent Labs   Lab 07/24/19  0409   WBC 3.97   RBC 2.69*   HGB 9.3*   HCT 26.8*   PLT 35*   *   MCH 34.6*   MCHC 34.7     CMP:   Recent Labs   Lab 07/24/19  0409   *   CALCIUM 8.3*   ALBUMIN 3.1*   PROT 5.6*   *   K 3.8   CO2 26   CL 90*   BUN 66*   CREATININE 1.7*   ALKPHOS 140*   ALT 27   AST 34   BILITOT 1.6*     LFTs:   Recent Labs   Lab 07/24/19  0409   ALT 27   AST 34   ALKPHOS 140*   BILITOT 1.6*   PROT 5.6*   ALBUMIN 3.1*        Significant Imaging:  X-Ray: Reviewed  CT: Reviewed

## 2019-07-24 NOTE — CONSULTS
Consult received. Patient admitted to the CMICU for hepatic encephalopathy. Full H&P to follow.     Bonny Hathaway PA-C  Critical Care Medicine  7/24/2019   11:59 AM

## 2019-07-24 NOTE — ASSESSMENT & PLAN NOTE
Rusty is a 69 yo F with liver cirrhosis and CKD admitted for hyponatremia. Hyponatremia may be related to several factors primarily liver cirrhosis requiring biweekly paracentesis. This leads to a hypovolemic hyponatremic state in which fluid is pulled out of the vasculature. Additional contributing factors related to ileus, NPO status, and TPN nutrition. Extra sodium in TPN has increased sodium. Now on tube feeds. Baseline Na+ from labs in [place] between 123-124. She is above her baseline at 127 (7/25/19). Patient is now intubated on ventilator.   - If volume expansion required recommend albumin   - Increased Midodrine to 10 mg TID scheduled and NE drip to keep MAP>70  - Strict I/O monitoring

## 2019-07-24 NOTE — PROGRESS NOTES
[facility]  Nephrology  Progress Note    Patient Name: Michelle Ojeda  MRN: [id number]  Admission Date: 7/18/2019  Hospital Length of Stay: 6 days  Attending Provider: Stefany Jasso MD   Primary Care Physician: Primary Doctor No  Principal Problem:Hyponatremia    Subjective:     HPI: Rusty Martinez is 69 yo F with CKD stage 4 presenting from transplant hepatology clinic for hyponatremia on 7/18/19. She is originally from [place], here for liver transplant evaluation. She was diagnosed with IZAGUIRRE in grade 1 hepatic encephalopathy with severe ascites and esophogeal varices (s/p banding). During the month prior to admission she felt more lethargic and was requiring biweekly paracentesis. Of note, she is also being treated for Ileus her acute hepatic encephalopathy with lactulose. Recent 24 hour cr clearance study was near 21. Reported baseline Cr 1.6-1.8 and chronic hyponatremia range between 123-124.     Interval History: Patient not responding to questions. Per daughter, patient started feeling more lethargic last night. Lactulose was actively being pushed per primary team during my examination.     Review of patient's allergies indicates:  No Known Allergies  Current Facility-Administered Medications   Medication Frequency    acetaminophen tablet 325 mg Q4H PRN    albumin human 25% bottle 25 g TID    dextrose 10% (D10W) Bolus PRN    dextrose 10% (D10W) Bolus PRN    dicyclomine capsule 10 mg QID PRN    glucagon (human recombinant) injection 1 mg PRN    glucose chewable tablet 16 g PRN    glucose chewable tablet 24 g PRN    insulin aspart U-100 pen 0-5 Units Q6H PRN    insulin detemir U-100 pen 10 Units Daily    iohexol (OMNIPAQUE) oral solution 15 mL PRN    lactulose 10 gram/15 mL solution (enema) 200 g Once    lactulose 20 gram/30 mL solution Soln 45 g Q2H    levothyroxine tablet 88 mcg Before breakfast    midodrine tablet 5 mg TID    ondansetron disintegrating tablet 8  mg Q8H PRN    rifAXIMin tablet 550 mg BID    simethicone chewable tablet 80 mg TID PRN    sodium chloride 0.9% flush 10 mL PRN    sodium chloride 0.9% flush 10 mL PRN    zinc sulfate capsule 220 mg Daily       Objective:     Vital Signs (Most Recent):  Temp: 97.5 °F (36.4 °C) (07/24/19 0717)  Pulse: 82 (07/24/19 0930)  Resp: 16 (07/24/19 0930)  BP: (!) 114/58 (07/24/19 0930)  SpO2: 96 % (07/24/19 0930)  O2 Device (Oxygen Therapy): room air (07/24/19 0930) Vital Signs (24h Range):  Temp:  [97.5 °F (36.4 °C)-98.4 °F (36.9 °C)] 97.5 °F (36.4 °C)  Pulse:  [75-88] 82  Resp:  [16-18] 16  SpO2:  [96 %-100 %] 96 %  BP: (105-131)/(53-60) 114/58     Weight: 56 kg (123 lb 7.3 oz) (07/23/19 0629)  Body mass index is 24.11 kg/m².  Body surface area is 1.54 meters squared.    I/O last 3 completed shifts:  In: 60 [P.O.:60]  Out: 900 [Urine:900]    Physical Exam   HENT:   Head: Normocephalic and atraumatic.   Eyes: Scleral icterus is present.   Cardiovascular: Normal rate, regular rhythm and normal heart sounds.   No murmur heard.  Pulmonary/Chest: Effort normal. No respiratory distress.   Abdominal: She exhibits distension (Ascites ).   Hyperactive BS throughout    Musculoskeletal: She exhibits no edema.   Neurological:   Not responding to commands   Skin: Skin is warm and dry. She is not diaphoretic.       Significant Labs:  CBC:   Recent Labs   Lab 07/24/19 0409   WBC 3.97   RBC 2.69*   HGB 9.3*   HCT 26.8*   PLT 35*   *   MCH 34.6*   MCHC 34.7     CMP:   Recent Labs   Lab 07/24/19 0409   *   CALCIUM 8.3*   ALBUMIN 3.1*   PROT 5.6*   *   K 3.8   CO2 26   CL 90*   BUN 66*   CREATININE 1.7*   ALKPHOS 140*   ALT 27   AST 34   BILITOT 1.6*     LFTs:   Recent Labs   Lab 07/24/19  0409   ALT 27   AST 34   ALKPHOS 140*   BILITOT 1.6*   PROT 5.6*   ALBUMIN 3.1*        Significant Imaging:  X-Ray: Reviewed  CT: Reviewed    Assessment/Plan:     * Hyponatremia  Rusty is a 67 yo F with liver cirrhosis and CKD  admitted for hyponatremia. Hyponatremia may be related to several factors primarily liver cirrhosis requiring biweekly paracentesis. This leads to a hypovolemic hyponatremic state in which fluid is pulled out of the vasculature. Additional contributing factors related to ileus, NPO status, and TPN nutrition. Extra sodium in TPN has increased sodium. Now on tube feeds. Baseline Na+ from labs in [place] between 123-124. She is above her baseline at 126 (7/24/19). Abd Ct noted small left pleural effusion but patient is satting well in mid 90s ORA.   - If volume expansion required recommend albumin   - Added Midodrine 5 mg TID scheduled to keep MAP>75  - Strict I/O monitoring     Acute hepatic encephalopathy  Treatment plan per primary team     Stage 4 chronic kidney disease  Labs from [place] nephrology demonstrated Cr of 1.77 in 3/12/19, 1.67 on 4/5/19, 1.96 in 6/13/19, and 1.83 on 6/24. Patient may meet criteria fro kidney transplant. Cr is 1.7 today (7/24/19) near her baseline.  - Midodrine added to prevent hypoperfusion to kidneys  - Monitor with daily BMP  - Nephrology will continue to follow closely.   - Considering dialysis if worsening Cr     Liver cirrhosis secondary to IZAGUIRRE  Treatment plan per primary team       Thank you for your consult. I will follow-up with patient. Please contact us if you have any additional questions.    Josh Sampson MD  Nephrology  [facility]

## 2019-07-24 NOTE — PROGRESS NOTES
Progress Note  Hospital Medicine  [facility]       Patient Name: Michelle Ojeda  MRN:  [id number]  Hospital Medicine Team: [facility] [place] HOSP MED L Stefany Jasso MD  Date of Admission:  7/18/2019     Length of Stay:  LOS: 5 days   Expected Discharge Date: 7/26/2019  Principal Problem:  Hyponatremia     Subjective:     Interval History/Overnight Events:    Encephalopathy improving slowly.  Still has asterixis  Has been off lactulose due to ileus  Na improved to 124  Has a R renal mass, urology consulted  Nephrology following  Cr 2  undergoing SLK eval      levothyroxine  88 mcg Oral Before breakfast    midodrine  5 mg Oral TID    polyethylene glycol  17 g Oral TID    rifAXImin  550 mg Oral BID    zinc sulfate  220 mg Oral Daily           acetaminophen, Dextrose 10% Bolus, Dextrose 10% Bolus, dicyclomine, glucagon (human recombinant), glucose, glucose, iohexol, ondansetron, simethicone, sodium chloride 0.9%, sodium chloride 0.9%    Review of Systems   Constitutional: Negative for chills, fatigue, fever.   HENT: Negative for sore throat, trouble swallowing.    Eyes: Negative for photophobia, visual disturbance.   Respiratory: Negative for cough, shortness of breath.    Cardiovascular: Negative for chest pain, palpitations, leg swelling.   Gastrointestinal: Negative for abdominal pain, constipation, diarrhea, nausea, vomiting.   Endocrine: Negative for cold intolerance, heat intolerance.   Genitourinary: Negative for dysuria, frequency.   Musculoskeletal: Negative for arthralgias, myalgias.   Skin: Negative for rash, wound, erythema   Neurological: Negative for dizziness, syncope, weakness, light-headedness.   Psychiatric/Behavioral: Negative for confusion, hallucinations, anxiety  All other systems reviewed and are negative.    Objective:     Temp:  [98 °F (36.7 °C)-98.5 °F (36.9 °C)]   Pulse:  [75-86]   Resp:  [16-18]   BP: (105-122)/(53-59)   SpO2:  [97 %-99 %]       Physical  Exam:  Constitutional: appears older than stated age, chronically ill looking,  non-distressed, not diaphoretic.   HENT: NC/AT, external ears normal, oropharynx clear, MMM w/o exudates.   Eyes: PERRL, EOMI, conjunctiva normal, no discharge b/l, +scleral icterus   Neck: normal ROM, supple  CV: RRR, no m/r/g, no carotid bruits, +2 peripheral pulses.  Pulmonary/Chest wall:Decreased breath sounds at lung bases/ crackles at lung bases   GI: Soft, non-tender, (+) BS, (+) BM   +distended  Musculoskeletal: Normal ROM, no atrophy,  + pitting edema in LE bilaterally   Neurological: AAO x 4, CN II-XI in tact, nl sensation, nl strength/tone  ++asterixis   Skin: warm, dry   +pallor, jaundice, + spider angiomas, +bruising   Psych: normal mood and affect, normal behavior, thought content and judgement.    Labs:    Chemistries:   Recent Labs   Lab 07/21/19 0402 07/22/19 0448 07/23/19  0355   * 123* 124*   K 4.1 3.9 3.8   CL 90* 90* 89*   CO2 21* 24 27   BUN 36* 45* 56*   CREATININE 1.8* 1.8* 2.0*   CALCIUM 9.2 8.4* 8.4*   PROT 5.8* 5.0* 5.3*   BILITOT 2.4* 2.4* 1.9*   ALKPHOS 80 76 127   ALT 27 24 26   AST 28 19 31   MG 2.0 2.1 2.2   PHOS 3.0 3.5 3.4        WBC:   Recent Labs   Lab 07/19/19  0341 07/20/19  0439 07/21/19  0402 07/22/19  0448 07/23/19  0355   WBC 2.03* 2.51* 6.87 4.76 3.87*     Bands:     CBC/Anemia Labs: Coags:    Recent Labs   Lab 07/18/19  0810  07/21/19  0402 07/22/19  0448 07/23/19  0355   WBC 3.40*   < > 6.87 4.76 3.87*   HGB 10.6*   < > 10.1* 8.8* 8.8*   HCT 31.3*   < > 29.2* 25.3* 25.8*   PLT 47*   < > 31* 27* 33*   *   < > 100* 100* 100*   RDW 14.7*   < > 14.6* 14.5 14.2   IRON 61  --   --   --   --    FERRITIN 246  --   --   --   --     < > = values in this interval not displayed.    Recent Labs   Lab 07/21/19  0402 07/22/19  0448 07/23/19  0355   INR 1.5* 1.5* 1.3*            Assessment and Plan     Hospital Course:    Ms. Michelle Ojeda was admitted to Hospital Medicine for management of  decomp liver disease and SLK eval    Active Hospital Problems    Diagnosis  POA    *Hyponatremia [E87.1]  Yes    Right kidney mass [N28.89]  Unknown    Kidney transplant candidate [Z76.82]  Not Applicable    Ileus [K56.7]  No    Acute hepatic encephalopathy [K72.00]  Yes    Physical debility [R53.81]  Yes    Ascites [R18.8]  Yes    Pancytopenia [D61.818]  Yes    Hypothyroidism [E03.9]  Yes    Moderate malnutrition [E44.0]  Yes    Coagulopathy [D68.9]  Yes    Organ transplant candidate [Z76.82]  Not Applicable    Stage 4 chronic kidney disease [N18.4]  Yes    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       # Ileus   - N/V episodes; seen on ab x ray  - NG tube inserted on 7/20 with 700 cc out initially  - lactulose held due to ileus      # Hyponatremia  - fluid restrict to 1L, currently 123,    - nephrology consult   - checking serum cortisol in AM     # Acute hepatic encephalopathy   - lactulose enema and finally had a large BM ,  - cont rifaximin adding zinc   - lactulose held due to ileus      # Decompensated IZAGUIRRE Cirrhosis with ascites  MELD-Na score: 27 at 7/23/2019  3:55 AM  MELD score: 18 at 7/23/2019  3:55 AM  Calculated from:  Serum Creatinine: 2.0 mg/dL at 7/23/2019  3:55 AM  Serum Sodium: 124 mmol/L (Rounded to 125 mmol/L) at 7/23/2019  3:55 AM  Total Bilirubin: 1.9 mg/dL at 7/23/2019  3:55 AM  INR(ratio): 1.3 at 7/23/2019  3:55 AM  Age: 68 years  - hepatology consulted  - being evaluated for SLK, KTM states a kidney tx candidate   - Liver U/S with doppler reviewed   - cleared for inpatient eval, tests and consults placed  - went for IR paracentesis with 5L removed on 7/19 and negative for SBP  - plan for presentation 7/24; ID consult, CT ab/pelv; gyn; spect ordered   - urology consulted for R renal mass     # CKD stage 4  - U/A; renal U/S reviewed; Cr  improved to 1.7; is a kidney tx candidate      # Coagulopathy due to liver disease   - vitamin K times 3 days   - DIC labs     # Hypothyroidism  - cont levothyroxine      #  Moderate protein payton malnutrition  - PAB, novasource, dietary; dayanna tube feeds at night  started on 7/23     # Pancytopenia  - DIC labs     # Physical debility  -PT/OT    Diet:  Low Na with fluid restriction   GI PPx:  PO PPI  DVT PPx:  SCDs due to coagulopathy   Goals of Care:  Full     High Risk Conditions:  decomp liver disease     Disposition:    Tentatively 7/26  Undergoing SLK eval   Patient is from [place]     Signing Physician:     Stefany Jasso MD  Department of Hospital Medicine   [facility]  Pager [phone] [id number]  7/23/2019

## 2019-07-24 NOTE — PROCEDURES
"Michelle Ojeda is a 68 y.o. female patient.    Temp: 97.2 °F (36.2 °C) (19 1300)  Pulse: 63 (19 1519)  Resp: (!) 40 (19 1401)  BP: (!) 149/70 (19 1401)  SpO2: 100 % (19 1519)  Weight: 56 kg (123 lb 7.3 oz) (19 0629)  Height: 5' (152.4 cm) (19 1643)       Paracentesis  Date/Time: 2019 3:42 PM  Location procedure was performed: [facility] CRITICAL CARE MEDICINE  Performed by: Bonny Hathaway PA-C  Authorized by: Bonny Hathaway PA-C   Assisting provider: Chucho Garcia NP  Pre-operative diagnosis:  ascites  Post-operative diagnosis: ascites  Consent Done: Yes  Consent: Written consent obtained.  Consent given by: daughter.  Procedure consent: procedure consent matches procedure scheduled  Relevant documents: relevant documents present and verified  Site marked: the operative site was marked  Patient identity confirmed: , MRN and name  Time out: Immediately prior to procedure a "time out" was called to verify the correct patient, procedure, equipment, support staff and site/side marked as required.  Initial or subsequent exam: subsequent  Procedure purpose: diagnostic and therapeutic  Indications: suspected peritonitis    Anesthesia:  Local Anesthetic: lidocaine 1% without epinephrine  Anesthetic total: 3 mL  Preparation: Patient was prepped and draped in the usual sterile fashion.  Needle gauge: 20  Puncture site: left lower quadrant  Fluid removed: 4000(ml)  Fluid appearance: serosanguinous  Complications: No  Estimated blood loss (mL): 1  Specimens: No  Implants: No  Patient tolerance: Patient tolerated the procedure well with no immediate complications          Bonny Hathaway  2019  "

## 2019-07-24 NOTE — PROGRESS NOTES
Progress Note  Hospital Medicine  [facility]       Patient Name: Michelle Ojeda  MRN:  [id number]  Hospital Medicine Team: [facility] Stefany Jsaso MD  Date of Admission:  7/18/2019     Length of Stay:  LOS: 6 days   Expected Discharge Date: 7/29/2019  Principal Problem:  Acute hepatic encephalopathy     Subjective:     Interval History/Overnight Events:    Patient is much more encephalopathic today.  Patient is found to be unresponsive and minimally reactive to Strong sternal rub.  Overall hemodynamically stable and saturating well.  Liver transplant evaluation testing this morning was canceled due to encephalopathy.  Ammonia level was found to be at 170.  Patient's lactulose has been held in the last couple of days due to ongoing ileus.  Patient has Conor tube in place and aggressive lactulose via the [place] tube was started along with a lactulose enema.  Labs overall show improvement with improving creatinine and sodium.  Blood cultures and urine culture were ordered due to hepatic encephalopathy. IV albumin given for increasing BUN to 66, Cr stable at 1.7     Per nursing report patient had vomiting episode earlier in the morning.  Patient was also found to be gagging and attempted to vomited during evaluation at bedside this morning    Patient is full code. Family updated at bedside and plan discussed.     Given grade 3-4 hepatic encephalopathy and concern for aspiration given gagging-vomiting, ICU was consulted for closer monitoring as well as intubation watch.  ICU team evaluated patient at bedside and patient was admitted to the unit.  Patient was intubated upon presentation ICU     albumin human 25%  25 g Intravenous TID    chlorhexidine  15 mL Mouth/Throat BID    famotidine (PF)  20 mg Intravenous Daily    insulin detemir U-100  10 Units Subcutaneous Daily    lactulose  20 g Per OG tube TID    [START ON 7/25/2019] levothyroxine  88 mcg Per OG tube Before breakfast     midodrine  5 mg Per OG tube TID    piperacillin-tazobactam 4.5 g in sodium chloride 0.9% 100 mL IVPB (ready to mix system)  4.5 g Intravenous Q8H    rifAXImin  550 mg Per OG tube BID    vancomycin (VANCOCIN) IVPB  20 mg/kg Intravenous Once    [START ON 7/25/2019] zinc sulfate  220 mg Per G Tube Daily        dexmedetomidine (PRECEDEX) infusion 0.2 mcg/kg/hr (07/24/19 1347)       acetaminophen, Dextrose 10% Bolus, Dextrose 10% Bolus, dicyclomine, fentaNYL **FOLLOWED BY** [START ON 7/28/2019] fentaNYL, glucagon (human recombinant), glucose, glucose, insulin aspart U-100, iohexol, ondansetron, simethicone, sodium chloride 0.9%, sodium chloride 0.9%    Review of Systems   Unable to obtain due to encephalopathy      Objective:     Temp:  [97.2 °F (36.2 °C)-98.4 °F (36.9 °C)]   Pulse:  [68-91]   Resp:  [14-40]   BP: (111-176)/(55-75)   SpO2:  [96 %-100 %]       Physical Exam:  Constitutional: chronically ill looking, in distress, lethargic  HENT: NC/AT, external ears normal, oropharynx clear, MMM w/o exudates.   Eyes: PERRL, EOMI, conjunctiva normal, no discharge b/l, +scleral icterus   Neck: normal ROM, supple  CV: RRR, no m/r/g, no carotid bruits, +2 peripheral pulses.  Pulmonary/Chest wall:Decreased breath sounds at lung bases  GI: Soft, non-tender, (+) BS, (+) BM   +distended  Musculoskeletal: Normal ROM, no atrophy,  + pitting edema in LE bilaterally   Neurological: lethargic and minimally responsive   Skin: warm, dry   +pallor, jaundice, + spider angiomas, +bruising   Psych:unable to assess     Labs:    Chemistries:   Recent Labs   Lab 07/22/19  0448 07/23/19  0355 07/24/19  0409   * 124* 126*   K 3.9 3.8 3.8   CL 90* 89* 90*   CO2 24 27 26   BUN 45* 56* 66*   CREATININE 1.8* 2.0* 1.7*   CALCIUM 8.4* 8.4* 8.3*   PROT 5.0* 5.3* 5.6*   BILITOT 2.4* 1.9* 1.6*   ALKPHOS 76 127 140*   ALT 24 26 27   AST 19 31 34   MG 2.1 2.2 2.4   PHOS 3.5 3.4 2.8        WBC:   Recent Labs   Lab 07/20/19  8747  07/21/19  0402 07/22/19  0448 07/23/19  0355 07/24/19  0409   WBC 2.51* 6.87 4.76 3.87* 3.97     Bands:     CBC/Anemia Labs: Coags:    Recent Labs   Lab 07/18/19  0810  07/22/19 0448 07/23/19  0355 07/24/19  0409   WBC 3.40*   < > 4.76 3.87* 3.97   HGB 10.6*   < > 8.8* 8.8* 9.3*   HCT 31.3*   < > 25.3* 25.8* 26.8*   PLT 47*   < > 27* 33* 35*   *   < > 100* 100* 100*   RDW 14.7*   < > 14.5 14.2 14.1   IRON 61  --   --   --   --    FERRITIN 246  --   --   --   --     < > = values in this interval not displayed.    Recent Labs   Lab 07/22/19 0448 07/23/19 0355 07/24/19  0409   INR 1.5* 1.3* 1.3*            Assessment and Plan     Hospital Course:    Ms. Michelle Ojeda was admitted to Hospital Medicine for management of decomp liver disease and SLK eval    Active Hospital Problems    Diagnosis  POA    *Acute hepatic encephalopathy [K72.00]  Yes    Acute hypoxemic respiratory failure [J96.01]  No    Right kidney mass [N28.89]  Unknown    Kidney transplant candidate [Z76.82]  Not Applicable    Ileus [K56.7]  No    Physical debility [R53.81]  Yes    Ascites [R18.8]  Yes    Pancytopenia [D61.818]  Yes    Hypothyroidism [E03.9]  Yes    Moderate malnutrition [E44.0]  Yes    Coagulopathy [D68.9]  Yes    Hyponatremia [E87.1]  Yes    Organ transplant candidate [Z76.82]  Not Applicable    Stage 4 chronic kidney disease [N18.4]  Yes    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         # Acute hepatic encephalopathy   - lactulose held due to ileus  - cont rifaximin ,zinc added  - worsening HE on 7/24 with ammonia level of 170, likely due to held lactulose doses while patient had ileus.  Aggressive PO lactulose and rectal lactulose enemas started on 7/24. Infectious work up ordered  . ABG and CXR ordered. KUB showed resolved ileus     # Decompensated IZAGUIRRE Cirrhosis with  ascites  MELD-Na score: 25 at 7/24/2019  4:09 AM  MELD score: 16 at 7/24/2019  4:09 AM  Calculated from:  Serum Creatinine: 1.7 mg/dL at 7/24/2019  4:09 AM  Serum Sodium: 126 mmol/L at 7/24/2019  4:09 AM  Total Bilirubin: 1.6 mg/dL at 7/24/2019  4:09 AM  INR(ratio): 1.3 at 7/24/2019  4:09 AM  Age: 68 years  - hepatology consulted  - being evaluated for SLK, KTM states a kidney tx candidate . Patient and family traveled from [place] for outpatient OLT eval, however, was hospitalized due to high meld score , HE and sodium level of 120  - cleared for inpatient eval, tests and consults placed  - s/p LVP with  5L removed on 7/19 and negative for SBP  - was planned for presentation 7/24 however, given profound HE and ICU admission on 7/24 with intubation, SLK testing placed on hold. Pending  cardiac spect test . In addition, patient found to have a  2.4cm R renal mass, for which urology was consulted and recommended no intervention    # Ileus - resolved   - N/V episodes; seen on ab x ray  - NG tube inserted on 7/20 with 700 cc out initially  - lactulose held due to ileus   - lactulose restarted on 7/24 given profound HE     # Hyponatremia  - fluid restriction, 122--> 126       # CKD stage 4  - Cr on admit of 1.9, Cr improved to 1.7; likely baseline  - KTM evaluated patient and stated she is an SLK candidate  - patient was on diuretics prior to admission. Given high Cr and low Na of 122, diuretics stopped     # Coagulopathy due to liver disease   # Pancytopenia  - vitamin K times 3 days   - DIC labs negative   - INR improved to 1.3  - monitor hbg and plts     # Hypothyroidism  - cont levothyroxine      #  Moderate protein payton malnutrition  - PAB, novasource, dietary; dayanna tube feeds at night  started on 7/23     # Physical debility  -PT/OT    Diet:  Low Na with fluid restriction   GI PPx:  PO PPI  DVT PPx:  SCDs due to coagulopathy   Goals of Care:  Full     High Risk Conditions:  decomp liver disease     Disposition:     Patient transferred to ICU on 07/24/2019 and intubated given profound HE , airway protection, and aspiration risk    Critical Care Time: 45 minutes  Critical care was time spent personally by me on the following activities: evaluating this patient's organ dysfunction, development of treatment plan, discussing treatment plan with patient or surrogate and bedside caregivers, discussions with consultants, evaluation of patient's response to treatment, examination of patient, ordering and performing treatments and interventions, ordering and review of laboratory studies, ordering and review of radiographic studies, re-evaluation of patient's condition. This critical care time did not overlap with that of any other provider or involve time for any procedures      Signing Physician:     Stefany Jasso MD  Department of Hospital Medicine   [facility]  Pager [phone] [id number]  7/24/2019

## 2019-07-24 NOTE — ASSESSMENT & PLAN NOTE
Complicated by ascites, thrombocytopenia, esophageal varices, hyponatremia and HE  --Currently being worked up for LTS eval  --Continue lactulose  --Hepatology following  --Paracentesis today with 4L removed; labs pending to rule out SBP    MELD-Na score: 28 at 7/24/2019  3:12 PM  MELD score: 20 at 7/24/2019  3:12 PM  Calculated from:  Serum Creatinine: 2.0 mg/dL at 7/24/2019  3:12 PM  Serum Sodium: 125 mmol/L at 7/24/2019  3:12 PM  Total Bilirubin: 2.7 mg/dL at 7/24/2019  3:12 PM  INR(ratio): 1.3 at 7/24/2019  4:09 AM  Age: 68 years

## 2019-07-24 NOTE — CARE UPDATE
Pt intubated by MD at bedside upon arrival to [facility]ICU. 7.5 ETT secured 23 cm at the lips. ETT placement confirmed by bilateral breath sounds and etco2 colorimetric device. Vent settings can be found in flowsheets. SpO2 100%. Pt tolerated well. Will continue to monitor closely.

## 2019-07-24 NOTE — HPI
Michelle Ojeda is a 68 y.o. female with IZAGUIRRE ESLD, DM2, HTN, and hypothyroidism that initially presented to Liver transplant Clinic for Liver/Kidney Transplant Evaluation.  She was found to have significant hyponatremia in clinic and was admitted for correction and close monitoring. This morning, she had nausea and emesis x 4. She was given phenergan and became more somnolent afterwards. Per daughter and son-in-law at bedside, she has been getting progressively weaker with decreased oral intake over the past month. According to previous records, she has baseline Cr 1.6-1.8 and baseline Na 123-124.      Urology was consulted to evaluate R kidney lesion found on work up for liver/kidney transplant.     Ms. Ojeda denies hx gross hematuria, dysuria, flank pain, hx kidney stones, hx urologic cancer.     Her creatinine on admission 2.0, WBC 3.87, plts 33, INR 1.3     ***n shows differential renal function 69 % on the left and 31 % on the right.  Renal US done today shows the right kidney with a mass in the superior pole of the right kidney measuring 2.4 x 2.3 x 2.3 cm. No renal stone. No hydronephrosis. The left kidney had no mass, no renal stone, and no hydronephrosis.

## 2019-07-24 NOTE — ASSESSMENT & PLAN NOTE
68 YOF with IZAGUIRRE ESLD admitted for hyponatremia. Incidentally found R kidney mass on work up.     - continue with adequate nutrition as tolerated   - R kidney mass measures 2.4 cm x 2.3 cm, would recommend active surveillance as outpatient given size, although we will defer to transplant team as this could delay possible transplantation.   - Regarding inpatient surgery, she is at high risk for complications   - Regarding renal biopsy, she is at high risk for complications   - we will discuss with transplant team

## 2019-07-24 NOTE — ASSESSMENT & PLAN NOTE
Labs from [place] nephrology demonstrated Cr of 1.77 in 3/12/19, 1.67 on 4/5/19, 1.96 in 6/13/19, and 1.83 on 6/24. Cr is 2.2 today (7/25/19) above her baseline due to ATN secondary to hypoperfusion of kidneys related to hypotensive episodes. Hepatorenal syndrome not suspected at this time. Received 3X35g doses of albumin on 7/24/19.  - Midodrine 10mg TID added to prevent hypoperfusion to kidneys. Titrate NE drip to keep MAP>70.  - Monitor with daily BMP  - Nephrology will continue to follow closely.   - Considering dialysis if worsening Cr   - Consider Octreotide and Albumin if further blood pressure support is needed.

## 2019-07-25 LAB
ALBUMIN SERPL BCP-MCNC: 3.5 G/DL (ref 3.5–5.2)
ALLENS TEST: ABNORMAL
ALP SERPL-CCNC: 102 U/L (ref 55–135)
ALT SERPL W/O P-5'-P-CCNC: 26 U/L (ref 10–44)
AMMONIA PLAS-SCNC: 89 UMOL/L (ref 10–50)
ANION GAP SERPL CALC-SCNC: 15 MMOL/L (ref 8–16)
ANISOCYTOSIS BLD QL SMEAR: SLIGHT
AST SERPL-CCNC: 47 U/L (ref 10–40)
BACTERIA BLD CULT: NORMAL
BACTERIA BLD CULT: NORMAL
BASOPHILS # BLD AUTO: 0.03 K/UL (ref 0–0.2)
BASOPHILS NFR BLD: 0.5 % (ref 0–1.9)
BILIRUB SERPL-MCNC: 2.7 MG/DL (ref 0.1–1)
BUN SERPL-MCNC: 69 MG/DL (ref 8–23)
CALCIUM SERPL-MCNC: 8.7 MG/DL (ref 8.7–10.5)
CHLORIDE SERPL-SCNC: 92 MMOL/L (ref 95–110)
CO2 SERPL-SCNC: 20 MMOL/L (ref 23–29)
CREAT SERPL-MCNC: 2.2 MG/DL (ref 0.5–1.4)
DELSYS: ABNORMAL
DIFFERENTIAL METHOD: ABNORMAL
EOSINOPHIL # BLD AUTO: 0.1 K/UL (ref 0–0.5)
EOSINOPHIL NFR BLD: 1.5 % (ref 0–8)
ERYTHROCYTE [DISTWIDTH] IN BLOOD BY AUTOMATED COUNT: 14.3 % (ref 11.5–14.5)
ERYTHROCYTE [SEDIMENTATION RATE] IN BLOOD BY WESTERGREN METHOD: 18 MM/H
EST. GFR  (AFRICAN AMERICAN): 25.4 ML/MIN/1.73 M^2
EST. GFR  (NON AFRICAN AMERICAN): 22.1 ML/MIN/1.73 M^2
FIO2: 30
GLUCOSE SERPL-MCNC: 157 MG/DL (ref 70–110)
HCO3 UR-SCNC: 22.2 MMOL/L (ref 24–28)
HCT VFR BLD AUTO: 28.5 % (ref 37–48.5)
HGB BLD-MCNC: 9.9 G/DL (ref 12–16)
IMM GRANULOCYTES # BLD AUTO: 0.07 K/UL (ref 0–0.04)
IMM GRANULOCYTES NFR BLD AUTO: 1.2 % (ref 0–0.5)
INR PPP: 1.5 (ref 0.8–1.2)
LACTATE SERPL-SCNC: 1.8 MMOL/L (ref 0.5–2.2)
LYMPHOCYTES # BLD AUTO: 0.5 K/UL (ref 1–4.8)
LYMPHOCYTES NFR BLD: 7.8 % (ref 18–48)
MAGNESIUM SERPL-MCNC: 2.7 MG/DL (ref 1.6–2.6)
MCH RBC QN AUTO: 34.3 PG (ref 27–31)
MCHC RBC AUTO-ENTMCNC: 34.7 G/DL (ref 32–36)
MCV RBC AUTO: 99 FL (ref 82–98)
MIN VOL: 5.56
MODE: ABNORMAL
MONOCYTES # BLD AUTO: 0.9 K/UL (ref 0.3–1)
MONOCYTES NFR BLD: 15.8 % (ref 4–15)
NEUTROPHILS # BLD AUTO: 4.3 K/UL (ref 1.8–7.7)
NEUTROPHILS NFR BLD: 73.2 % (ref 38–73)
NRBC BLD-RTO: 0 /100 WBC
PCO2 BLDA: 28.4 MMHG (ref 35–45)
PEEP: 5
PH SMN: 7.5 [PH] (ref 7.35–7.45)
PHOSPHATE SERPL-MCNC: 3.7 MG/DL (ref 2.7–4.5)
PIP: 25
PLATELET # BLD AUTO: 43 K/UL (ref 150–350)
PLATELET BLD QL SMEAR: ABNORMAL
PMV BLD AUTO: 11.9 FL (ref 9.2–12.9)
PO2 BLDA: 75 MMHG (ref 40–60)
POC BE: -1 MMOL/L
POC SATURATED O2: 96 % (ref 95–100)
POC TCO2: 23 MMOL/L (ref 24–29)
POCT GLUCOSE: 171 MG/DL (ref 70–110)
POCT GLUCOSE: 175 MG/DL (ref 70–110)
POCT GLUCOSE: 181 MG/DL (ref 70–110)
POCT GLUCOSE: 250 MG/DL (ref 70–110)
POTASSIUM SERPL-SCNC: 4.1 MMOL/L (ref 3.5–5.1)
PROT SERPL-MCNC: 6.2 G/DL (ref 6–8.4)
PROTHROMBIN TIME: 14.9 SEC (ref 9–12.5)
RBC # BLD AUTO: 2.89 M/UL (ref 4–5.4)
SAMPLE: ABNORMAL
SITE: ABNORMAL
SODIUM SERPL-SCNC: 127 MMOL/L (ref 136–145)
SP02: 100
TOXIC GRANULES BLD QL SMEAR: PRESENT
VANCOMYCIN SERPL-MCNC: 13.6 UG/ML
VT: 300
WBC # BLD AUTO: 5.9 K/UL (ref 3.9–12.7)

## 2019-07-25 PROCEDURE — 80053 COMPREHEN METABOLIC PANEL: CPT | Mod: NTX

## 2019-07-25 PROCEDURE — 83735 ASSAY OF MAGNESIUM: CPT | Mod: NTX

## 2019-07-25 PROCEDURE — 99900035 HC TECH TIME PER 15 MIN (STAT): Mod: NTX

## 2019-07-25 PROCEDURE — 80202 ASSAY OF VANCOMYCIN: CPT | Mod: NTX

## 2019-07-25 PROCEDURE — 63600175 PHARM REV CODE 636 W HCPCS: Mod: NTX | Performed by: INTERNAL MEDICINE

## 2019-07-25 PROCEDURE — 99232 SBSQ HOSP IP/OBS MODERATE 35: CPT | Mod: GC,NTX,, | Performed by: INTERNAL MEDICINE

## 2019-07-25 PROCEDURE — 84100 ASSAY OF PHOSPHORUS: CPT | Mod: NTX

## 2019-07-25 PROCEDURE — 63600175 PHARM REV CODE 636 W HCPCS: Mod: NTX | Performed by: STUDENT IN AN ORGANIZED HEALTH CARE EDUCATION/TRAINING PROGRAM

## 2019-07-25 PROCEDURE — 20000000 HC ICU ROOM: Mod: NTX

## 2019-07-25 PROCEDURE — 94761 N-INVAS EAR/PLS OXIMETRY MLT: CPT | Mod: NTX

## 2019-07-25 PROCEDURE — 82803 BLOOD GASES ANY COMBINATION: CPT | Mod: NTX

## 2019-07-25 PROCEDURE — 94003 VENT MGMT INPAT SUBQ DAY: CPT | Mod: NTX

## 2019-07-25 PROCEDURE — 99233 PR SUBSEQUENT HOSPITAL CARE,LEVL III: ICD-10-PCS | Mod: GC,NTX,, | Performed by: INTERNAL MEDICINE

## 2019-07-25 PROCEDURE — 99233 SBSQ HOSP IP/OBS HIGH 50: CPT | Mod: GC,NTX,, | Performed by: INTERNAL MEDICINE

## 2019-07-25 PROCEDURE — 85610 PROTHROMBIN TIME: CPT | Mod: NTX

## 2019-07-25 PROCEDURE — 99292 CRITICAL CARE ADDL 30 MIN: CPT | Mod: NTX,,, | Performed by: INTERNAL MEDICINE

## 2019-07-25 PROCEDURE — 99232 PR SUBSEQUENT HOSPITAL CARE,LEVL II: ICD-10-PCS | Mod: GC,NTX,, | Performed by: INTERNAL MEDICINE

## 2019-07-25 PROCEDURE — 25000003 PHARM REV CODE 250: Mod: NTX | Performed by: PHYSICIAN ASSISTANT

## 2019-07-25 PROCEDURE — 82140 ASSAY OF AMMONIA: CPT | Mod: NTX

## 2019-07-25 PROCEDURE — 99291 CRITICAL CARE FIRST HOUR: CPT | Mod: NTX,,, | Performed by: NURSE PRACTITIONER

## 2019-07-25 PROCEDURE — 99900026 HC AIRWAY MAINTENANCE (STAT): Mod: NTX

## 2019-07-25 PROCEDURE — 99292 PR CRITICAL CARE, ADDL 30 MIN: ICD-10-PCS | Mod: NTX,,, | Performed by: INTERNAL MEDICINE

## 2019-07-25 PROCEDURE — 63600175 PHARM REV CODE 636 W HCPCS: Mod: NTX | Performed by: PHYSICIAN ASSISTANT

## 2019-07-25 PROCEDURE — 36600 WITHDRAWAL OF ARTERIAL BLOOD: CPT | Mod: NTX

## 2019-07-25 PROCEDURE — 25000003 PHARM REV CODE 250: Mod: NTX | Performed by: NURSE PRACTITIONER

## 2019-07-25 PROCEDURE — 99291 PR CRITICAL CARE, E/M 30-74 MINUTES: ICD-10-PCS | Mod: NTX,,, | Performed by: NURSE PRACTITIONER

## 2019-07-25 PROCEDURE — 83605 ASSAY OF LACTIC ACID: CPT | Mod: NTX

## 2019-07-25 PROCEDURE — 85025 COMPLETE CBC W/AUTO DIFF WBC: CPT | Mod: NTX

## 2019-07-25 RX ORDER — MIDODRINE HYDROCHLORIDE 5 MG/1
15 TABLET ORAL 3 TIMES DAILY
Status: DISCONTINUED | OUTPATIENT
Start: 2019-07-25 | End: 2019-07-27

## 2019-07-25 RX ORDER — FENTANYL CITRATE 50 UG/ML
50 INJECTION, SOLUTION INTRAMUSCULAR; INTRAVENOUS
Status: DISCONTINUED | OUTPATIENT
Start: 2019-07-25 | End: 2019-07-26

## 2019-07-25 RX ORDER — FENTANYL CITRATE/PF 250MCG/5ML
50 SYRINGE (ML) INTRAVENOUS
Status: DISCONTINUED | OUTPATIENT
Start: 2019-07-25 | End: 2019-07-25

## 2019-07-25 RX ORDER — MIDODRINE HYDROCHLORIDE 5 MG/1
10 TABLET ORAL 3 TIMES DAILY
Status: DISCONTINUED | OUTPATIENT
Start: 2019-07-25 | End: 2019-07-25

## 2019-07-25 RX ADMIN — PIPERACILLIN AND TAZOBACTAM 4.5 G: 4; .5 INJECTION, POWDER, LYOPHILIZED, FOR SOLUTION INTRAVENOUS; PARENTERAL at 11:07

## 2019-07-25 RX ADMIN — RIFAXIMIN 550 MG: 550 TABLET ORAL at 08:07

## 2019-07-25 RX ADMIN — MIDODRINE HYDROCHLORIDE 10 MG: 5 TABLET ORAL at 02:07

## 2019-07-25 RX ADMIN — CHLORHEXIDINE GLUCONATE 0.12% ORAL RINSE 15 ML: 1.2 LIQUID ORAL at 08:07

## 2019-07-25 RX ADMIN — FENTANYL CITRATE 50 MCG: 50 INJECTION INTRAMUSCULAR; INTRAVENOUS at 10:07

## 2019-07-25 RX ADMIN — PIPERACILLIN AND TAZOBACTAM 4.5 G: 4; .5 INJECTION, POWDER, LYOPHILIZED, FOR SOLUTION INTRAVENOUS; PARENTERAL at 03:07

## 2019-07-25 RX ADMIN — MIDODRINE HYDROCHLORIDE 10 MG: 5 TABLET ORAL at 08:07

## 2019-07-25 RX ADMIN — FAMOTIDINE 20 MG: 20 TABLET, FILM COATED ORAL at 08:07

## 2019-07-25 RX ADMIN — DEXMEDETOMIDINE HYDROCHLORIDE 0.3 MCG/KG/HR: 4 INJECTION, SOLUTION INTRAVENOUS at 05:07

## 2019-07-25 RX ADMIN — LACTULOSE 20 G: 20 SOLUTION ORAL at 02:07

## 2019-07-25 RX ADMIN — INSULIN ASPART 1 UNITS: 100 INJECTION, SOLUTION INTRAVENOUS; SUBCUTANEOUS at 12:07

## 2019-07-25 RX ADMIN — MIDODRINE HYDROCHLORIDE 15 MG: 5 TABLET ORAL at 08:07

## 2019-07-25 RX ADMIN — Medication 220 MG: at 08:07

## 2019-07-25 RX ADMIN — DEXTROSE 750 MG: 5 SOLUTION INTRAVENOUS at 04:07

## 2019-07-25 RX ADMIN — INSULIN DETEMIR 10 UNITS: 100 INJECTION, SOLUTION SUBCUTANEOUS at 09:07

## 2019-07-25 RX ADMIN — FENTANYL CITRATE 50 MCG: 50 INJECTION INTRAMUSCULAR; INTRAVENOUS at 02:07

## 2019-07-25 RX ADMIN — FENTANYL CITRATE 50 MCG: 50 INJECTION INTRAMUSCULAR; INTRAVENOUS at 08:07

## 2019-07-25 RX ADMIN — FENTANYL CITRATE 50 MCG: 50 INJECTION INTRAMUSCULAR; INTRAVENOUS at 11:07

## 2019-07-25 RX ADMIN — LACTULOSE 20 G: 20 SOLUTION ORAL at 08:07

## 2019-07-25 RX ADMIN — PIPERACILLIN AND TAZOBACTAM 4.5 G: 4; .5 INJECTION, POWDER, LYOPHILIZED, FOR SOLUTION INTRAVENOUS; PARENTERAL at 12:07

## 2019-07-25 RX ADMIN — FENTANYL CITRATE 50 MCG: 50 INJECTION INTRAMUSCULAR; INTRAVENOUS at 06:07

## 2019-07-25 RX ADMIN — LEVOTHYROXINE SODIUM 88 MCG: 88 TABLET ORAL at 05:07

## 2019-07-25 NOTE — PROGRESS NOTES
Pharmacokinetic Assessment Follow Up: IV Vancomycin    Vancomycin serum concentration assessment(s):    Vancomycin trough resulted at 13.6 mcg/mL approximately 24 hours after loading dose.   Patient with worsening serum creatinine.     Vancomycin Regimen Plan:    Continue with vancomycin  mg dose for today. Obtain trough level prior to next dose on 7/26 1430 to ensure clearance.      Pharmacy will continue to follow and monitor vancomycin.    Please contact pharmacy at extension [phone] for questions regarding this assessment.    Thank you for the consult,   Paola Womack, PharmD, BCCCP           Patient brief summary:  Michelle Ojeda is a 68 y.o. female initiated on antimicrobial therapy with IV vancomycin for treatment of suspected sepsis    Drug Allergies:   Review of patient's allergies indicates:  No Known Allergies    Actual Body Weight:   56 kg     Renal Function:   Estimated Creatinine Clearance: 18.7 mL/min (A) (based on SCr of 2.2 mg/dL (H)).    CBC (last 72 hours):  Recent Labs   Lab Result Units 07/23/19  0355 07/24/19  0409 07/24/19  1410 07/25/19  0557   WBC K/uL 3.87* 3.97 8.89 5.90   Hemoglobin g/dL 8.8* 9.3* 10.5* 9.9*   Hematocrit % 25.8* 26.8* 30.3* 28.5*   Platelets K/uL 33* 35* 55* 43*   Gran% % 81.0* 79.1* 80.4* 73.2*   Lymph% % 6.5* 6.5* 6.1* 7.8*   Mono% % 10.1 12.6 12.5 15.8*   Eosinophil% % 0.5 1.0 0.1 1.5   Basophil% % 0.3 0.3 0.2 0.5   Differential Method  Automated Automated Automated Automated       Metabolic Panel (last 72 hours):  Recent Labs   Lab Result Units 07/23/19  0355 07/24/19  0409 07/24/19  1352 07/24/19  1512 07/25/19  0557   Sodium mmol/L 124* 126*  --  125* 127*   Potassium mmol/L 3.8 3.8  --  3.3* 4.1   Chloride mmol/L 89* 90*  --  89* 92*   CO2 mmol/L 27 26  --  26 20*   Glucose mg/dL 229* 296*  --  274* 157*   Glucose, UA   --   --  2+*  --   --    BUN, Bld mg/dL 56* 66*  --  73* 69*   Creatinine mg/dL 2.0* 1.7*  --  2.0* 2.2*   Albumin g/dL 3.2* 3.1*  --  3.8  3.5   Total Bilirubin mg/dL 1.9* 1.6*  --  2.7* 2.7*   Alkaline Phosphatase U/L 127 140*  --  143* 102   AST U/L 31 34  --  37 47*   ALT U/L 26 27  --  31 26   Magnesium mg/dL 2.2 2.4  --  2.7* 2.7*   Phosphorus mg/dL 3.4 2.8  --   --  3.7       Vancomycin Administrations:  vancomycin given in the last 96 hours                   vancomycin in dextrose 5 % 1 gram/250 mL IVPB 1,000 mg (mg) 1,000 mg New Bag 07/24/19 1456                Drug levels (last 3 results):  Recent Labs   Lab Result Units 07/25/19  1444   Vancomycin, Random ug/mL 13.6       Microbiologic Results:  Microbiology Results (last 7 days)     Procedure Component Value Units Date/Time    Blood culture [[id number]] Collected:  07/24/19 0930    Order Status:  Completed Specimen:  Blood Updated:  07/25/19 1412     Blood Culture, Routine No Growth to date      No Growth to date    Blood culture [[id number]] Collected:  07/24/19 0930    Order Status:  Completed Specimen:  Blood Updated:  07/25/19 1412     Blood Culture, Routine No Growth to date      No Growth to date    Culture, Respiratory with Gram Stain [[id number]] Collected:  07/24/19 1333    Order Status:  Completed Specimen:  Respiratory from Endotracheal Aspirate Updated:  07/25/19 0910     Respiratory Culture Normal respiratory alan     Gram Stain (Respiratory) Few WBC's     Gram Stain (Respiratory) No organisms seen    Aerobic culture [[id number]] Collected:  07/24/19 1604    Order Status:  Completed Specimen:  Body Fluid from Ascites Updated:  07/25/19 0803     Aerobic Bacterial Culture No growth    Culture, Anaerobe [[id number]] Collected:  07/24/19 1604    Order Status:  Completed Specimen:  Body Fluid from Ascites Updated:  07/25/19 0708     Anaerobic Culture Culture in progress    Gram stain [[id number]] Collected:  07/24/19 1604    Order Status:  Completed Specimen:  Body Fluid from Ascites Updated:  07/24/19 2131     Gram Stain Result Rare WBC's      No organisms seen    Narrative:        Ascites    Blood culture [[id number]] Collected:  07/24/19 1352    Order Status:  Completed Specimen:  Blood from Peripheral, Antecubital, Right Updated:  07/24/19 2115     Blood Culture, Routine No Growth to date    Blood culture [[id number]] Collected:  07/24/19 1351    Order Status:  Completed Specimen:  Blood from Peripheral, Antecubital, Left Updated:  07/24/19 2115     Blood Culture, Routine No Growth to date    Blood culture [[id number]] Collected:  07/20/19 1356    Order Status:  Completed Specimen:  Blood Updated:  07/24/19 1612     Blood Culture, Routine No Growth to date      No Growth to date      No Growth to date      No Growth to date      No Growth to date    Blood culture [[id number]] Collected:  07/20/19 1356    Order Status:  Completed Specimen:  Blood Updated:  07/24/19 1612     Blood Culture, Routine No Growth to date      No Growth to date      No Growth to date      No Growth to date      No Growth to date    Culture, Anaerobe [[id number]] Collected:  07/19/19 0927    Order Status:  Completed Specimen:  Body Fluid from Abdomen Updated:  07/23/19 1118     Anaerobic Culture Culture in progress    Aerobic culture [[id number]] Collected:  07/19/19 0927    Order Status:  Completed Specimen:  Body Fluid from Abdomen Updated:  07/22/19 1009     Aerobic Bacterial Culture No growth    Urine culture [[id number]] Collected:  07/18/19 1212    Order Status:  Completed Specimen:  Urine Updated:  07/19/19 2107     Urine Culture, Routine No growth    Narrative:       Preferred Collection Type->Urine, Clean Catch

## 2019-07-25 NOTE — PHYSICIAN QUERY
PT Name: Michelle Ojeda  MR #: [id number]    Physician Query Form - Respiratory Condition Clarification      CDS/: ABRIL Flanagan, RN, CDS               Contact information: [email or web address]    This form is a permanent document in the medical record.    Query Date: July 25, 2019    By submitting this query, we are merely seeking further clarification of documentation. Please utilize your independent clinical judgment when addressing the question(s) below.    The Medical record contains the following    Indicators   Supporting Clinical Findings Location in Medical Record      SOB, CHAPMAN, Wheezing, Productive Cough, Use of Accessory Muscles, etc.     X   Acute/Chronic Illness  IZAGUIRRE cirrhosis, HTN and hypothyroidism, cirrhosis, sarcopenia, esophageal varices, hyponatremia and hepatic encephalopathy.   H&P, Critical care note, Mag Draper PAC/ Dr. Burns, 7/24   X   Radiology Findings    Feeding tube in the stomach.  Heart size normal.  The lungs are clear.  No pleural effusion       ET tip 1 cm above jillian, NG tip descending duodenum.  Heart size is normal there is mild perihilar atelectasis.    CXR, 7/23           CXR, 7/24   X   Respiratory Distress or Failure  Acute Respiratory Failure with hypoxia  H&P, Critical care note, Mag Draper PAC/ Dr. Burns, 7/24      Hypoxia or Hypercapnia     X   RR         ABGs         O2 sat    POC PH 7.456 (H)   POC PCO2 34.9 (L)   POC PO2 153 (H)   POC BE 1   POC HCO3 24.6   POC SATURATED O2 99   POC TCO2 26   Sample ARTERIAL   Allens Test Pass   Site RR        Resp 16   SpO2 96%      ABG, 7/24                               Rapid Response note,  7/24 @ 1:09PM   X   BiPAP/Intubation  Intubated for airway protection in setting of severe encephalopathy and episodes of emesis / concern for aspirationI    H&P, Critical care note, Mag Draper PAC/ Dr. Burns, 7/24   X   Supplemental O2  Plan to move pt to ICU for intubation and  closer monitoring. VSS. Pt placed on 3L NC    Rapid Response note,  7/24 @ 1:09PM      Home O2, Oxygen Dependence     X   Treatment Started empiric abx for possible aspiration PNA    Minimal ventilator requirements  Wean supplemental O2 for goal SpO2 >88%    H&P, Critical care note, Mag Draper, LUTHER/ Dr. Burns, 7/24   X   Other  developed hepatic encephalopathy and moved to ICU for airway protection       Rapid response called 7/24/2019 for worsening encephalopathy. At that time, patient was hemodynamically stable however not following commands or withdrawing to pain. Found to have ammonia 171. Critical care medicine consulted as patient vomiting and concern for not protecting airway.      Upon arrival, patient unresponsive and will only withdraw to pain yet hemodynamically stable. Multiple episodes of emesis during exam requiring aggressive suctioning to protect airway. Patient admitted to the CMICU at that time for emergent intubation     Pulmonary/Chest: Effort normal and breath sounds normal. No accessory muscle usage or stridor. No tachypnea and no bradypnea. No respiratory distress. She has no decreased breath sounds. She has no wheezes. She has no rhonchi. She has no rales     --No focal signs of consolidation on CXR  H&P, Critical care note, Mag Draper, LUTHER/ Dr. Burns, 7/24     Respiratory failure can be acute, chronic or both. It is generally further specified as hypoxic, hypercapnic or both. Lastly, it is important to identify an etiology, if known or suspected.   References::  https://www.acphospitalist.org/archives/2013/10/coding.htm http://[email or web address]/acute-respiratory-failure-know     The clinical guidelines noted below are only system guidelines, and do not replace the providers clinical judgment.    Provider, please clarify the above conflicting diagnosis  associated with above clinical findings.     [   ] Acute Respiratory Failure with Hypoxia - ABG pO2 < 60 mmHg  or O2 sat of 88% on RA and respiratory symptoms documented   [ x  ] No Respiratory Failure, Maintained on Vent for Routine Care or Airway Protection -  purposely intubated for airway protection (e.g.: angioedema, stroke, trauma); without meeting the criteria for respiratory failure.    [   ] Other Respiratory Diagnosis (please specify): _________________________________   [   ]  Clinically Undetermined       Please document in your progress notes daily for the duration of treatment until resolved and include in your discharge summary.

## 2019-07-25 NOTE — H&P
[facility]  Critical Care Medicine  History & Physical    Patient Name: Michelle Ojeda  MRN: [id number]  Admission Date: 7/18/2019  Hospital Length of Stay: 6 days  Code Status: Full Code  Attending Physician: Mike Burns*   Primary Care Provider: Primary Doctor No   Principal Problem: Acute encephalopathy    Subjective:     HPI:  Michelle Ojeda is a 68 year old female with history of IZAGUIRRE cirrhosis, HTN and hypothyroidism who presented to [facility] on 7/18/2019 as a direct admit [facility] for hyponatremia. Patient is from [place] and requesting liver/kidney transplant evaluation. Her cirrhosis is complicated by ascites requiring biweekly paracentesis, sarcopenia, esophageal varices, hyponatremia and hepatic encephalopathy. Per daughter, patient with functional decline over the last 2 months with complaints of lethargy, decreased oral intake, and peripheral edema. Patient initially admitted to hospital medicine for treatment of hyponatremia with fluid restriction. During liver work up, incidental renal mass found and urology was consulted. Patient developed an ileus and unable to tolerate lactulose while on the floor.    Rapid response called 7/24/2019 for worsening encephalopathy. At that time, patient was hemodynamically stable however not following commands or withdrawing to pain. Found to have ammonia 171. Critical care medicine consulted as patient vomiting and concern for not protecting airway.     Upon arrival, patient unresponsive and will only withdraw to pain yet hemodynamically stable. Multiple episodes of emesis during exam requiring aggressive suctioning to protect airway. Patient admitted to the CMICU at that time for emergent intubation.     Hospital/ICU Course:  No notes on file     Past Medical History:   Diagnosis Date    Cirrhosis     Diabetes mellitus, type 2     Disorder of kidney and ureter     Hypertension     Hypothyroidism      NAFLD (nonalcoholic fatty liver disease)        History reviewed. No pertinent surgical history.    Review of patient's allergies indicates:  No Known Allergies    Family History     Problem Relation (Age of Onset)    No Known Problems Father        Tobacco Use    Smoking status: Never Smoker    Smokeless tobacco: Never Used   Substance and Sexual Activity    Alcohol use: Not Currently    Drug use: Never    Sexual activity: Not on file      Review of Systems   Unable to perform ROS: Mental status change     Objective:     Vital Signs (Most Recent):  Temp: 97.4 °F (36.3 °C) (07/24/19 1500)  Pulse: 65 (07/24/19 1828)  Resp: 18 (07/24/19 1500)  BP: (!) 108/52 (07/24/19 1800)  SpO2: 100 % (07/24/19 1828) Vital Signs (24h Range):  Temp:  [97.2 °F (36.2 °C)-98.4 °F (36.9 °C)] 97.4 °F (36.3 °C)  Pulse:  [55-92] 65  Resp:  [14-40] 18  SpO2:  [96 %-100 %] 100 %  BP: ()/(41-75) 108/52   Weight: 56 kg (123 lb 7.3 oz)  Body mass index is 24.11 kg/m².      Intake/Output Summary (Last 24 hours) at 7/24/2019 1844  Last data filed at 7/24/2019 1800  Gross per 24 hour   Intake 301.39 ml   Output 820 ml   Net -518.61 ml       Physical Exam   Constitutional: Vital signs are normal. She appears well-developed and well-nourished. She has a sickly appearance.   HENT:   Head: Normocephalic and atraumatic.   Eyes: Pupils are equal, round, and reactive to light. Conjunctivae and EOM are normal.   Neck: No tracheal deviation present. No thyromegaly present.   Cardiovascular: Normal rate and regular rhythm. Exam reveals no gallop and no friction rub.   No murmur heard.  Pulmonary/Chest: Effort normal and breath sounds normal. No accessory muscle usage or stridor. No tachypnea and no bradypnea. No respiratory distress. She has no decreased breath sounds. She has no wheezes. She has no rhonchi. She has no rales.   Abdominal: Soft. Bowel sounds are normal. She exhibits distension and fluid wave. There is hepatomegaly.   4 episodes  of emesis during examination   Musculoskeletal: Normal range of motion.   Neurological: She is unresponsive. No sensory deficit.   Only wince to pain in all four extremities. Not following commands.    Skin: Skin is warm and dry.       Vents:  Vent Mode: A/C (07/24/19 1828)  Ventilator Initiated: Yes (07/24/19 1312)  Set Rate: 18 bmp (07/24/19 1828)  Vt Set: 300 mL (07/24/19 1828)  Pressure Support: 0 cmH20 (07/24/19 1828)  PEEP/CPAP: 5 cmH20 (07/24/19 1828)  Oxygen Concentration (%): 30 (07/24/19 1828)  Peak Airway Pressure: 30 cmH2O (07/24/19 1828)  Plateau Pressure: 0 cmH20 (07/24/19 1828)  Total Ve: 8.26 mL (07/24/19 1828)  F/VT Ratio<105 (RSBI): 129.45 (07/24/19 1401)  Lines/Drains/Airways     Drain                 NG/OG Tube 07/24/19 1314 orogastric 14 Fr. Right mouth less than 1 day         Urethral Catheter 07/24/19 1346 Non-latex 16 Fr. less than 1 day          Airway                 Airway - Non-Surgical 07/24/19 1310 Endotracheal Tube less than 1 day          Peripheral Intravenous Line                 Peripheral IV - Single Lumen 07/21/19 1100 20 G Distal;Left;Lateral Forearm 3 days         Peripheral IV - Single Lumen 07/24/19 1315 20 G Anterior;Distal;Right Forearm less than 1 day              Significant Labs:    CBC/Anemia Profile:  Recent Labs   Lab 07/23/19  0355 07/24/19  0409 07/24/19  1410   WBC 3.87* 3.97 8.89   HGB 8.8* 9.3* 10.5*   HCT 25.8* 26.8* 30.3*   PLT 33* 35* 55*   * 100* 97   RDW 14.2 14.1 14.6*        Chemistries:  Recent Labs   Lab 07/23/19  0355 07/24/19  0409 07/24/19  1512   * 126* 125*   K 3.8 3.8 3.3*   CL 89* 90* 89*   CO2 27 26 26   BUN 56* 66* 73*   CREATININE 2.0* 1.7* 2.0*   CALCIUM 8.4* 8.3* 9.0   ALBUMIN 3.2* 3.1* 3.8   PROT 5.3* 5.6* 6.0   BILITOT 1.9* 1.6* 2.7*   ALKPHOS 127 140* 143*   ALT 26 27 31   AST 31 34 37   MG 2.2 2.4 2.7*   PHOS 3.4 2.8  --        All pertinent labs within the past 24 hours have been reviewed.    Significant Imaging: I have  reviewed and interpreted all pertinent imaging results/findings within the past 24 hours.    Assessment/Plan:     Neuro  * Acute encephalopathy  Likely 2/2 hepatic encephalopathy with ammonia 171 this AM (64 on 7/22).   --CT head pending  --EEG pending  --Unable to give lactulose while on the hospital floor  --OG tube placed  --Continue lactulose  --Continue precedex gtt and fentanyl pushes as needed for toleration of ET tube    Pulmonary  Acute hypoxemic respiratory failure  Intubated for airway protection in setting of severe encephalopathy and episodes of emesis / concern for aspiration  --Started empiric abx for possible aspiration PNA  --Sputum culture pending  --No focal signs of consolidation on CXR  --Minimal ventilator requirements  --Wean supplemental O2 for goal SpO2 >88%      Renal/  Right kidney mass  R kidney mass measures 2.4 cm x 2.3 cm found on CT abd/pelvis during transplant workup  --Urology following with plans to hold on any further work up in setting of acute decompensation    Stage 4 chronic kidney disease  Baseline sCr 1.6. Possible candidate for liver/kidney transplant  --Nephrology following  --leigh in place  --Strict I/Os    Hematology  Coagulopathy  See cirrhosis    Oncology  Pancytopenia  See cirrhosis    Endocrine  Hyperglycemia  Patient with Hx of DM however was taken off metformin due to improvement  --Detemir 10 units daily with SSI  --POCT q6    Hypothyroidism  Continue home levothyroxine    GI  Ileus  OG tube place set to LIS  --will continue to monitor    Ascites  See cirrhosis    Liver cirrhosis secondary to IZAGUIRRE  Complicated by ascites, thrombocytopenia, esophageal varices, hyponatremia and HE  --Currently being worked up for LTS eval  --Continue lactulose  --Hepatology following  --Paracentesis today with 4L removed; labs pending to rule out SBP    MELD-Na score: 28 at 7/24/2019  3:12 PM  MELD score: 20 at 7/24/2019  3:12 PM  Calculated from:  Serum Creatinine: 2.0 mg/dL at  7/24/2019  3:12 PM  Serum Sodium: 125 mmol/L at 7/24/2019  3:12 PM  Total Bilirubin: 2.7 mg/dL at 7/24/2019  3:12 PM  INR(ratio): 1.3 at 7/24/2019  4:09 AM  Age: 68 years      Other  Shock, unspecified  Likely due to sedating medications however patient at risk for infection  --Blood culture and sputum pending  --UA negative  --Peritoneal fluid pending to r/o SBT  --Started vancomycin/zosyn  --Requiring low dose norepinephrine  --Wean for MAP >65        Critical Care Daily Checklist:    A: Awake: RASS Goal/Actual Goal:    Actual: Kelsey Agitation Sedation Scale (RASS): Deep sedation   B: Spontaneous Breathing Trial Performed?     C: SAT & SBT Coordinated?  N/A                      D: Delirium: CAM-ICU Overall CAM-ICU: Positive   E: Early Mobility Performed? Yes   F: Feeding Goal: Goals: PO intake >50%  Status: Nutrition Goal Status: new   Current Diet Order   Procedures    Diet NPO Except for: Sips with Medication     Order Specific Question:   Except for     Answer:   Sips with Medication      AS: Analgesia/Sedation precedex gtt, fentanyl injections   T: Thromboembolic Prophylaxis Holding in setting of thrombocytopenia   H: HOB > 300 Yes   U: Stress Ulcer Prophylaxis (if needed) famotidine   G: Glucose Control POCT/SSI   B: Bowel Function Stool Occurrence: 1   I: Indwelling Catheter (Lines & Leigh) Necessity PIV, leigh   D: De-escalation of Antimicrobials/Pharmacotherapies Vancomycin/zosyn    Plan for the day/ETD Hemodynamically stablize    Code Status:  Family/Goals of Care: Full Code       Discussed with Dr. Burns.     Daughter and son-in-law updated at the bedside.     Critical Care Time: 70 minutes  Critical secondary to Patient has a condition that poses threat to life and bodily function: acute encephalopathy requiring mechanical ventilation for airway protection and shock requiring vasopressors.      Critical care was time spent personally by me on the following activities: development of treatment  plan with patient or surrogate and bedside caregivers, discussions with consultants, evaluation of patient's response to treatment, examination of patient, ordering and performing treatments and interventions, ordering and review of laboratory studies, ordering and review of radiographic studies, pulse oximetry, re-evaluation of patient's condition. This critical care time did not overlap with that of any other provider or involve time for any procedures.     Bonny Hathaway PA-C  Critical Care Medicine  [facility]

## 2019-07-25 NOTE — PROGRESS NOTES
[facility]  Adult Nutrition  Consult Note    SUMMARY     Recommendations    1. If/when medically feasible, resume enteral nutrition. Rec'd Novasource @ 30 mL/hr to provide 1440 kcals, 65 g of protein, 516 mL fluid.   - Hold for residuals >400 mL; additional fluid per MD.  2. If able to extubate & advance diet, recommend Low sodium diet (texture per SLP).  3. RD to monitor & follow-up.    Goals: Meet % EEN, EPN  Nutrition Goal Status: new  Communication of RD Recs: reviewed with RN    Reason for Assessment    Reason For Assessment: RD follow-up  Diagnosis: other (see comments)(Hyponatremia)  Relevant Medical History: DM, HTN, Cirrhosis  Interdisciplinary Rounds: attended    General Information Comments: Pt intubated 7/24. Prior to intubation, pt was on low sodium diet w/ poor PO intake & receiving enteral nutrition via NGT. At initial RD visit, pt's family reported PTA, pt w/ poor appetite x 7 months 2/2 early satiety. Unsure of UBW but w/ current fluid status, pt's wt ranges 125-135#. Prior to poor appetite & fluid wt gain/loss, (x 7 months ago), pt weighed 145# (-7%). Low sodium, high protein diet education complete 7/19. NFPE complete today, pt noted w/ moderate malnutrition. Please see PES statement for details. Paracentesis complete 7/24 - 4L removed.  Nutrition Discharge Planning: Unable to determine    Nutrition/Diet History    Patient Reported Diet/Restrictions/Preferences: low salt  Spiritual, Cultural Beliefs, Spiritism Practices, Values that Affect Care: no  Supplemental Drinks or Food Habits: Ensure Plus  Factors Affecting Nutritional Intake: NPO, on mechanical ventilation    Anthropometrics    Temp: 97.5 °F (36.4 °C)  Height Method: Stated  Height: 5' (152.4 cm)  Height (inches): 60 in  Weight Method: Bed Scale  Weight: 56 kg (123 lb 7.3 oz)  Weight (lb): 123.46 lb  Ideal Body Weight (IBW), Female: 100 lb  % Ideal Body Weight, Female (lb): 123.46 lb  BMI (Calculated):  24.2  BMI Grade: 18.5-24.9 - normal  Usual Body Weight (UBW), k kg  % Usual Body Weight: 93.68  % Weight Change From Usual Weight: -6.52 %    Lab/Procedures/Meds    Pertinent Labs Reviewed: reviewed  Pertinent Labs Comments: Na 127, BUN 69, Creat 2.2, GFR 22.1, Bili 2.7  Pertinent Medications Reviewed: reviewed  Pertinent Medications Comments: Precedex, Levophed    Estimated/Assessed Needs    Weight Used For Calorie Calculations: 56 kg (123 lb 7.3 oz)     Energy Calorie Requirements (kcal): 1382 kcal/d  Energy Need Method: [facility]     Protein Requirements: 67-84 g/d (1.2-1.5 g/kg)  Weight Used For Protein Calculations: 56 kg (123 lb 7.3 oz)     Estimated Fluid Requirement Method: other (see comments)(Per MD or 1 mL/kcal)     Nutrition Prescription Ordered    Current Diet Order: NPO    Evaluation of Received Nutrient/Fluid Intake    Comments: LBM:     Nutrition Risk    Level of Risk/Frequency of Follow-up: (2x/week)     Assessment and Plan    Moderate malnutrition    Malnutrition in the context of Chronic Illness/Injury    Related to (etiology):  Inadequate energy intake    Signs and Symptoms (as evidenced by):  Energy Intake: <75% of estimated energy requirement for 4 months   Weight Loss: 7% x 7 months     Interventions/Recommendations (treatment strategy):  Collaboration of nutrition care w/ other providers     Nutrition Diagnosis Status:  Continues     Monitor and Evaluation    Food and Nutrient Intake: energy intake, food and beverage intake, enteral nutrition intake  Food and Nutrient Adminstration: diet order, enteral and parenteral nutrition administration  Physical Activity and Function: nutrition-related ADLs and IADLs  Anthropometric Measurements: weight, weight change  Biochemical Data, Medical Tests and Procedures: lipid profile, inflammatory profile, glucose/endocrine profile, gastrointestinal profile, electrolyte and renal panel  Nutrition-Focused Physical Findings: overall appearance      Malnutrition Assessment    Weight Loss (Malnutrition): other (see comments)(7% x 7 months)  Energy Intake (Malnutrition): less than 75% for greater than or equal to 3 months  Muscle Mass (Malnutrition): moderate depletion  Orbital Region (Subcutaneous Fat Loss): moderate depletion  Upper Arm Region (Subcutaneous Fat Loss): moderate depletion   Episcopal Region (Muscle Loss): moderate depletion  Clavicle and Acromion Bone Region (Muscle Loss): moderate depletion     Nutrition Follow-Up    RD Follow-up?: Yes

## 2019-07-25 NOTE — PROGRESS NOTES
[facility]  Hepatology  Progress Note    Patient Name: Michelle Ojeda  MRN: [id number]  Admission Date: 7/18/2019  Hospital Length of Stay: 7 days  Attending Provider: Mike Burns*   Primary Care Physician: Primary Doctor No  Principal Problem:Acute metabolic encephalopathy    Subjective:     Transplant status: No    HPI: Ms Ojeda is a 68 year old female with a history of HERRMANN ESLD, with decompensations including Gr 1 HE, ascites, EV, hyponatremia, T2DM, HTN, hypothyroidism, who is being admitted from hepatology clinic due to concern for hyponatremia.    She presents to transplant Clinic for initial evaluation on 07/18 with Dr. Cannon for initial transplant evaluation.  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  Her cirrhosis has been complicated by grade 1 hepatic encephalopathy, recently started Aldo Louisa min but never been on lactulose, ascites, sarcopenia, esophageal varices status post ligation, hyponatremia.  She was previously evaluated at [place] with Dr. Mccormick who recommended evaluation at [facility].  Does not appear she was evaluated for transplant at that time.    Due to hyponatremia with sodium of 120 she was admitted to the hospital for further management.  Currently she reports she is feeling well without any complaints.  Denies any abdominal pain, nausea, vomiting, diarrhea.  Denies any fevers, chills, sweats or other infectious complaints.    Family who is present in the room note that she is doing extremely well until approximately 2 months prior to presentation when she began to be more frail, fatigue, increased peripheral edema, decreased oral intake.  They note they have been extremely careful to avoid T here to low-sodium diet.  She has never been hospitalized in the  past for hyponatremia.  No other recent hospitalizations.  Regarding her ascites she has approximately received a paracentesis every 2 weeks.      Interval History:   Transferred to ICU, intubated, sedated on propofol.  2 BMs overnight.    Current Facility-Administered Medications   Medication    acetaminophen tablet 325 mg    chlorhexidine 0.12 % solution 15 mL    dexmedetomidine (PRECEDEX) 400mcg/100mL 0.9% NaCL infusion    dextrose 10% (D10W) Bolus    dextrose 10% (D10W) Bolus    dicyclomine capsule 10 mg    famotidine tablet 20 mg    [START ON 7/28/2019] fentaNYL injection 50 mcg    glucagon (human recombinant) injection 1 mg    glucose chewable tablet 16 g    glucose chewable tablet 24 g    insulin aspart U-100 pen 0-5 Units    insulin detemir U-100 pen 10 Units    iohexol (OMNIPAQUE) oral solution 15 mL    lactulose 20 gram/30 mL solution Soln 20 g    levothyroxine tablet 88 mcg    midodrine tablet 10 mg    norepinephrine 4 mg in dextrose 5% 250 mL infusion (premix) (titrating)    ondansetron injection 4 mg    piperacillin-tazobactam 4.5 g in sodium chloride 0.9% 100 mL IVPB (ready to mix system)    rifAXIMin tablet 550 mg    simethicone chewable tablet 80 mg    sodium chloride 0.9% flush 10 mL    sodium chloride 0.9% flush 10 mL    vancomycin 750 mg in dextrose 5 % 250 mL IVPB (ready to mix system)    zinc sulfate capsule 220 mg       Objective:     Vital Signs (Most Recent):  Temp: 98 °F (36.7 °C) (07/25/19 1500)  Pulse: 68 (07/25/19 1544)  Resp: (!) 23 (07/25/19 1544)  BP: 132/62 (07/25/19 1500)  SpO2: 100 % (07/25/19 1544) Vital Signs (24h Range):  Temp:  [96.4 °F (35.8 °C)-98 °F (36.7 °C)] 98 °F (36.7 °C)  Pulse:  [50-70] 68  Resp:  [18-32] 23  SpO2:  [100 %] 100 %  BP: ()/(38-65) 132/62     Weight: 56 kg (123 lb 7.3 oz) (07/25/19 1300)  Body mass index is 24.11 kg/m².    Physical Exam   Constitutional: No distress.   Eyes: No scleral icterus.   Cardiovascular: Normal rate  and regular rhythm.   Pulmonary/Chest: Effort normal and breath sounds normal. No respiratory distress.   Abdominal: Soft. Bowel sounds are normal.   Neurological: She is unresponsive.   Skin: Skin is warm and dry.   Nursing note and vitals reviewed.      MELD-Na score: 28 at 7/25/2019  5:57 AM  MELD score: 22 at 7/25/2019  5:57 AM  Calculated from:  Serum Creatinine: 2.2 mg/dL at 7/25/2019  5:57 AM  Serum Sodium: 127 mmol/L at 7/25/2019  5:57 AM  Total Bilirubin: 2.7 mg/dL at 7/25/2019  5:57 AM  INR(ratio): 1.5 at 7/25/2019  5:57 AM  Age: 68 years    Significant Labs:  Labs within the past month have been reviewed.    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 Found to have incidentally discovered R renal mass. Unresponsive on 7/24, transferred to ICU and intubated for airway protection.     Plan  - encephalopathy: Worsening confusion on 7/20, unresponsive 7/24 and now in ICU. Restart lactulose aggressively with goal of 4-5 BMs daily. Ammonia 171. Continue rifaximin. Infectious workup per primary. On Vanc and zosyn for possible aspiration.  - R renal mass: per transplant team, recommend Urology consult and U/S retroperitoneum. Urology recommending active surveillance. High risk for surgery or biopsy. U/S retroperitoneum with mass but unable to characterize. Thrombocytopenia is a concern especially if biopsy necessary.  - ileus on imaging, now resolved. NGT removed.  - ascites: Negative for SBP on 07/19. Total protein 1.5.  - diuretics: hold  - CKD: unclear baseline. Holding diuretics and albumin therapy given hyponatremia.   - hyponatremia: slowly improving with fluid restriction. continue fluid restriction 800mL. Holding diuresis. Avoid  free water additives. Nephrology consult.  - malnutrition: nutrition consult. Calorie count. Supplements. NG tube feeds now that ileus resolved.  - PT/OT  - inpatient transplant evaluation started. Nuclear stress test and renal scan completed, ID consult, now needs further evaluation of R renal mass and improvement in mental status  - Appreciate renal evaluation re: SLK given GFR <35        Thank you for your consult. I will follow-up with patient. Please contact us if you have any additional questions.    Andrea Eaton MD  Hepatology  [facility]

## 2019-07-25 NOTE — SUBJECTIVE & OBJECTIVE
Subjective:     Interval History:   Transferred to ICU, intubated, sedated with propofol.  2BMs overnight.    Review of Systems   Unable to perform ROS: Mental status change     Objective:     Vital Signs (Most Recent):  Temp: 98 °F (36.7 °C) (07/25/19 1500)  Pulse: 68 (07/25/19 1544)  Resp: (!) 23 (07/25/19 1544)  BP: 132/62 (07/25/19 1500)  SpO2: 100 % (07/25/19 1544) Vital Signs (24h Range):  Temp:  [96.4 °F (35.8 °C)-98 °F (36.7 °C)] 98 °F (36.7 °C)  Pulse:  [50-70] 68  Resp:  [18-32] 23  SpO2:  [100 %] 100 %  BP: ()/(38-65) 132/62     Weight: 56 kg (123 lb 7.3 oz) (07/25/19 1300)  Body mass index is 24.11 kg/m².      Intake/Output Summary (Last 24 hours) at 7/25/2019 1603  Last data filed at 7/25/2019 1500  Gross per 24 hour   Intake 659.48 ml   Output 530 ml   Net 129.48 ml       Lines/Drains/Airways     Drain                 NG/OG Tube 07/24/19 1314 orogastric 14 Fr. Right mouth 1 day         Urethral Catheter 07/24/19 1346 Non-latex 16 Fr. 1 day         Rectal Tube 07/25/19 0943 rectal tube w/ balloon (indicate number of mLs) less than 1 day          Airway                 Airway - Non-Surgical 07/24/19 1310 Endotracheal Tube 1 day          Peripheral Intravenous Line                 Peripheral IV - Single Lumen 07/21/19 1100 20 G Distal;Left;Lateral Forearm 4 days         Peripheral IV - Single Lumen 07/24/19 1315 20 G Anterior;Distal;Right Forearm 1 day                Physical Exam   Constitutional: No distress.   HENT:   +NG tube in place   Eyes: Scleral icterus is present.   Cardiovascular: Normal rate and regular rhythm.   Pulmonary/Chest: Effort normal. No respiratory distress.   Intubated, on ventilator   Abdominal: Soft. She exhibits no distension and no mass. There is no tenderness. There is no guarding.   Musculoskeletal: She exhibits no edema or deformity.   Neurological: She is unresponsive.   Not oriented, does not respond to questions   Skin: Skin is warm and dry.   Vitals  reviewed.      Significant Labs:  CBC:   Recent Labs   Lab 08/05/19  0550 08/06/19  0354   WBC 4.03 5.23   HGB 8.1* 9.1*   HCT 23.8* 27.7*   PLT 46* 44*     CMP:   Recent Labs   Lab 08/06/19  0354   *   CALCIUM 9.1   ALBUMIN 4.9   PROT 7.2   *   K 3.5   CO2 16*   CL 99   *   CREATININE 3.8*   ALKPHOS 167*   ALT 25   AST 37   BILITOT 4.3*     Coagulation:   Recent Labs   Lab 08/06/19 0354   INR 1.5*     All pertinent lab results from the last 24 hours have been reviewed.

## 2019-07-25 NOTE — SUBJECTIVE & OBJECTIVE
Interval History/Significant Events: see hospital course    Review of Systems   Unable to perform ROS: Intubated     Objective:     Vital Signs (Most Recent):  Temp: 96.5 °F (35.8 °C) (07/25/19 0700)  Pulse: 63 (07/25/19 0700)  Resp: 18 (07/25/19 0700)  BP: (!) 100/50 (07/25/19 0700)  SpO2: 100 % (07/25/19 0700) Vital Signs (24h Range):  Temp:  [96.4 °F (35.8 °C)-98 °F (36.7 °C)] 96.5 °F (35.8 °C)  Pulse:  [50-92] 63  Resp:  [14-40] 18  SpO2:  [96 %-100 %] 100 %  BP: ()/(41-75) 100/50   Weight: 56 kg (123 lb 7.3 oz)  Body mass index is 24.11 kg/m².      Intake/Output Summary (Last 24 hours) at 7/25/2019 0756  Last data filed at 7/25/2019 0700  Gross per 24 hour   Intake 596.61 ml   Output 1070 ml   Net -473.39 ml       Physical Exam   Constitutional: She appears well-developed and well-nourished. She appears ill. She is intubated and restrained.   HENT:   Head: Normocephalic and atraumatic.   Eyes: Pupils are equal, round, and reactive to light. EOM are normal. Scleral icterus is present.   Neck: No tracheal deviation present. No thyromegaly present.   Cardiovascular: Normal rate and regular rhythm. Exam reveals no gallop and no friction rub.   Murmur heard.  Pulses:       Radial pulses are 2+ on the right side, and 2+ on the left side.        Dorsalis pedis pulses are 2+ on the right side, and 2+ on the left side.   Warm extremities, no peripheral edema   Pulmonary/Chest: Effort normal and breath sounds normal. No accessory muscle usage or stridor. No tachypnea and no bradypnea. She is intubated. No respiratory distress. She has no decreased breath sounds. She has no wheezes. She has no rhonchi. She has no rales.   Abdominal: Soft. She exhibits distension. Bowel sounds are increased. There is hepatomegaly. There is no tenderness.   Fecal management system with brown loose stool   Genitourinary:   Genitourinary Comments: Urine catheter with deborah output   Musculoskeletal: Normal range of motion.    Neurological: No sensory deficit.   No spontaneous eye movements to voice or pain. PERRL, no nystagmus or roving eye movements.  + cough, gag.  Resisting eye opening. Breathing over ventilator.  Spontaneous movement to BLE.    Skin: Skin is warm and dry. No rash noted.            Vents:  Vent Mode: A/C (07/25/19 0553)  Ventilator Initiated: Yes (07/24/19 1312)  Set Rate: 18 bmp (07/25/19 0553)  Vt Set: 300 mL (07/25/19 0553)  Pressure Support: 0 cmH20 (07/25/19 0553)  PEEP/CPAP: 5 cmH20 (07/25/19 0553)  Oxygen Concentration (%): 30 (07/25/19 0700)  Peak Airway Pressure: 24 cmH2O (07/25/19 0553)  Plateau Pressure: 0 cmH20 (07/25/19 0553)  Total Ve: 5.43 mL (07/25/19 0553)  F/VT Ratio<105 (RSBI): (!) 57.32 (07/24/19 2307)  Lines/Drains/Airways     Drain                 NG/OG Tube 07/24/19 1314 orogastric 14 Fr. Right mouth less than 1 day         Urethral Catheter 07/24/19 1346 Non-latex 16 Fr. less than 1 day          Airway                 Airway - Non-Surgical 07/24/19 1310 Endotracheal Tube less than 1 day          Peripheral Intravenous Line                 Peripheral IV - Single Lumen 07/21/19 1100 20 G Distal;Left;Lateral Forearm 3 days         Peripheral IV - Single Lumen 07/24/19 1315 20 G Anterior;Distal;Right Forearm less than 1 day              Significant Labs:    CBC/Anemia Profile:  Recent Labs   Lab 07/24/19  0409 07/24/19  1410 07/25/19  0557   WBC 3.97 8.89 5.90   HGB 9.3* 10.5* 9.9*   HCT 26.8* 30.3* 28.5*   PLT 35* 55*  --    * 97 99*   RDW 14.1 14.6* 14.3        Chemistries:  Recent Labs   Lab 07/24/19  0409 07/24/19  1512 07/25/19  0557   * 125* 127*   K 3.8 3.3* 4.1   CL 90* 89* 92*   CO2 26 26 20*   BUN 66* 73* 69*   CREATININE 1.7* 2.0* 2.2*   CALCIUM 8.3* 9.0 8.7   ALBUMIN 3.1* 3.8 3.5   PROT 5.6* 6.0 6.2   BILITOT 1.6* 2.7* 2.7*   ALKPHOS 140* 143* 102   ALT 27 31 26   AST 34 37 47*   MG 2.4 2.7* 2.7*   PHOS 2.8  --  3.7       All pertinent labs within the past 24 hours have  been reviewed.    Significant Imaging:  I have reviewed and interpreted all pertinent imaging results/findings within the past 24 hours.

## 2019-07-25 NOTE — ASSESSMENT & PLAN NOTE
Likely due to sedating medications however patient at risk for infection  --Blood culture and sputum pending  --UA negative  --Peritoneal fluid pending to r/o SBT  --Started vancomycin/zosyn  --Requiring low dose norepinephrine  --Wean for MAP >65

## 2019-07-25 NOTE — NURSING
A: Awake    RASS: Goal - -1  awakes to voice (eye opening/contact) > 10 seconds Actual - -3  moderate sedation, movement or eye opening; no eye contact   Restraint necessity: yes  B: Breath   SBT: Not attempted  C: Coordinate A & B, analgesics/sedatives   Pain: managed   SAT: Not attempted  D: Delirium   CAM-ICU: negative  E: Early Mobility   MOVE Screen: Fail   Activity order: bedrest  FAS: Feeding/Nutrition   Diet order: NPO Fluid restriction: None  T: Thrombus   DVT prophylaxis: SCD  H: HOB Elevation   30 degrees: Yes   U: Ulcer Prophylaxis   GI: yes  G: Glucose control   managed  S: Skin   Bundle compliance: yes  B: Bowel Function   lactlose x 3 daily. first BM on 7/25   I: Indwelling Catheters   [place] necessity: Yes   CVC necessity: No   IPAD offered: Yes  D: De-escalation Antibx   No  Plan for the day   Monitor BM w/ scheduled lactulose, wean off levo   Family/Goals of care/Code Status   Code Status: Full Code

## 2019-07-25 NOTE — PLAN OF CARE
Problem: Adult Inpatient Plan of Care  Goal: Plan of Care Review  Outcome: Ongoing (interventions implemented as appropriate)  No acute events throughout day. See vital signs and assessments in flowsheets. See below for updates on today's progress.   Pulmonary: on vent AC, rate 18, peep 5, Fio2 30%   Cardiovascular: SB/NSR 50-60s HR, titrating levo to keep MAP >65  Neurological: responds to pain, unable to follow commands   Gastrointestinal: NPO, very large BM, thick/ liquid, light brown  Genitourinary: leigh in place, UO decreased through out shift to 20-30ml/hr. Team notified   Endocrine: Accu check q6 hrs, PRN insulin given per sliding scale     Date of last CHG bath given: 7/75 before shift change   Infusions: levo precedex  Patient progressing towards goals as tolerated, plan of care communicated and reviewed with Michelle Ojeda and family. All concerns addressed. Will continue to monitor.

## 2019-07-25 NOTE — ASSESSMENT & PLAN NOTE
Ms Ojeda is a 68 year old female with a history of IZAGUIRRE ESLD, with decompensations including Gr 1 HE, ascites, EV, hyponatremia, T2DM, HTN, hypothyroidism, who is being admitted from hepatology clinic due to concern for hyponatremia. Found to have incidentally discovered R renal mass. Unresponsive on 7/24, transferred to ICU and intubated for airway protection.     Plan  - encephalopathy: Worsening confusion on 7/20, unresponsive 7/24 and now in ICU. Restart lactulose aggressively with goal of 4-5 BMs daily. Ammonia 171. Continue rifaximin. Infectious workup per primary. On Vanc and zosyn for possible aspiration.  - R renal mass: per transplant team, recommend Urology consult and U/S retroperitoneum. Urology recommending active surveillance. High risk for surgery or biopsy. U/S retroperitoneum with mass but unable to characterize. Thrombocytopenia is a concern especially if biopsy necessary.  - ileus on imaging, now resolved. NGT removed.  - ascites: Negative for SBP on 07/19. Total protein 1.5.  - diuretics: hold  - CKD: unclear baseline. Holding diuretics and albumin therapy given hyponatremia.   - hyponatremia: slowly improving with fluid restriction. continue fluid restriction 800mL. Holding diuresis. Avoid free water additives. Nephrology consult.  - malnutrition: nutrition consult. Calorie count. Supplements. NG tube feeds now that ileus resolved.  - PT/OT  - inpatient transplant evaluation started. Nuclear stress test and renal scan completed, ID consult, now needs further evaluation of R renal mass and improvement in mental status  - Appreciate renal evaluation re: SLK given GFR <35

## 2019-07-25 NOTE — SUBJECTIVE & OBJECTIVE
Interval History:   Transferred to ICU, intubated, sedated on propofol.  2 BMs overnight.    Current Facility-Administered Medications   Medication    acetaminophen tablet 325 mg    chlorhexidine 0.12 % solution 15 mL    dexmedetomidine (PRECEDEX) 400mcg/100mL 0.9% NaCL infusion    dextrose 10% (D10W) Bolus    dextrose 10% (D10W) Bolus    dicyclomine capsule 10 mg    famotidine tablet 20 mg    [START ON 7/28/2019] fentaNYL injection 50 mcg    glucagon (human recombinant) injection 1 mg    glucose chewable tablet 16 g    glucose chewable tablet 24 g    insulin aspart U-100 pen 0-5 Units    insulin detemir U-100 pen 10 Units    iohexol (OMNIPAQUE) oral solution 15 mL    lactulose 20 gram/30 mL solution Soln 20 g    levothyroxine tablet 88 mcg    midodrine tablet 10 mg    norepinephrine 4 mg in dextrose 5% 250 mL infusion (premix) (titrating)    ondansetron injection 4 mg    piperacillin-tazobactam 4.5 g in sodium chloride 0.9% 100 mL IVPB (ready to mix system)    rifAXIMin tablet 550 mg    simethicone chewable tablet 80 mg    sodium chloride 0.9% flush 10 mL    sodium chloride 0.9% flush 10 mL    vancomycin 750 mg in dextrose 5 % 250 mL IVPB (ready to mix system)    zinc sulfate capsule 220 mg       Objective:     Vital Signs (Most Recent):  Temp: 98 °F (36.7 °C) (07/25/19 1500)  Pulse: 68 (07/25/19 1544)  Resp: (!) 23 (07/25/19 1544)  BP: 132/62 (07/25/19 1500)  SpO2: 100 % (07/25/19 1544) Vital Signs (24h Range):  Temp:  [96.4 °F (35.8 °C)-98 °F (36.7 °C)] 98 °F (36.7 °C)  Pulse:  [50-70] 68  Resp:  [18-32] 23  SpO2:  [100 %] 100 %  BP: ()/(38-65) 132/62     Weight: 56 kg (123 lb 7.3 oz) (07/25/19 1300)  Body mass index is 24.11 kg/m².    Physical Exam   Constitutional: No distress.   Eyes: No scleral icterus.   Cardiovascular: Normal rate and regular rhythm.   Pulmonary/Chest: Effort normal and breath sounds normal. No respiratory distress.   Abdominal: Soft. Bowel sounds are normal.    Neurological: She is unresponsive.   Skin: Skin is warm and dry.   Nursing note and vitals reviewed.      MELD-Na score: 28 at 7/25/2019  5:57 AM  MELD score: 22 at 7/25/2019  5:57 AM  Calculated from:  Serum Creatinine: 2.2 mg/dL at 7/25/2019  5:57 AM  Serum Sodium: 127 mmol/L at 7/25/2019  5:57 AM  Total Bilirubin: 2.7 mg/dL at 7/25/2019  5:57 AM  INR(ratio): 1.5 at 7/25/2019  5:57 AM  Age: 68 years    Significant Labs:  Labs within the past month have been reviewed.    Significant Imaging:  Reviewed

## 2019-07-25 NOTE — PLAN OF CARE
Patient stepped up to MICU from [facility] after rapid response was called on 07/24/19.  Patient is currently requiring CCM team for monitoring and interventions: Intubated/Sedated, Ventilator setting changes, and pressor support. Patient not medically stable for stepdown at this time. Per CCM team, patient is awaiting Transplant evaluation from [facility] services. CM remains available for any patient/family needs or concerns.        07/25/19 1433   Discharge Reassessment   Assessment Type Discharge Planning Reassessment   Provided patient/caregiver education on the expected discharge date and the discharge plan No   Do you have any problems affording any of your prescribed medications? No   Discharge Plan A Home Health;Home with family   Discharge Plan B Skilled Nursing Facility   DME Needed Upon Discharge  walker, rolling   Anticipated Discharge Disposition Home-Health   Can the patient answer the patient profile reliably? No, cognitively impaired   Describe the patient's ability to walk at the present time. Major restrictions/daily assistance from another person   Post-Acute Status   Discharge Delays (!) Change in Medical Condition       Claudia Abdul RN, [facility]  Case Management-Critical Care     [phone]

## 2019-07-25 NOTE — PLAN OF CARE
Problem: Adult Inpatient Plan of Care  Goal: Plan of Care Review  Outcome: Ongoing (interventions implemented as appropriate)    . See vital signs and assessments in flowsheets. See below for updates on today's progress.     Pulmonary: Pt remains on ventilator with Fio2 30%, Peep 5, . Lung sounds coarse and diminished in bases. No SBT done this shift as pt remains encephalopathic. Large to copious secretions throughout the shift.     Cardiovascular: Pt remains on Levophed to maintain MAP above 85 per Nephrology MD. Pt was Bradycardic this morning while on Precedex but since discontinuing medication Pt now NSR with HR in 60-70's.     Neurological: Pt withdraw to all 4 extremities, cough, gag and corneal reflexes all intact. Does not follow commands. Pulses palpable radial/PT/DP. Pupils brisk and equal 4mm.     Gastrointestinal: Rectal tube placed this morning as pt receiving Lactulose TID. 700ml light brown liquid stool evacuated.     Genitourinary: Webster remains in place. 20/cc clear deborah urine noted.    Endocrine: AC/HS. PRN Aspart and scheduled Detemir given per MAR.     Skin/Bath: Skin tears on bilateral arms. Covered w/ foam dressings  Date of last CHG bath given: 7/25/19 @ 1615    Infusions: Pt remains on small dose of Levophed to maintain MAP above 85 per Nephrology.     Patient progressing towards goals as tolerated, plan of care communicated and reviewed with Michelle Ojeda and family. All concerns addressed. Will continue to monitor.

## 2019-07-25 NOTE — ASSESSMENT & PLAN NOTE
Baseline sCr 1.6. Possible candidate for liver/kidney transplant  --Nephrology following  --leigh in place  --Strict I/Os

## 2019-07-25 NOTE — PROGRESS NOTES
PRE EDUCATION TEACHING NOTE    Michelle Ojeda was seen in clinic today.  Handbook on pre-liver transplant information (see outline below) was given to the patient and time was allowed for questions.  Patient's daughter and son in law, Tegan and Rodolfo accompanied her.  Informed consent signed and written information given on selection criteria.    LIVER TRANSPLANT WORK-UP EDUCATION   I. UNDERSTANDING THE TRANSPLANT PROCESS     A. Transplant team      B. MELD score      C. Balancing urgency and outcome     D. Liver Transplant Options         1.  Donor         2. Living Donor--rationale, benefits     E. Transplant Work-up         1. Medical         2. Psychological and Social--lifetime commitment, life changes, personal plan/ goal         3. Financial--fundraising     F.  Completed work-up and Next Steps    G. Wait Time         1.  Can be listed at more than one center         2.  Can transfer wait time     H. The Call       I. Possible donor options         1. DCD         2. Hep B Core and Hep C Positive         3. Increased Risk     J.  Liver Transplant Surgery         1. Length         2. Transfusions, cell saver         3. Surgical risks         4. What to expect after sugery--Central lines, drains, Webster catheter, incision, endotracheal              tube, NG tube, length of stay in ICU/ TSU  II.  HOW TO BEST CARE FOR YOURSELF (Take Five To Thrive)  III. UNDERSTANDING LIFE AFTER TRANSPLANT  A. Medicines after transplant      1. Immunosuppression--infection and rejection  B. Labs   IV. ADULT LIVER SURVIVAL RATES

## 2019-07-25 NOTE — SUBJECTIVE & OBJECTIVE
Interval History: Patient intubated. Son-in law present at bedside. She is not responding to questions but per nurse is responding to painful stimuli. Urine assessed per leigh catheter. Non-bloody.     Review of patient's allergies indicates:  No Known Allergies  Current Facility-Administered Medications   Medication Frequency    acetaminophen tablet 325 mg Q4H PRN    chlorhexidine 0.12 % solution 15 mL BID    dexmedetomidine (PRECEDEX) 400mcg/100mL 0.9% NaCL infusion Continuous    dextrose 10% (D10W) Bolus PRN    dextrose 10% (D10W) Bolus PRN    dicyclomine capsule 10 mg QID PRN    famotidine tablet 20 mg Daily    fentaNYL injection 50 mcg Q1H PRN    Followed by    [START ON 7/28/2019] fentaNYL injection 50 mcg Q1H PRN    glucagon (human recombinant) injection 1 mg PRN    glucose chewable tablet 16 g PRN    glucose chewable tablet 24 g PRN    insulin aspart U-100 pen 0-5 Units Q6H PRN    insulin detemir U-100 pen 10 Units Daily    iohexol (OMNIPAQUE) oral solution 15 mL PRN    lactulose 20 gram/30 mL solution Soln 20 g TID    levothyroxine tablet 88 mcg Before breakfast    midodrine tablet 10 mg TID    norepinephrine 4 mg in dextrose 5% 250 mL infusion (premix) (titrating) Continuous    ondansetron injection 4 mg Q6H PRN    piperacillin-tazobactam 4.5 g in sodium chloride 0.9% 100 mL IVPB (ready to mix system) Q8H    rifAXIMin tablet 550 mg BID    simethicone chewable tablet 80 mg TID PRN    sodium chloride 0.9% flush 10 mL PRN    sodium chloride 0.9% flush 10 mL PRN    vancomycin 750 mg in dextrose 5 % 250 mL IVPB (ready to mix system) Q24H    zinc sulfate capsule 220 mg Daily       Objective:     Vital Signs (Most Recent):  Temp: 97.6 °F (36.4 °C) (07/25/19 1000)  Pulse: 60 (07/25/19 1000)  Resp: 18 (07/25/19 1000)  BP: (!) 105/58 (07/25/19 1000)  SpO2: 100 % (07/25/19 1000)  O2 Device (Oxygen Therapy): ventilator (07/25/19 1000) Vital Signs (24h Range):  Temp:  [96.4 °F (35.8 °C)-98 °F  (36.7 °C)] 97.6 °F (36.4 °C)  Pulse:  [50-92] 60  Resp:  [14-40] 18  SpO2:  [100 %] 100 %  BP: ()/(38-75) 105/58     Weight: 56 kg (123 lb 7.3 oz) (07/23/19 0629)  Body mass index is 24.11 kg/m².  Body surface area is 1.54 meters squared.    I/O last 3 completed shifts:  In: 610.8 [I.V.:160.8; IV Piggyback:450]  Out: 1120 [Urine:1120]    Physical Exam   HENT:   Head: Normocephalic and atraumatic.   Eyes: Scleral icterus is present.   Cardiovascular: Normal rate, regular rhythm and normal heart sounds.   No murmur heard.  Pulmonary/Chest: Effort normal and breath sounds normal.   On ventilator    Abdominal: She exhibits distension (Ascites ).   Hyperactive BS throughout    Musculoskeletal: She exhibits no edema.   Neurological:   Not responding to commands. Responding to painful stimuli   Skin: Skin is warm and dry. She is not diaphoretic.       Significant Labs:  ABGs:   Recent Labs   Lab 07/25/19  0859   PH 7.501*   PCO2 28.4*   HCO3 22.2*   POCSATURATED 96   BE -1     BMP:   Recent Labs   Lab 07/25/19  0557   *   CL 92*   CO2 20*   BUN 69*   CREATININE 2.2*   CALCIUM 8.7   MG 2.7*     CBC:   Recent Labs   Lab 07/25/19  0557   WBC 5.90   RBC 2.89*   HGB 9.9*   HCT 28.5*   PLT 43*   MCV 99*   MCH 34.3*   MCHC 34.7     CMP:   Recent Labs   Lab 07/25/19  0557   *   CALCIUM 8.7   ALBUMIN 3.5   PROT 6.2   *   K 4.1   CO2 20*   CL 92*   BUN 69*   CREATININE 2.2*   ALKPHOS 102   ALT 26   AST 47*   BILITOT 2.7*     LFTs:   Recent Labs   Lab 07/25/19  0557   ALT 26   AST 47*   ALKPHOS 102   BILITOT 2.7*   PROT 6.2   ALBUMIN 3.5     All labs within the past 24 hours have been reviewed.     Significant Imaging:  CT Head Reviewed

## 2019-07-25 NOTE — PROCEDURES
"Michelle Ojeda is a 68 y.o. female patient.    Temp: 97.4 °F (36.3 °C) (19 1500)  Pulse: (!) 55 (19)  Resp: 18 (19 1500)  BP: (!) 108/52 (19 1800)  SpO2: 100 % (19)  Weight: 56 kg (123 lb 7.3 oz) (19 0629)  Height: 5' (152.4 cm) (19 1643)       Intubation  Date/Time: 2019 1:30 PM  Location procedure was performed: [facility]ARDI[facility] MED[facility] ([facility])  Performed by: Anna Perez MD  Authorized by: Anna Perez MD   Assisting provider: Mike Burns MD  Pre-operative diagnosis: Encephalopathy  Post-operative diagnosis: Encephalopathy  Consent Done: Yes  Consent: Verbal consent obtained.  Consent given by: power of   Patient identity confirmed: , MRN and name  Time out: Immediately prior to procedure a "time out" was called to verify the correct patient, procedure, equipment, support staff and site/side marked as required.  Indications: airway protection  Intubation method: video-assisted  Patient status: unconscious  Preoxygenation: nonrebreather mask  Sedatives: etomidate  Paralytic: succinylcholine  Laryngoscope size: Mac 3  Tube size: 7.5 mm  Tube type: cuffed  Number of attempts: 1  Cricoid pressure: no  Cords visualized: yes  Post-procedure assessment: chest rise and ETCO2 monitor  Breath sounds: equal and absent over the epigastrium  Cuff inflated: yes  ETT to lip: 23 cm  Tube secured with: ETT shay  Chest x-ray interpreted by me.  Chest x-ray findings: endotracheal tube in appropriate position  Patient tolerance: Patient tolerated the procedure well with no immediate complications  Complications: No  Specimens: No  Implants: No      Anna Perez MD  [employer] PCCM Fellow  "

## 2019-07-25 NOTE — ASSESSMENT & PLAN NOTE
68 year old female with a history of IZAGUIRRE ESLD, with decompensations including Gr 1 HE, ascites, EV, hyponatremia, T2DM, HTN, hypothyroidism, who is being admitted from hepatology clinic due to concern for hyponatremia. Found to have incidentally discovered R renal mass. Unresponsive on 7/24, transferred to ICU and intubated for airway protection. Extubated on 7/27. Had been doing well for past week. Overnight she developed hematemesis. She has a history of variceal bleed.    Recommendations:  - Keep NPO  - Octreotide gtt  - PPI IV VID  - Ceftriaxone 1gm IV x 5 days  - Plan for EGD today

## 2019-07-25 NOTE — ASSESSMENT & PLAN NOTE
Patient with Hx of DM however was taken off metformin due to improvement  --Detemir 10 units daily with SSI  --POCT q6

## 2019-07-26 ENCOUNTER — DOCUMENTATION ONLY (OUTPATIENT)
Dept: TRANSPLANT | Facility: CLINIC | Age: 69
End: 2019-07-26

## 2019-07-26 LAB
ALBUMIN SERPL BCP-MCNC: 3.1 G/DL (ref 3.5–5.2)
ALBUMIN SERPL BCP-MCNC: 3.2 G/DL (ref 3.5–5.2)
ALLENS TEST: ABNORMAL
ALP SERPL-CCNC: 144 U/L (ref 55–135)
ALP SERPL-CCNC: 152 U/L (ref 55–135)
ALT SERPL W/O P-5'-P-CCNC: 34 U/L (ref 10–44)
ALT SERPL W/O P-5'-P-CCNC: 34 U/L (ref 10–44)
ANION GAP SERPL CALC-SCNC: 14 MMOL/L (ref 8–16)
ANION GAP SERPL CALC-SCNC: 14 MMOL/L (ref 8–16)
ANION GAP SERPL CALC-SCNC: 15 MMOL/L (ref 8–16)
ANISOCYTOSIS BLD QL SMEAR: SLIGHT
AST SERPL-CCNC: 39 U/L (ref 10–40)
AST SERPL-CCNC: 40 U/L (ref 10–40)
BACTERIA SPEC AEROBE CULT: NORMAL
BACTERIA SPEC AEROBE CULT: NORMAL
BACTERIA SPEC ANAEROBE CULT: NORMAL
BASOPHILS # BLD AUTO: ABNORMAL K/UL (ref 0–0.2)
BASOPHILS NFR BLD: 0 % (ref 0–1.9)
BILIRUB DIRECT SERPL-MCNC: 2.1 MG/DL (ref 0.1–0.3)
BILIRUB SERPL-MCNC: 3.5 MG/DL (ref 0.1–1)
BILIRUB SERPL-MCNC: 4.1 MG/DL (ref 0.1–1)
BUN SERPL-MCNC: 77 MG/DL (ref 8–23)
BUN SERPL-MCNC: 79 MG/DL (ref 8–23)
BUN SERPL-MCNC: 83 MG/DL (ref 8–23)
CALCIUM SERPL-MCNC: 8.3 MG/DL (ref 8.7–10.5)
CALCIUM SERPL-MCNC: 8.7 MG/DL (ref 8.7–10.5)
CALCIUM SERPL-MCNC: 8.8 MG/DL (ref 8.7–10.5)
CHLORIDE SERPL-SCNC: 92 MMOL/L (ref 95–110)
CHLORIDE SERPL-SCNC: 95 MMOL/L (ref 95–110)
CHLORIDE SERPL-SCNC: 98 MMOL/L (ref 95–110)
CO2 SERPL-SCNC: 17 MMOL/L (ref 23–29)
CO2 SERPL-SCNC: 20 MMOL/L (ref 23–29)
CO2 SERPL-SCNC: 20 MMOL/L (ref 23–29)
CREAT SERPL-MCNC: 2.8 MG/DL (ref 0.5–1.4)
CREAT SERPL-MCNC: 2.9 MG/DL (ref 0.5–1.4)
CREAT SERPL-MCNC: 3.2 MG/DL (ref 0.5–1.4)
CREAT UR-MCNC: 77 MG/DL (ref 15–325)
DELSYS: ABNORMAL
DIFFERENTIAL METHOD: ABNORMAL
EOSINOPHIL # BLD AUTO: ABNORMAL K/UL (ref 0–0.5)
EOSINOPHIL NFR BLD: 1 % (ref 0–8)
ERYTHROCYTE [DISTWIDTH] IN BLOOD BY AUTOMATED COUNT: 14.3 % (ref 11.5–14.5)
ERYTHROCYTE [SEDIMENTATION RATE] IN BLOOD BY WESTERGREN METHOD: 14 MM/H
EST. GFR  (AFRICAN AMERICAN): 16.2 ML/MIN/1.73 M^2
EST. GFR  (AFRICAN AMERICAN): 18.2 ML/MIN/1.73 M^2
EST. GFR  (AFRICAN AMERICAN): 19 ML/MIN/1.73 M^2
EST. GFR  (NON AFRICAN AMERICAN): 14 ML/MIN/1.73 M^2
EST. GFR  (NON AFRICAN AMERICAN): 15.8 ML/MIN/1.73 M^2
EST. GFR  (NON AFRICAN AMERICAN): 16.5 ML/MIN/1.73 M^2
FIO2: 30
GLUCOSE FLD-MCNC: 269 MG/DL
GLUCOSE SERPL-MCNC: 257 MG/DL (ref 70–110)
GLUCOSE SERPL-MCNC: 295 MG/DL (ref 70–110)
GLUCOSE SERPL-MCNC: 312 MG/DL (ref 70–110)
GLUCOSE SERPL-MCNC: 312 MG/DL (ref 70–110)
GRAM STN SPEC: NORMAL
GRAM STN SPEC: NORMAL
HCO3 UR-SCNC: 21.3 MMOL/L (ref 24–28)
HCT VFR BLD AUTO: 29.9 % (ref 37–48.5)
HGB BLD-MCNC: 10.3 G/DL (ref 12–16)
HYPOCHROMIA BLD QL SMEAR: ABNORMAL
IMM GRANULOCYTES # BLD AUTO: ABNORMAL K/UL (ref 0–0.04)
IMM GRANULOCYTES NFR BLD AUTO: ABNORMAL % (ref 0–0.5)
INR PPP: 1.5 (ref 0.8–1.2)
LDH FLD L TO P-CCNC: 118 U/L
LYMPHOCYTES # BLD AUTO: ABNORMAL K/UL (ref 1–4.8)
LYMPHOCYTES NFR BLD: 7 % (ref 18–48)
MAGNESIUM SERPL-MCNC: 2.9 MG/DL (ref 1.6–2.6)
MCH RBC QN AUTO: 33.7 PG (ref 27–31)
MCHC RBC AUTO-ENTMCNC: 34.4 G/DL (ref 32–36)
MCV RBC AUTO: 98 FL (ref 82–98)
MIN VOL: 4.44
MODE: ABNORMAL
MONOCYTES # BLD AUTO: ABNORMAL K/UL (ref 0.3–1)
MONOCYTES NFR BLD: 14 % (ref 4–15)
NEUTROPHILS NFR BLD: 74 % (ref 38–73)
NEUTS BAND NFR BLD MANUAL: 4 %
NRBC BLD-RTO: 0 /100 WBC
OVALOCYTES BLD QL SMEAR: ABNORMAL
PCO2 BLDA: 29.2 MMHG (ref 35–45)
PEEP: 5
PH SMN: 7.47 [PH] (ref 7.35–7.45)
PHOSPHATE SERPL-MCNC: 3.7 MG/DL (ref 2.7–4.5)
PHOSPHATE SERPL-MCNC: 4.7 MG/DL (ref 2.7–4.5)
PHOSPHATE SERPL-MCNC: 5.2 MG/DL (ref 2.7–4.5)
PIP: 23
PLATELET # BLD AUTO: 40 K/UL (ref 150–350)
PLATELET BLD QL SMEAR: ABNORMAL
PMV BLD AUTO: 10.6 FL (ref 9.2–12.9)
PO2 BLDA: 62 MMHG (ref 40–60)
POC BE: -2 MMOL/L
POC SATURATED O2: 93 % (ref 95–100)
POC TCO2: 22 MMOL/L (ref 24–29)
POCT GLUCOSE: 194 MG/DL (ref 70–110)
POCT GLUCOSE: 214 MG/DL (ref 70–110)
POCT GLUCOSE: 216 MG/DL (ref 70–110)
POCT GLUCOSE: 223 MG/DL (ref 70–110)
POCT GLUCOSE: 260 MG/DL (ref 70–110)
POCT GLUCOSE: 261 MG/DL (ref 70–110)
POCT GLUCOSE: 271 MG/DL (ref 70–110)
POCT GLUCOSE: 275 MG/DL (ref 70–110)
POCT GLUCOSE: 277 MG/DL (ref 70–110)
POCT GLUCOSE: 285 MG/DL (ref 70–110)
POCT GLUCOSE: 299 MG/DL (ref 70–110)
POCT GLUCOSE: 309 MG/DL (ref 70–110)
POCT GLUCOSE: 311 MG/DL (ref 70–110)
POCT GLUCOSE: 339 MG/DL (ref 70–110)
POIKILOCYTOSIS BLD QL SMEAR: SLIGHT
POLYCHROMASIA BLD QL SMEAR: ABNORMAL
POTASSIUM SERPL-SCNC: 3.3 MMOL/L (ref 3.5–5.1)
POTASSIUM SERPL-SCNC: 3.5 MMOL/L (ref 3.5–5.1)
POTASSIUM SERPL-SCNC: 4.2 MMOL/L (ref 3.5–5.1)
PROT SERPL-MCNC: 5.8 G/DL (ref 6–8.4)
PROT SERPL-MCNC: 5.9 G/DL (ref 6–8.4)
PROTHROMBIN TIME: 14.5 SEC (ref 9–12.5)
RBC # BLD AUTO: 3.06 M/UL (ref 4–5.4)
SAMPLE: ABNORMAL
SITE: ABNORMAL
SODIUM SERPL-SCNC: 127 MMOL/L (ref 136–145)
SODIUM SERPL-SCNC: 129 MMOL/L (ref 136–145)
SODIUM SERPL-SCNC: 129 MMOL/L (ref 136–145)
SODIUM UR-SCNC: <20 MMOL/L (ref 20–250)
SP02: 100
SPECIMEN SOURCE: NORMAL
SPECIMEN SOURCE: NORMAL
TOXIC GRANULES BLD QL SMEAR: PRESENT
VT: 300
WBC # BLD AUTO: 6.1 K/UL (ref 3.9–12.7)

## 2019-07-26 PROCEDURE — 84100 ASSAY OF PHOSPHORUS: CPT | Mod: NTX

## 2019-07-26 PROCEDURE — 99291 PR CRITICAL CARE, E/M 30-74 MINUTES: ICD-10-PCS | Mod: NTX,,, | Performed by: NURSE PRACTITIONER

## 2019-07-26 PROCEDURE — 99291 CRITICAL CARE FIRST HOUR: CPT | Mod: NTX,,, | Performed by: NURSE PRACTITIONER

## 2019-07-26 PROCEDURE — 99900035 HC TECH TIME PER 15 MIN (STAT): Mod: NTX

## 2019-07-26 PROCEDURE — 84075 ASSAY ALKALINE PHOSPHATASE: CPT | Mod: NTX

## 2019-07-26 PROCEDURE — 25000003 PHARM REV CODE 250: Mod: NTX | Performed by: PHYSICIAN ASSISTANT

## 2019-07-26 PROCEDURE — 99232 PR SUBSEQUENT HOSPITAL CARE,LEVL II: ICD-10-PCS | Mod: GC,NTX,, | Performed by: INTERNAL MEDICINE

## 2019-07-26 PROCEDURE — 94003 VENT MGMT INPAT SUBQ DAY: CPT | Mod: NTX

## 2019-07-26 PROCEDURE — 27200966 HC CLOSED SUCTION SYSTEM: Mod: NTX

## 2019-07-26 PROCEDURE — 25000003 PHARM REV CODE 250: Mod: NTX | Performed by: NURSE PRACTITIONER

## 2019-07-26 PROCEDURE — 83735 ASSAY OF MAGNESIUM: CPT | Mod: NTX

## 2019-07-26 PROCEDURE — 20000000 HC ICU ROOM: Mod: NTX

## 2019-07-26 PROCEDURE — 85610 PROTHROMBIN TIME: CPT | Mod: NTX

## 2019-07-26 PROCEDURE — 82803 BLOOD GASES ANY COMBINATION: CPT | Mod: NTX

## 2019-07-26 PROCEDURE — 27000221 HC OXYGEN, UP TO 24 HOURS: Mod: NTX

## 2019-07-26 PROCEDURE — S5571 INSULIN DISPOS PEN 3 ML: HCPCS | Mod: NTX | Performed by: HOSPITALIST

## 2019-07-26 PROCEDURE — 99900026 HC AIRWAY MAINTENANCE (STAT): Mod: NTX

## 2019-07-26 PROCEDURE — 82570 ASSAY OF URINE CREATININE: CPT | Mod: NTX

## 2019-07-26 PROCEDURE — 80069 RENAL FUNCTION PANEL: CPT | Mod: NTX

## 2019-07-26 PROCEDURE — 36415 COLL VENOUS BLD VENIPUNCTURE: CPT | Mod: NTX

## 2019-07-26 PROCEDURE — 25000003 PHARM REV CODE 250: Mod: NTX | Performed by: STUDENT IN AN ORGANIZED HEALTH CARE EDUCATION/TRAINING PROGRAM

## 2019-07-26 PROCEDURE — 94761 N-INVAS EAR/PLS OXIMETRY MLT: CPT | Mod: NTX

## 2019-07-26 PROCEDURE — 85027 COMPLETE CBC AUTOMATED: CPT | Mod: NTX

## 2019-07-26 PROCEDURE — 99233 SBSQ HOSP IP/OBS HIGH 50: CPT | Mod: GC,NTX,, | Performed by: INTERNAL MEDICINE

## 2019-07-26 PROCEDURE — 63600175 PHARM REV CODE 636 W HCPCS: Mod: NTX | Performed by: INTERNAL MEDICINE

## 2019-07-26 PROCEDURE — 99233 PR SUBSEQUENT HOSPITAL CARE,LEVL III: ICD-10-PCS | Mod: GC,NTX,, | Performed by: INTERNAL MEDICINE

## 2019-07-26 PROCEDURE — 80053 COMPREHEN METABOLIC PANEL: CPT | Mod: NTX

## 2019-07-26 PROCEDURE — 63600175 PHARM REV CODE 636 W HCPCS: Mod: NTX | Performed by: NURSE PRACTITIONER

## 2019-07-26 PROCEDURE — 99232 SBSQ HOSP IP/OBS MODERATE 35: CPT | Mod: GC,NTX,, | Performed by: INTERNAL MEDICINE

## 2019-07-26 PROCEDURE — 84300 ASSAY OF URINE SODIUM: CPT | Mod: NTX

## 2019-07-26 PROCEDURE — 63600175 PHARM REV CODE 636 W HCPCS: Mod: NTX | Performed by: HOSPITALIST

## 2019-07-26 PROCEDURE — 85007 BL SMEAR W/DIFF WBC COUNT: CPT | Mod: NTX

## 2019-07-26 RX ORDER — POTASSIUM CHLORIDE 20 MEQ/15ML
40 SOLUTION ORAL ONCE
Status: COMPLETED | OUTPATIENT
Start: 2019-07-26 | End: 2019-07-26

## 2019-07-26 RX ORDER — FENTANYL CITRATE 50 UG/ML
25 INJECTION, SOLUTION INTRAMUSCULAR; INTRAVENOUS
Status: DISCONTINUED | OUTPATIENT
Start: 2019-07-26 | End: 2019-07-30

## 2019-07-26 RX ADMIN — PIPERACILLIN AND TAZOBACTAM 4.5 G: 4; .5 INJECTION, POWDER, LYOPHILIZED, FOR SOLUTION INTRAVENOUS; PARENTERAL at 11:07

## 2019-07-26 RX ADMIN — LACTULOSE 20 G: 20 SOLUTION ORAL at 08:07

## 2019-07-26 RX ADMIN — LEVOTHYROXINE SODIUM 88 MCG: 88 TABLET ORAL at 05:07

## 2019-07-26 RX ADMIN — MIDODRINE HYDROCHLORIDE 15 MG: 5 TABLET ORAL at 08:07

## 2019-07-26 RX ADMIN — POTASSIUM CHLORIDE 40 MEQ: 20 SOLUTION ORAL at 05:07

## 2019-07-26 RX ADMIN — LACTULOSE 20 G: 20 SOLUTION ORAL at 03:07

## 2019-07-26 RX ADMIN — SODIUM CHLORIDE 1 UNITS/HR: 9 INJECTION, SOLUTION INTRAVENOUS at 01:07

## 2019-07-26 RX ADMIN — INSULIN ASPART 1 UNITS: 100 INJECTION, SOLUTION INTRAVENOUS; SUBCUTANEOUS at 12:07

## 2019-07-26 RX ADMIN — FENTANYL CITRATE 50 MCG: 50 INJECTION INTRAMUSCULAR; INTRAVENOUS at 09:07

## 2019-07-26 RX ADMIN — INSULIN ASPART 3 UNITS: 100 INJECTION, SOLUTION INTRAVENOUS; SUBCUTANEOUS at 05:07

## 2019-07-26 RX ADMIN — CHLORHEXIDINE GLUCONATE 0.12% ORAL RINSE 15 ML: 1.2 LIQUID ORAL at 08:07

## 2019-07-26 RX ADMIN — RIFAXIMIN 550 MG: 550 TABLET ORAL at 08:07

## 2019-07-26 RX ADMIN — MIDODRINE HYDROCHLORIDE 15 MG: 5 TABLET ORAL at 03:07

## 2019-07-26 RX ADMIN — FAMOTIDINE 20 MG: 20 TABLET, FILM COATED ORAL at 08:07

## 2019-07-26 RX ADMIN — INSULIN DETEMIR 10 UNITS: 100 INJECTION, SOLUTION SUBCUTANEOUS at 09:07

## 2019-07-26 RX ADMIN — Medication 220 MG: at 09:07

## 2019-07-26 NOTE — NURSING
A: Awake    RASS: Goal - 0  alert and calm Actual - -1  awakes to voice (eye opening/contact) > 10 seconds   Restraint necessity: yes  B: Breath   SBT: Not attempted  C: Coordinate A & B, analgesics/sedatives   Pain: managed   SAT: Not attempted  D: Delirium   CAM-ICU: positive  E: Early Mobility   MOVE Screen: Fail   Activity order: bedrest  FAS: Feeding/Nutrition   Diet order: tube feeds Fluid restriction: None  T: Thrombus   DVT prophylaxis: TEDS  H: HOB Elevation   30 degrees: Yes   U: Ulcer Prophylaxis   GI: yes  G: Glucose control   managed  S: Skin   Bundle compliance: yes  B: Bowel Function   lactulose TID, rectal tube in place  I: Indwelling Catheters   Webster necessity: Yes   CVC necessity: No   IPAD offered: Not appropriate  D: De-escalation Antibx   No  Plan for the day   Monitor ammonia levels, neuro status, and BM  Family/Goals of care/Code Status   Code Status: Full Code

## 2019-07-26 NOTE — ASSESSMENT & PLAN NOTE
Patient with Hx of DM however was taken off metformin due to improvement  --A1c 5.1  --insulin infusion with hourly glucose checks

## 2019-07-26 NOTE — ASSESSMENT & PLAN NOTE
Likely 2/2 hepatic encephalopathy with ammonia 171 upon ICU tx (64 on 7/22).   --CT head pending  --EEG pending  --Unable to give lactulose while on the hospital floor  --OG tube placed  --Continue lactulose  --minimize sedation for SAT/SBT; fentanyl pushes as needed for toleration of ET tube

## 2019-07-26 NOTE — CONSULTS
Therapy with vancomycin complete and consult discontinued by provider.  Pharmacy will sign off, please re-consult as needed.    Paola Womack, PharmD, BCCCP  [id number]

## 2019-07-26 NOTE — PHYSICIAN QUERY
PT Name: Michelle Ojeda  MR #: [id number]    Physician Query Form - Cause and Effect Relationship Clarification      CDS/: ABRIL Flanagan, RN, CDS              Contact information:[email or web address]    This form is a permanent document in the medical record.     Query Date: July 26, 2019    By submitting this query, we are merely seeking further clarification of documentation. Please utilize your independent clinical judgment when addressing the question(s) below.    The Medical record contains the following:  Supporting Clinical Findings   Location in record      Hyponatremia   Acute hepatic encephalopathy                                                         She was diagnosed with IZAGUIRRE cirrhosis by CT scan and blood tests in July 2015.    She has developed grade 1 hepatic encephalopathy, recently started on rifaximin. She has developed severe ascites, sarcopenia, esophageal varices and hyponatremia.       Hyponatremia slowly correcting, she has been off TPN now. Continue free water restriction. Continue to trend labs, watch for any changes in mental status.        Encephalopathy improving slowly.  Still has asterixis  Has been off lactulose due to ileus  Na improved to 124        encephalopathy: Worsening confusion on 7/20, unresponsive 7/24 and now in ICU     hyponatremia: slowly improving with fluid restriction. continue fluid restriction 800mL. Holding diuresis. Avoid free water additives.                                                                                                                        H&P, Dr. Breaux, 7/18                     Neph note, Dr. Sampson,/ Dr. Rizvi, 7/23           HM PN, Dr. Jasso, 7/23           Hepatology note, Dr. Eaton/Dr. Cannon, 7/25                        Provider, please clarify if there is any correlation between Hyponatremia and Acute Hepatic encephalopathy.           Are the conditions:      [  ] Due to or associated with each other   [ x ] Unrelated to  each other   [  ] Other (Please Specify): _________________________   [  ] Clinically Undetermined

## 2019-07-26 NOTE — SUBJECTIVE & OBJECTIVE
Interval History: Patient intubated. Daughters at bedside.     Review of patient's allergies indicates:  No Known Allergies  Current Facility-Administered Medications   Medication Frequency    acetaminophen tablet 325 mg Q4H PRN    chlorhexidine 0.12 % solution 15 mL BID    dextrose 10% (D10W) Bolus PRN    dextrose 10% (D10W) Bolus PRN    dicyclomine capsule 10 mg QID PRN    famotidine tablet 20 mg Daily    fentaNYL injection 25 mcg Q1H PRN    glucagon (human recombinant) injection 1 mg PRN    glucose chewable tablet 16 g PRN    glucose chewable tablet 24 g PRN    insulin regular (Humulin R) 100 Units in sodium chloride 0.9% 100 mL infusion Continuous    iohexol (OMNIPAQUE) oral solution 15 mL PRN    lactulose 20 gram/30 mL solution Soln 20 g TID    levothyroxine tablet 88 mcg Before breakfast    midodrine tablet 15 mg TID    ondansetron injection 4 mg Q6H PRN    piperacillin-tazobactam 4.5 g in sodium chloride 0.9% 100 mL IVPB (ready to mix system) Q12H    rifAXIMin tablet 550 mg BID    simethicone chewable tablet 80 mg TID PRN    sodium chloride 0.9% flush 10 mL PRN    sodium chloride 0.9% flush 10 mL PRN    zinc sulfate capsule 220 mg Daily       Objective:     Vital Signs (Most Recent):  Temp: 98.1 °F (36.7 °C) (07/26/19 0700)  Pulse: 70 (07/26/19 1200)  Resp: (!) 22 (07/26/19 1200)  BP: (!) 121/59 (07/26/19 1200)  SpO2: 100 % (07/26/19 1200)  O2 Device (Oxygen Therapy): ventilator (07/26/19 1150) Vital Signs (24h Range):  Temp:  [96.9 °F (36.1 °C)-98.1 °F (36.7 °C)] 98.1 °F (36.7 °C)  Pulse:  [60-87] 70  Resp:  [18-59] 22  SpO2:  [100 %] 100 %  BP: (111-145)/(53-77) 121/59     Weight: 56 kg (123 lb 7.3 oz) (07/25/19 1300)  Body mass index is 24.11 kg/m².  Body surface area is 1.54 meters squared.    I/O last 3 completed shifts:  In: 1347.4 [I.V.:217.4; NG/GT:480; IV Piggyback:650]  Out: 1975 [Urine:775; Stool:1200]    Physical Exam   Constitutional: No distress.   HENT:   Head:  Normocephalic and atraumatic.   Cardiovascular: Normal rate, regular rhythm and normal heart sounds.   No murmur heard.  Pulmonary/Chest:   On ventilator      Abdominal: She exhibits distension (Ascites ).   Hyperactive BS throughout    Musculoskeletal: She exhibits no edema.   Neurological:   Not responding to commands. Responding to painful stimuli   Skin: Skin is warm and dry. She is not diaphoretic.       Significant Labs:  ABGs:   Recent Labs   Lab 07/26/19 0928   PH 7.471*   PCO2 29.2*   HCO3 21.3*   POCSATURATED 93*   BE -2     BMP:   Recent Labs   Lab 07/26/19 0417 07/26/19 0928   * 295*   CL 92* 95   CO2 20* 20*   BUN 77* 79*   CREATININE 2.8* 2.9*   CALCIUM 8.7 8.3*   MG 2.9*  --      CBC:   Recent Labs   Lab 07/26/19 0417   WBC 6.10   RBC 3.06*   HGB 10.3*   HCT 29.9*   PLT 40*   MCV 98   MCH 33.7*   MCHC 34.4     CMP:   Recent Labs   Lab 07/26/19 0928   *   CALCIUM 8.3*   ALBUMIN 3.1*  3.1*   PROT 5.8*   *   K 4.2   CO2 20*   CL 95   BUN 79*   CREATININE 2.9*   ALKPHOS 152*   ALT 34   AST 40   BILITOT 3.5*     LFTs:   Recent Labs   Lab 07/26/19 0928   ALT 34   AST 40   ALKPHOS 152*   BILITOT 3.5*   PROT 5.8*   ALBUMIN 3.1*  3.1*     All labs within the past 24 hours have been reviewed.     Significant Imaging:  No new imaging in 24 hours.

## 2019-07-26 NOTE — SUBJECTIVE & OBJECTIVE
Interval History/Significant Events: No acute events overnight.     Review of Systems   Unable to perform ROS: Intubated     Objective:     Vital Signs (Most Recent):  Temp: 98.1 °F (36.7 °C) (07/26/19 0700)  Pulse: 77 (07/26/19 1324)  Resp: (!) 38 (07/26/19 1324)  BP: (!) 114/56 (07/26/19 1300)  SpO2: 100 % (07/26/19 1324) Vital Signs (24h Range):  Temp:  [96.9 °F (36.1 °C)-98.1 °F (36.7 °C)] 98.1 °F (36.7 °C)  Pulse:  [60-87] 77  Resp:  [20-59] 38  SpO2:  [100 %] 100 %  BP: (111-145)/(54-77) 114/56   Weight: 56 kg (123 lb 7.3 oz)  Body mass index is 24.11 kg/m².      Intake/Output Summary (Last 24 hours) at 7/26/2019 1343  Last data filed at 7/26/2019 1300  Gross per 24 hour   Intake 1152.3 ml   Output 1830 ml   Net -677.7 ml       Physical Exam   Constitutional: She appears well-developed and well-nourished. She appears ill. She is intubated and restrained.   HENT:   Head: Normocephalic and atraumatic.   Eyes: Pupils are equal, round, and reactive to light. EOM are normal. Scleral icterus is present.   Neck: No tracheal deviation present. No thyromegaly present.   Cardiovascular: Normal rate and regular rhythm. Exam reveals no gallop and no friction rub.   Murmur heard.  Pulses:       Radial pulses are 2+ on the right side, and 2+ on the left side.        Dorsalis pedis pulses are 2+ on the right side, and 2+ on the left side.   Warm extremities, no peripheral edema   Pulmonary/Chest: Effort normal and breath sounds normal. No accessory muscle usage or stridor. No tachypnea and no bradypnea. She is intubated. No respiratory distress. She has no decreased breath sounds. She has no wheezes. She has no rhonchi. She has no rales.   Abdominal: Soft. She exhibits distension. Bowel sounds are increased. There is hepatomegaly. There is no tenderness.   Fecal management system with brown loose stool   Genitourinary:   Genitourinary Comments: Urine catheter with deborah output   Musculoskeletal: Normal range of motion.    Neurological: No sensory deficit.   No spontaneous eye movements to voice or pain. PERRL, no nystagmus or roving eye movements.  + cough, gag.  Resisting eye opening. Breathing over ventilator.  Spontaneous movement to BLE.    Skin: Skin is warm and dry. No rash noted.            Vents:  Vent Mode: A/C (07/26/19 1324)  Ventilator Initiated: Yes (07/24/19 1312)  Set Rate: 14 bmp (07/26/19 1324)  Vt Set: 300 mL (07/26/19 1324)  Pressure Support: 0 cmH20 (07/26/19 1324)  PEEP/CPAP: 5 cmH20 (07/26/19 1324)  Oxygen Concentration (%): 30 (07/26/19 1324)  Peak Airway Pressure: 22 cmH2O (07/26/19 1324)  Plateau Pressure: 0 cmH20 (07/26/19 1324)  Total Ve: 8.21 mL (07/26/19 1324)  F/VT Ratio<105 (RSBI): 109.51 (07/26/19 1324)  Lines/Drains/Airways     Drain                 NG/OG Tube 07/24/19 1314 orogastric 14 Fr. Right mouth 2 days         Rectal Tube 07/25/19 0943 rectal tube w/ balloon (indicate number of mLs) 1 day         Urethral Catheter 07/24/19 1346 Non-latex 16 Fr. 1 day          Airway                 Airway - Non-Surgical 07/24/19 1310 Endotracheal Tube 2 days          Peripheral Intravenous Line                 Peripheral IV - Single Lumen 07/24/19 1315 20 G Anterior;Distal;Right Forearm 2 days         Peripheral IV - Single Lumen 07/26/19 0500 18 G Left Antecubital less than 1 day              Significant Labs:    CBC/Anemia Profile:  Recent Labs   Lab 07/24/19  1410 07/25/19  0557 07/26/19  0417   WBC 8.89 5.90 6.10   HGB 10.5* 9.9* 10.3*   HCT 30.3* 28.5* 29.9*   PLT 55* 43* 40*   MCV 97 99* 98   RDW 14.6* 14.3 14.3        Chemistries:  Recent Labs   Lab 07/24/19  1512 07/25/19  0557 07/26/19  0417 07/26/19  0928   * 127* 127* 129*   K 3.3* 4.1 3.3* 4.2   CL 89* 92* 92* 95   CO2 26 20* 20* 20*   BUN 73* 69* 77* 79*   CREATININE 2.0* 2.2* 2.8* 2.9*   CALCIUM 9.0 8.7 8.7 8.3*   ALBUMIN 3.8 3.5 3.2* 3.1*  3.1*   PROT 6.0 6.2 5.9* 5.8*   BILITOT 2.7* 2.7* 4.1* 3.5*   ALKPHOS 143* 102 144* 152*   ALT 31  26 34 34   AST 37 47* 39 40   MG 2.7* 2.7* 2.9*  --    PHOS  --  3.7 5.2* 4.7*       All pertinent labs within the past 24 hours have been reviewed.    Significant Imaging:  I have reviewed and interpreted all pertinent imaging results/findings within the past 24 hours.

## 2019-07-26 NOTE — PROGRESS NOTES
[facility]  Critical Care Medicine  Progress Note    Patient Name: Michelle Ojeda  MRN: [id number]  Admission Date: 7/18/2019  Hospital Length of Stay: 8 days  Code Status: Full Code  Attending Provider: Mike Burns*  Primary Care Provider: Primary Doctor No   Principal Problem: Acute metabolic encephalopathy    Subjective:     HPI:  Michelle Ojeda is a 68 year old female with history of IZAGUIRRE cirrhosis, HTN and hypothyroidism who presented to [facility] on 7/18/2019 as a direct admit from Transplant [facility]ology [facility] for hyponatremia. Patient is from [place] and requesting liver/kidney transplant evaluation. Her cirrhosis is complicated by ascites requiring biweekly paracentesis, sarcopenia, esophageal varices, hyponatremia and hepatic encephalopathy. Per daughter, patient with functional decline over the last 2 months with complaints of lethargy, decreased oral intake, and peripheral edema. Patient initially admitted to hospital medicine for treatment of hyponatremia with fluid restriction. During liver work up, incidental renal mass found and urology was consulted. Patient developed an ileus and unable to tolerate lactulose while on the floor.    Rapid response called 7/24/2019 for worsening encephalopathy. At that time, patient was hemodynamically stable however not following commands or withdrawing to pain. Found to have ammonia 171. Critical care medicine consulted as patient vomiting and concern for not protecting airway.     Upon arrival, patient unresponsive and will only withdraw to pain yet hemodynamically stable. Multiple episodes of emesis during exam requiring aggressive suctioning to protect airway. Patient admitted to the CMICU at that time for emergent intubation.     Hospital/ICU Course:  Ms. Ojeda was admitted to the ICU with [facility] for acute hepatic encephalopathy requiring intubation for airway protection.  Ammonia level 171.  Lactulose and Rifaximin started.   Paracentesis on 7/24 with 4 L removed, negative for SBP. Pan cultured and started on empiric antibiotics with vancomycin and pip/tazo.     Interval History/Significant Events: see hospital course    Review of Systems   Unable to perform ROS: Intubated     Objective:     Vital Signs (Most Recent):  Temp: 96.5 °F (35.8 °C) (07/25/19 0700)  Pulse: 63 (07/25/19 0700)  Resp: 18 (07/25/19 0700)  BP: (!) 100/50 (07/25/19 0700)  SpO2: 100 % (07/25/19 0700) Vital Signs (24h Range):  Temp:  [96.4 °F (35.8 °C)-98 °F (36.7 °C)] 96.5 °F (35.8 °C)  Pulse:  [50-92] 63  Resp:  [14-40] 18  SpO2:  [96 %-100 %] 100 %  BP: ()/(41-75) 100/50   Weight: 56 kg (123 lb 7.3 oz)  Body mass index is 24.11 kg/m².      Intake/Output Summary (Last 24 hours) at 7/25/2019 0756  Last data filed at 7/25/2019 0700  Gross per 24 hour   Intake 596.61 ml   Output 1070 ml   Net -473.39 ml       Physical Exam   Constitutional: She appears well-developed and well-nourished. She appears ill. She is intubated and restrained.   HENT:   Head: Normocephalic and atraumatic.   Eyes: Pupils are equal, round, and reactive to light. EOM are normal. Scleral icterus is present.   Neck: No tracheal deviation present. No thyromegaly present.   Cardiovascular: Normal rate and regular rhythm. Exam reveals no gallop and no friction rub.   Murmur heard.  Pulses:       Radial pulses are 2+ on the right side, and 2+ on the left side.        Dorsalis pedis pulses are 2+ on the right side, and 2+ on the left side.   Warm extremities, no peripheral edema   Pulmonary/Chest: Effort normal and breath sounds normal. No accessory muscle usage or stridor. No tachypnea and no bradypnea. She is intubated. No respiratory distress. She has no decreased breath sounds. She has no wheezes. She has no rhonchi. She has no rales.   Abdominal: Soft. She exhibits distension. Bowel sounds are increased. There is hepatomegaly. There is no tenderness.   Fecal management system with brown loose  stool   Genitourinary:   Genitourinary Comments: Urine catheter with deborah output   Musculoskeletal: Normal range of motion.   Neurological: No sensory deficit.   No spontaneous eye movements to voice or pain. PERRL, no nystagmus or roving eye movements.  + cough, gag.  Resisting eye opening. Breathing over ventilator.  Spontaneous movement to BLE.    Skin: Skin is warm and dry. No rash noted.            Vents:  Vent Mode: A/C (07/25/19 0553)  Ventilator Initiated: Yes (07/24/19 1312)  Set Rate: 18 bmp (07/25/19 0553)  Vt Set: 300 mL (07/25/19 0553)  Pressure Support: 0 cmH20 (07/25/19 0553)  PEEP/CPAP: 5 cmH20 (07/25/19 0553)  Oxygen Concentration (%): 30 (07/25/19 0700)  Peak Airway Pressure: 24 cmH2O (07/25/19 0553)  Plateau Pressure: 0 cmH20 (07/25/19 0553)  Total Ve: 5.43 mL (07/25/19 0553)  F/VT Ratio<105 (RSBI): (!) 57.32 (07/24/19 2307)  Lines/Drains/Airways     Drain                 NG/OG Tube 07/24/19 1314 orogastric 14 Fr. Right mouth less than 1 day         Urethral Catheter 07/24/19 1346 Non-latex 16 Fr. less than 1 day          Airway                 Airway - Non-Surgical 07/24/19 1310 Endotracheal Tube less than 1 day          Peripheral Intravenous Line                 Peripheral IV - Single Lumen 07/21/19 1100 20 G Distal;Left;Lateral Forearm 3 days         Peripheral IV - Single Lumen 07/24/19 1315 20 G Anterior;Distal;Right Forearm less than 1 day              Significant Labs:    CBC/Anemia Profile:  Recent Labs   Lab 07/24/19  0409 07/24/19  1410 07/25/19  0557   WBC 3.97 8.89 5.90   HGB 9.3* 10.5* 9.9*   HCT 26.8* 30.3* 28.5*   PLT 35* 55*  --    * 97 99*   RDW 14.1 14.6* 14.3        Chemistries:  Recent Labs   Lab 07/24/19  0409 07/24/19  1512 07/25/19  0557   * 125* 127*   K 3.8 3.3* 4.1   CL 90* 89* 92*   CO2 26 26 20*   BUN 66* 73* 69*   CREATININE 1.7* 2.0* 2.2*   CALCIUM 8.3* 9.0 8.7   ALBUMIN 3.1* 3.8 3.5   PROT 5.6* 6.0 6.2   BILITOT 1.6* 2.7* 2.7*   ALKPHOS 140* 143* 102    ALT 27 31 26   AST 34 37 47*   MG 2.4 2.7* 2.7*   PHOS 2.8  --  3.7       All pertinent labs within the past 24 hours have been reviewed.    Significant Imaging:  I have reviewed and interpreted all pertinent imaging results/findings within the past 24 hours.      ABG  Recent Labs   Lab 07/26/19  0928   PH 7.471*   PO2 62*   PCO2 29.2*   HCO3 21.3*   BE -2     Assessment/Plan:     Neuro  * Acute metabolic encephalopathy  Likely 2/2 hepatic encephalopathy with ammonia 171 upon ICU tx (64 on 7/22).   --CT head pending  --EEG pending  --Unable to give lactulose while on the hospital floor  --OG tube placed  --Continue lactulose  --minimize sedation for SAT/SBT; fentanyl pushes as needed for toleration of ET tube    Pulmonary  Acute hypoxemic respiratory failure  Intubated for airway protection in setting of severe encephalopathy and episodes of emesis/concern for aspiration  --Started empiric abx for possible aspiration PNA  --Sputum culture pending  --No focal signs of consolidation on CXR  --Minimal ventilator requirements  --Wean supplemental O2 for goal SpO2 >88%      Renal/  Right kidney mass  R kidney mass measures 2.4 cm x 2.3 cm found on CT abd/pelvis during transplant workup  --Urology following with plans to hold on any further work up in setting of acute decompensation    Stage 4 chronic kidney disease  --Baseline sCr 1.6. Possible candidate for liver/kidney transplant  --iATN vs HRS  --Nephrology following.  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  --Complicated by ascites, thrombocytopenia, esophageal varices, hyponatremia and HE  --Currently being worked up for LTS eval  --Continue lactulose and rifaximin  --Hepatology following  --Paracentesis 7/24 with 4L removed;negative for SBP      Other  Shock, unspecified  Likely due to sedating medications however patient at risk for infection  --Blood culture and sputum pending  --UA negative  --Peritoneal fluid pending to r/o SBT  --Started vancomycin/zosyn  --Requiring low dose norepinephrine  --Wean for MAP >65        Critical Care Time: 40 minutes  Critical secondary to Patient has a condition that poses threat to life and bodily function: Acute metabolic encephalopathy      Critical care was time spent personally by me on the following activities: development of treatment plan with patient or surrogate and bedside caregivers, discussions with consultants, evaluation of patient's response to treatment, examination of patient, ordering and performing treatments and interventions, ordering and review of laboratory studies, ordering and review of radiographic studies, pulse oximetry, re-evaluation of patient's condition. This critical care time did not overlap with that of any other provider or involve time for any procedures.     Chucho Garcia, NP  Critical Care Medicine  [facility]

## 2019-07-26 NOTE — PROGRESS NOTES
[facility]  Nephrology  Progress Note    Patient Name: Michelle Ojeda  MRN: [id number]  Admission Date: 7/18/2019  Hospital Length of Stay: 8 days  Attending Provider: Mike Burns*   Primary Care Physician: Primary Doctor No  Principal Problem:Acute metabolic encephalopathy    Subjective:     HPI: Rusty Martinez is 67 yo F with CKD stage 4 presenting from transplant hepatology clinic for hyponatremia on 7/18/19. She is originally from [place], here for liver transplant evaluation. She was diagnosed with IZAGUIRRE in grade 1 hepatic encephalopathy with severe ascites and esophogeal varices (s/p banding). During the month prior to admission she felt more lethargic and was requiring biweekly paracentesis. Of note, she is also being treated for Ileus her acute hepatic encephalopathy with lactulose. Recent 24 hour cr clearance study was near 21. Reported baseline Cr 1.6-1.8 and chronic hyponatremia range between 123-124.     Interval History: Patient intubated. Daughters at bedside.     Review of patient's allergies indicates:  No Known Allergies  Current Facility-Administered Medications   Medication Frequency    acetaminophen tablet 325 mg Q4H PRN    chlorhexidine 0.12 % solution 15 mL BID    dextrose 10% (D10W) Bolus PRN    dextrose 10% (D10W) Bolus PRN    dicyclomine capsule 10 mg QID PRN    famotidine tablet 20 mg Daily    fentaNYL injection 25 mcg Q1H PRN    glucagon (human recombinant) injection 1 mg PRN    glucose chewable tablet 16 g PRN    glucose chewable tablet 24 g PRN    insulin regular (Humulin R) 100 Units in sodium chloride 0.9% 100 mL infusion Continuous    iohexol (OMNIPAQUE) oral solution 15 mL PRN    lactulose 20 gram/30 mL solution Soln 20 g TID    levothyroxine tablet 88 mcg Before breakfast    midodrine tablet 15 mg TID    ondansetron injection 4 mg Q6H PRN    piperacillin-tazobactam 4.5 g in sodium chloride 0.9% 100 mL IVPB (ready to mix system)  Q12H    rifAXIMin tablet 550 mg BID    simethicone chewable tablet 80 mg TID PRN    sodium chloride 0.9% flush 10 mL PRN    sodium chloride 0.9% flush 10 mL PRN    zinc sulfate capsule 220 mg Daily       Objective:     Vital Signs (Most Recent):  Temp: 98.1 °F (36.7 °C) (07/26/19 0700)  Pulse: 70 (07/26/19 1200)  Resp: (!) 22 (07/26/19 1200)  BP: (!) 121/59 (07/26/19 1200)  SpO2: 100 % (07/26/19 1200)  O2 Device (Oxygen Therapy): ventilator (07/26/19 1150) Vital Signs (24h Range):  Temp:  [96.9 °F (36.1 °C)-98.1 °F (36.7 °C)] 98.1 °F (36.7 °C)  Pulse:  [60-87] 70  Resp:  [18-59] 22  SpO2:  [100 %] 100 %  BP: (111-145)/(53-77) 121/59     Weight: 56 kg (123 lb 7.3 oz) (07/25/19 1300)  Body mass index is 24.11 kg/m².  Body surface area is 1.54 meters squared.    I/O last 3 completed shifts:  In: 1347.4 [I.V.:217.4; NG/GT:480; IV Piggyback:650]  Out: 1975 [Urine:775; Stool:1200]    Physical Exam   Constitutional: No distress.   HENT:   Head: Normocephalic and atraumatic.   Cardiovascular: Normal rate, regular rhythm and normal heart sounds.   No murmur heard.  Pulmonary/Chest:   On ventilator      Abdominal: She exhibits distension (Ascites ).   Hyperactive BS throughout    Musculoskeletal: She exhibits no edema.   Neurological:   Not responding to commands. Responding to painful stimuli   Skin: Skin is warm and dry. She is not diaphoretic.       Significant Labs:  ABGs:   Recent Labs   Lab 07/26/19 0928   PH 7.471*   PCO2 29.2*   HCO3 21.3*   POCSATURATED 93*   BE -2     BMP:   Recent Labs   Lab 07/26/19  0417 07/26/19  0928   * 295*   CL 92* 95   CO2 20* 20*   BUN 77* 79*   CREATININE 2.8* 2.9*   CALCIUM 8.7 8.3*   MG 2.9*  --      CBC:   Recent Labs   Lab 07/26/19  0417   WBC 6.10   RBC 3.06*   HGB 10.3*   HCT 29.9*   PLT 40*   MCV 98   MCH 33.7*   MCHC 34.4     CMP:   Recent Labs   Lab 07/26/19  0928   *   CALCIUM 8.3*   ALBUMIN 3.1*  3.1*   PROT 5.8*   *   K 4.2   CO2 20*   CL 95   BUN  79*   CREATININE 2.9*   ALKPHOS 152*   ALT 34   AST 40   BILITOT 3.5*     LFTs:   Recent Labs   Lab 07/26/19  0928   ALT 34   AST 40   ALKPHOS 152*   BILITOT 3.5*   PROT 5.8*   ALBUMIN 3.1*  3.1*     All labs within the past 24 hours have been reviewed.     Significant Imaging:  No new imaging in 24 hours.    Assessment/Plan:     * Acute metabolic encephalopathy  Treatment plan per primary team     Right kidney mass  Patient has new Right renal mass found on CT abd on 7/22/19. Previous Ct from 2016 and MRI studies from 2019 in [place] did not demonstrate this finding.     Recommendations  - Urology recommends f/u outpatient as she is not a good surgical or biopsy candidate.   - Would prefer not expose kidneys to contrast but will discuss with other teams if necessary.    Stage 4 chronic kidney disease  Labs from [place] nephrology demonstrated Cr of 1.77 in 3/12/19, 1.67 on 4/5/19, 1.96 in 6/13/19, and 1.83 on 6/24. Cr is 2.8 today (7/26/19) above her baseline due to ATN secondary to hypoperfusion of kidneys related to hypotensive episodes. Hepatorenal syndrome not suspected at this time but may be present but this is a diagnoses of exclusion. Received 3X35g doses of albumin on 7/24/19.  - Midodrine 15mg TID added to prevent hypoperfusion to kidneys. Goal MAP>70.  - Monitor with daily BMP  - Nephrology will continue to follow closely.   - Considering dialysis if worsening Cr     Hyponatremia  Rusty is a 67 yo F with liver cirrhosis and CKD admitted for hyponatremia. Hyponatremia may be related to several factors primarily liver cirrhosis requiring biweekly paracentesis. This leads to a hypovolemic hyponatremic state in which fluid is pulled out of the vasculature. Additional contributing factors related to ileus, NPO status, and TPN nutrition. Extra sodium in TPN has increased sodium. Now on tube feeds. Baseline Na+ from labs in [place] between 123-124. She is above her baseline at 129 (7/26/19). Patient  is now intubated on ventilator.   - If volume expansion required recommend albumin   - Increased Midodrine to 15 mg TID scheduled; NE was discontinued and we agree with this order  - Strict I/O monitoring     Liver cirrhosis secondary to IZAGUIRRE  Treatment plan per primary team         Thank you for your consult. I will follow-up with patient. Please contact us if you have any additional questions.    Josh Sampson MD  Nephrology  [facility]

## 2019-07-26 NOTE — ASSESSMENT & PLAN NOTE
Ms Ojeda is a 68 year old female with a history of IZAGUIRRE ESLD, with decompensations including Gr 1 HE, ascites, EV, hyponatremia, T2DM, HTN, hypothyroidism, who is being admitted from hepatology clinic due to concern for hyponatremia. Found to have incidentally discovered R renal mass. Unresponsive on 7/24, transferred to ICU and intubated for airway protection.     Plan  - encephalopathy: Acutely worsened on 7/24 and now in ICU. Restarted lactulose aggressively with good stool output. Ammonia 171. Continue rifaximin. Infectious workup per primary. On zosyn. Will need to evaluate how patient does over next 48 hours before. If no improvement, may need to consider Palliative involvement.  - R renal mass: per transplant team, recommend Urology consult and U/S retroperitoneum. Urology recommending active surveillance. High risk for surgery or biopsy. U/S retroperitoneum with mass but unable to characterize. Thrombocytopenia is a concern especially if biopsy necessary.  - ileus on imaging, now resolved. NGT removed.  - ascites: Negative for SBP on 07/19. Total protein 1.5.  - diuretics: hold  - CKD: unclear baseline. Holding diuretics and albumin therapy given hyponatremia.   - hyponatremia: slowly improving with fluid restriction. continue fluid restriction 800mL. Holding diuresis. Avoid free water additives. Nephrology consult.  - malnutrition: nutrition consult. Calorie count. Supplements. NG tube feeds now that ileus has resolved.  - PT/OT  - inpatient transplant evaluation started. Nuclear stress test and renal scan completed, ID consult, now needs improvement in mental status and further evaluation of R renal mass (although transplant may still be done with a relatively indolent tumor)  - Appreciate renal evaluation re: SLK given GFR <35

## 2019-07-26 NOTE — ASSESSMENT & PLAN NOTE
Intubated for airway protection in setting of severe encephalopathy and episodes of emesis/concern for aspiration  --Started empiric abx for possible aspiration PNA  --Sputum culture pending  --No focal signs of consolidation on CXR  --Minimal ventilator requirements  --Wean supplemental O2 for goal SpO2 >88%

## 2019-07-26 NOTE — ASSESSMENT & PLAN NOTE
Rusty is a 67 yo F with liver cirrhosis and CKD admitted for hyponatremia. Hyponatremia may be related to several factors primarily liver cirrhosis requiring biweekly paracentesis. This leads to a hypovolemic hyponatremic state in which fluid is pulled out of the vasculature. Additional contributing factors related to ileus, NPO status, and TPN nutrition. Extra sodium in TPN has increased sodium. Now on tube feeds. Baseline Na+ from labs in [place] between 123-124. She is above her baseline at 129 (7/26/19). Patient is now intubated on ventilator.   - If volume expansion required recommend albumin   - Increased Midodrine to 15 mg TID scheduled; NE was discontinued and we agree with this order  - Strict I/O monitoring

## 2019-07-26 NOTE — PHYSICIAN QUERY
PT Name: Michelle Ojeda  MR #: [id number]    Physician Query Form - Cause and Effect Relationship Clarification      CDS/: ABRIL Flanagan, RN, CDS               Contact information:[email or web address]    This form is a permanent document in the medical record.     Query Date: July 26, 2019    By submitting this query, we are merely seeking further clarification of documentation. Please utilize your independent clinical judgment when addressing the question(s) below.    The Medical record contains the following:  Supporting Clinical Findings   Location in record    Hyponatremia may be related to several factors primarily liver cirrhosis requiring biweekly paracentesis       Liver cirrhosis secondary to IZAGUIRRE  Complicated by ascites, thrombocytopenia, esophageal varices, hyponatremia and HE                                                                                                                                                                                          Neph note, Dr. Sampson, 7/23           Critical care note, Bonny Hathaway, PAC/Dr. Burns, 7/24                  Provider, please clarify if there is any correlation between Hyponatremia and IZAGUIRRE cirrhosis.           Are the conditions:      [ x ] Due to or associated with each other   [  ] Unrelated to each other   [  ] Other (Please Specify): _________________________   [  ] Clinically Undetermined

## 2019-07-26 NOTE — SUBJECTIVE & OBJECTIVE
Interval History:   This morning, off propofol, just receiving intermittent fentanyl pushes for tachypnea.  Off levophed drip.  No change in mental status, but per sister did open eyes earlier in the day.  Good stool output.    Current Facility-Administered Medications   Medication    acetaminophen tablet 325 mg    chlorhexidine 0.12 % solution 15 mL    dextrose 10% (D10W) Bolus    dextrose 10% (D10W) Bolus    dicyclomine capsule 10 mg    famotidine tablet 20 mg    fentaNYL injection 25 mcg    glucagon (human recombinant) injection 1 mg    glucose chewable tablet 16 g    glucose chewable tablet 24 g    insulin regular (Humulin R) 100 Units in sodium chloride 0.9% 100 mL infusion    iohexol (OMNIPAQUE) oral solution 15 mL    lactulose 20 gram/30 mL solution Soln 20 g    levothyroxine tablet 88 mcg    midodrine tablet 15 mg    ondansetron injection 4 mg    piperacillin-tazobactam 4.5 g in sodium chloride 0.9% 100 mL IVPB (ready to mix system)    rifAXIMin tablet 550 mg    simethicone chewable tablet 80 mg    sodium chloride 0.9% flush 10 mL    sodium chloride 0.9% flush 10 mL    zinc sulfate capsule 220 mg       Objective:     Vital Signs (Most Recent):  Temp: 98.1 °F (36.7 °C) (07/26/19 0700)  Pulse: 77 (07/26/19 1324)  Resp: (!) 38 (07/26/19 1324)  BP: (!) 114/56 (07/26/19 1300)  SpO2: 100 % (07/26/19 1324) Vital Signs (24h Range):  Temp:  [96.9 °F (36.1 °C)-98.1 °F (36.7 °C)] 98.1 °F (36.7 °C)  Pulse:  [60-87] 77  Resp:  [20-59] 38  SpO2:  [100 %] 100 %  BP: (111-145)/(54-77) 114/56     Weight: 56 kg (123 lb 7.3 oz) (07/25/19 1300)  Body mass index is 24.11 kg/m².    Physical Exam   Constitutional: She has a sickly appearance. No distress.   Eyes: Scleral icterus is present.   Cardiovascular: Normal rate and regular rhythm.   Murmur (flow murmur) heard.  Pulmonary/Chest: Effort normal. No respiratory distress.   Abdominal: Soft. Bowel sounds are normal. She exhibits no distension. There is no  tenderness.   Neurological: She is unresponsive.   Skin: Skin is warm and dry.   Jaundiced   Nursing note and vitals reviewed.      MELD-Na score: 30 at 7/26/2019  9:28 AM  MELD score: 26 at 7/26/2019  9:28 AM  Calculated from:  Serum Creatinine: 2.9 mg/dL at 7/26/2019  9:28 AM  Serum Sodium: 129 mmol/L at 7/26/2019  9:28 AM  Total Bilirubin: 3.5 mg/dL at 7/26/2019  9:28 AM  INR(ratio): 1.5 at 7/26/2019  4:17 AM  Age: 68 years    Significant Labs:  Labs within the past month have been reviewed.    Significant Imaging:  Reviewed

## 2019-07-26 NOTE — PLAN OF CARE
Problem: Adult Inpatient Plan of Care  Goal: Plan of Care Review  Outcome: Ongoing (interventions implemented as appropriate)    No acute events throughout day. See vital signs and assessments in flowsheets. Pt withdraws to pain in all 4 extremities, unable to follow commands. Pt remains intubated, rectal tube and leigh maintained. Tube feeds @ 20ml/hr, goal is 40ml/hr. Accuchecks q6, PRN insulin administered per MAR. Patient progressing towards goals as tolerated, plan of care communicated and reviewed with Michelle Ojeda and family. All concerns addressed. Will continue to monitor.

## 2019-07-26 NOTE — PLAN OF CARE
Problem: Adult Inpatient Plan of Care  Goal: Plan of Care Review  Outcome: Ongoing (interventions implemented as appropriate)  See vital signs and assessments in flowsheets. See below for updates on today's progress.      Pulmonary: Pt remains on ventilator A/C, RR 14, FIO2 30%, PEEP 5.  Lung sounds coarse. Pt put on SBT briefly this morning but remains too lethargic/encephalopathic, very low TV. Large amt thick secretions via ET.     Cardiovascular: NSR, 60-80s. MAP >70. Pulses palpable     Neurological: Pt remains lethargic, does not follow commands. Withdraws to pain.  Pupils brisk and equal 4mm. Afebrile     Gastrointestinal: Flexi in place and patent- Lactulose TID. 700ml light brown liquid stool. TF at goal 40cc/hr. Max GR 85cc.      Genitourinary: Webster in place.       Endocrine: Pt hyperglycemic- Insulin gtt initiated, hourly acchuchecks      Skin/Bath: Skin tears on bilateral arms. Foam dressings in place                   Date of last CHG bath given: 7/26/19     Infusions: Insulin. ASIM Berg aware of K 3.5, no replacements orders as Cr trending up, now 3.2.     Patient progressing towards goals as tolerated, plan of care communicated and reviewed with Michelle Ojeda and family. All concerns addressed. Will continue to monitor.

## 2019-07-26 NOTE — ASSESSMENT & PLAN NOTE
Likely 2/2 hepatic encephalopathy with ammonia 171 upon ICU tx (64 on 7/22).   --CT head negative for acute intracranial abnormality  --EEG pending read  --continue lactulose and rifaximin  --continue antibiotics

## 2019-07-26 NOTE — PROGRESS NOTES
[facility]  Hepatology  Progress Note    Patient Name: Michelle Ojeda  MRN: [id number]  Admission Date: 7/18/2019  Hospital Length of Stay: 8 days  Attending Provider: Mike Burns*   Primary Care Physician: Primary Doctor No  Principal Problem:Acute metabolic encephalopathy    Subjective:     Transplant status: No    HPI: Ms Ojeda is a 68 year old female with a history of HERRMANN ESLD, with decompensations including Gr 1 HE, ascites, EV, hyponatremia, T2DM, HTN, hypothyroidism, who is being admitted from hepatology clinic due to concern for hyponatremia.    She presents to transplant Clinic for initial evaluation on 07/18 with Dr. Cannon for initial transplant evaluation.  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  Her cirrhosis has been complicated by grade 1 hepatic encephalopathy, recently started Aldo Geneva min but never been on lactulose, ascites, sarcopenia, esophageal varices status post ligation, hyponatremia.  She was previously evaluated at [place] with Dr. Mccormick who recommended evaluation at [facility].  Does not appear she was evaluated for transplant at that time.    Due to hyponatremia with sodium of 120 she was admitted to the hospital for further management.  Currently she reports she is feeling well without any complaints.  Denies any abdominal pain, nausea, vomiting, diarrhea.  Denies any fevers, chills, sweats or other infectious complaints.    Family who is present in the room note that she is doing extremely well until approximately 2 months prior to presentation when she began to be more frail, fatigue, increased peripheral edema, decreased oral intake.  They note they have been extremely careful to avoid T here to low-sodium diet.  She has never been hospitalized in the  past for hyponatremia.  No other recent hospitalizations.  Regarding her ascites she has approximately received a paracentesis every 2 weeks.      Interval History:   This morning, off propofol, just receiving intermittent fentanyl pushes for tachypnea.  Off levophed drip.  No change in mental status, but per sister did open eyes earlier in the day.  Good stool output.    Current Facility-Administered Medications   Medication    acetaminophen tablet 325 mg    chlorhexidine 0.12 % solution 15 mL    dextrose 10% (D10W) Bolus    dextrose 10% (D10W) Bolus    dicyclomine capsule 10 mg    famotidine tablet 20 mg    fentaNYL injection 25 mcg    glucagon (human recombinant) injection 1 mg    glucose chewable tablet 16 g    glucose chewable tablet 24 g    insulin regular (Humulin R) 100 Units in sodium chloride 0.9% 100 mL infusion    iohexol (OMNIPAQUE) oral solution 15 mL    lactulose 20 gram/30 mL solution Soln 20 g    levothyroxine tablet 88 mcg    midodrine tablet 15 mg    ondansetron injection 4 mg    piperacillin-tazobactam 4.5 g in sodium chloride 0.9% 100 mL IVPB (ready to mix system)    rifAXIMin tablet 550 mg    simethicone chewable tablet 80 mg    sodium chloride 0.9% flush 10 mL    sodium chloride 0.9% flush 10 mL    zinc sulfate capsule 220 mg       Objective:     Vital Signs (Most Recent):  Temp: 98.1 °F (36.7 °C) (07/26/19 0700)  Pulse: 77 (07/26/19 1324)  Resp: (!) 38 (07/26/19 1324)  BP: (!) 114/56 (07/26/19 1300)  SpO2: 100 % (07/26/19 1324) Vital Signs (24h Range):  Temp:  [96.9 °F (36.1 °C)-98.1 °F (36.7 °C)] 98.1 °F (36.7 °C)  Pulse:  [60-87] 77  Resp:  [20-59] 38  SpO2:  [100 %] 100 %  BP: (111-145)/(54-77) 114/56     Weight: 56 kg (123 lb 7.3 oz) (07/25/19 1300)  Body mass index is 24.11 kg/m².    Physical Exam   Constitutional: She has a sickly appearance. No distress.   Eyes: Scleral icterus is present.   Cardiovascular: Normal rate and regular rhythm.   Murmur (flow murmur)  heard.  Pulmonary/Chest: Effort normal. No respiratory distress.   Abdominal: Soft. Bowel sounds are normal. She exhibits no distension. There is no tenderness.   Neurological: She is unresponsive.   Skin: Skin is warm and dry.   Jaundiced   Nursing note and vitals reviewed.      MELD-Na score: 30 at 7/26/2019  9:28 AM  MELD score: 26 at 7/26/2019  9:28 AM  Calculated from:  Serum Creatinine: 2.9 mg/dL at 7/26/2019  9:28 AM  Serum Sodium: 129 mmol/L at 7/26/2019  9:28 AM  Total Bilirubin: 3.5 mg/dL at 7/26/2019  9:28 AM  INR(ratio): 1.5 at 7/26/2019  4:17 AM  Age: 68 years    Significant Labs:  Labs within the past month have been reviewed.    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 Found to have incidentally discovered R renal mass. Unresponsive on 7/24, transferred to ICU and intubated for airway protection.     Plan  - encephalopathy: Acutely worsened on 7/24 and now in ICU. Restarted lactulose aggressively with good stool output. Ammonia 171. Continue rifaximin. Infectious workup per primary. On zosyn. Will need to evaluate how patient does over next 48 hours before. If no improvement, may need to consider Palliative involvement.  - R renal mass: per transplant team, recommend Urology consult and U/S retroperitoneum. Urology recommending active surveillance. High risk for surgery or biopsy. U/S retroperitoneum with mass but unable to characterize. Thrombocytopenia is a concern especially if biopsy necessary.  - ileus on imaging, now resolved. NGT removed.  - ascites: Negative for SBP on 07/19. Total protein 1.5.  - diuretics: hold  - CKD: unclear baseline. Holding diuretics and albumin therapy given hyponatremia.   - hyponatremia:  slowly improving with fluid restriction. continue fluid restriction 800mL. Holding diuresis. Avoid free water additives. Nephrology consult.  - malnutrition: nutrition consult. Calorie count. Supplements. NG tube feeds now that ileus has resolved.  - PT/OT  - inpatient transplant evaluation started. Nuclear stress test and renal scan completed, ID consult, now needs improvement in mental status and further evaluation of R renal mass (although transplant may still be done with a relatively indolent tumor)  - Appreciate renal evaluation re: SLK given GFR <35        Thank you for your consult. I will follow-up with patient. Please contact us if you have any additional questions.    Andrea Eaton MD  Hepatology  [facility]

## 2019-07-26 NOTE — ASSESSMENT & PLAN NOTE
--Baseline sCr 1.6. Possible candidate for liver/kidney transplant  --iATN vs HRS  --Nephrology following.  Retroperitoneal US on 7/23 negative for hydronephrosis  --rising sCr and becoming oliguric  --Strict I/Os

## 2019-07-26 NOTE — ASSESSMENT & PLAN NOTE
--Complicated by ascites, thrombocytopenia, esophageal varices, hyponatremia and HE  --Currently being worked up for LTS eval  --Continue lactulose and rifaximin  --Hepatology following  --Paracentesis 7/24 with 4L removed;negative for SBP. Cytology pending.

## 2019-07-26 NOTE — HOSPITAL COURSE
Upon admission to the MICU, pt was severely encephalopathic and required rectal tube placement for lactulose enemas. On night of admission to MICU, central venous catheter was placed and patient was initiated on renal replacement therapy. As her uremic and hepatic encephalopathy improved with lactulose enemas and RRT, respectively, she became more engaged in discussions with her family and agreed to the plan for transfer back to [place]. Given the improvement in her encephalopathy, pt was able to pass a swallow evaluation. On the night of 8/8-8/9, pt experienced a drop in hemoglobin, concurrent drop in blood pressure and bright red blood noted in her rectal tube. She was administered a single unit of pRBC's, two units of platelets (since plt count was 33) and a dose of vit K. This appeared to stymie the bleeding as blood count responded well to this intervention. Her rectal lactulose was ceased, but rectal tube was kept in place d/t concern for her tenuous mental status. Her TPN was continued despite passed swallow assessment also d/t concern for her tenuous mental status.     On the night prior to discharge pt had no acute events. Pt stable for discharge via medical flight to [place].

## 2019-07-26 NOTE — ASSESSMENT & PLAN NOTE
Intubated for airway protection in setting of severe encephalopathy and episodes of emesis/concern for aspiration  --Started empiric abx for possible aspiration PNA  --Sputum culture negative from 7/24  --No focal signs of consolidation on CXR  --Minimal ventilator requirements  --Wean supplemental O2 for goal SpO2 >88%

## 2019-07-26 NOTE — ASSESSMENT & PLAN NOTE
--Likely due to sedating medications however patient at risk for infection  --Blood culture, sputum culture, and peritoneal fluid negative  --UA appearing noninfectious  --Day 3 of  Vancomycin/zosyn, d/c vancomycin.   --Resolved; weaned off norepinephrine infusion on 7/25

## 2019-07-26 NOTE — ASSESSMENT & PLAN NOTE
Labs from [place] nephrology demonstrated Cr of 1.77 in 3/12/19, 1.67 on 4/5/19, 1.96 in 6/13/19, and 1.83 on 6/24. Cr is 2.8 today (7/26/19) above her baseline due to ATN secondary to hypoperfusion of kidneys related to hypotensive episodes. Hepatorenal syndrome not suspected at this time but may be present but this is a diagnoses of exclusion. Received 3X35g doses of albumin on 7/24/19.  - Midodrine 15mg TID added to prevent hypoperfusion to kidneys. Goal MAP>70.  - Monitor with daily BMP  - Nephrology will continue to follow closely.   - Considering dialysis if worsening Cr

## 2019-07-26 NOTE — ASSESSMENT & PLAN NOTE
--Baseline reportedly sCr 1.6. Possible candidate for liver/kidney transplant  --Nephrology following, and suspects iATN given recent shock.  Retroperitoneal US on 7/23 negative for hydronephrosis  --rising sCr and becoming oliguric  --Strict I/Os

## 2019-07-26 NOTE — ASSESSMENT & PLAN NOTE
--Complicated by ascites, thrombocytopenia, esophageal varices, hyponatremia and HE  --Currently being worked up for LTS eval  --Continue lactulose and rifaximin  --Hepatology following  --Paracentesis 7/24 with 4L removed;negative for SBP

## 2019-07-26 NOTE — PROGRESS NOTES
[facility]  Critical Care Medicine  Progress Note    Patient Name: Michelle Ojeda  MRN: [id number]  Admission Date: 7/18/2019  Hospital Length of Stay: 8 days  Code Status: Full Code  Attending Provider: Mike Burns*  Primary Care Provider: Primary Doctor No   Principal Problem: Acute metabolic encephalopathy    Subjective:     HPI:  Michelle Ojeda is a 68 year old female with history of IZAGUIRRE cirrhosis, HTN and hypothyroidism who presented to [facility] on 7/18/2019 as a direct admit from Transplant Hepatology Clinic for hyponatremia. Patient is from [place] and requesting liver/kidney transplant evaluation. Her cirrhosis is complicated by ascites requiring biweekly paracentesis, sarcopenia, esophageal varices, hyponatremia and hepatic encephalopathy. Per daughter, patient with functional decline over the last 2 months with complaints of lethargy, decreased oral intake, and peripheral edema. Patient initially admitted to hospital medicine for treatment of hyponatremia with fluid restriction. During liver work up, incidental renal mass found and urology was consulted. Patient developed an ileus and unable to tolerate lactulose while on the floor.    Rapid response called 7/24/2019 for worsening encephalopathy. At that time, patient was hemodynamically stable however not following commands or withdrawing to pain. Found to have ammonia 171. Critical care medicine consulted as patient vomiting and concern for not protecting airway.     Upon arrival, patient unresponsive and will only withdraw to pain yet hemodynamically stable. Multiple episodes of emesis during exam requiring aggressive suctioning to protect airway. Patient admitted to the CMICU at that time for emergent intubation.     Hospital/ICU Course:  Ms. Ojeda was admitted to the ICU with [facility] for acute hepatic encephalopathy requiring intubation for airway protection.  Ammonia level 171.  Lactulose and Rifaximin started.   Paracentesis on 7/24 with 4 L removed, negative for SBP. Pan cultured and started on empiric antibiotics with vancomycin and pip/tazo.     Interval History/Significant Events: No acute events overnight.     Review of Systems   Unable to perform ROS: Intubated     Objective:     Vital Signs (Most Recent):  Temp: 98.1 °F (36.7 °C) (07/26/19 0700)  Pulse: 77 (07/26/19 1324)  Resp: (!) 38 (07/26/19 1324)  BP: (!) 114/56 (07/26/19 1300)  SpO2: 100 % (07/26/19 1324) Vital Signs (24h Range):  Temp:  [96.9 °F (36.1 °C)-98.1 °F (36.7 °C)] 98.1 °F (36.7 °C)  Pulse:  [60-87] 77  Resp:  [20-59] 38  SpO2:  [100 %] 100 %  BP: (111-145)/(54-77) 114/56   Weight: 56 kg (123 lb 7.3 oz)  Body mass index is 24.11 kg/m².      Intake/Output Summary (Last 24 hours) at 7/26/2019 1343  Last data filed at 7/26/2019 1300  Gross per 24 hour   Intake 1152.3 ml   Output 1830 ml   Net -677.7 ml       Physical Exam   Constitutional: She appears well-developed and well-nourished. She appears ill. She is intubated and restrained.   HENT:   Head: Normocephalic and atraumatic.   Eyes: Pupils are equal, round, and reactive to light. EOM are normal. Scleral icterus is present.   Neck: No tracheal deviation present. No thyromegaly present.   Cardiovascular: Normal rate and regular rhythm. Exam reveals no gallop and no friction rub.   Murmur heard.  Pulses:       Radial pulses are 2+ on the right side, and 2+ on the left side.        Dorsalis pedis pulses are 2+ on the right side, and 2+ on the left side.   Warm extremities, no peripheral edema   Pulmonary/Chest: Effort normal and breath sounds normal. No accessory muscle usage or stridor. No tachypnea and no bradypnea. She is intubated. No respiratory distress. She has no decreased breath sounds. She has no wheezes. She has no rhonchi. She has no rales.   Abdominal: Soft. She exhibits distension. Bowel sounds are increased. There is hepatomegaly. There is no tenderness.   Fecal management system with  brown loose stool   Genitourinary:   Genitourinary Comments: Urine catheter with deborah output   Musculoskeletal: Normal range of motion.   Neurological: No sensory deficit.   No spontaneous eye movements to voice or pain. PERRL, no nystagmus or roving eye movements.  + cough, gag.  Resisting eye opening. Breathing over ventilator.  Spontaneous movement to BLE.    Skin: Skin is warm and dry. No rash noted.            Vents:  Vent Mode: A/C (07/26/19 1324)  Ventilator Initiated: Yes (07/24/19 1312)  Set Rate: 14 bmp (07/26/19 1324)  Vt Set: 300 mL (07/26/19 1324)  Pressure Support: 0 cmH20 (07/26/19 1324)  PEEP/CPAP: 5 cmH20 (07/26/19 1324)  Oxygen Concentration (%): 30 (07/26/19 1324)  Peak Airway Pressure: 22 cmH2O (07/26/19 1324)  Plateau Pressure: 0 cmH20 (07/26/19 1324)  Total Ve: 8.21 mL (07/26/19 1324)  F/VT Ratio<105 (RSBI): 109.51 (07/26/19 1324)  Lines/Drains/Airways     Drain                 NG/OG Tube 07/24/19 1314 orogastric 14 Fr. Right mouth 2 days         Rectal Tube 07/25/19 0943 rectal tube w/ balloon (indicate number of mLs) 1 day         Urethral Catheter 07/24/19 1346 Non-latex 16 Fr. 1 day          Airway                 Airway - Non-Surgical 07/24/19 1310 Endotracheal Tube 2 days          Peripheral Intravenous Line                 Peripheral IV - Single Lumen 07/24/19 1315 20 G Anterior;Distal;Right Forearm 2 days         Peripheral IV - Single Lumen 07/26/19 0500 18 G Left Antecubital less than 1 day              Significant Labs:    CBC/Anemia Profile:  Recent Labs   Lab 07/24/19  1410 07/25/19  0557 07/26/19  0417   WBC 8.89 5.90 6.10   HGB 10.5* 9.9* 10.3*   HCT 30.3* 28.5* 29.9*   PLT 55* 43* 40*   MCV 97 99* 98   RDW 14.6* 14.3 14.3        Chemistries:  Recent Labs   Lab 07/24/19  1512 07/25/19  0557 07/26/19  0417 07/26/19  0928   * 127* 127* 129*   K 3.3* 4.1 3.3* 4.2   CL 89* 92* 92* 95   CO2 26 20* 20* 20*   BUN 73* 69* 77* 79*   CREATININE 2.0* 2.2* 2.8* 2.9*   CALCIUM 9.0 8.7  8.7 8.3*   ALBUMIN 3.8 3.5 3.2* 3.1*  3.1*   PROT 6.0 6.2 5.9* 5.8*   BILITOT 2.7* 2.7* 4.1* 3.5*   ALKPHOS 143* 102 144* 152*   ALT 31 26 34 34   AST 37 47* 39 40   MG 2.7* 2.7* 2.9*  --    PHOS  --  3.7 5.2* 4.7*       All pertinent labs within the past 24 hours have been reviewed.    Significant Imaging:  I have reviewed and interpreted all pertinent imaging results/findings within the past 24 hours.      ABG  Recent Labs   Lab 07/26/19  0928   PH 7.471*   PO2 62*   PCO2 29.2*   HCO3 21.3*   BE -2     Assessment/Plan:     Neuro  * Acute metabolic encephalopathy  Likely 2/2 hepatic encephalopathy with ammonia 171 upon ICU tx (64 on 7/22).   --CT head negative for acute intracranial abnormality  --EEG pending read  --continue lactulose and rifaximin  --continue antibiotics      Pulmonary  Acute hypoxemic respiratory failure  Intubated for airway protection in setting of severe encephalopathy and episodes of emesis/concern for aspiration  --continue empiric abx for possible aspiration PNA  --Sputum culture negative from 7/24  --No focal signs of consolidation on CXR  --Minimal ventilator requirements  --Wean supplemental O2 for goal SpO2 >88%      Renal/  Right kidney mass  R kidney mass measures 2.4 cm x 2.3 cm found on CT abd/pelvis during transplant workup  --Urology following with plans to hold on any further work up in setting of acute decompensation    Stage 4 chronic kidney disease  --Baseline reportedly sCr 1.6. Possible candidate for liver/kidney transplant  --Nephrology following, and suspects iATN given recent shock.  Retroperitoneal US on 7/23 negative for hydronephrosis  --rising sCr and becoming oliguric  --Strict I/Os    Hematology  Coagulopathy  See cirrhosis    Oncology  Pancytopenia  See cirrhosis    Endocrine  Hyperglycemia  Patient with Hx of DM however was taken off metformin due to improvement  --A1c 5.1  --insulin infusion with hourly glucose checks    Hypothyroidism  --Continue home  levothyroxine    GI  Ileus  --improved; tolerating enteral feedings.     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 Cytology pending.      Other  Shock, unspecified  --Likely due to sedating medications however patient at risk for infection  --Blood culture, sputum culture, and peritoneal fluid negative  --UA appearing noninfectious  --Day 3 of  Vancomycin/zosyn, d/c vancomycin.   --Resolved; weaned off norepinephrine infusion on 7/25      DVT ppx:  SCDs, no pharmacological ppx given thrombocytopenia.     Family updated at bedside.     Discussed with Dr. Burns    Critical Care Time: 45 minutes  Critical secondary to Patient has a condition that poses threat to life and bodily function: hepatic encephalopathy requiring mechanical ventilation for airway protection.       Critical care was time spent personally by me on the following activities: development of treatment plan with patient or surrogate and bedside caregivers, discussions with consultants, evaluation of patient's response to treatment, examination of patient, ordering and performing treatments and interventions, ordering and review of laboratory studies, ordering and review of radiographic studies, pulse oximetry, re-evaluation of patient's condition. This critical care time did not overlap with that of any other provider or involve time for any procedures.     Morena Garcia NP  Critical Care Medicine  [facility]

## 2019-07-27 LAB
ALBUMIN FLD-MCNC: 1480 MG/DL
ALBUMIN SERPL BCP-MCNC: 3.2 G/DL (ref 3.5–5.2)
ALBUMIN SERPL BCP-MCNC: 3.2 G/DL (ref 3.5–5.2)
ALP SERPL-CCNC: 191 U/L (ref 55–135)
ALT SERPL W/O P-5'-P-CCNC: 35 U/L (ref 10–44)
ANION GAP SERPL CALC-SCNC: 12 MMOL/L (ref 8–16)
ANION GAP SERPL CALC-SCNC: 14 MMOL/L (ref 8–16)
ANISOCYTOSIS BLD QL SMEAR: SLIGHT
AST SERPL-CCNC: 41 U/L (ref 10–40)
BACTERIA SPEC AEROBE CULT: NO GROWTH
BASOPHILS # BLD AUTO: 0.03 K/UL (ref 0–0.2)
BASOPHILS NFR BLD: 0.5 % (ref 0–1.9)
BILIRUB SERPL-MCNC: 2.8 MG/DL (ref 0.1–1)
BUN SERPL-MCNC: 89 MG/DL (ref 8–23)
BUN SERPL-MCNC: 89 MG/DL (ref 8–23)
CALCIUM SERPL-MCNC: 8.6 MG/DL (ref 8.7–10.5)
CALCIUM SERPL-MCNC: 8.8 MG/DL (ref 8.7–10.5)
CHLORIDE SERPL-SCNC: 100 MMOL/L (ref 95–110)
CHLORIDE SERPL-SCNC: 103 MMOL/L (ref 95–110)
CO2 SERPL-SCNC: 20 MMOL/L (ref 23–29)
CO2 SERPL-SCNC: 21 MMOL/L (ref 23–29)
CREAT SERPL-MCNC: 3.2 MG/DL (ref 0.5–1.4)
CREAT SERPL-MCNC: 3.3 MG/DL (ref 0.5–1.4)
DIFFERENTIAL METHOD: ABNORMAL
EOSINOPHIL # BLD AUTO: 0.1 K/UL (ref 0–0.5)
EOSINOPHIL NFR BLD: 0.9 % (ref 0–8)
ERYTHROCYTE [DISTWIDTH] IN BLOOD BY AUTOMATED COUNT: 14.5 % (ref 11.5–14.5)
EST. GFR  (AFRICAN AMERICAN): 15.6 ML/MIN/1.73 M^2
EST. GFR  (AFRICAN AMERICAN): 16.2 ML/MIN/1.73 M^2
EST. GFR  (NON AFRICAN AMERICAN): 13.5 ML/MIN/1.73 M^2
EST. GFR  (NON AFRICAN AMERICAN): 14 ML/MIN/1.73 M^2
GLUCOSE SERPL-MCNC: 114 MG/DL (ref 70–110)
GLUCOSE SERPL-MCNC: 212 MG/DL (ref 70–110)
HCT VFR BLD AUTO: 28.8 % (ref 37–48.5)
HGB BLD-MCNC: 9.9 G/DL (ref 12–16)
HYPOCHROMIA BLD QL SMEAR: ABNORMAL
IMM GRANULOCYTES # BLD AUTO: 0.06 K/UL (ref 0–0.04)
IMM GRANULOCYTES NFR BLD AUTO: 1.1 % (ref 0–0.5)
INR PPP: 1.4 (ref 0.8–1.2)
LYMPHOCYTES # BLD AUTO: 0.5 K/UL (ref 1–4.8)
LYMPHOCYTES NFR BLD: 9.5 % (ref 18–48)
MAGNESIUM SERPL-MCNC: 3.3 MG/DL (ref 1.6–2.6)
MCH RBC QN AUTO: 33.8 PG (ref 27–31)
MCHC RBC AUTO-ENTMCNC: 34.4 G/DL (ref 32–36)
MCV RBC AUTO: 98 FL (ref 82–98)
MONOCYTES # BLD AUTO: 0.8 K/UL (ref 0.3–1)
MONOCYTES NFR BLD: 14.6 % (ref 4–15)
NEUTROPHILS # BLD AUTO: 4.2 K/UL (ref 1.8–7.7)
NEUTROPHILS NFR BLD: 73.4 % (ref 38–73)
NRBC BLD-RTO: 0 /100 WBC
PHOSPHATE SERPL-MCNC: 2.5 MG/DL (ref 2.7–4.5)
PHOSPHATE SERPL-MCNC: 3 MG/DL (ref 2.7–4.5)
PLATELET # BLD AUTO: 41 K/UL (ref 150–350)
PLATELET BLD QL SMEAR: ABNORMAL
PMV BLD AUTO: 10 FL (ref 9.2–12.9)
POCT GLUCOSE: 108 MG/DL (ref 70–110)
POCT GLUCOSE: 109 MG/DL (ref 70–110)
POCT GLUCOSE: 113 MG/DL (ref 70–110)
POCT GLUCOSE: 116 MG/DL (ref 70–110)
POCT GLUCOSE: 119 MG/DL (ref 70–110)
POCT GLUCOSE: 147 MG/DL (ref 70–110)
POCT GLUCOSE: 152 MG/DL (ref 70–110)
POCT GLUCOSE: 152 MG/DL (ref 70–110)
POCT GLUCOSE: 172 MG/DL (ref 70–110)
POCT GLUCOSE: 187 MG/DL (ref 70–110)
POCT GLUCOSE: 188 MG/DL (ref 70–110)
POCT GLUCOSE: 190 MG/DL (ref 70–110)
POCT GLUCOSE: 196 MG/DL (ref 70–110)
POCT GLUCOSE: 197 MG/DL (ref 70–110)
POCT GLUCOSE: 204 MG/DL (ref 70–110)
POCT GLUCOSE: 217 MG/DL (ref 70–110)
POCT GLUCOSE: 225 MG/DL (ref 70–110)
POCT GLUCOSE: 227 MG/DL (ref 70–110)
POCT GLUCOSE: 232 MG/DL (ref 70–110)
POTASSIUM SERPL-SCNC: 2.8 MMOL/L (ref 3.5–5.1)
POTASSIUM SERPL-SCNC: 3.8 MMOL/L (ref 3.5–5.1)
PROT FLD-MCNC: 2.4 G/DL
PROT SERPL-MCNC: 6.4 G/DL (ref 6–8.4)
PROTHROMBIN TIME: 14.1 SEC (ref 9–12.5)
RBC # BLD AUTO: 2.93 M/UL (ref 4–5.4)
SODIUM SERPL-SCNC: 135 MMOL/L (ref 136–145)
SODIUM SERPL-SCNC: 135 MMOL/L (ref 136–145)
SPECIMEN SOURCE: NORMAL
SPECIMEN SOURCE: NORMAL
WBC # BLD AUTO: 5.7 K/UL (ref 3.9–12.7)

## 2019-07-27 PROCEDURE — 80069 RENAL FUNCTION PANEL: CPT | Mod: NTX

## 2019-07-27 PROCEDURE — 63600175 PHARM REV CODE 636 W HCPCS: Mod: NTX | Performed by: HOSPITALIST

## 2019-07-27 PROCEDURE — 25000003 PHARM REV CODE 250: Mod: NTX | Performed by: STUDENT IN AN ORGANIZED HEALTH CARE EDUCATION/TRAINING PROGRAM

## 2019-07-27 PROCEDURE — 99900035 HC TECH TIME PER 15 MIN (STAT): Mod: NTX

## 2019-07-27 PROCEDURE — 25000003 PHARM REV CODE 250: Mod: NTX | Performed by: NURSE PRACTITIONER

## 2019-07-27 PROCEDURE — 83735 ASSAY OF MAGNESIUM: CPT | Mod: NTX

## 2019-07-27 PROCEDURE — 27000221 HC OXYGEN, UP TO 24 HOURS: Mod: NTX

## 2019-07-27 PROCEDURE — 85025 COMPLETE CBC W/AUTO DIFF WBC: CPT | Mod: NTX

## 2019-07-27 PROCEDURE — 63600175 PHARM REV CODE 636 W HCPCS: Mod: JG,NTX | Performed by: NURSE PRACTITIONER

## 2019-07-27 PROCEDURE — 99233 PR SUBSEQUENT HOSPITAL CARE,LEVL III: ICD-10-PCS | Mod: GC,NTX,, | Performed by: INTERNAL MEDICINE

## 2019-07-27 PROCEDURE — 20000000 HC ICU ROOM: Mod: NTX

## 2019-07-27 PROCEDURE — 99291 CRITICAL CARE FIRST HOUR: CPT | Mod: NTX,,, | Performed by: NURSE PRACTITIONER

## 2019-07-27 PROCEDURE — 27200966 HC CLOSED SUCTION SYSTEM: Mod: NTX

## 2019-07-27 PROCEDURE — 84100 ASSAY OF PHOSPHORUS: CPT | Mod: NTX

## 2019-07-27 PROCEDURE — 80053 COMPREHEN METABOLIC PANEL: CPT | Mod: NTX

## 2019-07-27 PROCEDURE — P9047 ALBUMIN (HUMAN), 25%, 50ML: HCPCS | Mod: JG,NTX | Performed by: NURSE PRACTITIONER

## 2019-07-27 PROCEDURE — 99233 SBSQ HOSP IP/OBS HIGH 50: CPT | Mod: GC,NTX,, | Performed by: INTERNAL MEDICINE

## 2019-07-27 PROCEDURE — 94003 VENT MGMT INPAT SUBQ DAY: CPT | Mod: NTX

## 2019-07-27 PROCEDURE — 25000003 PHARM REV CODE 250: Mod: NTX | Performed by: PHYSICIAN ASSISTANT

## 2019-07-27 PROCEDURE — 63600175 PHARM REV CODE 636 W HCPCS: Mod: NTX | Performed by: STUDENT IN AN ORGANIZED HEALTH CARE EDUCATION/TRAINING PROGRAM

## 2019-07-27 PROCEDURE — 99291 PR CRITICAL CARE, E/M 30-74 MINUTES: ICD-10-PCS | Mod: NTX,,, | Performed by: NURSE PRACTITIONER

## 2019-07-27 PROCEDURE — 99900026 HC AIRWAY MAINTENANCE (STAT): Mod: NTX

## 2019-07-27 PROCEDURE — 85610 PROTHROMBIN TIME: CPT | Mod: NTX

## 2019-07-27 RX ORDER — POTASSIUM CHLORIDE 20 MEQ/15ML
40 SOLUTION ORAL ONCE
Status: COMPLETED | OUTPATIENT
Start: 2019-07-27 | End: 2019-07-27

## 2019-07-27 RX ORDER — ALBUMIN HUMAN 250 G/1000ML
25 SOLUTION INTRAVENOUS 3 TIMES DAILY
Status: COMPLETED | OUTPATIENT
Start: 2019-07-27 | End: 2019-07-29

## 2019-07-27 RX ORDER — INSULIN ASPART 100 [IU]/ML
0-5 INJECTION, SOLUTION INTRAVENOUS; SUBCUTANEOUS EVERY 6 HOURS PRN
Status: DISCONTINUED | OUTPATIENT
Start: 2019-07-27 | End: 2019-08-10 | Stop reason: HOSPADM

## 2019-07-27 RX ORDER — POTASSIUM CHLORIDE 20 MEQ/15ML
40 SOLUTION ORAL
Status: DISCONTINUED | OUTPATIENT
Start: 2019-07-27 | End: 2019-07-27

## 2019-07-27 RX ORDER — POTASSIUM CHLORIDE 7.45 MG/ML
10 INJECTION INTRAVENOUS
Status: DISCONTINUED | OUTPATIENT
Start: 2019-07-27 | End: 2019-07-27

## 2019-07-27 RX ORDER — LACTULOSE 10 G/15ML
200 SOLUTION ORAL; RECTAL 3 TIMES DAILY
Status: DISCONTINUED | OUTPATIENT
Start: 2019-07-27 | End: 2019-07-28

## 2019-07-27 RX ORDER — LACTULOSE 10 G/15ML
15 SOLUTION ORAL 2 TIMES DAILY
Status: DISCONTINUED | OUTPATIENT
Start: 2019-07-27 | End: 2019-07-27

## 2019-07-27 RX ORDER — MIDODRINE HYDROCHLORIDE 5 MG/1
10 TABLET ORAL 3 TIMES DAILY
Status: DISCONTINUED | OUTPATIENT
Start: 2019-07-27 | End: 2019-07-30

## 2019-07-27 RX ORDER — POTASSIUM CHLORIDE 20 MEQ/15ML
20 SOLUTION ORAL ONCE
Status: COMPLETED | OUTPATIENT
Start: 2019-07-27 | End: 2019-07-27

## 2019-07-27 RX ADMIN — Medication 220 MG: at 09:07

## 2019-07-27 RX ADMIN — FAMOTIDINE 20 MG: 20 TABLET, FILM COATED ORAL at 08:07

## 2019-07-27 RX ADMIN — ONDANSETRON 4 MG: 2 INJECTION INTRAMUSCULAR; INTRAVENOUS at 08:07

## 2019-07-27 RX ADMIN — MIDODRINE HYDROCHLORIDE 15 MG: 5 TABLET ORAL at 08:07

## 2019-07-27 RX ADMIN — LEVOTHYROXINE SODIUM 88 MCG: 88 TABLET ORAL at 05:07

## 2019-07-27 RX ADMIN — SIMETHICONE CHEW TAB 80 MG 80 MG: 80 TABLET ORAL at 08:07

## 2019-07-27 RX ADMIN — CHLORHEXIDINE GLUCONATE 0.12% ORAL RINSE 15 ML: 1.2 LIQUID ORAL at 08:07

## 2019-07-27 RX ADMIN — POTASSIUM CHLORIDE 20 MEQ: 20 SOLUTION ORAL at 06:07

## 2019-07-27 RX ADMIN — RIFAXIMIN 550 MG: 550 TABLET ORAL at 08:07

## 2019-07-27 RX ADMIN — SODIUM CHLORIDE 5.7 UNITS/HR: 9 INJECTION, SOLUTION INTRAVENOUS at 12:07

## 2019-07-27 RX ADMIN — LACTULOSE 200 G: 10 SOLUTION ORAL at 09:07

## 2019-07-27 RX ADMIN — POTASSIUM CHLORIDE 10 MEQ: 10 INJECTION, SOLUTION INTRAVENOUS at 06:07

## 2019-07-27 RX ADMIN — POTASSIUM CHLORIDE 10 MEQ: 10 INJECTION, SOLUTION INTRAVENOUS at 08:07

## 2019-07-27 RX ADMIN — ALBUMIN (HUMAN) 25 G: 25 SOLUTION INTRAVENOUS at 09:07

## 2019-07-27 RX ADMIN — SODIUM CHLORIDE, SODIUM LACTATE, POTASSIUM CHLORIDE, AND CALCIUM CHLORIDE 500 ML: .6; .31; .03; .02 INJECTION, SOLUTION INTRAVENOUS at 11:07

## 2019-07-27 NOTE — PROGRESS NOTES
[facility]  Hepatology  Progress Note    Patient Name: Michelle Ojeda  MRN: [id number]  Admission Date: 7/18/2019  Hospital Length of Stay: 9 days  Attending Provider: Mike Burns*   Primary Care Physician: Primary Doctor No  Principal Problem:Acute metabolic encephalopathy    Subjective:     Transplant status: No    HPI: Ms Ojeda is a 68 year old female with a history of HERRMANN ESLD, with decompensations including Gr 1 HE, ascites, EV, hyponatremia, T2DM, HTN, hypothyroidism, who is being admitted from hepatology clinic due to concern for hyponatremia.    She presents to transplant Clinic for initial evaluation on 07/18 with Dr. Cannon for initial transplant evaluation.  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  Her cirrhosis has been complicated by grade 1 hepatic encephalopathy, recently started Aldo [place] min but never been on lactulose, ascites, sarcopenia, esophageal varices status post ligation, hyponatremia.  She was previously evaluated at [place] [facility] with Dr. Mccormick who recommended evaluation at [facility].  Does not appear she was evaluated for transplant at that time.    Due to hyponatremia with sodium of 120 she was admitted to the hospital for further management.  Currently she reports she is feeling well without any complaints.  Denies any abdominal pain, nausea, vomiting, diarrhea.  Denies any fevers, chills, sweats or other infectious complaints.    Family who is present in the room note that she is doing extremely well until approximately 2 months prior to presentation when she began to be more frail, fatigue, increased peripheral edema, decreased oral intake.  They note they have been extremely careful to avoid T here to low-sodium diet.  She has never been hospitalized in the  past for hyponatremia.  No other recent hospitalizations.  Regarding her ascites she has approximately received a paracentesis every 2 weeks.      Interval History:   Extubated this morning, answer questions appropriately. Soft voice. No tube feeds.    Current Facility-Administered Medications   Medication    acetaminophen tablet 325 mg    dextrose 10% (D10W) Bolus    dextrose 10% (D10W) Bolus    dicyclomine capsule 10 mg    fentaNYL injection 25 mcg    glucagon (human recombinant) injection 1 mg    glucose chewable tablet 16 g    glucose chewable tablet 24 g    insulin regular (Humulin R) 100 Units in sodium chloride 0.9% 100 mL infusion    iohexol (OMNIPAQUE) oral solution 15 mL    lactulose 20 gram/30 mL solution Soln 15 g    levothyroxine tablet 88 mcg    midodrine tablet 10 mg    ondansetron injection 4 mg    rifAXIMin tablet 550 mg    simethicone chewable tablet 80 mg    sodium chloride 0.9% flush 10 mL    sodium chloride 0.9% flush 10 mL    zinc sulfate capsule 220 mg       Objective:     Vital Signs (Most Recent):  Temp: 98.2 °F (36.8 °C) (07/27/19 1100)  Pulse: 74 (07/27/19 1400)  Resp: 14 (07/27/19 1400)  BP: 129/61 (07/27/19 1400)  SpO2: 100 % (07/27/19 1400) Vital Signs (24h Range):  Temp:  [97.4 °F (36.3 °C)-98.5 °F (36.9 °C)] 98.2 °F (36.8 °C)  Pulse:  [67-97] 74  Resp:  [14-47] 14  SpO2:  [100 %] 100 %  BP: (112-168)/(55-72) 129/61     Weight: 56 kg (123 lb 7.3 oz) (07/25/19 1300)  Body mass index is 24.11 kg/m².    Physical Exam   Eyes: Scleral icterus is present.   Cardiovascular: Normal rate and regular rhythm.   Pulmonary/Chest: Effort normal. No respiratory distress.   Abdominal: Soft. Bowel sounds are normal. She exhibits no distension. There is no tenderness.   Neurological: She is alert.   Skin:   Jaundiced   Psychiatric: Her behavior is normal.   Nursing note and vitals reviewed.      MELD-Na score: 26 at 7/27/2019 12:13 PM  MELD score: 25 at 7/27/2019 12:13 PM  Calculated  from:  Serum Creatinine: 3.2 mg/dL at 7/27/2019 12:13 PM  Serum Sodium: 135 mmol/L at 7/27/2019 12:13 PM  Total Bilirubin: 2.8 mg/dL at 7/27/2019  4:18 AM  INR(ratio): 1.4 at 7/27/2019  4:18 AM  Age: 68 years    Significant Labs:  Labs within the past month have been reviewed.    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 Found to have incidentally discovered R renal mass. Unresponsive on 7/24, transferred to ICU and intubated for airway protection. Now extubated with improving mentation.     Plan  - encephalopathy: Acutely worsened on 7/24 and now in ICU. Restarted lactulose aggressively with good stool output. Ammonia 171. Extubated today with improvement in mental status. Continue rifaximin. Infectious workup per primary. On zosyn.  - R renal mass: per transplant team, recommend Urology consult and U/S retroperitoneum. Urology recommending active surveillance. High risk for surgery or biopsy. U/S retroperitoneum with mass but unable to characterize. Thrombocytopenia is a concern especially if biopsy necessary. Not necessarily a contraindication to transplant as this may be an indolent tumor.  - ileus on imaging, now resolved. NGT removed.  - ascites: Negative for SBP on 07/19. Total protein 1.5.  - diuretics: hold  - TERA on CKD: unclear baseline. Cr up to 3.2 from 2s earlier this admission. IV albumin 25g tid for next 2-3 days.  - hyponatremia: Resolved, but seems dehydrated. Recommend free water. Holding diuresis. Nephrology consult.  - malnutrition: nutrition consult. Calorie count. Supplements. NG tube feeds. Swallow eval and resume PO intake when safe to do so.  - PT/OT  - inpatient transplant evaluation started. Nuclear stress  test and renal scan completed, ID consult, now needs improvement in mental status and further evaluation of R renal mass (although transplant not contraindicated with a relatively indolent tumor)  - Appreciate renal evaluation re: SLK given GFR <35        Thank you for your consult. I will follow-up with patient. Please contact us if you have any additional questions.    Andrea Eaton MD  Hepatology  [facility]

## 2019-07-27 NOTE — ASSESSMENT & PLAN NOTE
--Likely due to sedating medications however patient at risk for infection  --Blood culture, sputum culture, and peritoneal fluid negative  --UA appearing noninfectious  --Day 4 of piptazo, d/c today.  Received 3 days of vancomycin.  --Resolved; weaned off norepinephrine infusion on 7/25

## 2019-07-27 NOTE — PROGRESS NOTES
[facility]  Critical Care Medicine  Progress Note    Patient Name: Michelle Ojeda  MRN: [id number]  Admission Date: 7/18/2019  Hospital Length of Stay: 9 days  Code Status: Full Code  Attending Provider: Naveed Calix MD  Primary Care Provider: Primary Doctor No   Principal Problem: Acute metabolic encephalopathy    Subjective:     HPI:  Michelle Ojeda is a 68 year old female with history of IZAGUIRRE cirrhosis, HTN and hypothyroidism who presented to [facility] on 7/18/2019 as a direct admit from Transplant Hepatology Clinic for hyponatremia. Patient is from [place] and requesting liver/kidney transplant evaluation. Her cirrhosis is complicated by ascites requiring biweekly paracentesis, sarcopenia, esophageal varices, hyponatremia and hepatic encephalopathy. Per daughter, patient with functional decline over the last 2 months with complaints of lethargy, decreased oral intake, and peripheral edema. Patient initially admitted to hospital medicine for treatment of hyponatremia with fluid restriction. During liver work up, incidental renal mass found and urology was consulted. Patient developed an ileus and unable to tolerate lactulose while on the floor.    Rapid response called 7/24/2019 for worsening encephalopathy. At that time, patient was hemodynamically stable however not following commands or withdrawing to pain. Found to have ammonia 171. Critical care medicine consulted as patient vomiting and concern for not protecting airway.     Upon arrival, patient unresponsive and will only withdraw to pain yet hemodynamically stable. Multiple episodes of emesis during exam requiring aggressive suctioning to protect airway. Patient admitted to the CMICU at that time for emergent intubation.     Hospital/ICU Course:  Ms. Ojeda was admitted to the ICU with [facility] for acute hepatic encephalopathy requiring intubation for airway protection.  Ammonia level 171.  Lactulose and Rifaximin started.   Paracentesis on 7/24 with 4 L removed, negative for SBP. Pan cultured and started on empiric antibiotics with vancomycin and pip/tazo.     Interval History/Significant Events: alert     Review of Systems   Unable to perform ROS: Intubated     Objective:     Vital Signs (Most Recent):  Temp: 98.5 °F (36.9 °C) (07/27/19 1500)  Pulse: 74 (07/27/19 1500)  Resp: 13 (07/27/19 1500)  BP: 130/61 (07/27/19 1500)  SpO2: 100 % (07/27/19 1500) Vital Signs (24h Range):  Temp:  [97.4 °F (36.3 °C)-98.5 °F (36.9 °C)] 98.5 °F (36.9 °C)  Pulse:  [67-97] 74  Resp:  [13-47] 13  SpO2:  [100 %] 100 %  BP: (112-168)/(55-72) 130/61   Weight: 56 kg (123 lb 7.3 oz)  Body mass index is 24.11 kg/m².      Intake/Output Summary (Last 24 hours) at 7/27/2019 1557  Last data filed at 7/27/2019 1500  Gross per 24 hour   Intake 1754.39 ml   Output 1790 ml   Net -35.61 ml       Physical Exam   Constitutional: She appears well-developed and well-nourished. She appears ill. She is intubated and restrained.   HENT:   Head: Normocephalic and atraumatic.   Eyes: Pupils are equal, round, and reactive to light. EOM are normal. Scleral icterus is present.   Neck: No tracheal deviation present. No thyromegaly present.   Cardiovascular: Normal rate and regular rhythm. Exam reveals no gallop and no friction rub.   No murmur heard.  Pulses:       Radial pulses are 2+ on the right side, and 2+ on the left side.        Dorsalis pedis pulses are 2+ on the right side, and 2+ on the left side.   Warm extremities, no peripheral edema   Pulmonary/Chest: Effort normal and breath sounds normal. No accessory muscle usage or stridor. No tachypnea and no bradypnea. She is intubated. No respiratory distress. She has no decreased breath sounds. She has no wheezes. She has no rhonchi. She has no rales.   Abdominal: Soft. She exhibits distension. Bowel sounds are increased. There is hepatomegaly. There is no tenderness.   Fecal management system with brown loose stool    Genitourinary:   Genitourinary Comments: Urine catheter with deborah output   Musculoskeletal: Normal range of motion.   Neurological: No sensory deficit.   Spontaneous eye opening.  PERRL, no nystagmus or roving eye movements. Following request and moving all extremities.    Skin: Skin is warm and dry. No rash noted.            Vents:  Vent Mode: Spont (07/27/19 0912)  Ventilator Initiated: Yes (07/24/19 1312)  Set Rate: 0 bmp (07/27/19 0912)  Vt Set: 300 mL (07/27/19 0912)  Pressure Support: 5 cmH20 (07/27/19 0912)  PEEP/CPAP: 5 cmH20 (07/27/19 0912)  Oxygen Concentration (%): 28 (07/27/19 1113)  Peak Airway Pressure: 11 cmH2O (07/27/19 0912)  Plateau Pressure: 0 cmH20 (07/27/19 0912)  Total Ve: 8.93 mL (07/27/19 0912)  Negative Inspiratory Force (cm H2O): -35 (07/27/19 1105)  F/VT Ratio<105 (RSBI): (!) 36.25 (07/27/19 0912)  Lines/Drains/Airways     Drain                 Urethral Catheter 07/24/19 1346 Non-latex 16 Fr. 3 days         Rectal Tube 07/25/19 0943 rectal tube w/ balloon (indicate number of mLs) 2 days          Airway                 Airway - Non-Surgical 07/24/19 1310 Endotracheal Tube 3 days          Peripheral Intravenous Line                 Peripheral IV - Single Lumen 07/24/19 1315 20 G Anterior;Distal;Right Forearm 3 days         Peripheral IV - Single Lumen 07/26/19 0500 18 G Left Antecubital 1 day              Significant Labs:    CBC/Anemia Profile:  Recent Labs   Lab 07/26/19 0417 07/27/19  0418   WBC 6.10 5.70   HGB 10.3* 9.9*   HCT 29.9* 28.8*   PLT 40* 41*   MCV 98 98   RDW 14.3 14.5        Chemistries:  Recent Labs   Lab 07/26/19  0417 07/26/19  0928 07/26/19  1604 07/27/19  0418 07/27/19  1213   * 129* 129* 135* 135*   K 3.3* 4.2 3.5 2.8* 3.8   CL 92* 95 98 100 103   CO2 20* 20* 17* 21* 20*   BUN 77* 79* 83* 89* 89*   CREATININE 2.8* 2.9* 3.2* 3.3* 3.2*   CALCIUM 8.7 8.3* 8.8 8.6* 8.8   ALBUMIN 3.2* 3.1*  3.1* 3.1* 3.2* 3.2*   PROT 5.9* 5.8*  --  6.4  --    BILITOT 4.1* 3.5*  --   2.8*  --    ALKPHOS 144* 152*  --  191*  --    ALT 34 34  --  35  --    AST 39 40  --  41*  --    MG 2.9*  --   --  3.3*  --    PHOS 5.2* 4.7* 3.7 3.0 2.5*       All pertinent labs within the past 24 hours have been reviewed.    Significant Imaging:  I have reviewed and interpreted all pertinent imaging results/findings within the past 24 hours.      ABG  Recent Labs   Lab 07/26/19  0928   PH 7.471*   PO2 62*   PCO2 29.2*   HCO3 21.3*   BE -2     Assessment/Plan:     Neuro  * Acute metabolic encephalopathy  Likely 2/2 hepatic encephalopathy with ammonia 171 upon ICU tx (64 on 7/22).   --CT head negative for acute intracranial abnormality  --EEG pending read  --continue lactulose and rifaximin        Pulmonary  Acute hypoxemic respiratory failure  Intubated for airway protection in setting of severe encephalopathy and episodes of emesis/concern for aspiration  --Started empiric abx for possible aspiration PNA  --Sputum culture negative from 7/24  --No focal signs of consolidation on CXR  --Minimal ventilator requirements  --Wean supplemental O2 for goal SpO2 >88%  --extubated on 7/27      Renal/  Right kidney mass  R kidney mass measures 2.4 cm x 2.3 cm found on CT abd/pelvis during transplant workup  --Urology following with plans to hold on any further work up in setting of acute decompensation    Stage 4 chronic kidney disease  --Baseline reportedly sCr 1.6. Possible candidate for liver/kidney transplant  --Nephrology following,concern iATN given recent shock.  Also concern for intravascular volume depletion given significant stool output.   Retroperitoneal US on 7/23 negative for hydronephrosis  --rising sCr and becoming oliguric  --urine studies concerning for pre-renal etiology  --Strict I/Os    Hematology  Coagulopathy  See cirrhosis    Oncology  Pancytopenia  See cirrhosis    Endocrine  Hyperglycemia  Patient with Hx of DM however was taken off metformin due to improvement  --A1c 5.1  --insulin infusion  with hourly glucose checks    Hypothyroidism  --Continue home levothyroxine    GI  Ileus  --improved; tolerating enteral feedings.     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 Cytology pending.      Other  Shock, unspecified  --Likely due to sedating medications however patient at risk for infection  --Blood culture, sputum culture, and peritoneal fluid negative  --UA appearing noninfectious  --Day 4 of piptazo, d/c today.  Received 3 days of vancomycin.  --Resolved; weaned off norepinephrine infusion on 7/25    DVT ppx:  SCDs, no pharmacological ppx given thrombocytopenia.     PT/OT consulted    Discussed plan with Dr. Calix     Critical Care Time: 60  minutes  Critical secondary to Patient has a condition that poses threat to life and bodily function: acute respiratory failure requiring intubation.       Critical care was time spent personally by me on the following activities: development of treatment plan with patient or surrogate and bedside caregivers, discussions with consultants, evaluation of patient's response to treatment, examination of patient, ordering and performing treatments and interventions, ordering and review of laboratory studies, ordering and review of radiographic studies, pulse oximetry, re-evaluation of patient's condition. This critical care time did not overlap with that of any other provider or involve time for any procedures.     Morena Garcia NP  Critical Care Medicine  [facility]

## 2019-07-27 NOTE — ASSESSMENT & PLAN NOTE
--Baseline reportedly sCr 1.6. Possible candidate for liver/kidney transplant  --Nephrology following,concern iATN given recent shock.  Also concern for intravascular volume depletion given significant stool output.   Retroperitoneal US on 7/23 negative for hydronephrosis  --rising sCr and becoming oliguric  --urine studies concerning for pre-renal etiology  --Strict I/Os

## 2019-07-27 NOTE — SUBJECTIVE & OBJECTIVE
Interval History:   Extubated this morning, answer questions appropriately. Soft voice. No tube feeds.    Current Facility-Administered Medications   Medication    acetaminophen tablet 325 mg    dextrose 10% (D10W) Bolus    dextrose 10% (D10W) Bolus    dicyclomine capsule 10 mg    fentaNYL injection 25 mcg    glucagon (human recombinant) injection 1 mg    glucose chewable tablet 16 g    glucose chewable tablet 24 g    insulin regular (Humulin R) 100 Units in sodium chloride 0.9% 100 mL infusion    iohexol (OMNIPAQUE) oral solution 15 mL    lactulose 20 gram/30 mL solution Soln 15 g    levothyroxine tablet 88 mcg    midodrine tablet 10 mg    ondansetron injection 4 mg    rifAXIMin tablet 550 mg    simethicone chewable tablet 80 mg    sodium chloride 0.9% flush 10 mL    sodium chloride 0.9% flush 10 mL    zinc sulfate capsule 220 mg       Objective:     Vital Signs (Most Recent):  Temp: 98.2 °F (36.8 °C) (07/27/19 1100)  Pulse: 74 (07/27/19 1400)  Resp: 14 (07/27/19 1400)  BP: 129/61 (07/27/19 1400)  SpO2: 100 % (07/27/19 1400) Vital Signs (24h Range):  Temp:  [97.4 °F (36.3 °C)-98.5 °F (36.9 °C)] 98.2 °F (36.8 °C)  Pulse:  [67-97] 74  Resp:  [14-47] 14  SpO2:  [100 %] 100 %  BP: (112-168)/(55-72) 129/61     Weight: 56 kg (123 lb 7.3 oz) (07/25/19 1300)  Body mass index is 24.11 kg/m².    Physical Exam   Eyes: Scleral icterus is present.   Cardiovascular: Normal rate and regular rhythm.   Pulmonary/Chest: Effort normal. No respiratory distress.   Abdominal: Soft. Bowel sounds are normal. She exhibits no distension. There is no tenderness.   Neurological: She is alert.   Skin:   Jaundiced   Psychiatric: Her behavior is normal.   Nursing note and vitals reviewed.      MELD-Na score: 26 at 7/27/2019 12:13 PM  MELD score: 25 at 7/27/2019 12:13 PM  Calculated from:  Serum Creatinine: 3.2 mg/dL at 7/27/2019 12:13 PM  Serum Sodium: 135 mmol/L at 7/27/2019 12:13 PM  Total Bilirubin: 2.8 mg/dL at 7/27/2019   4:18 AM  INR(ratio): 1.4 at 7/27/2019  4:18 AM  Age: 68 years    Significant Labs:  Labs within the past month have been reviewed.    Significant Imaging:  Reviewed

## 2019-07-27 NOTE — ASSESSMENT & PLAN NOTE
Likely 2/2 hepatic encephalopathy with ammonia 171 upon ICU tx (64 on 7/22).   --CT head negative for acute intracranial abnormality  --EEG pending read  --continue lactulose and rifaximin

## 2019-07-27 NOTE — CONSULTS
NIAS at bedside to assess IV access. Pt with adequate access at this time. 2 PIVs one expires tomorrow. Re consult if needed.

## 2019-07-27 NOTE — ASSESSMENT & PLAN NOTE
Ms Ojeda is a 68 year old female with a history of IZAGUIRRE ESLD, with decompensations including Gr 1 HE, ascites, EV, hyponatremia, T2DM, HTN, hypothyroidism, who is being admitted from hepatology clinic due to concern for hyponatremia. Found to have incidentally discovered R renal mass. Unresponsive on 7/24, transferred to ICU and intubated for airway protection. Now extubated with improving mentation.     Plan  - encephalopathy: Acutely worsened on 7/24 and now in ICU. Restarted lactulose aggressively with good stool output. Ammonia 171. Extubated today with improvement in mental status. Continue rifaximin. Infectious workup per primary. On zosyn.  - R renal mass: per transplant team, recommend Urology consult and U/S retroperitoneum. Urology recommending active surveillance. High risk for surgery or biopsy. U/S retroperitoneum with mass but unable to characterize. Thrombocytopenia is a concern especially if biopsy necessary. Not necessarily a contraindication to transplant as this may be an indolent tumor.  - ileus on imaging, now resolved. NGT removed.  - ascites: Negative for SBP on 07/19. Total protein 1.5.  - diuretics: hold  - TERA on CKD: unclear baseline. Cr up to 3.2 from 2s earlier this admission. IV albumin 25g tid for next 2-3 days.  - hyponatremia: Resolved, but seems dehydrated. Recommend free water. Holding diuresis. Nephrology consult.  - malnutrition: nutrition consult. Calorie count. Supplements. NG tube feeds. Swallow eval and resume PO intake when safe to do so.  - PT/OT  - inpatient transplant evaluation started. Nuclear stress test and renal scan completed, ID consult, now needs improvement in mental status and further evaluation of R renal mass (although transplant not contraindicated with a relatively indolent tumor)  - Appreciate renal evaluation re: SLK given GFR <35

## 2019-07-27 NOTE — ASSESSMENT & PLAN NOTE
Intubated for airway protection in setting of severe encephalopathy and episodes of emesis/concern for aspiration  --Started empiric abx for possible aspiration PNA  --Sputum culture negative from 7/24  --No focal signs of consolidation on CXR  --Minimal ventilator requirements  --Wean supplemental O2 for goal SpO2 >88%  --extubated on 7/27

## 2019-07-27 NOTE — PLAN OF CARE
Problem: Adult Inpatient Plan of Care  Goal: Plan of Care Review  Outcome: Ongoing (interventions implemented as appropriate)    No acute events throughout day. See vital signs and assessments in flowsheets. Pt withdraws to pain, does not follow commands, moves legs intermittently. Pt remains intubated, vent settings changed per MD order. Rectal tube and leigh maintained. Insulin gtt infusing and titrated per sliding scale. Patient progressing towards goals as tolerated, plan of care communicated and reviewed with Michelle Ojeda and family. All concerns addressed. Will continue to monitor.

## 2019-07-27 NOTE — PLAN OF CARE
Problem: Adult Inpatient Plan of Care  Goal: Plan of Care Review  Outcome: Ongoing (interventions implemented as appropriate)  See vital signs and assessments in flowsheets. See below for updates on today's progress.      Pulmonary: Extubated to RA- sats 100%. No c/o SOB. Very weak cough, suction provided as needed. Sputum thick/clear. Breath sounds coarse.     Cardiovascular: NSR, 60-80s. MAP >70.      Neurological: Slowly more alert throughout day. Follows commands, JUSTICE, gen weakness, Flat/withdrawn, oriented to person/ and frequently place. Afebrile. Pupils equal/reactive     Gastrointestinal: Flexi in place and patent- Lactulose now ordered enema. 500ml light brown liquid stool output. NPO, awaiting ST eval for diet.      Genitourinary: Webster in place, UOP 15-25cc/hr deborah urine. 500cc LR bolus given      Endocrine: Insulin gtt discontinued. Acchuchecks q6hrs with SSI as needed     Skin/Bath: Skin tears on bilateral arms. Foam dressings in place. Heels elevated.                 Date of last CHG bath given: 19     Infusions: N/A    PT/ST/OT ordered. Patient progressing towards goals as tolerated, plan of care communicated and reviewed with Michelle Ojeda and family. All concerns addressed. Will continue to monitor.

## 2019-07-27 NOTE — NURSING
1115: Pt extubated to venti mask- tolerating well. Decreased to 3L NC, sats 100%.     1520: Pt still having several intermittent gasping breaths (team aware and assessed at bedside). Sats 100%, HOB elevated, denies SOB. Weak cough, requires assist w deep suction for thick secretions. Failed bedside swallow- team notified. ST consult orders, PO meds to be held per CCS, lactulose enemas ordered. Also received orders for insulin gtt to be turned off- last two -116. TF off since OG removal. Wctm.

## 2019-07-27 NOTE — SUBJECTIVE & OBJECTIVE
Interval History/Significant Events: alert     Review of Systems   Unable to perform ROS: Intubated     Objective:     Vital Signs (Most Recent):  Temp: 98.5 °F (36.9 °C) (07/27/19 1500)  Pulse: 74 (07/27/19 1500)  Resp: 13 (07/27/19 1500)  BP: 130/61 (07/27/19 1500)  SpO2: 100 % (07/27/19 1500) Vital Signs (24h Range):  Temp:  [97.4 °F (36.3 °C)-98.5 °F (36.9 °C)] 98.5 °F (36.9 °C)  Pulse:  [67-97] 74  Resp:  [13-47] 13  SpO2:  [100 %] 100 %  BP: (112-168)/(55-72) 130/61   Weight: 56 kg (123 lb 7.3 oz)  Body mass index is 24.11 kg/m².      Intake/Output Summary (Last 24 hours) at 7/27/2019 1557  Last data filed at 7/27/2019 1500  Gross per 24 hour   Intake 1754.39 ml   Output 1790 ml   Net -35.61 ml       Physical Exam   Constitutional: She appears well-developed and well-nourished. She appears ill. She is intubated and restrained.   HENT:   Head: Normocephalic and atraumatic.   Eyes: Pupils are equal, round, and reactive to light. EOM are normal. Scleral icterus is present.   Neck: No tracheal deviation present. No thyromegaly present.   Cardiovascular: Normal rate and regular rhythm. Exam reveals no gallop and no friction rub.   No murmur heard.  Pulses:       Radial pulses are 2+ on the right side, and 2+ on the left side.        Dorsalis pedis pulses are 2+ on the right side, and 2+ on the left side.   Warm extremities, no peripheral edema   Pulmonary/Chest: Effort normal and breath sounds normal. No accessory muscle usage or stridor. No tachypnea and no bradypnea. She is intubated. No respiratory distress. She has no decreased breath sounds. She has no wheezes. She has no rhonchi. She has no rales.   Abdominal: Soft. She exhibits distension. Bowel sounds are increased. There is hepatomegaly. There is no tenderness.   Fecal management system with brown loose stool   Genitourinary:   Genitourinary Comments: Urine catheter with deborah output   Musculoskeletal: Normal range of motion.   Neurological: No sensory  deficit.   Spontaneous eye opening.  PERRL, no nystagmus or roving eye movements. Following request and moving all extremities.    Skin: Skin is warm and dry. No rash noted.            Vents:  Vent Mode: Spont (07/27/19 0912)  Ventilator Initiated: Yes (07/24/19 1312)  Set Rate: 0 bmp (07/27/19 0912)  Vt Set: 300 mL (07/27/19 0912)  Pressure Support: 5 cmH20 (07/27/19 0912)  PEEP/CPAP: 5 cmH20 (07/27/19 0912)  Oxygen Concentration (%): 28 (07/27/19 1113)  Peak Airway Pressure: 11 cmH2O (07/27/19 0912)  Plateau Pressure: 0 cmH20 (07/27/19 0912)  Total Ve: 8.93 mL (07/27/19 0912)  Negative Inspiratory Force (cm H2O): -35 (07/27/19 1105)  F/VT Ratio<105 (RSBI): (!) 36.25 (07/27/19 0912)  Lines/Drains/Airways     Drain                 Urethral Catheter 07/24/19 1346 Non-latex 16 Fr. 3 days         Rectal Tube 07/25/19 0943 rectal tube w/ balloon (indicate number of mLs) 2 days          Airway                 Airway - Non-Surgical 07/24/19 1310 Endotracheal Tube 3 days          Peripheral Intravenous Line                 Peripheral IV - Single Lumen 07/24/19 1315 20 G Anterior;Distal;Right Forearm 3 days         Peripheral IV - Single Lumen 07/26/19 0500 18 G Left Antecubital 1 day              Significant Labs:    CBC/Anemia Profile:  Recent Labs   Lab 07/26/19 0417 07/27/19 0418   WBC 6.10 5.70   HGB 10.3* 9.9*   HCT 29.9* 28.8*   PLT 40* 41*   MCV 98 98   RDW 14.3 14.5        Chemistries:  Recent Labs   Lab 07/26/19  0417 07/26/19  0928 07/26/19  1604 07/27/19  0418 07/27/19  1213   * 129* 129* 135* 135*   K 3.3* 4.2 3.5 2.8* 3.8   CL 92* 95 98 100 103   CO2 20* 20* 17* 21* 20*   BUN 77* 79* 83* 89* 89*   CREATININE 2.8* 2.9* 3.2* 3.3* 3.2*   CALCIUM 8.7 8.3* 8.8 8.6* 8.8   ALBUMIN 3.2* 3.1*  3.1* 3.1* 3.2* 3.2*   PROT 5.9* 5.8*  --  6.4  --    BILITOT 4.1* 3.5*  --  2.8*  --    ALKPHOS 144* 152*  --  191*  --    ALT 34 34  --  35  --    AST 39 40  --  41*  --    MG 2.9*  --   --  3.3*  --    PHOS 5.2* 4.7*  3.7 3.0 2.5*       All pertinent labs within the past 24 hours have been reviewed.    Significant Imaging:  I have reviewed and interpreted all pertinent imaging results/findings within the past 24 hours.

## 2019-07-28 PROBLEM — N17.9 ACUTE KIDNEY INJURY SUPERIMPOSED ON CHRONIC KIDNEY DISEASE: Status: ACTIVE | Noted: 2019-07-18

## 2019-07-28 PROBLEM — R13.19 OTHER DYSPHAGIA: Status: ACTIVE | Noted: 2019-07-28

## 2019-07-28 PROBLEM — N18.9 ACUTE KIDNEY INJURY SUPERIMPOSED ON CHRONIC KIDNEY DISEASE: Status: ACTIVE | Noted: 2019-07-18

## 2019-07-28 LAB
ALBUMIN SERPL BCP-MCNC: 3.5 G/DL (ref 3.5–5.2)
ALP SERPL-CCNC: 127 U/L (ref 55–135)
ALT SERPL W/O P-5'-P-CCNC: 30 U/L (ref 10–44)
ANION GAP SERPL CALC-SCNC: 13 MMOL/L (ref 8–16)
ANISOCYTOSIS BLD QL SMEAR: SLIGHT
ANISOCYTOSIS BLD QL SMEAR: SLIGHT
AST SERPL-CCNC: 28 U/L (ref 10–40)
BASOPHILS # BLD AUTO: 0.01 K/UL (ref 0–0.2)
BASOPHILS # BLD AUTO: 0.02 K/UL (ref 0–0.2)
BASOPHILS # BLD AUTO: 0.03 K/UL (ref 0–0.2)
BASOPHILS NFR BLD: 0.3 % (ref 0–1.9)
BASOPHILS NFR BLD: 0.7 % (ref 0–1.9)
BASOPHILS NFR BLD: 0.9 % (ref 0–1.9)
BILIRUB SERPL-MCNC: 3.2 MG/DL (ref 0.1–1)
BUN SERPL-MCNC: 90 MG/DL (ref 8–23)
BURR CELLS BLD QL SMEAR: ABNORMAL
C IMMITIS AB TITR SER CF: NEGATIVE {TITER}
C IMMITIS IGG SER QL: NEGATIVE
C IMMITIS IGM SER QL: NEGATIVE
CALCIUM SERPL-MCNC: 8.7 MG/DL (ref 8.7–10.5)
CHLORIDE SERPL-SCNC: 104 MMOL/L (ref 95–110)
CO2 SERPL-SCNC: 22 MMOL/L (ref 23–29)
CREAT SERPL-MCNC: 2.9 MG/DL (ref 0.5–1.4)
DIFFERENTIAL METHOD: ABNORMAL
DOHLE BOD BLD QL SMEAR: PRESENT
EOSINOPHIL # BLD AUTO: 0 K/UL (ref 0–0.5)
EOSINOPHIL NFR BLD: 0.8 % (ref 0–8)
EOSINOPHIL NFR BLD: 0.9 % (ref 0–8)
EOSINOPHIL NFR BLD: 1 % (ref 0–8)
ERYTHROCYTE [DISTWIDTH] IN BLOOD BY AUTOMATED COUNT: 14.5 % (ref 11.5–14.5)
ERYTHROCYTE [DISTWIDTH] IN BLOOD BY AUTOMATED COUNT: 14.5 % (ref 11.5–14.5)
ERYTHROCYTE [DISTWIDTH] IN BLOOD BY AUTOMATED COUNT: 14.6 % (ref 11.5–14.5)
EST. GFR  (AFRICAN AMERICAN): 18.2 ML/MIN/1.73 M^2
EST. GFR  (NON AFRICAN AMERICAN): 15.8 ML/MIN/1.73 M^2
GLUCOSE SERPL-MCNC: 160 MG/DL (ref 70–110)
HCT VFR BLD AUTO: 23.1 % (ref 37–48.5)
HCT VFR BLD AUTO: 24.6 % (ref 37–48.5)
HCT VFR BLD AUTO: 24.6 % (ref 37–48.5)
HGB BLD-MCNC: 7.5 G/DL (ref 12–16)
HGB BLD-MCNC: 7.9 G/DL (ref 12–16)
HGB BLD-MCNC: 8.1 G/DL (ref 12–16)
HYPOCHROMIA BLD QL SMEAR: ABNORMAL
IMM GRANULOCYTES # BLD AUTO: 0.02 K/UL (ref 0–0.04)
IMM GRANULOCYTES # BLD AUTO: 0.03 K/UL (ref 0–0.04)
IMM GRANULOCYTES # BLD AUTO: 0.03 K/UL (ref 0–0.04)
IMM GRANULOCYTES NFR BLD AUTO: 0.6 % (ref 0–0.5)
IMM GRANULOCYTES NFR BLD AUTO: 0.8 % (ref 0–0.5)
IMM GRANULOCYTES NFR BLD AUTO: 1 % (ref 0–0.5)
INR PPP: 1.6 (ref 0.8–1.2)
LYMPHOCYTES # BLD AUTO: 0.4 K/UL (ref 1–4.8)
LYMPHOCYTES NFR BLD: 11 % (ref 18–48)
LYMPHOCYTES NFR BLD: 12.5 % (ref 18–48)
LYMPHOCYTES NFR BLD: 13.4 % (ref 18–48)
MAGNESIUM SERPL-MCNC: 3.3 MG/DL (ref 1.6–2.6)
MCH RBC QN AUTO: 33.5 PG (ref 27–31)
MCHC RBC AUTO-ENTMCNC: 32.1 G/DL (ref 32–36)
MCHC RBC AUTO-ENTMCNC: 32.5 G/DL (ref 32–36)
MCHC RBC AUTO-ENTMCNC: 32.9 G/DL (ref 32–36)
MCV RBC AUTO: 102 FL (ref 82–98)
MCV RBC AUTO: 103 FL (ref 82–98)
MCV RBC AUTO: 104 FL (ref 82–98)
MONOCYTES # BLD AUTO: 0.3 K/UL (ref 0.3–1)
MONOCYTES # BLD AUTO: 0.4 K/UL (ref 0.3–1)
MONOCYTES # BLD AUTO: 0.4 K/UL (ref 0.3–1)
MONOCYTES NFR BLD: 10 % (ref 4–15)
MONOCYTES NFR BLD: 10.5 % (ref 4–15)
MONOCYTES NFR BLD: 13 % (ref 4–15)
NEUTROPHILS # BLD AUTO: 2.1 K/UL (ref 1.8–7.7)
NEUTROPHILS # BLD AUTO: 2.4 K/UL (ref 1.8–7.7)
NEUTROPHILS # BLD AUTO: 2.8 K/UL (ref 1.8–7.7)
NEUTROPHILS NFR BLD: 70.9 % (ref 38–73)
NEUTROPHILS NFR BLD: 75.1 % (ref 38–73)
NEUTROPHILS NFR BLD: 76.6 % (ref 38–73)
NRBC BLD-RTO: 0 /100 WBC
OVALOCYTES BLD QL SMEAR: ABNORMAL
OVALOCYTES BLD QL SMEAR: ABNORMAL
PHOSPHATE SERPL-MCNC: 4.1 MG/DL (ref 2.7–4.5)
PLATELET # BLD AUTO: 26 K/UL (ref 150–350)
PLATELET # BLD AUTO: 27 K/UL (ref 150–350)
PLATELET # BLD AUTO: 27 K/UL (ref 150–350)
PLATELET BLD QL SMEAR: ABNORMAL
PMV BLD AUTO: 10 FL (ref 9.2–12.9)
PMV BLD AUTO: 10.5 FL (ref 9.2–12.9)
PMV BLD AUTO: 10.6 FL (ref 9.2–12.9)
POCT GLUCOSE: 138 MG/DL (ref 70–110)
POCT GLUCOSE: 148 MG/DL (ref 70–110)
POCT GLUCOSE: 171 MG/DL (ref 70–110)
POCT GLUCOSE: 222 MG/DL (ref 70–110)
POIKILOCYTOSIS BLD QL SMEAR: SLIGHT
POIKILOCYTOSIS BLD QL SMEAR: SLIGHT
POLYCHROMASIA BLD QL SMEAR: ABNORMAL
POTASSIUM SERPL-SCNC: 3.7 MMOL/L (ref 3.5–5.1)
PROT SERPL-MCNC: 6.1 G/DL (ref 6–8.4)
PROTHROMBIN TIME: 15.7 SEC (ref 9–12.5)
RBC # BLD AUTO: 2.24 M/UL (ref 4–5.4)
RBC # BLD AUTO: 2.36 M/UL (ref 4–5.4)
RBC # BLD AUTO: 2.42 M/UL (ref 4–5.4)
SODIUM SERPL-SCNC: 139 MMOL/L (ref 136–145)
TOXIC GRANULES BLD QL SMEAR: PRESENT
WBC # BLD AUTO: 2.99 K/UL (ref 3.9–12.7)
WBC # BLD AUTO: 3.19 K/UL (ref 3.9–12.7)
WBC # BLD AUTO: 3.62 K/UL (ref 3.9–12.7)

## 2019-07-28 PROCEDURE — 85025 COMPLETE CBC W/AUTO DIFF WBC: CPT | Mod: NTX

## 2019-07-28 PROCEDURE — 99233 PR SUBSEQUENT HOSPITAL CARE,LEVL III: ICD-10-PCS | Mod: GC,NTX,, | Performed by: INTERNAL MEDICINE

## 2019-07-28 PROCEDURE — 83735 ASSAY OF MAGNESIUM: CPT | Mod: NTX

## 2019-07-28 PROCEDURE — 20000000 HC ICU ROOM: Mod: NTX

## 2019-07-28 PROCEDURE — 94761 N-INVAS EAR/PLS OXIMETRY MLT: CPT | Mod: NTX

## 2019-07-28 PROCEDURE — 80053 COMPREHEN METABOLIC PANEL: CPT | Mod: NTX

## 2019-07-28 PROCEDURE — 25000003 PHARM REV CODE 250: Mod: NTX | Performed by: NURSE PRACTITIONER

## 2019-07-28 PROCEDURE — 63600175 PHARM REV CODE 636 W HCPCS: Mod: NTX | Performed by: STUDENT IN AN ORGANIZED HEALTH CARE EDUCATION/TRAINING PROGRAM

## 2019-07-28 PROCEDURE — 63600175 PHARM REV CODE 636 W HCPCS: Mod: JG,NTX | Performed by: NURSE PRACTITIONER

## 2019-07-28 PROCEDURE — 85610 PROTHROMBIN TIME: CPT | Mod: NTX

## 2019-07-28 PROCEDURE — 99233 SBSQ HOSP IP/OBS HIGH 50: CPT | Mod: NTX,,, | Performed by: NURSE PRACTITIONER

## 2019-07-28 PROCEDURE — 84100 ASSAY OF PHOSPHORUS: CPT | Mod: NTX

## 2019-07-28 PROCEDURE — 99233 SBSQ HOSP IP/OBS HIGH 50: CPT | Mod: GC,NTX,, | Performed by: INTERNAL MEDICINE

## 2019-07-28 PROCEDURE — 92610 EVALUATE SWALLOWING FUNCTION: CPT | Mod: NTX

## 2019-07-28 PROCEDURE — P9047 ALBUMIN (HUMAN), 25%, 50ML: HCPCS | Mod: JG,NTX | Performed by: NURSE PRACTITIONER

## 2019-07-28 PROCEDURE — 25000003 PHARM REV CODE 250: Mod: NTX | Performed by: STUDENT IN AN ORGANIZED HEALTH CARE EDUCATION/TRAINING PROGRAM

## 2019-07-28 PROCEDURE — 25000003 PHARM REV CODE 250: Mod: NTX | Performed by: PHYSICIAN ASSISTANT

## 2019-07-28 PROCEDURE — 99233 PR SUBSEQUENT HOSPITAL CARE,LEVL III: ICD-10-PCS | Mod: NTX,,, | Performed by: NURSE PRACTITIONER

## 2019-07-28 RX ORDER — CIPROFLOXACIN 500 MG/1
500 TABLET ORAL EVERY 24 HOURS
Status: DISCONTINUED | OUTPATIENT
Start: 2019-07-28 | End: 2019-07-30

## 2019-07-28 RX ORDER — LACTULOSE 10 G/15ML
15 SOLUTION ORAL 3 TIMES DAILY
Status: DISCONTINUED | OUTPATIENT
Start: 2019-07-28 | End: 2019-08-01

## 2019-07-28 RX ADMIN — RIFAXIMIN 550 MG: 550 TABLET ORAL at 08:07

## 2019-07-28 RX ADMIN — Medication 220 MG: at 09:07

## 2019-07-28 RX ADMIN — ALBUMIN (HUMAN) 25 G: 25 SOLUTION INTRAVENOUS at 02:07

## 2019-07-28 RX ADMIN — MIDODRINE HYDROCHLORIDE 10 MG: 5 TABLET ORAL at 09:07

## 2019-07-28 RX ADMIN — MIDODRINE HYDROCHLORIDE 10 MG: 5 TABLET ORAL at 02:07

## 2019-07-28 RX ADMIN — CIPROFLOXACIN HYDROCHLORIDE 500 MG: 500 TABLET, FILM COATED ORAL at 05:07

## 2019-07-28 RX ADMIN — INSULIN ASPART 2 UNITS: 100 INJECTION, SOLUTION INTRAVENOUS; SUBCUTANEOUS at 05:07

## 2019-07-28 RX ADMIN — LACTULOSE 15 G: 20 SOLUTION ORAL at 02:07

## 2019-07-28 RX ADMIN — RIFAXIMIN 550 MG: 550 TABLET ORAL at 09:07

## 2019-07-28 RX ADMIN — MIDODRINE HYDROCHLORIDE 10 MG: 5 TABLET ORAL at 08:07

## 2019-07-28 RX ADMIN — ALBUMIN (HUMAN) 25 G: 25 SOLUTION INTRAVENOUS at 08:07

## 2019-07-28 RX ADMIN — ALBUMIN (HUMAN) 25 G: 25 SOLUTION INTRAVENOUS at 09:07

## 2019-07-28 RX ADMIN — LACTULOSE 15 G: 20 SOLUTION ORAL at 08:07

## 2019-07-28 NOTE — ASSESSMENT & PLAN NOTE
Ms Ojeda is a 68 year old female with a history of IZAGUIRRE ESLD, with decompensations including Gr 1 HE, ascites, EV, hyponatremia, T2DM, HTN, hypothyroidism, who is being admitted from hepatology clinic due to concern for hyponatremia. Also found to have ileus which has resolved. Hyponatremia has resolved. Unresponsive on 7/24, transferred to ICU and intubated for airway protection. Now extubated with slightly improved mentation.     Plan  - encephalopathy: Continue lactulose and rifaximin.  - ascites: Negative for SBP on 07/19. Total protein 1.5.  - diuretics: hold given TERA  - TERA on CKD: unclear baseline. Cr up to 3.2 from 2s earlier this admission, now improving s/p IV albumin.  - hyponatremia: Resolved, but seems dehydrated. Recommend free water. Holding diuresis. Nephrology consult.  - malnutrition: nutrition consult. Calorie count. Supplements. High protein intake. PT/OT  - Anemia: likely dilutional from albumin, but monitor for overt GI bleed  - Appreciate Renal transplant evaluation re: SLK given GFR <30.  - inpatient transplant evaluation started. Nuclear stress test and renal scan completed, ID consult, now needs improvement in mental status and further evaluation of R renal mass. Will discuss at transplant committee tomorrow to assess if patient may be a future candidate. Need for further workup will depend on this decision. Family is understanding and either way would like to see patient improve in terms of rehab.

## 2019-07-28 NOTE — PROGRESS NOTES
[facility]  Hepatology  Progress Note    Patient Name: Michelle Ojeda  MRN: [id number]  Admission Date: 7/18/2019  Hospital Length of Stay: 10 days  Attending Provider: Naveed Calix MD   Primary Care Physician: Primary Doctor No  Principal Problem:Acute metabolic encephalopathy    Subjective:     Transplant status: Pre-transplant    HPI: Ms Ojeda is a 68 year old female with a history of HERRMANN ESLD, with decompensations including Gr 1 HE, ascites, EV, hyponatremia, T2DM, HTN, hypothyroidism, who is being admitted from hepatology clinic due to concern for hyponatremia.    She presents to transplant Clinic for initial evaluation on 07/18 with Dr. Cannon for initial transplant evaluation.  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  Her cirrhosis has been complicated by grade 1 hepatic encephalopathy, recently started Aldo Shannon min but never been on lactulose, ascites, sarcopenia, esophageal varices status post ligation, hyponatremia.  She was previously evaluated at [place] with Dr. Mccormick who recommended evaluation at [facility].  Does not appear she was evaluated for transplant at that time.    Due to hyponatremia with sodium of 120 she was admitted to the hospital for further management.  Currently she reports she is feeling well without any complaints.  Denies any abdominal pain, nausea, vomiting, diarrhea.  Denies any fevers, chills, sweats or other infectious complaints.    Family who is present in the room note that she is doing extremely well until approximately 2 months prior to presentation when she began to be more frail, fatigue, increased peripheral edema, decreased oral intake.  They note they have been extremely careful to avoid T here to low-sodium diet.  She has never been hospitalized  in the past for hyponatremia.  No other recent hospitalizations.  Regarding her ascites she has approximately received a paracentesis every 2 weeks.      Interval History:   Remains extubated, NAONE.  No complaints from patient, asking if she can go home, A&Ox2.  Passed swallow study this morning.  Hgb 9.9 -> 7.5, but all cell lines down. No melena or blood in stool output, no vomiting.    Current Facility-Administered Medications   Medication    acetaminophen tablet 325 mg    albumin human 25% bottle 25 g    dextrose 10% (D10W) Bolus    dextrose 10% (D10W) Bolus    dicyclomine capsule 10 mg    fentaNYL injection 25 mcg    glucagon (human recombinant) injection 1 mg    glucose chewable tablet 16 g    glucose chewable tablet 24 g    insulin aspart U-100 pen 0-5 Units    iohexol (OMNIPAQUE) oral solution 15 mL    lactulose 20 gram/30 mL solution Soln 15 g    levothyroxine tablet 88 mcg    midodrine tablet 10 mg    ondansetron injection 4 mg    rifAXIMin tablet 550 mg    simethicone chewable tablet 80 mg    sodium chloride 0.9% flush 10 mL    sodium chloride 0.9% flush 10 mL    zinc sulfate capsule 220 mg       Objective:     Vital Signs (Most Recent):  Temp: 97.5 °F (36.4 °C) (07/28/19 1100)  Pulse: 77 (07/28/19 1218)  Resp: 17 (07/28/19 1218)  BP: 127/60 (07/28/19 1200)  SpO2: 100 % (07/28/19 1218) Vital Signs (24h Range):  Temp:  [97.5 °F (36.4 °C)-98.5 °F (36.9 °C)] 97.5 °F (36.4 °C)  Pulse:  [71-89] 77  Resp:  [13-35] 17  SpO2:  [100 %] 100 %  BP: (110-141)/(55-63) 127/60     Weight: 56 kg (123 lb 7.3 oz) (07/25/19 1300)  Body mass index is 24.11 kg/m².    Physical Exam   Constitutional: She has a sickly appearance. No distress.   Cardiovascular: Normal rate and regular rhythm.   Pulmonary/Chest: Effort normal. No respiratory distress.   Abdominal: Soft. Bowel sounds are normal. She exhibits no distension. There is no tenderness.   Neurological: She is alert. She is disoriented (oriented to  name and place).   Psychiatric: Her behavior is normal.   Nursing note and vitals reviewed.      MELD-Na score: 26 at 7/28/2019  3:06 AM  MELD score: 26 at 7/28/2019  3:06 AM  Calculated from:  Serum Creatinine: 2.9 mg/dL at 7/28/2019  3:06 AM  Serum Sodium: 139 mmol/L (Rounded to 137 mmol/L) at 7/28/2019  3:06 AM  Total Bilirubin: 3.2 mg/dL at 7/28/2019  3:06 AM  INR(ratio): 1.6 at 7/28/2019  3:06 AM  Age: 68 years    Significant Labs:  Labs within the past month have been reviewed.    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 Also found to have ileus which has resolved. Hyponatremia has resolved. Unresponsive on 7/24, transferred to ICU and intubated for airway protection. Now extubated with improving mentation.     Plan  - encephalopathy: Continue lactulose and rifaximin.  - ascites: Negative for SBP on 07/19. Total protein 1.5.  - diuretics: hold given TERA  - TERA on CKD: unclear baseline. Cr up to 3.2 from 2s earlier this admission, now improving. IV albumin 25g tid for next 2-3 days.  - hyponatremia: Resolved, but seems dehydrated. Recommend free water. Holding diuresis. Nephrology consult.  - malnutrition: nutrition consult. Calorie count. Supplements. Resume PO intake as per Speech path recs.  - Anemia: likely dilutional from albumin, but monitor for overt GI bleed  - PT/OT  - inpatient transplant evaluation started. Nuclear stress test and renal scan completed, ID consult, now needs improvement in mental status and further evaluation of R renal mass (although transplant not contraindicated with a relatively indolent tumor).  - Appreciate renal evaluation re: SLK given GFR <35      Thank you for your consult. I will follow-up with  patient. Please contact us if you have any additional questions.    Andrea Eaton MD  Hepatology  [facility]

## 2019-07-28 NOTE — ASSESSMENT & PLAN NOTE
--Likely due to sedating medications however patient at risk for infection  --Blood culture, sputum culture, and peritoneal fluid negative  --UA appearing noninfectious  --Received 4 doses of piptazo,  Received 3 days of vancomycin.  --Resolved; weaned off norepinephrine infusion on 7/25  --Continue midodrine 10 mg TID

## 2019-07-28 NOTE — ASSESSMENT & PLAN NOTE
Patient with Hx of DM however was taken off metformin due to improvement  --A1c 5.1  --insulin infusion discontinued on 7/27 when enteral feedings where held.   --frequent glucose checks with SSI

## 2019-07-28 NOTE — PROGRESS NOTES
[facility]  Nephrology  Progress Note    Patient Name: Michelle Ojeda  MRN: [id number]  Admission Date: 7/18/2019  Hospital Length of Stay: 10 days  Attending Provider: Naveed Calix MD   Primary Care Physician: Primary Doctor No  Principal Problem:Acute metabolic encephalopathy    Subjective:     HPI: Rusty Martinez is 69 yo F with CKD stage 4 presenting from transplant hepatology clinic for hyponatremia on 7/18/19. She is originally from [place], here for liver transplant evaluation. She was diagnosed with IZAGUIRRE in grade 1 hepatic encephalopathy with severe ascites and esophogeal varices (s/p banding). During the month prior to admission she felt more lethargic and was requiring biweekly paracentesis. Of note, she is also being treated for Ileus her acute hepatic encephalopathy with lactulose. Recent 24 hour cr clearance study was near 21. Reported baseline Cr 1.6-1.8 and chronic hyponatremia range between 123-124.     Interval History: NAEO. Patient extubated yesterday. Family reports improvements in alertness and patient now following commands. Nursing reports approximately 30 cc/hr UOP  Review of patient's allergies indicates:  No Known Allergies  Current Facility-Administered Medications   Medication Frequency    acetaminophen tablet 325 mg Q4H PRN    albumin human 25% bottle 25 g TID    dextrose 10% (D10W) Bolus PRN    dextrose 10% (D10W) Bolus PRN    dicyclomine capsule 10 mg QID PRN    fentaNYL injection 25 mcg Q1H PRN    glucagon (human recombinant) injection 1 mg PRN    glucose chewable tablet 16 g PRN    glucose chewable tablet 24 g PRN    insulin aspart U-100 pen 0-5 Units Q6H PRN    iohexol (OMNIPAQUE) oral solution 15 mL PRN    lactulose 20 gram/30 mL solution Soln 15 g TID    levothyroxine tablet 88 mcg Before breakfast    midodrine tablet 10 mg TID    ondansetron injection 4 mg Q6H PRN    rifAXIMin tablet 550 mg BID    simethicone chewable tablet 80 mg TID  PRN    sodium chloride 0.9% flush 10 mL PRN    sodium chloride 0.9% flush 10 mL PRN    zinc sulfate capsule 220 mg Daily       Objective:     Vital Signs (Most Recent):  Temp: 97.5 °F (36.4 °C) (07/28/19 1100)  Pulse: 77 (07/28/19 1218)  Resp: 17 (07/28/19 1218)  BP: 127/60 (07/28/19 1200)  SpO2: 100 % (07/28/19 1218)  O2 Device (Oxygen Therapy): room air (07/28/19 1218) Vital Signs (24h Range):  Temp:  [97.5 °F (36.4 °C)-98.5 °F (36.9 °C)] 97.5 °F (36.4 °C)  Pulse:  [71-89] 77  Resp:  [13-35] 17  SpO2:  [100 %] 100 %  BP: (110-141)/(55-63) 127/60     Weight: 56 kg (123 lb 7.3 oz) (07/25/19 1300)  Body mass index is 24.11 kg/m².  Body surface area is 1.54 meters squared.    I/O last 3 completed shifts:  In: 1703.7 [I.V.:163.7; NG/GT:740; IV Piggyback:800]  Out: 2495 [Urine:965; Stool:1530]    Physical Exam   Constitutional: She appears cachectic. No distress.   HENT:   Head: Normocephalic and atraumatic.   Eyes: EOM are normal. Scleral icterus is present.   Neck: Normal range of motion.   Cardiovascular: Normal rate, regular rhythm and normal heart sounds.   No murmur heard.  Pulmonary/Chest: Effort normal and breath sounds normal.        Abdominal: She exhibits distension (Ascites ). There is hepatosplenomegaly.   Musculoskeletal: She exhibits no edema.   Neurological: She is alert.   Skin: Skin is warm and dry. Ecchymosis noted. She is not diaphoretic.   Vitals reviewed.      Significant Labs:  ABGs:   Recent Labs   Lab 07/26/19  0928   PH 7.471*   PCO2 29.2*   HCO3 21.3*   POCSATURATED 93*   BE -2     BMP:   Recent Labs   Lab 07/28/19  0306   *      CO2 22*   BUN 90*   CREATININE 2.9*   CALCIUM 8.7   MG 3.3*     CBC:   Recent Labs   Lab 07/28/19  0924   WBC 3.19*   RBC 2.42*   HGB 8.1*   HCT 24.6*   PLT 27*   *   MCH 33.5*   MCHC 32.9     CMP:   Recent Labs   Lab 07/28/19  0306   *   CALCIUM 8.7   ALBUMIN 3.5   PROT 6.1      K 3.7   CO2 22*      BUN 90*   CREATININE 2.9*    ALKPHOS 127   ALT 30   AST 28   BILITOT 3.2*     LFTs:   Recent Labs   Lab 07/28/19  0306   ALT 30   AST 28   ALKPHOS 127   BILITOT 3.2*   PROT 6.1   ALBUMIN 3.5     All labs within the past 24 hours have been reviewed.     Significant Imaging:  No new imaging in 24 hours.    Assessment/Plan:     Acute kidney injury superimposed on chronic kidney disease  Labs from [place] nephrology demonstrated Cr of 1.77 in 3/12/19, 1.67 on 4/5/19, 1.96 in 6/13/19, and 1.83 on 6/24. Cr is 2.8 today (7/26/19) above her baseline due to ATN secondary to hypoperfusion of kidneys related to hypotensive episodes. Hepatorenal syndrome not suspected at this time as the patient's Cr and urine output have improved with an albumin challenge. TERA could be exacerbated with noted Hgb drop from 9.9 to 7.5 this morning.     Recommendations:   - c/w Midodrine 10mg TID added to prevent hypoperfusion to kidneys. Goal MAP>70  - c/w albumin 25g TID  - Monitor with daily BMP  - Strict I/Os   - Avoid Nephrotoxic agents  - Monitor for signs of blood loss  - Hgb > 7         Thank you for your consult. I will follow-up with patient. Please contact us if you have any additional questions.    Jose J Fang MD  Nephrology  [facility]

## 2019-07-28 NOTE — ASSESSMENT & PLAN NOTE
Intubated for airway protection in setting of severe encephalopathy and episodes of emesis/concern for aspiration  --Sputum culture negative from 7/24.  De-escalated antibiotics.   --No focal signs of consolidation on CXR  --Minimal ventilator requirements  --Wean supplemental O2 for goal SpO2 >88%  --extubated on 7/27

## 2019-07-28 NOTE — PT/OT/SLP EVAL
"Speech Language Pathology Evaluation  Bedside Swallow    Patient Name:  Michelle Ojeda   MRN:  [id number]  Admitting Diagnosis: Acute metabolic encephalopathy    Recommendations:                 General Recommendations:    Diet recommendations:  Dental Soft, Thin   Aspiration Precautions: HOB to 90 degrees, Meds whole 1 at a time, Monitor for s/s of aspiration, No straws and Standard aspiration precautions   General Precautions: Standard, fall  Communication strategies:  none    History:     Past Medical History:   Diagnosis Date    Cirrhosis     Diabetes mellitus, type 2     Disorder of kidney and ureter     Hypertension     Hypothyroidism     NAFLD (nonalcoholic fatty liver disease)        History reviewed. No pertinent surgical history.    Social History: Patient lives with spouse.    Prior Intubation HX:  Extubated on 7/27  Prior diet:regular.        Subjective     "I am thirsty" per pt  Patient goals: home     Pain/Comfort:  · Pain Rating 1: 0/10  · Pain Rating Post-Intervention 1: 0/10    Objective:     Oral Musculature Evaluation  · Oral Musculature: WFL  · Dentition: present and adequate  · Secretion Management: adequate  · Mucosal Quality: adequate  · Mandibular Strength and Mobility: WFL  · Oral Labial Strength and Mobility: WFL  · Lingual Strength and Mobility: WFL  · Velar Elevation: WFL  · Buccal Strength and Mobility: WFL  · Volitional Cough: fair  · Volitional Swallow: no delay  · Voice Prior to PO Intake: wfl    Bedside Swallow Eval:   Consistencies Assessed:  · Thin liquids 2 teaspoons then 6 sips of water via cup self fed  · Puree 2 teaspoons  · Solids 1/2 cracker     Oral Phase:   · WFL    Pharyngeal Phase:   · no overt clinical signs/symptoms of aspiration  · no overt clinical signs/symptoms of pharyngeal dysphagia    Compensatory Strategies  · None    Treatment:     Assessment:     Michelle Ojeda is a 68 y.o. female with no s/s of aspiration  Goals:   Multidisciplinary Problems     SLP " Goals        Problem: SLP Goal    Goal Priority Disciplines Outcome   SLP Goal     SLP Ongoing (interventions implemented as appropriate)                   Plan:     · Patient to be seen:      · Plan of Care expires:     · Plan of Care reviewed with:  patient   · SLP Follow-Up:  No       Discharge recommendations:  home       Time Tracking:     SLP Treatment Date:   07/28/19  Speech Start Time:  1300  Speech Stop Time:  1310     Speech Total Time (min):  10 min    Billable Minutes: Eval Swallow and Oral Function 10    Leandra Tadeo MA, CCC-SLP  07/28/2019

## 2019-07-28 NOTE — SUBJECTIVE & OBJECTIVE
Interval History:   Remains extubated, NAONE.  No complaints from patient, asking if she can go home, A&Ox2.  Passed swallow study this morning.  Hgb 9.9 -> 7.5, but all cell lines down. No melena or blood in stool output, no vomiting.    Current Facility-Administered Medications   Medication    acetaminophen tablet 325 mg    albumin human 25% bottle 25 g    dextrose 10% (D10W) Bolus    dextrose 10% (D10W) Bolus    dicyclomine capsule 10 mg    fentaNYL injection 25 mcg    glucagon (human recombinant) injection 1 mg    glucose chewable tablet 16 g    glucose chewable tablet 24 g    insulin aspart U-100 pen 0-5 Units    iohexol (OMNIPAQUE) oral solution 15 mL    lactulose 20 gram/30 mL solution Soln 15 g    levothyroxine tablet 88 mcg    midodrine tablet 10 mg    ondansetron injection 4 mg    rifAXIMin tablet 550 mg    simethicone chewable tablet 80 mg    sodium chloride 0.9% flush 10 mL    sodium chloride 0.9% flush 10 mL    zinc sulfate capsule 220 mg       Objective:     Vital Signs (Most Recent):  Temp: 97.5 °F (36.4 °C) (07/28/19 1100)  Pulse: 77 (07/28/19 1218)  Resp: 17 (07/28/19 1218)  BP: 127/60 (07/28/19 1200)  SpO2: 100 % (07/28/19 1218) Vital Signs (24h Range):  Temp:  [97.5 °F (36.4 °C)-98.5 °F (36.9 °C)] 97.5 °F (36.4 °C)  Pulse:  [71-89] 77  Resp:  [13-35] 17  SpO2:  [100 %] 100 %  BP: (110-141)/(55-63) 127/60     Weight: 56 kg (123 lb 7.3 oz) (07/25/19 1300)  Body mass index is 24.11 kg/m².    Physical Exam   Constitutional: She has a sickly appearance. No distress.   Cardiovascular: Normal rate and regular rhythm.   Pulmonary/Chest: Effort normal. No respiratory distress.   Abdominal: Soft. Bowel sounds are normal. She exhibits no distension. There is no tenderness.   Neurological: She is alert. She is disoriented (oriented to name and place).   Psychiatric: Her behavior is normal.   Nursing note and vitals reviewed.      MELD-Na score: 26 at 7/28/2019  3:06 AM  MELD score: 26 at  7/28/2019  3:06 AM  Calculated from:  Serum Creatinine: 2.9 mg/dL at 7/28/2019  3:06 AM  Serum Sodium: 139 mmol/L (Rounded to 137 mmol/L) at 7/28/2019  3:06 AM  Total Bilirubin: 3.2 mg/dL at 7/28/2019  3:06 AM  INR(ratio): 1.6 at 7/28/2019  3:06 AM  Age: 68 years    Significant Labs:  Labs within the past month have been reviewed.    Significant Imaging:  Reviewed

## 2019-07-28 NOTE — SUBJECTIVE & OBJECTIVE
Interval History/Significant Events: No acute events overnight.     Review of Systems   Constitutional: Negative for chills, diaphoresis and fever.   HENT: Negative for sore throat.    Respiratory: Negative for shortness of breath.    Cardiovascular: Negative for chest pain.   Gastrointestinal: Negative for abdominal pain.   Neurological: Negative for dizziness and headaches.     Objective:     Vital Signs (Most Recent):  Temp: 97.5 °F (36.4 °C) (07/28/19 0700)  Pulse: 73 (07/28/19 0800)  Resp: (!) 27 (07/28/19 0800)  BP: 121/60 (07/28/19 0800)  SpO2: 100 % (07/28/19 0800) Vital Signs (24h Range):  Temp:  [97.5 °F (36.4 °C)-98.5 °F (36.9 °C)] 97.5 °F (36.4 °C)  Pulse:  [67-89] 73  Resp:  [13-35] 27  SpO2:  [100 %] 100 %  BP: (110-168)/(55-71) 121/60   Weight: 56 kg (123 lb 7.3 oz)  Body mass index is 24.11 kg/m².      Intake/Output Summary (Last 24 hours) at 7/28/2019 0919  Last data filed at 7/28/2019 0800  Gross per 24 hour   Intake 644.21 ml   Output 1720 ml   Net -1075.79 ml       Physical Exam   Constitutional: She appears well-developed and well-nourished. She appears ill.   HENT:   Head: Normocephalic and atraumatic.   Eyes: Pupils are equal, round, and reactive to light. EOM are normal. Scleral icterus is present.   Neck: No tracheal deviation present. No thyromegaly present.   Cardiovascular: Normal rate and regular rhythm. Exam reveals no gallop and no friction rub.   No murmur heard.  Pulses:       Radial pulses are 2+ on the right side, and 2+ on the left side.        Dorsalis pedis pulses are 2+ on the right side, and 2+ on the left side.   Warm extremities, no peripheral edema   Pulmonary/Chest: Effort normal and breath sounds normal. No accessory muscle usage or stridor. No tachypnea and no bradypnea. No respiratory distress. She has no decreased breath sounds. She has no wheezes. She has no rhonchi. She has no rales.   On room air   Abdominal: Soft. She exhibits distension. Bowel sounds are  increased. There is hepatomegaly. There is no tenderness.   Fecal management system with brown loose stool   Genitourinary:   Genitourinary Comments: Urine catheter with deborah output   Musculoskeletal: Normal range of motion.   Neurological: She is alert. No cranial nerve deficit or sensory deficit. GCS eye subscore is 4. GCS verbal subscore is 4. GCS motor subscore is 6.   Alert, oriented to person and place.  Disoriented to time and situation.  Following request and moving all extremities equally and symmetrically.    .    Skin: Skin is warm and dry. Ecchymosis ( (arms/abdomen)) noted. No rash noted.            Vents:  Vent Mode: Spont (07/27/19 0912)  Ventilator Initiated: Yes (07/24/19 1312)  Set Rate: 0 bmp (07/27/19 0912)  Vt Set: 300 mL (07/27/19 0912)  Pressure Support: 5 cmH20 (07/27/19 0912)  PEEP/CPAP: 5 cmH20 (07/27/19 0912)  Oxygen Concentration (%): 28 (07/27/19 1113)  Peak Airway Pressure: 11 cmH2O (07/27/19 0912)  Plateau Pressure: 0 cmH20 (07/27/19 0912)  Total Ve: 8.93 mL (07/27/19 0912)  Negative Inspiratory Force (cm H2O): -35 (07/27/19 1105)  F/VT Ratio<105 (RSBI): (!) 36.25 (07/27/19 0912)  Lines/Drains/Airways     Drain                 Urethral Catheter 07/24/19 1346 Non-latex 16 Fr. 3 days         Rectal Tube 07/25/19 0943 rectal tube w/ balloon (indicate number of mLs) 2 days          Peripheral Intravenous Line                 Peripheral IV - Single Lumen 07/24/19 1315 20 G Anterior;Distal;Right Forearm 3 days         Peripheral IV - Single Lumen 07/26/19 0500 18 G Left Antecubital 2 days              Significant Labs:    CBC/Anemia Profile:  Recent Labs   Lab 07/27/19  0418 07/28/19  0306   WBC 5.70 2.99*   HGB 9.9* 7.5*   HCT 28.8* 23.1*   PLT 41* 26*   MCV 98 103*   RDW 14.5 14.5        Chemistries:  Recent Labs   Lab 07/26/19  0928  07/27/19  0418 07/27/19  1213 07/28/19  0306   *   < > 135* 135* 139   K 4.2   < > 2.8* 3.8 3.7   CL 95   < > 100 103 104   CO2 20*   < > 21* 20* 22*    BUN 79*   < > 89* 89* 90*   CREATININE 2.9*   < > 3.3* 3.2* 2.9*   CALCIUM 8.3*   < > 8.6* 8.8 8.7   ALBUMIN 3.1*  3.1*   < > 3.2* 3.2* 3.5   PROT 5.8*  --  6.4  --  6.1   BILITOT 3.5*  --  2.8*  --  3.2*   ALKPHOS 152*  --  191*  --  127   ALT 34  --  35  --  30   AST 40  --  41*  --  28   MG  --   --  3.3*  --  3.3*   PHOS 4.7*   < > 3.0 2.5* 4.1    < > = values in this interval not displayed.       All pertinent labs within the past 24 hours have been reviewed.    Significant Imaging:  I have reviewed and interpreted all pertinent imaging results/findings within the past 24 hours.

## 2019-07-28 NOTE — PLAN OF CARE
Problem: SLP Goal  Goal: SLP Goal  Outcome: Ongoing (interventions implemented as appropriate)  Dental soft diet with thin liquids recommended.    Leandra Tadeo MA/GENO-SLP  Speech Language Pathologist  Pager [phone]  7/28/2019

## 2019-07-28 NOTE — ASSESSMENT & PLAN NOTE
Labs from [place] nephrology demonstrated Cr of 1.77 in 3/12/19, 1.67 on 4/5/19, 1.96 in 6/13/19, and 1.83 on 6/24. Cr is 2.8 today (7/26/19) above her baseline due to ATN secondary to hypoperfusion of kidneys related to hypotensive episodes. Hepatorenal syndrome not suspected at this time as the patient's Cr and urine output have improved with an albumin challenge. TERA could be exacerbated with noted Hgb drop from 9.9 to 7.5 this morning.     Recommendations:   - c/w Midodrine 10mg TID added to prevent hypoperfusion to kidneys. Goal MAP>70  - c/w albumin 25g TID  - Monitor with daily BMP  - Strict I/Os   - Avoid Nephrotoxic agents  - Monitor for signs of blood loss  - Hgb > 7

## 2019-07-28 NOTE — ASSESSMENT & PLAN NOTE
Ms Ojeda is a 68 year old female with a history of IZAGUIRRE ESLD, with decompensations including Gr 1 HE, ascites, EV, hyponatremia, T2DM, HTN, hypothyroidism, who is being admitted from hepatology clinic due to concern for hyponatremia. Also found to have ileus which has resolved. Hyponatremia has resolved. Unresponsive on 7/24, transferred to ICU and intubated for airway protection. Now extubated with improving mentation.     Plan  - encephalopathy: Continue lactulose and rifaximin.  - ascites: Negative for SBP on 07/19. Total protein 1.5.  - diuretics: hold given TERA  - TERA on CKD: unclear baseline. Cr up to 3.2 from 2s earlier this admission, now improving. IV albumin 25g tid for next 2-3 days.  - hyponatremia: Resolved, but seems dehydrated. Recommend free water. Holding diuresis. Nephrology consult.  - malnutrition: nutrition consult. Calorie count. Supplements. Resume PO intake as per Speech path recs.  - PT/OT  - inpatient transplant evaluation started. Nuclear stress test and renal scan completed, ID consult, now needs improvement in mental status and further evaluation of R renal mass (although transplant not contraindicated with a relatively indolent tumor).  - Appreciate renal evaluation re: SLK given GFR <35

## 2019-07-28 NOTE — ASSESSMENT & PLAN NOTE
Likely 2/2 hepatic encephalopathy with ammonia 171 upon ICU tx (64 on 7/22).   --CT head negative for acute intracranial abnormality  --EEG pending read  --continue lactulose and rifaximin  --improving

## 2019-07-28 NOTE — PROGRESS NOTES
[facility]  Critical Care Medicine  Progress Note    Patient Name: Michelle Ojeda  MRN: [id number]  Admission Date: 7/18/2019  Hospital Length of Stay: 10 days  Code Status: Full Code  Attending Provider: Naveed Calix MD  Primary Care Provider: Primary Doctor No   Principal Problem: Acute metabolic encephalopathy    Subjective:     HPI:  Michelle Ojeda is a 68 year old female with history of IZAGUIRRE cirrhosis, HTN and hypothyroidism who presented to [facility] on 7/18/2019 as a direct admit from Transplant Hepatology [facility] for hyponatremia. Patient is from [place] and requesting liver/kidney transplant evaluation. Her cirrhosis is complicated by ascites requiring biweekly paracentesis, sarcopenia, esophageal varices, hyponatremia and hepatic encephalopathy. Per daughter, patient with functional decline over the last 2 months with complaints of lethargy, decreased oral intake, and peripheral edema. Patient initially admitted to hospital medicine for treatment of hyponatremia with fluid restriction. During liver work up, incidental renal mass found and urology was consulted. Patient developed an ileus and unable to tolerate lactulose while on the floor.    Rapid response called 7/24/2019 for worsening encephalopathy. At that time, patient was hemodynamically stable however not following commands or withdrawing to pain. Found to have ammonia 171. Critical care medicine consulted as patient vomiting and concern for not protecting airway.     Upon arrival, patient unresponsive and will only withdraw to pain yet hemodynamically stable. Multiple episodes of emesis during exam requiring aggressive suctioning to protect airway. Patient admitted to the CMICU at that time for emergent intubation.     Hospital/ICU Course:  Ms. Ojeda was admitted to the ICU with [facility] for acute hepatic encephalopathy requiring intubation for airway protection.  Ammonia level 171.  Lactulose and Rifaximin started.   Paracentesis on 7/24 with 4 L removed, negative for SBP. Pan cultured and started on empiric antibiotics with vancomycin and pip/tazo. Cultures NGTD, discontinued antibiotics on 7/27.  Extubated to NC on 7/27.  Awaiting swallow evaluation.     Interval History/Significant Events: No acute events overnight.     Review of Systems   Constitutional: Negative for chills, diaphoresis and fever.   HENT: Negative for sore throat.    Respiratory: Negative for shortness of breath.    Cardiovascular: Negative for chest pain.   Gastrointestinal: Negative for abdominal pain.   Neurological: Negative for dizziness and headaches.     Objective:     Vital Signs (Most Recent):  Temp: 97.5 °F (36.4 °C) (07/28/19 0700)  Pulse: 73 (07/28/19 0800)  Resp: (!) 27 (07/28/19 0800)  BP: 121/60 (07/28/19 0800)  SpO2: 100 % (07/28/19 0800) Vital Signs (24h Range):  Temp:  [97.5 °F (36.4 °C)-98.5 °F (36.9 °C)] 97.5 °F (36.4 °C)  Pulse:  [67-89] 73  Resp:  [13-35] 27  SpO2:  [100 %] 100 %  BP: (110-168)/(55-71) 121/60   Weight: 56 kg (123 lb 7.3 oz)  Body mass index is 24.11 kg/m².      Intake/Output Summary (Last 24 hours) at 7/28/2019 0919  Last data filed at 7/28/2019 0800  Gross per 24 hour   Intake 644.21 ml   Output 1720 ml   Net -1075.79 ml       Physical Exam   Constitutional: She appears well-developed and well-nourished. She appears ill.   HENT:   Head: Normocephalic and atraumatic.   Eyes: Pupils are equal, round, and reactive to light. EOM are normal. Scleral icterus is present.   Neck: No tracheal deviation present. No thyromegaly present.   Cardiovascular: Normal rate and regular rhythm. Exam reveals no gallop and no friction rub.   No murmur heard.  Pulses:       Radial pulses are 2+ on the right side, and 2+ on the left side.        Dorsalis pedis pulses are 2+ on the right side, and 2+ on the left side.   Warm extremities, no peripheral edema   Pulmonary/Chest: Effort normal and breath sounds normal. No accessory muscle usage or  stridor. No tachypnea and no bradypnea. No respiratory distress. She has no decreased breath sounds. She has no wheezes. She has no rhonchi. She has no rales.   On room air   Abdominal: Soft. She exhibits distension. Bowel sounds are increased. There is hepatomegaly. There is no tenderness.   Fecal management system with brown loose stool   Genitourinary:   Genitourinary Comments: Urine catheter with deborah output   Musculoskeletal: Normal range of motion.   Neurological: She is alert. No cranial nerve deficit or sensory deficit. GCS eye subscore is 4. GCS verbal subscore is 4. GCS motor subscore is 6.   Alert, oriented to person and place.  Disoriented to time and situation.  Following request and moving all extremities equally and symmetrically.    .    Skin: Skin is warm and dry. Ecchymosis ( (arms/abdomen)) noted. No rash noted.            Vents:  Vent Mode: Spont (07/27/19 0912)  Ventilator Initiated: Yes (07/24/19 1312)  Set Rate: 0 bmp (07/27/19 0912)  Vt Set: 300 mL (07/27/19 0912)  Pressure Support: 5 cmH20 (07/27/19 0912)  PEEP/CPAP: 5 cmH20 (07/27/19 0912)  Oxygen Concentration (%): 28 (07/27/19 1113)  Peak Airway Pressure: 11 cmH2O (07/27/19 0912)  Plateau Pressure: 0 cmH20 (07/27/19 0912)  Total Ve: 8.93 mL (07/27/19 0912)  Negative Inspiratory Force (cm H2O): -35 (07/27/19 1105)  F/VT Ratio<105 (RSBI): (!) 36.25 (07/27/19 0912)  Lines/Drains/Airways     Drain                 Urethral Catheter 07/24/19 1346 Non-latex 16 Fr. 3 days         Rectal Tube 07/25/19 0943 rectal tube w/ balloon (indicate number of mLs) 2 days          Peripheral Intravenous Line                 Peripheral IV - Single Lumen 07/24/19 1315 20 G Anterior;Distal;Right Forearm 3 days         Peripheral IV - Single Lumen 07/26/19 0500 18 G Left Antecubital 2 days              Significant Labs:    CBC/Anemia Profile:  Recent Labs   Lab 07/27/19  0418 07/28/19  0306   WBC 5.70 2.99*   HGB 9.9* 7.5*   HCT 28.8* 23.1*   PLT 41* 26*   MCV  98 103*   RDW 14.5 14.5        Chemistries:  Recent Labs   Lab 07/26/19  0928  07/27/19  0418 07/27/19  1213 07/28/19  0306   *   < > 135* 135* 139   K 4.2   < > 2.8* 3.8 3.7   CL 95   < > 100 103 104   CO2 20*   < > 21* 20* 22*   BUN 79*   < > 89* 89* 90*   CREATININE 2.9*   < > 3.3* 3.2* 2.9*   CALCIUM 8.3*   < > 8.6* 8.8 8.7   ALBUMIN 3.1*  3.1*   < > 3.2* 3.2* 3.5   PROT 5.8*  --  6.4  --  6.1   BILITOT 3.5*  --  2.8*  --  3.2*   ALKPHOS 152*  --  191*  --  127   ALT 34  --  35  --  30   AST 40  --  41*  --  28   MG  --   --  3.3*  --  3.3*   PHOS 4.7*   < > 3.0 2.5* 4.1    < > = values in this interval not displayed.       All pertinent labs within the past 24 hours have been reviewed.    Significant Imaging:  I have reviewed and interpreted all pertinent imaging results/findings within the past 24 hours.      ABG  Recent Labs   Lab 07/26/19  0928   PH 7.471*   PO2 62*   PCO2 29.2*   HCO3 21.3*   BE -2     Assessment/Plan:     Neuro  * Acute metabolic encephalopathy  Likely 2/2 hepatic encephalopathy with ammonia 171 upon ICU tx (64 on 7/22).   --CT head negative for acute intracranial abnormality  --EEG pending read  --continue lactulose and rifaximin  --improving        Pulmonary  Acute hypoxemic respiratory failure  Intubated for airway protection in setting of severe encephalopathy and episodes of emesis/concern for aspiration  --Sputum culture negative from 7/24.  De-escalated antibiotics.   --No focal signs of consolidation on CXR  --Minimal ventilator requirements  --Wean supplemental O2 for goal SpO2 >88%  --extubated on 7/27      Renal/  Right kidney mass  R kidney mass measures 2.4 cm x 2.3 cm found on CT abd/pelvis during transplant workup  --Urology following with plans to hold on any further work up in setting of acute decompensation    Acute kidney injury superimposed on chronic kidney disease  --Baseline reportedly sCr 1.6. Possible candidate for liver/kidney transplant  --Nephrology  following,concern iATN given recent shock.  Also concern for intravascular volume depletion given significant stool output.   Retroperitoneal US on 7/23 negative for hydronephrosis  --rising sCr and becoming oliguric  --urine studies concerning for pre-renal etiology.  Started on albumin   --Strict I/Os    Hematology  Coagulopathy  See cirrhosis    Oncology  Pancytopenia  See cirrhosis    Endocrine  Hyperglycemia  Patient with Hx of DM however was taken off metformin due to improvement  --A1c 5.1  --insulin infusion discontinued on 7/27 when enteral feedings where held.   --frequent glucose checks with SSI    Hypothyroidism  --Continue home levothyroxine    GI  Other dysphagia  --SLP consulted    Ileus  --improved; tolerating enteral feedings.     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 Cytology pending.      Other  Shock, unspecified  --Likely due to sedating medications however patient at risk for infection  --Blood culture, sputum culture, and peritoneal fluid negative  --UA appearing noninfectious  --Received 4 doses of piptazo,  Received 3 days of vancomycin.  --Resolved; weaned off norepinephrine infusion on 7/25  --Continue midodrine 10 mg TID    Physical debility  --PT/OT consulted    Family updated at bedside.     I spent >70 minutes reviewing patient records, examining, and counseling the patient with greater than 50% of the time spent with direct patient care and coordination.        Morena Garcia NP  Critical Care Medicine  [facility]

## 2019-07-28 NOTE — ASSESSMENT & PLAN NOTE
--Baseline reportedly sCr 1.6. Possible candidate for liver/kidney transplant  --Nephrology following,concern iATN given recent shock.  Also concern for intravascular volume depletion given significant stool output.   Retroperitoneal US on 7/23 negative for hydronephrosis  --rising sCr and becoming oliguric  --urine studies concerning for pre-renal etiology.  Started on albumin   --Strict I/Os

## 2019-07-28 NOTE — SUBJECTIVE & OBJECTIVE
Interval History: NAEO. Patient extubated yesterday. Family reports improvements in alertness and patient now following commands. Nursing reports approximately 30 cc/hr UOP  Review of patient's allergies indicates:  No Known Allergies  Current Facility-Administered Medications   Medication Frequency    acetaminophen tablet 325 mg Q4H PRN    albumin human 25% bottle 25 g TID    dextrose 10% (D10W) Bolus PRN    dextrose 10% (D10W) Bolus PRN    dicyclomine capsule 10 mg QID PRN    fentaNYL injection 25 mcg Q1H PRN    glucagon (human recombinant) injection 1 mg PRN    glucose chewable tablet 16 g PRN    glucose chewable tablet 24 g PRN    insulin aspart U-100 pen 0-5 Units Q6H PRN    iohexol (OMNIPAQUE) oral solution 15 mL PRN    lactulose 20 gram/30 mL solution Soln 15 g TID    levothyroxine tablet 88 mcg Before breakfast    midodrine tablet 10 mg TID    ondansetron injection 4 mg Q6H PRN    rifAXIMin tablet 550 mg BID    simethicone chewable tablet 80 mg TID PRN    sodium chloride 0.9% flush 10 mL PRN    sodium chloride 0.9% flush 10 mL PRN    zinc sulfate capsule 220 mg Daily       Objective:     Vital Signs (Most Recent):  Temp: 97.5 °F (36.4 °C) (07/28/19 1100)  Pulse: 77 (07/28/19 1218)  Resp: 17 (07/28/19 1218)  BP: 127/60 (07/28/19 1200)  SpO2: 100 % (07/28/19 1218)  O2 Device (Oxygen Therapy): room air (07/28/19 1218) Vital Signs (24h Range):  Temp:  [97.5 °F (36.4 °C)-98.5 °F (36.9 °C)] 97.5 °F (36.4 °C)  Pulse:  [71-89] 77  Resp:  [13-35] 17  SpO2:  [100 %] 100 %  BP: (110-141)/(55-63) 127/60     Weight: 56 kg (123 lb 7.3 oz) (07/25/19 1300)  Body mass index is 24.11 kg/m².  Body surface area is 1.54 meters squared.    I/O last 3 completed shifts:  In: 1703.7 [I.V.:163.7; NG/GT:740; IV Piggyback:800]  Out: 2495 [Urine:965; Stool:1530]    Physical Exam   Constitutional: She appears cachectic. No distress.   HENT:   Head: Normocephalic and atraumatic.   Eyes: EOM are normal. Scleral icterus  is present.   Neck: Normal range of motion.   Cardiovascular: Normal rate, regular rhythm and normal heart sounds.   No murmur heard.  Pulmonary/Chest: Effort normal and breath sounds normal.        Abdominal: She exhibits distension (Ascites ). There is hepatosplenomegaly.   Musculoskeletal: She exhibits no edema.   Neurological: She is alert.   Skin: Skin is warm and dry. Ecchymosis noted. She is not diaphoretic.   Vitals reviewed.      Significant Labs:  ABGs:   Recent Labs   Lab 07/26/19  0928   PH 7.471*   PCO2 29.2*   HCO3 21.3*   POCSATURATED 93*   BE -2     BMP:   Recent Labs   Lab 07/28/19  0306   *      CO2 22*   BUN 90*   CREATININE 2.9*   CALCIUM 8.7   MG 3.3*     CBC:   Recent Labs   Lab 07/28/19  0924   WBC 3.19*   RBC 2.42*   HGB 8.1*   HCT 24.6*   PLT 27*   *   MCH 33.5*   MCHC 32.9     CMP:   Recent Labs   Lab 07/28/19  0306   *   CALCIUM 8.7   ALBUMIN 3.5   PROT 6.1      K 3.7   CO2 22*      BUN 90*   CREATININE 2.9*   ALKPHOS 127   ALT 30   AST 28   BILITOT 3.2*     LFTs:   Recent Labs   Lab 07/28/19  0306   ALT 30   AST 28   ALKPHOS 127   BILITOT 3.2*   PROT 6.1   ALBUMIN 3.5     All labs within the past 24 hours have been reviewed.     Significant Imaging:  No new imaging in 24 hours.

## 2019-07-28 NOTE — PLAN OF CARE
Problem: Adult Inpatient Plan of Care  Goal: Plan of Care Review  Outcome: Ongoing (interventions implemented as appropriate)    No acute events throughout day. See vital signs and assessments in flowsheets. Pt oriented to self, follows commands, flat affect, aefebrile. [place] and flexi maintained, lactulose administered as enema. Pt remains NPO, ST consulted. Patient progressing towards goals as tolerated, plan of care communicated and reviewed with Michelle Ojeda and family. All concerns addressed. Will continue to monitor.

## 2019-07-29 LAB
ALBUMIN SERPL BCP-MCNC: 4.2 G/DL (ref 3.5–5.2)
ALP SERPL-CCNC: 114 U/L (ref 55–135)
ALT SERPL W/O P-5'-P-CCNC: 24 U/L (ref 10–44)
ANION GAP SERPL CALC-SCNC: 10 MMOL/L (ref 8–16)
ANISOCYTOSIS BLD QL SMEAR: SLIGHT
ANISOCYTOSIS BLD QL SMEAR: SLIGHT
AST SERPL-CCNC: 24 U/L (ref 10–40)
BACTERIA BLD CULT: NORMAL
BASO STIPL BLD QL SMEAR: ABNORMAL
BASOPHILS # BLD AUTO: 0.02 K/UL (ref 0–0.2)
BASOPHILS NFR BLD: 0.8 % (ref 0–1.9)
BASOPHILS NFR BLD: 1 % (ref 0–1.9)
BILIRUB SERPL-MCNC: 2.5 MG/DL (ref 0.1–1)
BUN SERPL-MCNC: 86 MG/DL (ref 8–23)
CALCIUM SERPL-MCNC: 8.9 MG/DL (ref 8.7–10.5)
CHLORIDE SERPL-SCNC: 111 MMOL/L (ref 95–110)
CO2 SERPL-SCNC: 22 MMOL/L (ref 23–29)
CREAT SERPL-MCNC: 2.5 MG/DL (ref 0.5–1.4)
DIFFERENTIAL METHOD: ABNORMAL
DIFFERENTIAL METHOD: ABNORMAL
EOSINOPHIL # BLD AUTO: 0 K/UL (ref 0–0.5)
EOSINOPHIL NFR BLD: 0 % (ref 0–8)
EOSINOPHIL NFR BLD: 1.2 % (ref 0–8)
ERYTHROCYTE [DISTWIDTH] IN BLOOD BY AUTOMATED COUNT: 14.5 % (ref 11.5–14.5)
ERYTHROCYTE [DISTWIDTH] IN BLOOD BY AUTOMATED COUNT: 14.7 % (ref 11.5–14.5)
EST. GFR  (AFRICAN AMERICAN): 21.8 ML/MIN/1.73 M^2
EST. GFR  (NON AFRICAN AMERICAN): 18.9 ML/MIN/1.73 M^2
GLUCOSE SERPL-MCNC: 169 MG/DL (ref 70–110)
HCT VFR BLD AUTO: 22 % (ref 37–48.5)
HCT VFR BLD AUTO: 22.3 % (ref 37–48.5)
HGB BLD-MCNC: 7.2 G/DL (ref 12–16)
HGB BLD-MCNC: 7.3 G/DL (ref 12–16)
IMM GRANULOCYTES # BLD AUTO: 0.03 K/UL (ref 0–0.04)
IMM GRANULOCYTES # BLD AUTO: ABNORMAL K/UL (ref 0–0.04)
IMM GRANULOCYTES NFR BLD AUTO: 1.2 % (ref 0–0.5)
IMM GRANULOCYTES NFR BLD AUTO: ABNORMAL % (ref 0–0.5)
INR PPP: 1.6 (ref 0.8–1.2)
LYMPHOCYTES # BLD AUTO: 0.3 K/UL (ref 1–4.8)
LYMPHOCYTES NFR BLD: 11.5 % (ref 18–48)
LYMPHOCYTES NFR BLD: 15 % (ref 18–48)
MAGNESIUM SERPL-MCNC: 3.4 MG/DL (ref 1.6–2.6)
MCH RBC QN AUTO: 34.3 PG (ref 27–31)
MCH RBC QN AUTO: 34.3 PG (ref 27–31)
MCHC RBC AUTO-ENTMCNC: 32.7 G/DL (ref 32–36)
MCHC RBC AUTO-ENTMCNC: 32.7 G/DL (ref 32–36)
MCV RBC AUTO: 105 FL (ref 82–98)
MCV RBC AUTO: 105 FL (ref 82–98)
METAMYELOCYTES NFR BLD MANUAL: 1 %
MONOCYTES # BLD AUTO: 0.3 K/UL (ref 0.3–1)
MONOCYTES NFR BLD: 11.1 % (ref 4–15)
MONOCYTES NFR BLD: 5 % (ref 4–15)
NEUTROPHILS # BLD AUTO: 1.9 K/UL (ref 1.8–7.7)
NEUTROPHILS NFR BLD: 74.2 % (ref 38–73)
NEUTROPHILS NFR BLD: 78 % (ref 38–73)
NRBC BLD-RTO: 0 /100 WBC
NRBC BLD-RTO: 0 /100 WBC
PHOSPHATE SERPL-MCNC: 3.5 MG/DL (ref 2.7–4.5)
PLATELET # BLD AUTO: 23 K/UL (ref 150–350)
PLATELET # BLD AUTO: 26 K/UL (ref 150–350)
PLATELET BLD QL SMEAR: ABNORMAL
PLATELET BLD QL SMEAR: ABNORMAL
PMV BLD AUTO: 10.8 FL (ref 9.2–12.9)
PMV BLD AUTO: ABNORMAL FL (ref 9.2–12.9)
POCT GLUCOSE: 174 MG/DL (ref 70–110)
POCT GLUCOSE: 181 MG/DL (ref 70–110)
POCT GLUCOSE: 242 MG/DL (ref 70–110)
POCT GLUCOSE: 264 MG/DL (ref 70–110)
POLYCHROMASIA BLD QL SMEAR: ABNORMAL
POLYCHROMASIA BLD QL SMEAR: ABNORMAL
POTASSIUM SERPL-SCNC: 3.5 MMOL/L (ref 3.5–5.1)
PROT SERPL-MCNC: 6.5 G/DL (ref 6–8.4)
PROTHROMBIN TIME: 15.4 SEC (ref 9–12.5)
RBC # BLD AUTO: 2.1 M/UL (ref 4–5.4)
RBC # BLD AUTO: 2.13 M/UL (ref 4–5.4)
SODIUM SERPL-SCNC: 143 MMOL/L (ref 136–145)
WBC # BLD AUTO: 1.71 K/UL (ref 3.9–12.7)
WBC # BLD AUTO: 2.52 K/UL (ref 3.9–12.7)

## 2019-07-29 PROCEDURE — 97168 OT RE-EVAL EST PLAN CARE: CPT | Mod: NTX

## 2019-07-29 PROCEDURE — 25000003 PHARM REV CODE 250: Mod: NTX | Performed by: NURSE PRACTITIONER

## 2019-07-29 PROCEDURE — 85610 PROTHROMBIN TIME: CPT | Mod: NTX

## 2019-07-29 PROCEDURE — 85025 COMPLETE CBC W/AUTO DIFF WBC: CPT | Mod: NTX

## 2019-07-29 PROCEDURE — 63600175 PHARM REV CODE 636 W HCPCS: Mod: JG,NTX | Performed by: NURSE PRACTITIONER

## 2019-07-29 PROCEDURE — 25000003 PHARM REV CODE 250: Mod: NTX | Performed by: PHYSICIAN ASSISTANT

## 2019-07-29 PROCEDURE — 97164 PT RE-EVAL EST PLAN CARE: CPT | Mod: NTX

## 2019-07-29 PROCEDURE — 80053 COMPREHEN METABOLIC PANEL: CPT | Mod: NTX

## 2019-07-29 PROCEDURE — 85027 COMPLETE CBC AUTOMATED: CPT | Mod: NTX

## 2019-07-29 PROCEDURE — 97116 GAIT TRAINING THERAPY: CPT | Mod: NTX

## 2019-07-29 PROCEDURE — 25000003 PHARM REV CODE 250: Mod: NTX | Performed by: STUDENT IN AN ORGANIZED HEALTH CARE EDUCATION/TRAINING PROGRAM

## 2019-07-29 PROCEDURE — 99233 PR SUBSEQUENT HOSPITAL CARE,LEVL III: ICD-10-PCS | Mod: NTX,,, | Performed by: NURSE PRACTITIONER

## 2019-07-29 PROCEDURE — 99233 SBSQ HOSP IP/OBS HIGH 50: CPT | Mod: GC,NTX,, | Performed by: INTERNAL MEDICINE

## 2019-07-29 PROCEDURE — 97535 SELF CARE MNGMENT TRAINING: CPT | Mod: NTX

## 2019-07-29 PROCEDURE — P9047 ALBUMIN (HUMAN), 25%, 50ML: HCPCS | Mod: JG,NTX | Performed by: NURSE PRACTITIONER

## 2019-07-29 PROCEDURE — 99233 PR SUBSEQUENT HOSPITAL CARE,LEVL III: ICD-10-PCS | Mod: GC,NTX,, | Performed by: INTERNAL MEDICINE

## 2019-07-29 PROCEDURE — 83735 ASSAY OF MAGNESIUM: CPT | Mod: NTX

## 2019-07-29 PROCEDURE — 84100 ASSAY OF PHOSPHORUS: CPT | Mod: NTX

## 2019-07-29 PROCEDURE — 99232 SBSQ HOSP IP/OBS MODERATE 35: CPT | Mod: NTX,,, | Performed by: INTERNAL MEDICINE

## 2019-07-29 PROCEDURE — 99232 PR SUBSEQUENT HOSPITAL CARE,LEVL II: ICD-10-PCS | Mod: NTX,,, | Performed by: INTERNAL MEDICINE

## 2019-07-29 PROCEDURE — 20600001 HC STEP DOWN PRIVATE ROOM: Mod: NTX

## 2019-07-29 PROCEDURE — 85007 BL SMEAR W/DIFF WBC COUNT: CPT | Mod: NTX

## 2019-07-29 PROCEDURE — 94761 N-INVAS EAR/PLS OXIMETRY MLT: CPT | Mod: NTX

## 2019-07-29 PROCEDURE — 99233 SBSQ HOSP IP/OBS HIGH 50: CPT | Mod: NTX,,, | Performed by: NURSE PRACTITIONER

## 2019-07-29 RX ADMIN — LACTULOSE 15 G: 20 SOLUTION ORAL at 02:07

## 2019-07-29 RX ADMIN — ALBUMIN (HUMAN) 25 G: 25 SOLUTION INTRAVENOUS at 08:07

## 2019-07-29 RX ADMIN — RIFAXIMIN 550 MG: 550 TABLET ORAL at 09:07

## 2019-07-29 RX ADMIN — LEVOTHYROXINE SODIUM 88 MCG: 88 TABLET ORAL at 05:07

## 2019-07-29 RX ADMIN — INSULIN ASPART 3 UNITS: 100 INJECTION, SOLUTION INTRAVENOUS; SUBCUTANEOUS at 12:07

## 2019-07-29 RX ADMIN — CIPROFLOXACIN HYDROCHLORIDE 500 MG: 500 TABLET, FILM COATED ORAL at 08:07

## 2019-07-29 RX ADMIN — LACTULOSE 15 G: 20 SOLUTION ORAL at 08:07

## 2019-07-29 RX ADMIN — LACTULOSE 15 G: 20 SOLUTION ORAL at 09:07

## 2019-07-29 RX ADMIN — MIDODRINE HYDROCHLORIDE 10 MG: 5 TABLET ORAL at 02:07

## 2019-07-29 RX ADMIN — ALBUMIN (HUMAN) 25 G: 25 SOLUTION INTRAVENOUS at 02:07

## 2019-07-29 RX ADMIN — INSULIN ASPART 2 UNITS: 100 INJECTION, SOLUTION INTRAVENOUS; SUBCUTANEOUS at 05:07

## 2019-07-29 RX ADMIN — INSULIN ASPART 1 UNITS: 100 INJECTION, SOLUTION INTRAVENOUS; SUBCUTANEOUS at 11:07

## 2019-07-29 RX ADMIN — Medication 220 MG: at 08:07

## 2019-07-29 RX ADMIN — MIDODRINE HYDROCHLORIDE 10 MG: 5 TABLET ORAL at 09:07

## 2019-07-29 RX ADMIN — MIDODRINE HYDROCHLORIDE 10 MG: 5 TABLET ORAL at 08:07

## 2019-07-29 RX ADMIN — RIFAXIMIN 550 MG: 550 TABLET ORAL at 08:07

## 2019-07-29 NOTE — PLAN OF CARE
Problem: Occupational Therapy Goal  Goal: Occupational Therapy Goal  Goals to be met by: 8/29/19     Patient will increase functional independence with ADLs by performing:    UE Dressing with Modified [place].  LE Dressing with Modified [place] and Assistive Devices as needed.  Grooming while standing at sink with Modified [place].  Toileting from bedside commode with Modified [place] for hygiene and clothing management.   Bathing from  shower chair/bench with Modified [place].  Toilet transfer to bedside commode with Modified [place].  Increased functional strength to WFL for B UE.  Upper extremity exercise program x15 reps per handout, with assistance as needed.    POC initiated.

## 2019-07-29 NOTE — PLAN OF CARE
Problem: Adult Inpatient Plan of Care  Goal: Plan of Care Review  Outcome: Ongoing (interventions implemented as appropriate)  No acute events throughout shift. Please see VS and all assessments in flowsheets. Patient AAOx4 during shift, occasional delayed responses when spoken to but able to follow commands when prompted. Patient often have a straight gaze, a blank stare and mainly quiet. Patient NSR on monitor, remains on RA. Flexi seal leigh catheter seal remains in place. UOP adequate. Plans for stepdown in AM. Patient progressing towards goal as tolerated. Plan of care reviewed with patient and family. All questions and concerns addressed. Patient and family verbalizes understanding. WCTM.

## 2019-07-29 NOTE — SUBJECTIVE & OBJECTIVE
Interval History/Significant Events: No acute events overnight.     Review of Systems   Constitutional: Negative for chills, diaphoresis and fever.   Respiratory: Negative for shortness of breath.    Cardiovascular: Negative for chest pain.   Gastrointestinal: Negative for abdominal pain and nausea.   Neurological: Negative for dizziness and headaches.     Objective:     Vital Signs (Most Recent):  Temp: 97.4 °F (36.3 °C) (07/29/19 1500)  Pulse: 74 (07/29/19 1500)  Resp: (!) 35 (07/29/19 1500)  BP: (!) 122/59 (07/29/19 1500)  SpO2: 100 % (07/29/19 1500) Vital Signs (24h Range):  Temp:  [97.3 °F (36.3 °C)-98 °F (36.7 °C)] 97.4 °F (36.3 °C)  Pulse:  [65-76] 74  Resp:  [14-35] 35  SpO2:  [100 %] 100 %  BP: (107-145)/(52-87) 122/59   Weight: 56 kg (123 lb 7.3 oz)  Body mass index is 24.11 kg/m².      Intake/Output Summary (Last 24 hours) at 7/29/2019 1551  Last data filed at 7/29/2019 1500  Gross per 24 hour   Intake 130 ml   Output 1070 ml   Net -940 ml       Physical Exam   Constitutional: She appears well-developed and well-nourished. She appears ill.   HENT:   Head: Normocephalic and atraumatic.   Eyes: Pupils are equal, round, and reactive to light. EOM are normal. Scleral icterus is present.   Neck: No tracheal deviation present. No thyromegaly present.   Cardiovascular: Normal rate and regular rhythm. Exam reveals no gallop and no friction rub.   No murmur heard.  Pulses:       Radial pulses are 2+ on the right side, and 2+ on the left side.        Dorsalis pedis pulses are 2+ on the right side, and 2+ on the left side.   Warm extremities, no peripheral edema   Pulmonary/Chest: Effort normal and breath sounds normal. No accessory muscle usage or stridor. No tachypnea and no bradypnea. No respiratory distress. She has no decreased breath sounds. She has no wheezes. She has no rhonchi. She has no rales.   On room air   Abdominal: Soft. She exhibits distension. Bowel sounds are increased. There is hepatomegaly.  There is no tenderness.   Fecal management system with brown loose stool   Genitourinary:   Genitourinary Comments: Urine catheter with deborah output   Musculoskeletal: Normal range of motion.   Neurological: She is alert. No cranial nerve deficit or sensory deficit. GCS eye subscore is 4. GCS verbal subscore is 4. GCS motor subscore is 6.   Alert, oriented to person, place, and situation.  Disoriented to time.  Following request and moving all extremities equally and symmetrically.    .    Skin: Skin is warm and dry. Ecchymosis ( (arms/abdomen)) noted. No rash noted.            Vents:  Vent Mode: Spont (07/27/19 0912)  Ventilator Initiated: Yes (07/24/19 1312)  Set Rate: 0 bmp (07/27/19 0912)  Vt Set: 300 mL (07/27/19 0912)  Pressure Support: 5 cmH20 (07/27/19 0912)  PEEP/CPAP: 5 cmH20 (07/27/19 0912)  Oxygen Concentration (%): 28 (07/27/19 1113)  Peak Airway Pressure: 11 cmH2O (07/27/19 0912)  Plateau Pressure: 0 cmH20 (07/27/19 0912)  Total Ve: 8.93 mL (07/27/19 0912)  Negative Inspiratory Force (cm H2O): -35 (07/27/19 1105)  F/VT Ratio<105 (RSBI): (!) 36.25 (07/27/19 0912)  Lines/Drains/Airways     Drain                 Urethral Catheter 07/24/19 1346 Non-latex 16 Fr. 5 days         Rectal Tube 07/25/19 0943 rectal tube w/ balloon (indicate number of mLs) 4 days          Peripheral Intravenous Line                 Peripheral IV - Single Lumen 07/24/19 1315 20 G Anterior;Distal;Right Forearm 5 days         Peripheral IV - Single Lumen 07/26/19 0500 18 G Left Antecubital 3 days              Significant Labs:    CBC/Anemia Profile:  Recent Labs   Lab 07/28/19  1704 07/29/19  0558 07/29/19  1254   WBC 3.62* 2.52* 1.71*   HGB 7.9* 7.2* 7.3*   HCT 24.6* 22.0* 22.3*   PLT 27* 26* 23*   * 105* 105*   RDW 14.5 14.5 14.7*        Chemistries:  Recent Labs   Lab 07/28/19  0306 07/29/19  0558    143   K 3.7 3.5    111*   CO2 22* 22*   BUN 90* 86*   CREATININE 2.9* 2.5*   CALCIUM 8.7 8.9   ALBUMIN 3.5 4.2    PROT 6.1 6.5   BILITOT 3.2* 2.5*   ALKPHOS 127 114   ALT 30 24   AST 28 24   MG 3.3* 3.4*   PHOS 4.1 3.5       All pertinent labs within the past 24 hours have been reviewed.    Significant Imaging:  I have reviewed and interpreted all pertinent imaging results/findings within the past 24 hours.

## 2019-07-29 NOTE — ASSESSMENT & PLAN NOTE
Labs from [place] nephrology demonstrated Cr of 1.77 in 3/12/19, 1.67 on 4/5/19, 1.96 in 6/13/19, and 1.83 on 6/24. Cr is 2.8 today (7/26/19) above her baseline due to ATN secondary to hypoperfusion of kidneys related to hypotensive episodes. Hepatorenal syndrome not suspected at this time as the patient's Cr and urine output have improved with an albumin challenge. TERA could be exacerbated with noted Hgb drop from 9.9 on 7/27 to 7.2 on 7/29.     Recommendations:   - c/w Midodrine 10mg TID added to prevent hypoperfusion to kidneys. Goal MAP>70  - c/w albumin 25g TID  - Monitor with daily BMP  - Strict I/Os   - Avoid Nephrotoxic agents  - Monitor for signs of blood loss  - Hgb > 7

## 2019-07-29 NOTE — PLAN OF CARE
Problem: Adult Inpatient Plan of Care  Goal: Plan of Care Review  Outcome: Ongoing (interventions implemented as appropriate)  No acute events throughout day. See vital signs and assessments in flowsheets. Pt oriented to self, follows commands, flat/withdrawn affect. [place] and flexi in place. ST cleared for diet, now on mechanical soft/renal diet. Patient progressing towards goals as tolerated, plan of care communicated and reviewed with Michelle Ojeda and family. All concerns addressed. Will continue to monitor.

## 2019-07-29 NOTE — PT/OT/SLP RE-EVAL
Occupational Therapy   Re-evaluation    Name: Michelle Ojeda  MRN: [id number]  Admitting Diagnosis:  Acute metabolic encephalopathy      Recommendations:     Discharge Recommendations: nursing facility, skilled  Discharge Equipment Recommendations:  walker, rolling, shower chair  Barriers to discharge:  None    Assessment:     Michelle Ojeda is a 68 y.o. female with a medical diagnosis of Acute metabolic encephalopathy.  She was able to perform supine/sit, sit/stand, and bed/chair T/F c mod-max A.  Has good static sitting balance and poor static/dynamic standing balance.  Pt was very lethargic throughout re-assessment.  B UE are WFL.  Able to perform grooming task c min A and cues.    Performance deficits affecting function are weakness, impaired endurance, impaired self care skills, impaired functional mobilty, impaired balance, impaired cognition, decreased upper extremity function, impaired coordination.      Rehab Prognosis:  Good; patient would benefit from acute skilled OT services to address these deficits and reach maximum level of function.       Plan:     Patient to be seen 4 x/week to address the above listed problems via self-care/home management, therapeutic activities, therapeutic exercises  · Plan of Care Expires: 08/29/19  · Plan of Care Reviewed with: patient, daughter, family    Subjective     Chief Complaint: TERA, cirrhosis  Patient/Family stated goals: To get better.  Communicated with: RN prior to session.  Pain/Comfort:  · Pain Rating 1: 0/10    Objective:     Communicated with: RN prior to session.  Patient found supine with: blood pressure cuff, leigh catheter, peripheral IV, pulse ox (continuous), telemetry upon OT entry to room.    General Precautions: Standard, fall   Orthopedic Precautions:N/A   Braces: N/A     Occupational Performance:    Bed Mobility:    · Patient completed Supine to Sit with maximal assistance    Functional Mobility/Transfers:  · Patient completed Sit <> Stand  Transfer with moderate assistance  with  hand-held assist   · Patient completed Bed <> Chair Transfer using Stand Pivot technique with moderate assistance with hand-held assist      Activities of Daily Living:  · Grooming: minimum assistance to brush teeth while sitting up on EOB.    Cognitive/Visual Perceptual:  Cognitive/Psychosocial Skills:     -       Oriented to: Person, Place, Time and Situation   -       Follows Commands/attention:Follows multistep  commands    Physical Exam:  Upper Extremity Range of Motion:     -       Right Upper Extremity: WFL  -       Left Upper Extremity: WFL  Upper Extremity Strength:    -       Right Upper Extremity: 3/5  -       Left Upper Extremity: 3/5    AMPAC 6 Click:  [facility] Total Score: 12    Education:      Patient left up in chair with all lines intact, call button in reach, RN notified and family present    GOALS:   Multidisciplinary Problems     Occupational Therapy Goals        Problem: Occupational Therapy Goal    Goal Priority Disciplines Outcome Interventions   Occupational Therapy Goal     OT, PT/OT Ongoing (interventions implemented as appropriate)    Description:  Goals to be met by: 8/29/19     Patient will increase functional independence with ADLs by performing:    UE Dressing with Modified [place].  LE Dressing with Modified [place] and Assistive Devices as needed.  Grooming while standing at sink with Modified [place].  Toileting from bedside commode with Modified [place] for hygiene and clothing management.   Bathing from  shower chair/bench with Modified [place].  Toilet transfer to bedside commode with Modified [place].  Increased functional strength to WFL for B UE.  Upper extremity exercise program x15 reps per handout, with assistance as needed.                      History:     Past Medical History:   Diagnosis Date    Cirrhosis     Diabetes mellitus, type 2     Disorder of kidney and ureter     Hypertension      Hypothyroidism     NAFLD (nonalcoholic fatty liver disease)        History reviewed. No pertinent surgical history.    Time Tracking:     OT Date of Treatment: 07/29/19  OT Start Time: 1010  OT Stop Time: 1030  OT Total Time (min): 20 min    Billable Minutes:Re-eval 10  Self Care/Home Management 10    ALLISON Elizalde  7/29/2019

## 2019-07-29 NOTE — RESIDENT HANDOFF
ICU Transfer of Care Note  Critical Care Medicine    Admit Date: 7/18/2019  LOS: 11    CC: Acute metabolic encephalopathy    Code Status: Full Code       HPI and Hospital Course:     HPI:  Michelle Ojeda is a 68 year old female with history of IZAGUIRRE cirrhosis, HTN and hypothyroidism who presented to [facility] on 7/18/2019 as a direct admit from [facility] for hyponatremia. Patient is from [place] and requesting liver/kidney transplant evaluation. Her cirrhosis is complicated by ascites requiring biweekly paracentesis, sarcopenia, esophageal varices, hyponatremia and hepatic encephalopathy. Per daughter, patient with functional decline over the last 2 months with complaints of lethargy, decreased oral intake, and peripheral edema. Patient initially admitted to hospital medicine for treatment of hyponatremia with fluid restriction. During liver work up, incidental renal mass found and urology was consulted. Patient developed an ileus and unable to tolerate lactulose while on the floor.    Rapid response called 7/24/2019 for worsening encephalopathy. At that time, patient was hemodynamically stable however not following commands or withdrawing to pain. Found to have ammonia 171. Critical care medicine consulted as patient vomiting and concern for not protecting airway.     Upon arrival, patient unresponsive and will only withdraw to pain yet hemodynamically stable. Multiple episodes of emesis during exam requiring aggressive suctioning to protect airway. Patient admitted to the CMICU at that time for emergent intubation.     Hospital/ICU Course:  Ms. Ojeda was admitted to the ICU with [facility] for acute hepatic encephalopathy requiring intubation for airway protection.  Ammonia level 171.  Lactulose and Rifaximin started.  Paracentesis on 7/24 with 4 L removed, negative for SBP. Pan cultured and started on empiric antibiotics with vancomycin and pip/tazo. Cultures NGTD, discontinued antibiotics on  7/27.  Extubated to NC on 7/27.  Evaluated by SLP on 7/28 who cleared for dental soft diet with thin liquids.  Also initiated on albumin in combination with midodrine with MAPs 80's given urine studies indicating pre-renal picture with mild improvement in sCr and urine output.        To Follow Up:     1. Renal function  2. H&H, pancytopenia    Discharge Plan:     PT/OT recommending SNF    Call with questions.     JOSHUA Garcia APRN, ACNP-BC  Critical Care Medicine  [phone]

## 2019-07-29 NOTE — ASSESSMENT & PLAN NOTE
--Baseline reportedly sCr 1.6. Possible candidate for liver/kidney transplant  --Nephrology following,concern iATN given recent shock.  Also concern for intravascular volume depletion given significant stool output.   Retroperitoneal US on 7/23 negative for hydronephrosis  --rising sCr and becoming oliguric  --urine studies concerning for pre-renal etiology.  Started on albumin and continued on midodrine  --Strict I/Os

## 2019-07-29 NOTE — ASSESSMENT & PLAN NOTE
Intubated for airway protection in setting of severe encephalopathy and episodes of emesis/concern for aspiration  --Sputum culture negative from 7/24.  Off antibiotics.  --No focal signs of consolidation on CXR  --Wean supplemental O2 for goal SpO2 >88%  --extubated on 7/27

## 2019-07-29 NOTE — ASSESSMENT & PLAN NOTE
Rusty is a 69 yo F with liver cirrhosis and CKD admitted for hyponatremia. Hyponatremia may be related to several factors primarily liver cirrhosis requiring biweekly paracentesis. This leads to a hypovolemic hyponatremic state in which fluid is pulled out of the vasculature. Additional contributing factors related to ileus, NPO status, and TPN nutrition. Extra sodium in TPN has increased sodium. Now on tube feeds. Baseline Na+ from labs in [place] between 123-124.   - Improved with albumin and Midodrine to 10 mg TID;  - Strict I/O monitoring

## 2019-07-29 NOTE — SUBJECTIVE & OBJECTIVE
Interval History:   Not sleeping well. Unchanged mental status from yesterday. Ate very small dinner last night.  Hgb 7.2, no black or bloody stool, no vomiting.    Current Facility-Administered Medications   Medication    acetaminophen tablet 325 mg    ciprofloxacin HCl tablet 500 mg    dextrose 10% (D10W) Bolus    dextrose 10% (D10W) Bolus    dicyclomine capsule 10 mg    fentaNYL injection 25 mcg    glucagon (human recombinant) injection 1 mg    glucose chewable tablet 16 g    glucose chewable tablet 24 g    insulin aspart U-100 pen 0-5 Units    iohexol (OMNIPAQUE) oral solution 15 mL    lactulose 20 gram/30 mL solution Soln 15 g    levothyroxine tablet 88 mcg    midodrine tablet 10 mg    ondansetron injection 4 mg    rifAXIMin tablet 550 mg    simethicone chewable tablet 80 mg    sodium chloride 0.9% flush 10 mL    sodium chloride 0.9% flush 10 mL    zinc sulfate capsule 220 mg       Objective:     Vital Signs (Most Recent):  Temp: 97.4 °F (36.3 °C) (07/29/19 1500)  Pulse: 73 (07/29/19 1600)  Resp: 20 (07/29/19 1600)  BP: (!) 114/57 (07/29/19 1600)  SpO2: 100 % (07/29/19 1600) Vital Signs (24h Range):  Temp:  [97.3 °F (36.3 °C)-98 °F (36.7 °C)] 97.4 °F (36.3 °C)  Pulse:  [65-76] 73  Resp:  [14-35] 20  SpO2:  [100 %] 100 %  BP: (107-135)/(52-87) 114/57     Weight: 56 kg (123 lb 7.3 oz) (07/25/19 1300)  Body mass index is 24.11 kg/m².    Physical Exam   Constitutional: She appears lethargic. She appears ill. No distress.   Eyes: No scleral icterus.   Neck: Neck supple.   Cardiovascular: Normal rate and regular rhythm.   Pulmonary/Chest: Effort normal. No respiratory distress.   Abdominal: Soft. Bowel sounds are normal. She exhibits no distension. There is no tenderness.   Genitourinary:   Genitourinary Comments: Flexiseal with green liquid stool   Neurological: She appears lethargic.   Skin: Skin is warm.   Nursing note and vitals reviewed.      MELD-Na score: 24 at 7/29/2019  5:58 AM  MELD  score: 24 at 7/29/2019  5:58 AM  Calculated from:  Serum Creatinine: 2.5 mg/dL at 7/29/2019  5:58 AM  Serum Sodium: 143 mmol/L (Rounded to 137 mmol/L) at 7/29/2019  5:58 AM  Total Bilirubin: 2.5 mg/dL at 7/29/2019  5:58 AM  INR(ratio): 1.6 at 7/29/2019  5:58 AM  Age: 68 years    Significant Labs:  Labs within the past month have been reviewed.    Significant Imaging:  Reviewed

## 2019-07-29 NOTE — PROGRESS NOTES
[facility]  Nephrology  Progress Note    Patient Name: Michelle Ojeda  MRN: [id number]  Admission Date: 7/18/2019  Hospital Length of Stay: 11 days  Attending Provider: Renata Sharpe MD   Primary Care Physician: Primary Doctor No  Principal Problem:Acute metabolic encephalopathy    Subjective:     HPI: Rusty Martinez is 67 yo F with CKD stage 4 presenting from transplant hepatology clinic for hyponatremia on 7/18/19. She is originally from [place], here for liver transplant evaluation. She was diagnosed with IZAGUIRRE in grade 1 hepatic encephalopathy with severe ascites and esophogeal varices (s/p banding). During the month prior to admission she felt more lethargic and was requiring biweekly paracentesis. Of note, she is also being treated for Ileus her acute hepatic encephalopathy with lactulose. Recent 24 hour cr clearance study was near 21. Reported baseline Cr 1.6-1.8 and chronic hyponatremia range between 123-124.     Interval History: NAEO. Pt sitting in recliner, answers questions with yes/no responses.   Review of patient's allergies indicates:  No Known Allergies  Current Facility-Administered Medications   Medication Frequency    acetaminophen tablet 325 mg Q4H PRN    albumin human 25% bottle 25 g TID    ciprofloxacin HCl tablet 500 mg Daily    dextrose 10% (D10W) Bolus PRN    dextrose 10% (D10W) Bolus PRN    dicyclomine capsule 10 mg QID PRN    fentaNYL injection 25 mcg Q1H PRN    glucagon (human recombinant) injection 1 mg PRN    glucose chewable tablet 16 g PRN    glucose chewable tablet 24 g PRN    insulin aspart U-100 pen 0-5 Units Q6H PRN    iohexol (OMNIPAQUE) oral solution 15 mL PRN    lactulose 20 gram/30 mL solution Soln 15 g TID    levothyroxine tablet 88 mcg Before breakfast    midodrine tablet 10 mg TID    ondansetron injection 4 mg Q6H PRN    rifAXIMin tablet 550 mg BID    simethicone chewable tablet 80 mg TID PRN    sodium chloride 0.9% flush  10 mL PRN    sodium chloride 0.9% flush 10 mL PRN    zinc sulfate capsule 220 mg Daily       Objective:     Vital Signs (Most Recent):  Temp: 97.3 °F (36.3 °C) (07/29/19 1100)  Pulse: 69 (07/29/19 1200)  Resp: 15 (07/29/19 1200)  BP: (!) 119/58 (07/29/19 1200)  SpO2: 100 % (07/29/19 1200)  O2 Device (Oxygen Therapy): room air (07/29/19 1200) Vital Signs (24h Range):  Temp:  [97.3 °F (36.3 °C)-98 °F (36.7 °C)] 97.3 °F (36.3 °C)  Pulse:  [65-76] 69  Resp:  [14-25] 15  SpO2:  [100 %] 100 %  BP: (107-145)/(52-87) 119/58     Weight: 56 kg (123 lb 7.3 oz) (07/25/19 1300)  Body mass index is 24.11 kg/m².  Body surface area is 1.54 meters squared.    I/O last 3 completed shifts:  In: 680 [P.O.:480; I.V.:80; NG/GT:120]  Out: 2145 [Urine:1070; Stool:1075]    Physical Exam   Constitutional: She appears cachectic. No distress.   HENT:   Head: Normocephalic and atraumatic.   Eyes: EOM are normal. Scleral icterus is present.   Neck: Normal range of motion.   Cardiovascular: Normal rate, regular rhythm and normal heart sounds.   No murmur heard.  Pulmonary/Chest: Effort normal and breath sounds normal.        Abdominal: She exhibits distension (Ascites ). There is hepatosplenomegaly.   Musculoskeletal: She exhibits no edema.   Neurological: She is alert.   Skin: Skin is warm and dry. Ecchymosis noted. She is not diaphoretic.   Vitals reviewed.      Significant Labs:  ABGs:   Recent Labs   Lab 07/26/19  0928   PH 7.471*   PCO2 29.2*   HCO3 21.3*   POCSATURATED 93*   BE -2     BMP:   Recent Labs   Lab 07/29/19  0558   *   *   CO2 22*   BUN 86*   CREATININE 2.5*   CALCIUM 8.9   MG 3.4*     CBC:   Recent Labs   Lab 07/29/19  0558 07/29/19  1254   WBC 2.52* 1.71*   RBC 2.10* 2.13*   HGB 7.2* 7.3*   HCT 22.0* 22.3*   PLT 26*  --    * 105*   MCH 34.3* 34.3*   MCHC 32.7 32.7     CMP:   Recent Labs   Lab 07/29/19  0558   *   CALCIUM 8.9   ALBUMIN 4.2   PROT 6.5      K 3.5   CO2 22*   *   BUN 86*    CREATININE 2.5*   ALKPHOS 114   ALT 24   AST 24   BILITOT 2.5*     LFTs:   Recent Labs   Lab 07/29/19  0558   ALT 24   AST 24   ALKPHOS 114   BILITOT 2.5*   PROT 6.5   ALBUMIN 4.2     All labs within the past 24 hours have been reviewed.     Significant Imaging:  No new imaging in 24 hours.    Assessment/Plan:     * Acute metabolic encephalopathy  Treatment plan per primary team     Right kidney mass  Patient has new Right renal mass found on CT abd on 7/22/19. Previous Ct from 2016 and MRI studies from 2019 in [place] did not demonstrate this finding.     Recommendations  - Urology recommends f/u outpatient as she is not a good surgical or biopsy candidate.   - Would prefer not expose kidneys to contrast but will discuss with other teams if necessary.    Acute kidney injury superimposed on chronic kidney disease  Labs from [place] nephrology demonstrated Cr of 1.77 in 3/12/19, 1.67 on 4/5/19, 1.96 in 6/13/19, and 1.83 on 6/24. Cr is 2.8 today (7/26/19) above her baseline due to ATN secondary to hypoperfusion of kidneys related to hypotensive episodes. Hepatorenal syndrome not suspected at this time as the patient's Cr and urine output have improved with an albumin challenge. TERA could be exacerbated with noted Hgb drop from 9.9 on 7/27 to 7.2 on 7/29.     Recommendations:   - c/w Midodrine 10mg TID added to prevent hypoperfusion to kidneys. Goal MAP>70  - c/w albumin 25g TID  - Monitor with daily BMP  - Strict I/Os   - Avoid Nephrotoxic agents  - Monitor for signs of blood loss  - Hgb > 7     Hyponatremia  Rusty is a 69 yo F with liver cirrhosis and CKD admitted for hyponatremia. Hyponatremia may be related to several factors primarily liver cirrhosis requiring biweekly paracentesis. This leads to a hypovolemic hyponatremic state in which fluid is pulled out of the vasculature. Additional contributing factors related to ileus, NPO status, and TPN nutrition. Extra sodium in TPN has increased sodium. Now on  tube feeds. Baseline Na+ from labs in [place] between 123-124.   - Improved with albumin and Midodrine to 10 mg TID;  - Strict I/O monitoring     Liver cirrhosis secondary to IZAGUIRRE  Treatment plan per primary team         Thank you for your consult. I will follow-up with patient. Please contact us if you have any additional questions.    Otilio Gibson,   Nephrology  [facility]

## 2019-07-29 NOTE — PROGRESS NOTES
[facility]  Critical Care Medicine  Progress Note    Patient Name: Michelle Ojeda  MRN: [id number]  Admission Date: 7/18/2019  Hospital Length of Stay: 11 days  Code Status: Full Code  Attending Provider: Renata Sharpe MD  Primary Care Provider: Primary Doctor No   Principal Problem: Acute metabolic encephalopathy    Subjective:     HPI:  Michelle Ojeda is a 68 year old female with history of IZAGUIRRE cirrhosis, HTN and hypothyroidism who presented to [facility] on 7/18/2019 as a direct admit from Transplant Hepatology Clinic for hyponatremia. Patient is from [place] and requesting liver/kidney transplant evaluation. Her cirrhosis is complicated by ascites requiring biweekly paracentesis, sarcopenia, esophageal varices, hyponatremia and hepatic encephalopathy. Per daughter, patient with functional decline over the last 2 months with complaints of lethargy, decreased oral intake, and peripheral edema. Patient initially admitted to hospital medicine for treatment of hyponatremia with fluid restriction. During liver work up, incidental renal mass found and urology was consulted. Patient developed an ileus and unable to tolerate lactulose while on the floor.    Rapid response called 7/24/2019 for worsening encephalopathy. At that time, patient was hemodynamically stable however not following commands or withdrawing to pain. Found to have ammonia 171. Critical care medicine consulted as patient vomiting and concern for not protecting airway.     Upon arrival, patient unresponsive and will only withdraw to pain yet hemodynamically stable. Multiple episodes of emesis during exam requiring aggressive suctioning to protect airway. Patient admitted to the CMICU at that time for emergent intubation.     Hospital/ICU Course:  Ms. Ojeda was admitted to the ICU with [facility] for acute hepatic encephalopathy requiring intubation for airway protection.  Ammonia level 171.  Lactulose and Rifaximin started.   Paracentesis on 7/24 with 4 L removed, negative for SBP. Pan cultured and started on empiric antibiotics with vancomycin and pip/tazo. Cultures NGTD, discontinued antibiotics on 7/27.  Extubated to NC on 7/27.  Evaluated by SLP on 7/28 who cleared for dental soft diet with thin liquids.  Also initiated on albumin in combination with midodrine with MAPs 80's given urine studies indicating pre-renal picture with mild improvement in sCr and urine output.      Interval History/Significant Events: No acute events overnight.     Review of Systems   Constitutional: Negative for chills, diaphoresis and fever.   Respiratory: Negative for shortness of breath.    Cardiovascular: Negative for chest pain.   Gastrointestinal: Negative for abdominal pain and nausea.   Neurological: Negative for dizziness and headaches.     Objective:     Vital Signs (Most Recent):  Temp: 97.4 °F (36.3 °C) (07/29/19 1500)  Pulse: 74 (07/29/19 1500)  Resp: (!) 35 (07/29/19 1500)  BP: (!) 122/59 (07/29/19 1500)  SpO2: 100 % (07/29/19 1500) Vital Signs (24h Range):  Temp:  [97.3 °F (36.3 °C)-98 °F (36.7 °C)] 97.4 °F (36.3 °C)  Pulse:  [65-76] 74  Resp:  [14-35] 35  SpO2:  [100 %] 100 %  BP: (107-145)/(52-87) 122/59   Weight: 56 kg (123 lb 7.3 oz)  Body mass index is 24.11 kg/m².      Intake/Output Summary (Last 24 hours) at 7/29/2019 1551  Last data filed at 7/29/2019 1500  Gross per 24 hour   Intake 130 ml   Output 1070 ml   Net -940 ml       Physical Exam   Constitutional: She appears well-developed and well-nourished. She appears ill.   HENT:   Head: Normocephalic and atraumatic.   Eyes: Pupils are equal, round, and reactive to light. EOM are normal. Scleral icterus is present.   Neck: No tracheal deviation present. No thyromegaly present.   Cardiovascular: Normal rate and regular rhythm. Exam reveals no gallop and no friction rub.   No murmur heard.  Pulses:       Radial pulses are 2+ on the right side, and 2+ on the left side.        Dorsalis  pedis pulses are 2+ on the right side, and 2+ on the left side.   Warm extremities, no peripheral edema   Pulmonary/Chest: Effort normal and breath sounds normal. No accessory muscle usage or stridor. No tachypnea and no bradypnea. No respiratory distress. She has no decreased breath sounds. She has no wheezes. She has no rhonchi. She has no rales.   On room air   Abdominal: Soft. She exhibits distension. Bowel sounds are increased. There is hepatomegaly. There is no tenderness.   Fecal management system with brown loose stool   Genitourinary:   Genitourinary Comments: Urine catheter with deborah output   Musculoskeletal: Normal range of motion.   Neurological: She is alert. No cranial nerve deficit or sensory deficit. GCS eye subscore is 4. GCS verbal subscore is 4. GCS motor subscore is 6.   Alert, oriented to person, place, and situation.  Disoriented to time.  Following request and moving all extremities equally and symmetrically.    .    Skin: Skin is warm and dry. Ecchymosis ( (arms/abdomen)) noted. No rash noted.            Vents:  Vent Mode: Spont (07/27/19 0912)  Ventilator Initiated: Yes (07/24/19 1312)  Set Rate: 0 bmp (07/27/19 0912)  Vt Set: 300 mL (07/27/19 0912)  Pressure Support: 5 cmH20 (07/27/19 0912)  PEEP/CPAP: 5 cmH20 (07/27/19 0912)  Oxygen Concentration (%): 28 (07/27/19 1113)  Peak Airway Pressure: 11 cmH2O (07/27/19 0912)  Plateau Pressure: 0 cmH20 (07/27/19 0912)  Total Ve: 8.93 mL (07/27/19 0912)  Negative Inspiratory Force (cm H2O): -35 (07/27/19 1105)  F/VT Ratio<105 (RSBI): (!) 36.25 (07/27/19 0912)  Lines/Drains/Airways     Drain                 Urethral Catheter 07/24/19 1346 Non-latex 16 Fr. 5 days         Rectal Tube 07/25/19 0943 rectal tube w/ balloon (indicate number of mLs) 4 days          Peripheral Intravenous Line                 Peripheral IV - Single Lumen 07/24/19 1315 20 G Anterior;Distal;Right Forearm 5 days         Peripheral IV - Single Lumen 07/26/19 0500 18 G Left  Antecubital 3 days              Significant Labs:    CBC/Anemia Profile:  Recent Labs   Lab 07/28/19  1704 07/29/19  0558 07/29/19  1254   WBC 3.62* 2.52* 1.71*   HGB 7.9* 7.2* 7.3*   HCT 24.6* 22.0* 22.3*   PLT 27* 26* 23*   * 105* 105*   RDW 14.5 14.5 14.7*        Chemistries:  Recent Labs   Lab 07/28/19  0306 07/29/19  0558    143   K 3.7 3.5    111*   CO2 22* 22*   BUN 90* 86*   CREATININE 2.9* 2.5*   CALCIUM 8.7 8.9   ALBUMIN 3.5 4.2   PROT 6.1 6.5   BILITOT 3.2* 2.5*   ALKPHOS 127 114   ALT 30 24   AST 28 24   MG 3.3* 3.4*   PHOS 4.1 3.5       All pertinent labs within the past 24 hours have been reviewed.    Significant Imaging:  I have reviewed and interpreted all pertinent imaging results/findings within the past 24 hours.      ABG  Recent Labs   Lab 07/26/19  0928   PH 7.471*   PO2 62*   PCO2 29.2*   HCO3 21.3*   BE -2     Assessment/Plan:     Neuro  * Acute metabolic encephalopathy  Likely 2/2 hepatic encephalopathy with ammonia 171 upon ICU tx (64 on 7/22).   --CT head negative for acute intracranial abnormality  --EEG pending read  --continue lactulose and rifaximin  --improving        Pulmonary  Acute hypoxemic respiratory failure  Intubated for airway protection in setting of severe encephalopathy and episodes of emesis/concern for aspiration  --Sputum culture negative from 7/24.  Off antibiotics.  --No focal signs of consolidation on CXR  --on room air  --extubated on 7/27      Renal/  Right kidney mass  R kidney mass measures 2.4 cm x 2.3 cm found on CT abd/pelvis during transplant workup  --Urology following with plans to hold on any further work up in setting of acute decompensation    Acute kidney injury superimposed on chronic kidney disease  --Baseline reportedly sCr 1.6. Possible candidate for liver/kidney transplant  --Nephrology following,concern iATN given recent shock.  Also concern for intravascular volume depletion given significant stool output.   Retroperitoneal US  on 7/23 negative for hydronephrosis  --urine studies concerning for pre-renal etiology.  Started on albumin and continued on midodrine (MAPs 80's)  --sCr downtrending and urine output improved following albumin  --Strict I/Os    Hematology  Coagulopathy  See cirrhosis    Oncology  Pancytopenia  See cirrhosis    Endocrine  Hyperglycemia  History of DM however was taken off metformin due to improvement  --A1c 5.1  --insulin infusion discontinued on 7/27 when enteral feedings where held.   --frequent glucose checks with SSI  --glucose goal 140-180    Hypothyroidism  --Continue home levothyroxine    GI  Other dysphagia  --SLP following.  Dental soft diet with thin liquids    Ileus  --improved.  Tolerating diet    Ascites  See cirrhosis    Liver cirrhosis secondary to IZAGUIRRE  --Complicated by ascites, thrombocytopenia, esophageal varices, hyponatremia and HE  --Currently being worked up for LTS eval  --Continue lactulose and rifaximin  --Hepatology following  --Paracentesis 7/24 with 4L removed;negative for SBP. Cytology pending.  --Cipro for SBP prophylaxis     MELD-Na score: 24 at 7/29/2019  5:58 AM  MELD score: 24 at 7/29/2019  5:58 AM  Calculated from:  Serum Creatinine: 2.5 mg/dL at 7/29/2019  5:58 AM  Serum Sodium: 143 mmol/L (Rounded to 137 mmol/L) at 7/29/2019  5:58 AM  Total Bilirubin: 2.5 mg/dL at 7/29/2019  5:58 AM  INR(ratio): 1.6 at 7/29/2019  5:58 AM  Age: 68 years        Other  Shock, unspecified  --Likely due to sedating medications however patient at risk for infection  --Blood culture, sputum culture, and peritoneal fluid negative  --UA appearing noninfectious  --Received 4 doses of piptazo,  Received 3 days of vancomycin.  --Resolved; weaned off norepinephrine infusion on 7/25  --Continue midodrine 10 mg TID    Physical debility  --PT/OT consulted      DVT ppx:  SCDs given thrombocytopenia    Discussed plan with patient and family at bedside.     Transfer to Hospital Medicine Liver Service     I spent  >70 minutes reviewing patient records, examining, and counseling the patient with greater than 50% of the time spent with direct patient care and coordination.        Morena Garcia NP  Critical Care Medicine  [facility]

## 2019-07-29 NOTE — PT/OT/SLP RE-EVAL
Physical Therapy Re-evaluation    Patient Name:  Michelle Ojeda   MRN:  [id number]    Recommendations:     Discharge Recommendations:  nursing facility, skilled( SNF- may progress)   Discharge Equipment Recommendations: walker, rolling, shower chair   Barriers to discharge: Decreased caregiver support at current level of function     Assessment:     Michelle Ojeda is a 68 y.o. female admitted with a medical diagnosis of Acute metabolic encephalopathy.  She presents with the following impairments/functional limitations:  weakness, impaired endurance, impaired self care skills, impaired functional mobilty, gait instability, impaired balance, impaired cardiopulmonary response to activity. Pt tolerated activity with moderate assistance to transfer from bed to chair with excessive posterior lean. Pt able to sit EOB and perform self-care with OT with supervision. Upon discharge, pt would benefit from continued skilled therapy intervention at [facility] nursing facility to progress toward more independent mobility. At this time, pt would continue to benefit from acute skilled therapy intervention to address deficits and progress toward prior level of function.       Rehab Prognosis:  good; patient would benefit from acute skilled PT services to address these deficits and reach maximum level of function.      Recent Surgery: * No surgery found *      Plan:     During this hospitalization, patient to be seen 4 x/week to address the above listed problems via gait training, therapeutic activities, therapeutic exercises, neuromuscular re-education  · Plan of Care Expires:  08/29/19   Plan of Care Reviewed with: patient, family, daughter    Subjective     Communicated with RN prior to session.  Patient found HOB elevated with blood pressure cuff, pulse ox (continuous), telemetry, peripheral IV, bowel management system, leigh catheter upon PT entry to room, agreeable to evaluation.      Chief Complaint: Pt c/o fatigue  Patient  comments/goals: to get better and return home   Pain/Comfort:  · Pain Rating 1: 0/10  · Pain Rating Post-Intervention 1: 0/10    Patients cultural, spiritual, Zoroastrianism conflicts given the current situation: no      Objective:     Patient found with: blood pressure cuff, pulse ox (continuous), telemetry, peripheral IV, bowel management system, leigh catheter     General Precautions: Standard, fall   Orthopedic Precautions:N/A   Braces: N/A     Exams:  · Cognitive Exam:  Patient is AAOx4, followed all commands, communicates clearly and fluently  · Gross Motor Coordination:  WFL  · RLE ROM: WFL  · RLE Strength: WFL  · LLE ROM: WFL  · LLE Strength: WFL    Functional Mobility:  · Bed Mobility:     · Supine to Sit: maximal assistance  · Transfers:     · Sit to Stand:  moderate assistance with hand-held assist  · Bed to Chair: moderate assistance with  hand-held assist  using  Step Transfer  · Gait: Pt took 5 steps from bed to chair with moderate assistance with bilateral HHA. Pt demo'd excessive posterior lean, small step size, decreased foot clearance, decreased weight shift. Pt denied SOB, denied dizziness.     AM-PAC 6 CLICK MOBILITY  Total Score:12       Therapeutic Activities and Exercises:   Pt educated on role of PT/POC. Pt verbalized understanding.   Pt encouraged to only perform OOB mobility with assistance from nursing/therapy. Pt agreeable.   Pt sat EOB for 10 mins with stand by assistance progressing to supervision. Pt participated in self care activity with OT. Pt demo'd bilateral anterior targeted reaching with stand by assistance for dynamic sitting balance.   Session terminated with arrival of MD team with POC discussion.     Patient left up in chair with all lines intact, call button in reach and RN notified.    GOALS:   Multidisciplinary Problems     Physical Therapy Goals        Problem: Physical Therapy Goal    Goal Priority Disciplines Outcome Goal Variances Interventions   Physical Therapy Goal      PT, PT/OT Ongoing (interventions implemented as appropriate)     Description:  Goals to be met by: 2019     Patient will increase functional independence with mobility by performin. Supine to sit with contact guard assistance   2. Sit to supine with contact guard assistance   3. Sit to stand transfer with stand by assistance with LRAD   4. Gait  x 50 feet with stand by assistance using Rolling Walker.   5. Ascend/descend 3 stair with bilateral Handrails Stand-by Assistance                      History:     Past Medical History:   Diagnosis Date    Cirrhosis     Diabetes mellitus, type 2     Disorder of kidney and ureter     Hypertension     Hypothyroidism     NAFLD (nonalcoholic fatty liver disease)        History reviewed. No pertinent surgical history.    Time Tracking:     PT Received On: 19  PT Start Time: 1011     PT Stop Time: 1032  PT Total Time (min): 21 min     Billable Minutes: Evaluation 10 mins  and Gait Training 11 mins       Paula Espinoza, PT  2019

## 2019-07-29 NOTE — PROGRESS NOTES
[facility]  Hepatology  Progress Note    Patient Name: Michelle Ojeda  MRN: [id number]  Admission Date: 7/18/2019  Hospital Length of Stay: 11 days  Attending Provider: Renata Sharpe MD   Primary Care Physician: Primary Doctor No  Principal Problem:Acute metabolic encephalopathy    Subjective:     Transplant status: No    HPI: Ms Ojeda is a 68 year old female with a history of HERRMANN ESLD, with decompensations including Gr 1 HE, ascites, EV, hyponatremia, T2DM, HTN, hypothyroidism, who is being admitted from hepatology clinic due to concern for hyponatremia.    She presents to transplant Clinic for initial evaluation on 07/18 with Dr. Cannon for initial transplant evaluation.  She has a history of Herrmann cirrhosis with a history of biopsy in 1998 with steatosis unclear fibrosis both diagnosed with cirrhosis in July of 2015 in the setting of decompensation due while in [place] with encephalopathy and hematemesis.  Her cirrhosis has been complicated by grade 1 hepatic encephalopathy, recently started Aldo Alfalfa min but never been on lactulose, ascites, sarcopenia, esophageal varices status post ligation, hyponatremia.  She was previously evaluated at [place] with Dr. Mccormick who recommended evaluation at [facility].  Does not appear she was evaluated for transplant at that time.    Due to hyponatremia with sodium of 120 she was admitted to the hospital for further management.  Currently she reports she is feeling well without any complaints.  Denies any abdominal pain, nausea, vomiting, diarrhea.  Denies any fevers, chills, sweats or other infectious complaints.    Family who is present in the room note that she is doing extremely well until approximately 2 months prior to presentation when she began to be more frail, fatigue, increased peripheral edema, decreased oral intake.  They note they have been extremely careful to avoid T here to low-sodium diet.  She has never been hospitalized in the  past for hyponatremia.  No other recent hospitalizations.  Regarding her ascites she has approximately received a paracentesis every 2 weeks.      Interval History:   Not sleeping well. Unchanged mental status from yesterday. Ate very small dinner last night.  Hgb 7.2, no black or bloody stool, no vomiting.    Current Facility-Administered Medications   Medication    acetaminophen tablet 325 mg    ciprofloxacin HCl tablet 500 mg    dextrose 10% (D10W) Bolus    dextrose 10% (D10W) Bolus    dicyclomine capsule 10 mg    fentaNYL injection 25 mcg    glucagon (human recombinant) injection 1 mg    glucose chewable tablet 16 g    glucose chewable tablet 24 g    insulin aspart U-100 pen 0-5 Units    iohexol (OMNIPAQUE) oral solution 15 mL    lactulose 20 gram/30 mL solution Soln 15 g    levothyroxine tablet 88 mcg    midodrine tablet 10 mg    ondansetron injection 4 mg    rifAXIMin tablet 550 mg    simethicone chewable tablet 80 mg    sodium chloride 0.9% flush 10 mL    sodium chloride 0.9% flush 10 mL    zinc sulfate capsule 220 mg       Objective:     Vital Signs (Most Recent):  Temp: 97.4 °F (36.3 °C) (07/29/19 1500)  Pulse: 73 (07/29/19 1600)  Resp: 20 (07/29/19 1600)  BP: (!) 114/57 (07/29/19 1600)  SpO2: 100 % (07/29/19 1600) Vital Signs (24h Range):  Temp:  [97.3 °F (36.3 °C)-98 °F (36.7 °C)] 97.4 °F (36.3 °C)  Pulse:  [65-76] 73  Resp:  [14-35] 20  SpO2:  [100 %] 100 %  BP: (107-135)/(52-87) 114/57     Weight: 56 kg (123 lb 7.3 oz) (07/25/19 1300)  Body mass index is 24.11 kg/m².    Physical Exam   Constitutional: She appears lethargic. She appears ill. No distress.   Eyes: No scleral icterus.   Neck: Neck supple.   Cardiovascular: Normal rate and regular rhythm.   Pulmonary/Chest: Effort normal. No respiratory distress.   Abdominal: Soft. Bowel sounds are normal. She exhibits no distension. There is no tenderness.   Genitourinary:   Genitourinary Comments: Flexiseal with green liquid stool    Neurological: She appears lethargic.   Skin: Skin is warm.   Nursing note and vitals reviewed.      MELD-Na score: 24 at 7/29/2019  5:58 AM  MELD score: 24 at 7/29/2019  5:58 AM  Calculated from:  Serum Creatinine: 2.5 mg/dL at 7/29/2019  5:58 AM  Serum Sodium: 143 mmol/L (Rounded to 137 mmol/L) at 7/29/2019  5:58 AM  Total Bilirubin: 2.5 mg/dL at 7/29/2019  5:58 AM  INR(ratio): 1.6 at 7/29/2019  5:58 AM  Age: 68 years    Significant Labs:  Labs within the past month have been reviewed.    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 Also found to have ileus which has resolved. Hyponatremia has resolved. Unresponsive on 7/24, transferred to ICU and intubated for airway protection. Now extubated with slightly improved mentation.     Plan  - encephalopathy: Continue lactulose and rifaximin.  - ascites: Negative for SBP on 07/19. Total protein 1.5.  - diuretics: hold given TERA  - TERA on CKD: unclear baseline. Cr up to 3.2 from 2s earlier this admission, now improving s/p IV albumin.  - hyponatremia: Resolved, but seems dehydrated. Recommend free water. Holding diuresis. Nephrology consult.  - malnutrition: nutrition consult. Calorie count. Supplements. High protein intake. PT/OT  - Anemia: likely dilutional from albumin, but monitor for overt GI bleed  - Appreciate Renal transplant evaluation re: SLK given GFR <30.  - inpatient transplant evaluation started. Nuclear stress test and renal scan completed, ID consult, now needs improvement in mental status and further evaluation of R renal mass. Will discuss at transplant committee tomorrow to assess if patient may be a future candidate. Need for further workup will depend on this decision.  Family is understanding and either way would like to see patient improve in terms of rehab.          Thank you for your consult. I will follow-up with patient. Please contact us if you have any additional questions.    Andrea aEton MD  Hepatology  [facility]

## 2019-07-29 NOTE — SUBJECTIVE & OBJECTIVE
Interval History: NAEO. Pt sitting in recliner, answers questions with yes/no responses.   Review of patient's allergies indicates:  No Known Allergies  Current Facility-Administered Medications   Medication Frequency    acetaminophen tablet 325 mg Q4H PRN    albumin human 25% bottle 25 g TID    ciprofloxacin HCl tablet 500 mg Daily    dextrose 10% (D10W) Bolus PRN    dextrose 10% (D10W) Bolus PRN    dicyclomine capsule 10 mg QID PRN    fentaNYL injection 25 mcg Q1H PRN    glucagon (human recombinant) injection 1 mg PRN    glucose chewable tablet 16 g PRN    glucose chewable tablet 24 g PRN    insulin aspart U-100 pen 0-5 Units Q6H PRN    iohexol (OMNIPAQUE) oral solution 15 mL PRN    lactulose 20 gram/30 mL solution Soln 15 g TID    levothyroxine tablet 88 mcg Before breakfast    midodrine tablet 10 mg TID    ondansetron injection 4 mg Q6H PRN    rifAXIMin tablet 550 mg BID    simethicone chewable tablet 80 mg TID PRN    sodium chloride 0.9% flush 10 mL PRN    sodium chloride 0.9% flush 10 mL PRN    zinc sulfate capsule 220 mg Daily       Objective:     Vital Signs (Most Recent):  Temp: 97.3 °F (36.3 °C) (07/29/19 1100)  Pulse: 69 (07/29/19 1200)  Resp: 15 (07/29/19 1200)  BP: (!) 119/58 (07/29/19 1200)  SpO2: 100 % (07/29/19 1200)  O2 Device (Oxygen Therapy): room air (07/29/19 1200) Vital Signs (24h Range):  Temp:  [97.3 °F (36.3 °C)-98 °F (36.7 °C)] 97.3 °F (36.3 °C)  Pulse:  [65-76] 69  Resp:  [14-25] 15  SpO2:  [100 %] 100 %  BP: (107-145)/(52-87) 119/58     Weight: 56 kg (123 lb 7.3 oz) (07/25/19 1300)  Body mass index is 24.11 kg/m².  Body surface area is 1.54 meters squared.    I/O last 3 completed shifts:  In: 680 [P.O.:480; I.V.:80; NG/GT:120]  Out: 2145 [Urine:1070; Stool:1075]    Physical Exam   Constitutional: She appears cachectic. No distress.   HENT:   Head: Normocephalic and atraumatic.   Eyes: EOM are normal. Scleral icterus is present.   Neck: Normal range of motion.    Cardiovascular: Normal rate, regular rhythm and normal heart sounds.   No murmur heard.  Pulmonary/Chest: Effort normal and breath sounds normal.        Abdominal: She exhibits distension (Ascites ). There is hepatosplenomegaly.   Musculoskeletal: She exhibits no edema.   Neurological: She is alert.   Skin: Skin is warm and dry. Ecchymosis noted. She is not diaphoretic.   Vitals reviewed.      Significant Labs:  ABGs:   Recent Labs   Lab 07/26/19  0928   PH 7.471*   PCO2 29.2*   HCO3 21.3*   POCSATURATED 93*   BE -2     BMP:   Recent Labs   Lab 07/29/19  0558   *   *   CO2 22*   BUN 86*   CREATININE 2.5*   CALCIUM 8.9   MG 3.4*     CBC:   Recent Labs   Lab 07/29/19  0558 07/29/19  1254   WBC 2.52* 1.71*   RBC 2.10* 2.13*   HGB 7.2* 7.3*   HCT 22.0* 22.3*   PLT 26*  --    * 105*   MCH 34.3* 34.3*   MCHC 32.7 32.7     CMP:   Recent Labs   Lab 07/29/19  0558   *   CALCIUM 8.9   ALBUMIN 4.2   PROT 6.5      K 3.5   CO2 22*   *   BUN 86*   CREATININE 2.5*   ALKPHOS 114   ALT 24   AST 24   BILITOT 2.5*     LFTs:   Recent Labs   Lab 07/29/19  0558   ALT 24   AST 24   ALKPHOS 114   BILITOT 2.5*   PROT 6.5   ALBUMIN 4.2     All labs within the past 24 hours have been reviewed.     Significant Imaging:  No new imaging in 24 hours.

## 2019-07-29 NOTE — PLAN OF CARE
Problem: Physical Therapy Goal  Goal: Physical Therapy Goal  Goals to be met by: 2019     Patient will increase functional independence with mobility by performin. Supine to sit with contact guard assistance   2. Sit to supine with contact guard assistance   3. Sit to stand transfer with stand by assistance with LRAD   4. Gait  x 50 feet with stand by assistance using Rolling Walker.   5. Ascend/descend 3 stair with bilateral Handrails Stand-by Assistance    Outcome: Ongoing (interventions implemented as appropriate)  Pt re-evaluated and appropriate goals established.

## 2019-07-29 NOTE — ASSESSMENT & PLAN NOTE
Patient with Hx of DM however was taken off metformin due to improvement  --A1c 5.1  --insulin infusion discontinued on 7/27 when enteral feedings where held.   --frequent glucose checks with SSI  --glucose goal 140-180

## 2019-07-29 NOTE — PLAN OF CARE
07/29/19 1337   Post-Acute Status   Other Status See Comments  (Per IDT meeting, CBC done @ 12noon, Possible step down pending results.)

## 2019-07-30 LAB
ALBUMIN SERPL BCP-MCNC: 4.6 G/DL (ref 3.5–5.2)
ALP SERPL-CCNC: 154 U/L (ref 55–135)
ALT SERPL W/O P-5'-P-CCNC: 29 U/L (ref 10–44)
ANION GAP SERPL CALC-SCNC: 11 MMOL/L (ref 8–16)
ANISOCYTOSIS BLD QL SMEAR: SLIGHT
AST SERPL-CCNC: 48 U/L (ref 10–40)
BASOPHILS # BLD AUTO: 0.02 K/UL (ref 0–0.2)
BASOPHILS NFR BLD: 0.6 % (ref 0–1.9)
BILIRUB SERPL-MCNC: 1.9 MG/DL (ref 0.1–1)
BUN SERPL-MCNC: 86 MG/DL (ref 8–23)
CALCIUM SERPL-MCNC: 9 MG/DL (ref 8.7–10.5)
CHLORIDE SERPL-SCNC: 109 MMOL/L (ref 95–110)
CO2 SERPL-SCNC: 22 MMOL/L (ref 23–29)
CREAT SERPL-MCNC: 2.6 MG/DL (ref 0.5–1.4)
DIFFERENTIAL METHOD: ABNORMAL
EOSINOPHIL # BLD AUTO: 0 K/UL (ref 0–0.5)
EOSINOPHIL NFR BLD: 0.8 % (ref 0–8)
ERYTHROCYTE [DISTWIDTH] IN BLOOD BY AUTOMATED COUNT: 14.6 % (ref 11.5–14.5)
EST. GFR  (AFRICAN AMERICAN): 20.8 ML/MIN/1.73 M^2
EST. GFR  (NON AFRICAN AMERICAN): 18 ML/MIN/1.73 M^2
GLUCOSE SERPL-MCNC: 232 MG/DL (ref 70–110)
HCT VFR BLD AUTO: 25.9 % (ref 37–48.5)
HGB BLD-MCNC: 8.4 G/DL (ref 12–16)
HYPOCHROMIA BLD QL SMEAR: ABNORMAL
IMM GRANULOCYTES # BLD AUTO: 0.04 K/UL (ref 0–0.04)
IMM GRANULOCYTES NFR BLD AUTO: 1.1 % (ref 0–0.5)
INR PPP: 1.5 (ref 0.8–1.2)
LYMPHOCYTES # BLD AUTO: 0.4 K/UL (ref 1–4.8)
LYMPHOCYTES NFR BLD: 10.2 % (ref 18–48)
MAGNESIUM SERPL-MCNC: 3.4 MG/DL (ref 1.6–2.6)
MCH RBC QN AUTO: 34.4 PG (ref 27–31)
MCHC RBC AUTO-ENTMCNC: 32.4 G/DL (ref 32–36)
MCV RBC AUTO: 106 FL (ref 82–98)
MONOCYTES # BLD AUTO: 0.4 K/UL (ref 0.3–1)
MONOCYTES NFR BLD: 12.2 % (ref 4–15)
NEUTROPHILS # BLD AUTO: 2.7 K/UL (ref 1.8–7.7)
NEUTROPHILS NFR BLD: 75.1 % (ref 38–73)
NRBC BLD-RTO: 0 /100 WBC
OVALOCYTES BLD QL SMEAR: ABNORMAL
PHOSPHATE SERPL-MCNC: 2.6 MG/DL (ref 2.7–4.5)
PLATELET # BLD AUTO: 29 K/UL (ref 150–350)
PMV BLD AUTO: 10.9 FL (ref 9.2–12.9)
POCT GLUCOSE: 242 MG/DL (ref 70–110)
POCT GLUCOSE: 286 MG/DL (ref 70–110)
POCT GLUCOSE: 287 MG/DL (ref 70–110)
POCT GLUCOSE: 341 MG/DL (ref 70–110)
POCT GLUCOSE: 359 MG/DL (ref 70–110)
POCT GLUCOSE: 406 MG/DL (ref 70–110)
POIKILOCYTOSIS BLD QL SMEAR: SLIGHT
POLYCHROMASIA BLD QL SMEAR: ABNORMAL
POTASSIUM SERPL-SCNC: 3.5 MMOL/L (ref 3.5–5.1)
PROT SERPL-MCNC: 7.2 G/DL (ref 6–8.4)
PROTHROMBIN TIME: 14.8 SEC (ref 9–12.5)
RBC # BLD AUTO: 2.44 M/UL (ref 4–5.4)
SODIUM SERPL-SCNC: 142 MMOL/L (ref 136–145)
WBC # BLD AUTO: 3.61 K/UL (ref 3.9–12.7)

## 2019-07-30 PROCEDURE — 99233 PR SUBSEQUENT HOSPITAL CARE,LEVL III: ICD-10-PCS | Mod: GC,NTX,, | Performed by: INTERNAL MEDICINE

## 2019-07-30 PROCEDURE — 85610 PROTHROMBIN TIME: CPT | Mod: NTX

## 2019-07-30 PROCEDURE — 99232 SBSQ HOSP IP/OBS MODERATE 35: CPT | Mod: GC,NTX,, | Performed by: INTERNAL MEDICINE

## 2019-07-30 PROCEDURE — 99232 PR SUBSEQUENT HOSPITAL CARE,LEVL II: ICD-10-PCS | Mod: GC,NTX,, | Performed by: INTERNAL MEDICINE

## 2019-07-30 PROCEDURE — 94761 N-INVAS EAR/PLS OXIMETRY MLT: CPT | Mod: NTX

## 2019-07-30 PROCEDURE — 63600175 PHARM REV CODE 636 W HCPCS: Mod: NTX | Performed by: HOSPITALIST

## 2019-07-30 PROCEDURE — 85025 COMPLETE CBC W/AUTO DIFF WBC: CPT | Mod: NTX

## 2019-07-30 PROCEDURE — S5571 INSULIN DISPOS PEN 3 ML: HCPCS | Mod: NTX | Performed by: HOSPITALIST

## 2019-07-30 PROCEDURE — 25000003 PHARM REV CODE 250: Mod: NTX | Performed by: NURSE PRACTITIONER

## 2019-07-30 PROCEDURE — 80053 COMPREHEN METABOLIC PANEL: CPT | Mod: NTX

## 2019-07-30 PROCEDURE — 25000003 PHARM REV CODE 250: Mod: NTX | Performed by: HOSPITALIST

## 2019-07-30 PROCEDURE — 25000003 PHARM REV CODE 250: Mod: NTX | Performed by: STUDENT IN AN ORGANIZED HEALTH CARE EDUCATION/TRAINING PROGRAM

## 2019-07-30 PROCEDURE — 97530 THERAPEUTIC ACTIVITIES: CPT | Mod: NTX

## 2019-07-30 PROCEDURE — 99233 SBSQ HOSP IP/OBS HIGH 50: CPT | Mod: NTX,,, | Performed by: HOSPITALIST

## 2019-07-30 PROCEDURE — 84100 ASSAY OF PHOSPHORUS: CPT | Mod: NTX

## 2019-07-30 PROCEDURE — 63600175 PHARM REV CODE 636 W HCPCS: Mod: NTX | Performed by: STUDENT IN AN ORGANIZED HEALTH CARE EDUCATION/TRAINING PROGRAM

## 2019-07-30 PROCEDURE — 36415 COLL VENOUS BLD VENIPUNCTURE: CPT | Mod: NTX

## 2019-07-30 PROCEDURE — 97116 GAIT TRAINING THERAPY: CPT | Mod: NTX

## 2019-07-30 PROCEDURE — 20600001 HC STEP DOWN PRIVATE ROOM: Mod: NTX

## 2019-07-30 PROCEDURE — 83735 ASSAY OF MAGNESIUM: CPT | Mod: NTX

## 2019-07-30 PROCEDURE — 99233 SBSQ HOSP IP/OBS HIGH 50: CPT | Mod: GC,NTX,, | Performed by: INTERNAL MEDICINE

## 2019-07-30 PROCEDURE — 97535 SELF CARE MNGMENT TRAINING: CPT | Mod: NTX

## 2019-07-30 PROCEDURE — 99233 PR SUBSEQUENT HOSPITAL CARE,LEVL III: ICD-10-PCS | Mod: NTX,,, | Performed by: HOSPITALIST

## 2019-07-30 PROCEDURE — 25000003 PHARM REV CODE 250: Mod: NTX | Performed by: PHYSICIAN ASSISTANT

## 2019-07-30 RX ORDER — CIPROFLOXACIN 250 MG/1
250 TABLET, FILM COATED ORAL EVERY 24 HOURS
Status: DISCONTINUED | OUTPATIENT
Start: 2019-07-31 | End: 2019-08-06

## 2019-07-30 RX ORDER — ZINC SULFATE 50(220)MG
220 CAPSULE ORAL DAILY
Status: DISCONTINUED | OUTPATIENT
Start: 2019-07-30 | End: 2019-08-07

## 2019-07-30 RX ORDER — LEVOTHYROXINE SODIUM 88 UG/1
88 TABLET ORAL
Status: DISCONTINUED | OUTPATIENT
Start: 2019-07-31 | End: 2019-08-07

## 2019-07-30 RX ORDER — MIDODRINE HYDROCHLORIDE 5 MG/1
10 TABLET ORAL 3 TIMES DAILY
Status: DISCONTINUED | OUTPATIENT
Start: 2019-07-30 | End: 2019-08-01

## 2019-07-30 RX ADMIN — INSULIN ASPART 5 UNITS: 100 INJECTION, SOLUTION INTRAVENOUS; SUBCUTANEOUS at 11:07

## 2019-07-30 RX ADMIN — LACTULOSE 15 G: 20 SOLUTION ORAL at 09:07

## 2019-07-30 RX ADMIN — MIDODRINE HYDROCHLORIDE 10 MG: 5 TABLET ORAL at 09:07

## 2019-07-30 RX ADMIN — RIFAXIMIN 550 MG: 550 TABLET ORAL at 09:07

## 2019-07-30 RX ADMIN — INSULIN ASPART 2 UNITS: 100 INJECTION, SOLUTION INTRAVENOUS; SUBCUTANEOUS at 06:07

## 2019-07-30 RX ADMIN — INSULIN ASPART 3 UNITS: 100 INJECTION, SOLUTION INTRAVENOUS; SUBCUTANEOUS at 09:07

## 2019-07-30 RX ADMIN — INSULIN DETEMIR 5 UNITS: 100 INJECTION, SOLUTION SUBCUTANEOUS at 11:07

## 2019-07-30 RX ADMIN — MIDODRINE HYDROCHLORIDE 10 MG: 5 TABLET ORAL at 02:07

## 2019-07-30 RX ADMIN — Medication 220 MG: at 09:07

## 2019-07-30 RX ADMIN — CIPROFLOXACIN HYDROCHLORIDE 500 MG: 500 TABLET, FILM COATED ORAL at 09:07

## 2019-07-30 RX ADMIN — INSULIN ASPART 4 UNITS: 100 INJECTION, SOLUTION INTRAVENOUS; SUBCUTANEOUS at 04:07

## 2019-07-30 RX ADMIN — LEVOTHYROXINE SODIUM 88 MCG: 88 TABLET ORAL at 05:07

## 2019-07-30 RX ADMIN — LACTULOSE 15 G: 20 SOLUTION ORAL at 02:07

## 2019-07-30 NOTE — PLAN OF CARE
Problem: Adult Inpatient Plan of Care  Goal: Plan of Care Review  Outcome: Ongoing (interventions implemented as appropriate)  POC reviewed w/patient and son and daughter upon transfer from ICU. More alert this am. Tolerating thin liquids to drink. No cough noted when med given or when drinking free water. Multiple bruises to bilat arms and face. Flexi seal intact. Purewick intact. Repositioned q2hr. DTI to sacrum which was present when transferred to this unit this shift. Safety maintained. Bed in low and locked position. Call light within reach. Side rails up x2. Frequent rounds.

## 2019-07-30 NOTE — SUBJECTIVE & OBJECTIVE
Interval History:   Transferred out of ICU.  More awake this morning, working with therapy.    Current Facility-Administered Medications   Medication    acetaminophen tablet 325 mg    [START ON 7/31/2019] ciprofloxacin HCl tablet 250 mg    dextrose 10% (D10W) Bolus    dextrose 10% (D10W) Bolus    dicyclomine capsule 10 mg    glucagon (human recombinant) injection 1 mg    glucose chewable tablet 16 g    glucose chewable tablet 24 g    insulin aspart U-100 pen 0-5 Units    insulin detemir U-100 pen 5 Units    lactulose 20 gram/30 mL solution Soln 15 g    [START ON 7/31/2019] levothyroxine tablet 88 mcg    midodrine tablet 10 mg    ondansetron injection 4 mg    rifAXIMin tablet 550 mg    simethicone chewable tablet 80 mg    sodium chloride 0.9% flush 10 mL    sodium chloride 0.9% flush 10 mL    zinc sulfate capsule 220 mg       Objective:     Vital Signs (Most Recent):  Temp: 97.5 °F (36.4 °C) (07/30/19 1115)  Pulse: 70 (07/30/19 1115)  Resp: 18 (07/30/19 0725)  BP: 121/60 (07/30/19 1115)  SpO2: 98 % (07/30/19 1115) Vital Signs (24h Range):  Temp:  [97.4 °F (36.3 °C)-98.6 °F (37 °C)] 97.5 °F (36.4 °C)  Pulse:  [70-82] 70  Resp:  [14-42] 18  SpO2:  [98 %-100 %] 98 %  BP: (111-138)/(55-82) 121/60     Weight: 56 kg (123 lb 7.3 oz) (07/30/19 1000)  Body mass index is 24.11 kg/m².    Physical Exam   Constitutional: No distress.   Eyes: No scleral icterus.   Cardiovascular: Normal rate and regular rhythm.   Pulmonary/Chest: Effort normal. No respiratory distress.   Abdominal: Soft. Bowel sounds are normal. She exhibits no distension. There is no tenderness.   Neurological: She is alert.   Skin: Skin is warm and dry.   Psychiatric: Her behavior is normal.   Nursing note and vitals reviewed.      MELD-Na score: 23 at 7/30/2019  6:06 AM  MELD score: 23 at 7/30/2019  6:06 AM  Calculated from:  Serum Creatinine: 2.6 mg/dL at 7/30/2019  6:06 AM  Serum Sodium: 142 mmol/L (Rounded to 137 mmol/L) at 7/30/2019  6:06  AM  Total Bilirubin: 1.9 mg/dL at 7/30/2019  6:06 AM  INR(ratio): 1.5 at 7/30/2019  6:06 AM  Age: 68 years    Significant Labs:  Labs within the past month have been reviewed.    Significant Imaging:  Reviewed

## 2019-07-30 NOTE — ASSESSMENT & PLAN NOTE
Ms Ojeda is a 68 year old female with a history of IZAGUIRRE ESLD, with decompensations including Gr 1 HE, ascites, EV, hyponatremia, T2DM, HTN, hypothyroidism, who was admitted from hepatology clinic due to concern for hyponatremia. Also found to have ileus which has resolved. Hyponatremia has resolved. Unresponsive on 7/24, transferred to ICU and intubated for airway protection. Now extubated, transferred out of ICU with improved mentation. Other ongoing issues include incidentally discovered R renal mass too high risk for biopsy at this time, deconditioning requiring PT/OT and eventual SNF.     Plan  - encephalopathy: Continue lactulose and rifaximin.  - ascites: Negative for SBP on 07/19. Total protein 1.5. Cont SBP ppx.  - diuretics: hold given TERA  - TERA on CKD: unclear baseline. Cr up to 3.2 from 2s earlier this admission, now improving s/p IV albumin.  - Protein calorie malnutrition: nutrition consult. Calorie count. Supplements. High protein intake.  - Anemia: likely dilutional from albumin, but monitor for overt GI bleed. Hgb up to 8.4 today  - Appreciate Renal transplant evaluation re: SLK given GFR <30.  - Transplant: Discussed at transplant committee today, but patient declined for transplant.

## 2019-07-30 NOTE — ASSESSMENT & PLAN NOTE
Patient has new Right renal mass found on CT abd on 7/22/19. Previous Ct from 2016 and MRI studies from 2019 in [place] did not demonstrate this finding.     Recommendations  - Urology recommends f/u outpatient as she is not a good surgical or biopsy candidate.   - Would prefer not expose kidneys to contrast but will discuss with other teams if necessary.  - If pt to be evaluated for simultaneous liver/kidney transplant, please place consult to Kidney Transplant Medicine

## 2019-07-30 NOTE — PROGRESS NOTES
[facility]  Adult Nutrition  Consult Note    SUMMARY     Recommendations    1. Continue current Renal, Dysphagia soft diet + Novasource ONS.   2. RD to monitor & follow-up.    Goals: Meet % EEN, EPN  Nutrition Goal Status: goal met  Communication of RD Recs: reviewed with RN    Reason for Assessment    Reason For Assessment: RD follow-up  Diagnosis: other (see comments)(Hyponatremia)  Relevant Medical History: DM, HTN, Cirrhosis  Interdisciplinary Rounds: attended    General Information Comments: Pt extubated . ST recommends Dysphagia soft diet w/ thin liquids. Pt consumed 90% of breakfast this AM, willing to try ONS (RN ordered). At initial RD visit, pt's family reported PTA, pt w/ poor appetite x 7 months 2/2 early satiety. Unsure of UBW but w/ current fluid status, pt's wt ranges 125-135#. Prior to poor appetite & fluid wt gain/loss, (x 7 months ago), pt weighed 145# (-7%). Low sodium, high protein diet education complete . NFPE complete 725, pt noted w/ moderate malnutrition. Please see PES statement for details.  Nutrition Discharge Planning: Adequate PO intake    Nutrition/Diet History    Patient Reported Diet/Restrictions/Preferences: low salt  Spiritual, Cultural Beliefs, [employer]ian Practices, Values that Affect Care: no  Supplemental Drinks or Food Habits: Ensure Plus  Factors Affecting Nutritional Intake: None identified at this time    Anthropometrics    Temp: 97.6 °F (36.4 °C)  Height Method: Stated  Height: 5' (152.4 cm)  Height (inches): 60 in  Weight Method: Bed Scale  Weight: 56 kg (123 lb 7.3 oz)  Weight (lb): 123.46 lb  Ideal Body Weight (IBW), Female: 100 lb  % Ideal Body Weight, Female (lb): 123.46 lb  BMI (Calculated): 24.2  BMI Grade: 18.5-24.9 - normal  Usual Body Weight (UBW), k kg  % Usual Body Weight: 93.68  % Weight Change From Usual Weight: -6.52 %    Lab/Procedures/Meds    Pertinent Labs Reviewed: reviewed  Pertinent Labs Comments: BUN 86, Creat  2.5, GFR 18.9, Bili 2.5  Pertinent Medications Reviewed: reviewed  Pertinent Medications Comments: Lactulose    Estimated/Assessed Needs    Weight Used For Calorie Calculations: 56 kg (123 lb 7.3 oz)     Energy Calorie Requirements (kcal): 1680 kcal/d  Energy Need Method: Kcal/kg(30 kcal/kg)     Protein Requirements: 73 g/d (1.3 g/kg)  Weight Used For Protein Calculations: 56 kg (123 lb 7.3 oz)     Estimated Fluid Requirement Method: other (see comments)(Per MD or 1 mL/kcal)    Nutrition Prescription Ordered    Current Diet Order: Renal, Dysphagia soft  Oral Nutrition Supplement: Novasource ONS    Evaluation of Received Nutrient/Fluid Intake    Comments: LBM: 7/27    Tolerance: tolerating    Nutrition Risk    Level of Risk/Frequency of Follow-up: (1x/week)     Assessment and Plan    Moderate malnutrition    Malnutrition in the context of Chronic Illness/Injury    Related to (etiology):  Inadequate energy intake    Signs and Symptoms (as evidenced by):  Energy Intake: <75% of estimated energy requirement for 4 months   Weight Loss: 7% x 7 months   RD to complete NFPE at time of follow-up    Interventions/Recommendations (treatment strategy):  Collaboration of nutrition care w/ other providers     Nutrition Diagnosis Status:  Continues     Monitor and Evaluation    Food and Nutrient Intake: energy intake, food and beverage intake  Food and Nutrient Adminstration: diet order  Physical Activity and Function: nutrition-related ADLs and IADLs  Anthropometric Measurements: weight, weight change  Biochemical Data, Medical Tests and Procedures: lipid profile, inflammatory profile, glucose/endocrine profile, gastrointestinal profile, electrolyte and renal panel  Nutrition-Focused Physical Findings: overall appearance     Malnutrition Assessment    Weight Loss (Malnutrition): other (see comments)(7% x 7 months)  Energy Intake (Malnutrition): less than 75% for greater than or equal to 3 months  Muscle Mass (Malnutrition):  moderate depletion  Fluid Accumulation (Malnutrition): moderate   Orbital Region (Subcutaneous Fat Loss): moderate depletion  Upper Arm Region (Subcutaneous Fat Loss): moderate depletion   [place] Region (Muscle Loss): moderate depletion  Clavicle and Acromion Bone Region (Muscle Loss): moderate depletion     Nutrition Follow-Up    RD Follow-up?: Yes

## 2019-07-30 NOTE — NURSING TRANSFER
Nursing Transfer Note      7/29/2019     Transfer To: [facility] [id number]    Transfer via bed    Transfer with    Transported by HERI Garalnd & Tramaine, PCT    Medicines sent: Insulin Pens    Chart send with patient: Yes    Notified: daughter, son    Patient reassessed at: 7/29/2019, 2245    Upon arrival to floor: patient oriented to room, call bell in reach and bed in lowest position

## 2019-07-30 NOTE — NURSING
Rec from ICU via bed awake and alert. Accompanied by son and daughter. Skin check with ICU nurse and noted DTI to sacral area. Foam drsg to sacrum. Foam drsg to bilat heels. Waffle overlay to bed. Applied padded green boots to BLE. Assisted to side using wedge to back. Reviewed POC with son and daughter. Both verbalized understanding of instructions. Placed bed alarm on. Call light within reach. Side rails up x2.

## 2019-07-30 NOTE — PLAN OF CARE
Problem: Physical Therapy Goal  Goal: Physical Therapy Goal  Goals to be met by: 2019     Patient will increase functional independence with mobility by performin. Supine to sit with contact guard assistance   2. Sit to supine with contact guard assistance   3. Sit to stand transfer with stand by assistance with LRAD   4. Gait  x 50 feet with stand by assistance using Rolling Walker.   5. Ascend/descend 3 stair with bilateral Handrails Stand-by Assistance     Outcome: Ongoing (interventions implemented as appropriate)  Pt is progressing towards goals.   Belinda Christie, PT  2019

## 2019-07-30 NOTE — ASSESSMENT & PLAN NOTE
Labs from [place] nephrology demonstrated Cr of 1.77 in 3/12/19, 1.67 on 4/5/19, 1.96 in 6/13/19, and 1.83 on 6/24. Cr of 2.8 on 7/26/19 above her baseline due to ATN secondary to hypoperfusion of kidneys related to hypotensive episodes. Hepatorenal syndrome not suspected at this time as the patient's Cr and urine output have improved with an albumin challenge. TERA could be exacerbated with noted Hgb drop from 9.9 on 7/27 to 7.2 on 7/29.     Recommendations:   - c/w Midodrine 10mg TID added to prevent hypoperfusion to kidneys. Goal MAP>70  - c/w albumin 25g TID  - Monitor with daily BMP  - Strict I/Os   - Avoid Nephrotoxic agents  - Monitor for signs of blood loss  - Hgb > 7

## 2019-07-30 NOTE — ASSESSMENT & PLAN NOTE
Rusty is a 67 yo F with liver cirrhosis and CKD admitted for hyponatremia. Hyponatremia may be related to several factors primarily liver cirrhosis requiring biweekly paracentesis. This leads to a hypovolemic hyponatremic state in which fluid is pulled out of the vasculature. Additional contributing factors related to ileus, NPO status, and TPN nutrition. Extra sodium in TPN has increased sodium. Now on tube feeds. Baseline Na+ from labs in [place] between 123-124.   - Improved with albumin and Midodrine to 10 mg TID;  - Strict I/O monitoring

## 2019-07-30 NOTE — PLAN OF CARE
Problem: Adult Inpatient Plan of Care  Goal: Plan of Care Review  Outcome: Ongoing (interventions implemented as appropriate)    No acute events throughout day. See vital signs and assessments in flowsheets. See below for updates on today's progress.     Pulmonary: Pt. Remains on RA; SPO2 > 95%.     Cardiovascular: Pt. Remains NSR. HR 60s-70s. SBP 120s-130s. MAP goal > 65.     Neurological: Pt. Was drowsy throughout day. Pt. Oriented to person but not time or situation. Afebrile.     Gastrointestinal: Flexi seal in place. 600 cc of output.     Genitourinary: leigh catheter removed today. purwick placed.    Endocrine: ACHS accuchecks.     Skin/Bath: skin tears to right arm; dressing changed    Date of last CHG bath given: 7/29 am shift     Patient progressing towards goals as tolerated, plan of care communicated and reviewed with Michelle Ojeda and family. All concerns addressed. Will continue to monitor.

## 2019-07-30 NOTE — PT/OT/SLP PROGRESS
Occupational Therapy   Treatment    Name: Michelle Ojeda  MRN: [id number]  Admitting Diagnosis:  Acute metabolic encephalopathy       Recommendations:     Discharge Recommendations: nursing facility, skilled( SNF)  Discharge Equipment Recommendations:  walker, rolling, shower chair  Barriers to discharge:  None    Assessment:     Michelle Ojeda is a 68 y.o. female with a medical diagnosis of Acute metabolic encephalopathy. Pt presents with decreased endurance and impaired mobility performance as limited by cardiovascular status and generalized weakness. Pt making significants improvements in therapy this date with skill focus on bed mobility, sit>stand t/f, grooming tasks, and mobility around bedroom to bedside chair. Pt demo bed mobility with SBA-mod (A) and sit>stand t/f with mod (A). Pt with posterior lean and requiring verbal cues for walker management.  Performance deficits affecting function are weakness, impaired endurance, impaired self care skills, impaired balance, decreased coordination, decreased safety awareness, impaired functional mobilty, gait instability. Pt would benefit from continued OT skilled services 4x/wk to improve daily living skills to optimize QOL. Pt is recommended to discharge to SNF at this time.      Rehab Prognosis:  Good; patient would benefit from acute skilled OT services to address these deficits and reach maximum level of function.       Plan:     Patient to be seen 4 x/week to address the above listed problems via self-care/home management, therapeutic exercises, therapeutic activities  · Plan of Care Expires: 08/29/19  · Plan of Care Reviewed with: patient, daughter    Subjective     Pain/Comfort:  · Pain Rating 1: 0/10  · Pain Rating Post-Intervention 2: 0/10    Objective:     Communicated with: RN prior to session.  Patient found HOB elevated with telemetry, leigh catheter(rectal tube) upon OT entry to room.    General Precautions: Standard, fall   Orthopedic  Precautions:N/A   Braces: N/A     Occupational Performance:     Bed Mobility:    · Patient completed Rolling/Turning to Right with stand by assistance  · Patient completed Scooting/Bridging with contact guard assistance  · Patient completed Supine to Sit with contact guard assistance     Functional Mobility/Transfers:  · Patient completed Sit <> Stand Transfer with moderate assistance  with  rolling walker   · Patient completed Bed <> Chair Transfer using Step Transfer technique with moderate assistance with rolling walker  · Functional Mobility: Pt completed bedroom mobility to couch then to bedside chair requiring cueing for walker management and overall mobility performance. Pt with noted posterior lean however able to correct with cueing.    Activities of Daily Living:  · Grooming: setup of facial cloth at EOB   · Lower Body Dressing: total assistance donning socks at EOB       [facility] 6 Click ADL: 15    Treatment & Education:  Pt educated on role of occupational therapy, POC, and safety during ADLs and functional mobility. Pt and OT discussed importance of safe, continued mobility to optimize daily living skills. Pt verbalized understanding.   Pt completed the following during session: bed mobility, sit>stand, grooming tasks, bedroom ambulation. White board updated during session. Pt given instruction to call for medical staff/nurse for assistance.       Patient left up in chair with all lines intact, call button in reach and daughters presentEducation:      GOALS:   Multidisciplinary Problems     Occupational Therapy Goals        Problem: Occupational Therapy Goal    Goal Priority Disciplines Outcome Interventions   Occupational Therapy Goal     OT, PT/OT Ongoing (interventions implemented as appropriate)    Description:  Goals to be met by: 8/29/19     Patient will increase functional independence with ADLs by performing:    UE Dressing with Modified [place].  LE Dressing with Modified [place] and  Assistive Devices as needed.  Grooming while standing at sink with Modified [place].  Toileting from bedside commode with Modified [place] for hygiene and clothing management.   Bathing from  shower chair/bench with Modified [place].  Toilet transfer to bedside commode with Modified [place].  Increased functional strength to WFL for B UE.  Upper extremity exercise program x15 reps per handout, with assistance as needed.                      Time Tracking:     OT Date of Treatment: 07/30/19  OT Start Time: 1120  OT Stop Time: 1151  OT Total Time (min): 31 min    Billable Minutes:Self Care/Home Management 10 min  Therapeutic Activity 21 min    Jayshree Samano OT  7/30/2019

## 2019-07-30 NOTE — PROGRESS NOTES
[facility]  Hepatology  Progress Note    Patient Name: Michelle Ojeda  MRN: [id number]  Admission Date: 7/18/2019  Hospital Length of Stay: 12 days  Attending Provider: Ty Breaux MD   Primary Care Physician: Primary Doctor No  Principal Problem:Acute metabolic encephalopathy    Subjective:     Transplant status: No    HPI: Ms Ojeda is a 68 year old female with a history of HERRMANN ESLD, with decompensations including Gr 1 HE, ascites, EV, hyponatremia, T2DM, HTN, hypothyroidism, who is being admitted from hepatology clinic due to concern for hyponatremia.    She presents to transplant Clinic for initial evaluation on 07/18 with Dr. Cannon for initial transplant evaluation.  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  Her cirrhosis has been complicated by grade 1 hepatic encephalopathy, recently started Aldo Mc[place] min but never been on lactulose, ascites, sarcopenia, esophageal varices status post ligation, hyponatremia.  She was previously evaluated at [place] with Dr. Mccormick who recommended evaluation at [facility].  Does not appear she was evaluated for transplant at that time.    Due to hyponatremia with sodium of 120 she was admitted to the hospital for further management.  Currently she reports she is feeling well without any complaints.  Denies any abdominal pain, nausea, vomiting, diarrhea.  Denies any fevers, chills, sweats or other infectious complaints.    Family who is present in the room note that she is doing extremely well until approximately 2 months prior to presentation when she began to be more frail, fatigue, increased peripheral edema, decreased oral intake.  They note they have been extremely careful to avoid T here to low-sodium diet.  She has never been hospitalized in the past  for hyponatremia.  No other recent hospitalizations.  Regarding her ascites she has approximately received a paracentesis every 2 weeks.      Interval History:   Transferred out of ICU.  More awake this morning, working with therapy.    Current Facility-Administered Medications   Medication    acetaminophen tablet 325 mg    [START ON 7/31/2019] ciprofloxacin HCl tablet 250 mg    dextrose 10% (D10W) Bolus    dextrose 10% (D10W) Bolus    dicyclomine capsule 10 mg    glucagon (human recombinant) injection 1 mg    glucose chewable tablet 16 g    glucose chewable tablet 24 g    insulin aspart U-100 pen 0-5 Units    insulin detemir U-100 pen 5 Units    lactulose 20 gram/30 mL solution Soln 15 g    [START ON 7/31/2019] levothyroxine tablet 88 mcg    midodrine tablet 10 mg    ondansetron injection 4 mg    rifAXIMin tablet 550 mg    simethicone chewable tablet 80 mg    sodium chloride 0.9% flush 10 mL    sodium chloride 0.9% flush 10 mL    zinc sulfate capsule 220 mg       Objective:     Vital Signs (Most Recent):  Temp: 97.5 °F (36.4 °C) (07/30/19 1115)  Pulse: 70 (07/30/19 1115)  Resp: 18 (07/30/19 0725)  BP: 121/60 (07/30/19 1115)  SpO2: 98 % (07/30/19 1115) Vital Signs (24h Range):  Temp:  [97.4 °F (36.3 °C)-98.6 °F (37 °C)] 97.5 °F (36.4 °C)  Pulse:  [70-82] 70  Resp:  [14-42] 18  SpO2:  [98 %-100 %] 98 %  BP: (111-138)/(55-82) 121/60     Weight: 56 kg (123 lb 7.3 oz) (07/30/19 1000)  Body mass index is 24.11 kg/m².    Physical Exam   Constitutional: No distress.   Eyes: No scleral icterus.   Cardiovascular: Normal rate and regular rhythm.   Pulmonary/Chest: Effort normal. No respiratory distress.   Abdominal: Soft. Bowel sounds are normal. She exhibits no distension. There is no tenderness.   Neurological: She is alert.   Skin: Skin is warm and dry.   Psychiatric: Her behavior is normal.   Nursing note and vitals reviewed.      MELD-Na score: 23 at 7/30/2019  6:06 AM  MELD score: 23 at 7/30/2019   6:06 AM  Calculated from:  Serum Creatinine: 2.6 mg/dL at 7/30/2019  6:06 AM  Serum Sodium: 142 mmol/L (Rounded to 137 mmol/L) at 7/30/2019  6:06 AM  Total Bilirubin: 1.9 mg/dL at 7/30/2019  6:06 AM  INR(ratio): 1.5 at 7/30/2019  6:06 AM  Age: 68 years    Significant Labs:  Labs within the past month have been reviewed.    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 Also found to have ileus which has resolved. Hyponatremia has resolved. Unresponsive on 7/24, transferred to ICU and intubated for airway protection. Now extubated, transferred out of ICU with improved mentation. Other ongoing issues include incidentally discovered R renal mass too high risk for biopsy at this time, deconditioning requiring PT/OT and eventual SNF.     Plan  - encephalopathy: Continue lactulose and rifaximin.  - ascites: Negative for SBP on 07/19. Total protein 1.5. Cont SBP ppx.  - diuretics: hold given TERA  - TERA on CKD: unclear baseline. Cr up to 3.2 from 2s earlier this admission, now improving s/p IV albumin.  - Protein calorie malnutrition: nutrition consult. Calorie count. Supplements. High protein intake.  - Anemia: likely dilutional from albumin, but monitor for overt GI bleed. Hgb up to 8.4 today  - Appreciate Renal transplant evaluation re: SLK given GFR <30.  - Transplant: Discussed at transplant committee today, but patient declined for transplant.        Thank you for your consult. I will follow-up with patient. Please contact us if you have any additional questions.    Andrea Eaton MD  Hepatology  [facility]

## 2019-07-30 NOTE — PLAN OF CARE
Problem: Occupational Therapy Goal  Goal: Occupational Therapy Goal  Goals to be met by: 8/29/19     Patient will increase functional independence with ADLs by performing:    UE Dressing with Modified [place].  LE Dressing with Modified [place] and Assistive Devices as needed.  Grooming while standing at sink with Modified [place].  Toileting from bedside commode with Modified [place] for hygiene and clothing management.   Bathing from  shower chair/bench with Modified [place].  Toilet transfer to bedside commode with Modified [place].  Increased functional strength to WFL for B UE.  Upper extremity exercise program x15 reps per handout, with assistance as needed.     Outcome: Ongoing (interventions implemented as appropriate)  Continue OT POC.  Jayshree Samano OT  7/30/2019

## 2019-07-30 NOTE — PT/OT/SLP PROGRESS
Physical Therapy Treatment    Patient Name:  Michelle Ojeda   MRN:  [id number]    Recommendations:     Discharge Recommendations:  nursing facility, skilled(SS-SNF)   Discharge Equipment Recommendations: walker, rolling, shower chair   Barriers to discharge: Inaccessible home and pt not at prior level of function    Assessment:     Michelle Ojeda is a 68 y.o. female admitted with a medical diagnosis of Acute metabolic encephalopathy.  She presents with the following impairments/functional limitations:  weakness, impaired endurance, impaired functional mobilty, gait instability, impaired self care skills, impaired balance, impaired cognition, decreased lower extremity function, decreased safety awareness, impaired cardiopulmonary response to activity. Pt progressing well with therapy, requiring less assistance for mobility and able to ambulate for short distance this session. Pt not oriented to year, but able to follow 2-3 step commands with cueing and motivated to participate. Pt required ModA for bed mobility and transfers with RW. Pt with posterior lean during static standing, but able to correct with verbal and tactile cueing. Pt ambulated 12ft in room with RW and Fanny, with decreased step length, decreased gait speed, and uncoordinated stepping requiring Fanny to maintain balance and for RW management. Pt would continue to benefit from skilled PT services to improve functional mobility, ambulation, and endurance to return to OF.     Rehab Prognosis: Good; patient would benefit from acute skilled PT services to address these deficits and reach maximum level of function.    Recent Surgery: * No surgery found *      Plan:     During this hospitalization, patient to be seen 4 x/week to address the identified rehab impairments via gait training, therapeutic activities, therapeutic exercises, neuromuscular re-education and progress toward the following goals:    · Plan of Care Expires:  08/29/19    Subjective  "    Chief Complaint: none  Patient/Family Comments/goals: "I want to look outside"   Pain/Comfort:  · Pain Rating 1: 0/10  · Pain Rating Post-Intervention 1: 0/10      Objective:     Communicated with RN prior to session.  Patient found supine with telemetry, leigh catheter(rectal tube) upon PT entry to room.     General Precautions: Standard, fall   Orthopedic Precautions:N/A   Braces: N/A     Functional Mobility:  · Bed Mobility:    · Rolling Right: stand by assistance  · Scooting: contact guard assistance  · Supine to Sit: moderate assistance  · Transfers:    · Sit to Stand:  moderate assistance with rolling walker x 1 from EOB  · Bed to Chair: moderate assistance with  rolling walker  using  Step Transfer   · Gait:   · 12ft in room with RW and Fanny   · Pt ambulates with decreased step length, decreased gait speed, and uncoordinated stepping requiring Fanny to maintain balance and for RW management   · Balance:   · Sitting: CGA-SBA   · Standing: Mod-Fanny  · Posterior lean - able to correct with verbal and tactile cueing       AM-PAC 6 CLICK MOBILITY  Turning over in bed (including adjusting bedclothes, sheets and blankets)?: 3  Sitting down on and standing up from a chair with arms (e.g., wheelchair, bedside commode, etc.): 2  Moving from lying on back to sitting on the side of the bed?: 2  Moving to and from a bed to a chair (including a wheelchair)?: 2  Need to walk in hospital room?: 2  Climbing 3-5 steps with a railing?: 2  Basic Mobility Total Score: 13       Therapeutic Activities and Exercises:    -Pt educated on:              -PT roles, expectations, and POC               -Safety with mobility              -Benefits of OOB activities to increase strength and functional mobility               -Performing ther ex for increasing LE ROM and strength              -Discharge recommendations       Patient left up in chair with all lines intact, call button in reach, RN notified and daughters present..    GOALS: "   Multidisciplinary Problems     Physical Therapy Goals        Problem: Physical Therapy Goal    Goal Priority Disciplines Outcome Goal Variances Interventions   Physical Therapy Goal     PT, PT/OT Ongoing (interventions implemented as appropriate)     Description:  Goals to be met by: 2019     Patient will increase functional independence with mobility by performin. Supine to sit with contact guard assistance   2. Sit to supine with contact guard assistance   3. Sit to stand transfer with stand by assistance with LRAD   4. Gait  x 50 feet with stand by assistance using Rolling Walker.   5. Ascend/descend 3 stair with bilateral Handrails Stand-by Assistance                      Time Tracking:     PT Received On: 19  PT Start Time: 1120     PT Stop Time: 1151  PT Total Time (min): 31 min     Billable Minutes: Gait Training 15 and Therapeutic Activity 16    Treatment Type: Treatment  PT/PTA: PT     PTA Visit Number: 0     Belinda Chirstie, PT  2019

## 2019-07-30 NOTE — PLAN OF CARE
CM  Met with patient and son. Son states mother will be placed at rehab. PT/OT recommending SNF. [facility] [place]. Asked about acomidations at [facility] (adult children). Will clarify DC plan with MD and send out referrals. Will clarify [facility]id[facility]     07/30/19 1611   Discharge Reassessment   Assessment Type Discharge Planning Reassessment   Provided patient/caregiver education on the expected discharge date and the discharge plan Yes   Do you have any problems affording any of your prescribed medications? No   Discharge Plan A Skilled Nursing Facility   Discharge Plan B Rehab   DME Needed Upon Discharge  walker, rolling;shower chair   Anticipated Discharge Disposition SNF   Can the patient answer the patient profile reliably? Yes, cognitively intact   Post-Acute Status   Post-Acute Authorization Placement   Post-Acute Placement Status Awaiting Clarification Orders

## 2019-07-30 NOTE — PROGRESS NOTES
[facility]  Nephrology  Progress Note    Patient Name: Michelle Ojeda  MRN: [id number]  Admission Date: 7/18/2019  Hospital Length of Stay: 12 days  Attending Provider: Ty Breaux MD   Primary Care Physician: Primary Doctor No  Principal Problem:Acute metabolic encephalopathy    Subjective:     HPI: Rusty Martinez is 69 yo F with CKD stage 4 presenting from transplant hepatology clinic for hyponatremia on 7/18/19. She is originally from [place], here for liver transplant evaluation. She was diagnosed with IZAGUIRRE in grade 1 hepatic encephalopathy with severe ascites and esophogeal varices (s/p banding). During the month prior to admission she felt more lethargic and was requiring biweekly paracentesis. Of note, she is also being treated for Ileus her acute hepatic encephalopathy with lactulose. Recent 24 hour cr clearance study was near 21. Reported baseline Cr 1.6-1.8 and chronic hyponatremia range between 123-124.     Interval History: No acute events overnight. Pt more alert and interactive on exam this morning.     Review of patient's allergies indicates:  No Known Allergies  Current Facility-Administered Medications   Medication Frequency    acetaminophen tablet 325 mg Q4H PRN    [START ON 7/31/2019] ciprofloxacin HCl tablet 250 mg Daily    dextrose 10% (D10W) Bolus PRN    dextrose 10% (D10W) Bolus PRN    dicyclomine capsule 10 mg QID PRN    glucagon (human recombinant) injection 1 mg PRN    glucose chewable tablet 16 g PRN    glucose chewable tablet 24 g PRN    insulin aspart U-100 pen 0-5 Units Q6H PRN    insulin detemir U-100 pen 5 Units Daily    lactulose 20 gram/30 mL solution Soln 15 g TID    [START ON 7/31/2019] levothyroxine tablet 88 mcg Before breakfast    midodrine tablet 10 mg TID    ondansetron injection 4 mg Q6H PRN    rifAXIMin tablet 550 mg BID    simethicone chewable tablet 80 mg TID PRN    sodium chloride 0.9% flush 10 mL PRN    sodium chloride 0.9%  flush 10 mL PRN    zinc sulfate capsule 220 mg Daily       Objective:     Vital Signs (Most Recent):  Temp: 97.5 °F (36.4 °C) (07/30/19 1115)  Pulse: 70 (07/30/19 1115)  Resp: 18 (07/30/19 0725)  BP: 121/60 (07/30/19 1115)  SpO2: 98 % (07/30/19 1115)  O2 Device (Oxygen Therapy): room air (07/30/19 1116) Vital Signs (24h Range):  Temp:  [97.4 °F (36.3 °C)-98.6 °F (37 °C)] 97.5 °F (36.4 °C)  Pulse:  [70-82] 70  Resp:  [14-42] 18  SpO2:  [98 %-100 %] 98 %  BP: (111-138)/(55-82) 121/60     Weight: 56 kg (123 lb 7.3 oz) (07/30/19 1000)  Body mass index is 24.11 kg/m².  Body surface area is 1.54 meters squared.    I/O last 3 completed shifts:  In: 330 [P.O.:240; I.V.:50; NG/GT:40]  Out: 1560 [Urine:710; Stool:850]    Physical Exam   Constitutional: She appears cachectic. No distress.   HENT:   Head: Normocephalic and atraumatic.   Eyes: EOM are normal. Scleral icterus is present.   Neck: Normal range of motion.   Cardiovascular: Normal rate, regular rhythm and normal heart sounds.   No murmur heard.  Pulmonary/Chest: Effort normal and breath sounds normal.        Abdominal: She exhibits distension (Ascites ). There is hepatosplenomegaly.   Musculoskeletal: She exhibits no edema.   Neurological: She is alert. She is disoriented (Oriented to Person, Place).   Skin: Skin is warm and dry. Ecchymosis noted. She is not diaphoretic.   Vitals reviewed.      Significant Labs:  CBC:   Recent Labs   Lab 07/30/19  0606   WBC 3.61*   RBC 2.44*   HGB 8.4*   HCT 25.9*   PLT 29*   *   MCH 34.4*   MCHC 32.4     CMP:   Recent Labs   Lab 07/30/19  0606   *   CALCIUM 9.0   ALBUMIN 4.6   PROT 7.2      K 3.5   CO2 22*      BUN 86*   CREATININE 2.6*   ALKPHOS 154*   ALT 29   AST 48*   BILITOT 1.9*     Recent Labs   Lab 07/24/19  1352   COLORU Shahla   SPECGRAV 1.015   PHUR 5.0   PROTEINUA Negative   NITRITE Negative   LEUKOCYTESUR Negative     All labs within the past 24 hours have been reviewed.     Significant  Imaging:  No recent imaging    Assessment/Plan:     * Acute metabolic encephalopathy  Treatment plan per primary team     Right kidney mass  Patient has new Right renal mass found on CT abd on 7/22/19. Previous Ct from 2016 and MRI studies from 2019 in [place] did not demonstrate this finding.     Recommendations  - Urology recommends f/u outpatient as she is not a good surgical or biopsy candidate.   - Would prefer not expose kidneys to contrast but will discuss with other teams if necessary.  - If pt to be evaluated for simultaneous liver/kidney transplant, please place consult to Kidney Transplant Medicine    Acute kidney injury superimposed on chronic kidney disease  Labs from [place] nephrology demonstrated Cr of 1.77 in 3/12/19, 1.67 on 4/5/19, 1.96 in 6/13/19, and 1.83 on 6/24. Cr of 2.8 on 7/26/19 above her baseline due to ATN secondary to hypoperfusion of kidneys related to hypotensive episodes. Hepatorenal syndrome not suspected at this time as the patient's Cr and urine output have improved with an albumin challenge. TERA could be exacerbated with noted Hgb drop from 9.9 on 7/27 to 7.2 on 7/29.     Recommendations:   - c/w Midodrine 10mg TID added to prevent hypoperfusion to kidneys. Goal MAP>70  - c/w albumin 25g TID  - Monitor with daily BMP  - Strict I/Os   - Avoid Nephrotoxic agents  - Monitor for signs of blood loss  - Hgb > 7     Hyponatremia  Rusty is a 69 yo F with liver cirrhosis and CKD admitted for hyponatremia. Hyponatremia may be related to several factors primarily liver cirrhosis requiring biweekly paracentesis. This leads to a hypovolemic hyponatremic state in which fluid is pulled out of the vasculature. Additional contributing factors related to ileus, NPO status, and TPN nutrition. Extra sodium in TPN has increased sodium. Now on tube feeds. Baseline Na+ from labs in [place] between 123-124.   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 I will follow-up with patient. Please contact us if you have any additional questions.    Otilio Gibson DO  Nephrology  [facility]

## 2019-07-30 NOTE — PLAN OF CARE
Problem: Adult Inpatient Plan of Care  Goal: Plan of Care Review  Outcome: Ongoing (interventions implemented as appropriate)  POC reviewed with Pt and family. VSS. No acute changes. A&Ox4. Pt up in chair most the day. Family at bedside. Pt denies pain. Rectal tube removed per MD order. BG checked before meals and insulin giver per MD order. All questions answered. Call light in reach.  Safety checks preformed. Will continue to monitor.

## 2019-07-30 NOTE — SUBJECTIVE & OBJECTIVE
Interval History: No acute events overnight. Pt more alert and interactive on exam this morning.     Review of patient's allergies indicates:  No Known Allergies  Current Facility-Administered Medications   Medication Frequency    acetaminophen tablet 325 mg Q4H PRN    [START ON 7/31/2019] ciprofloxacin HCl tablet 250 mg Daily    dextrose 10% (D10W) Bolus PRN    dextrose 10% (D10W) Bolus PRN    dicyclomine capsule 10 mg QID PRN    glucagon (human recombinant) injection 1 mg PRN    glucose chewable tablet 16 g PRN    glucose chewable tablet 24 g PRN    insulin aspart U-100 pen 0-5 Units Q6H PRN    insulin detemir U-100 pen 5 Units Daily    lactulose 20 gram/30 mL solution Soln 15 g TID    [START ON 7/31/2019] levothyroxine tablet 88 mcg Before breakfast    midodrine tablet 10 mg TID    ondansetron injection 4 mg Q6H PRN    rifAXIMin tablet 550 mg BID    simethicone chewable tablet 80 mg TID PRN    sodium chloride 0.9% flush 10 mL PRN    sodium chloride 0.9% flush 10 mL PRN    zinc sulfate capsule 220 mg Daily       Objective:     Vital Signs (Most Recent):  Temp: 97.5 °F (36.4 °C) (07/30/19 1115)  Pulse: 70 (07/30/19 1115)  Resp: 18 (07/30/19 0725)  BP: 121/60 (07/30/19 1115)  SpO2: 98 % (07/30/19 1115)  O2 Device (Oxygen Therapy): room air (07/30/19 1116) Vital Signs (24h Range):  Temp:  [97.4 °F (36.3 °C)-98.6 °F (37 °C)] 97.5 °F (36.4 °C)  Pulse:  [70-82] 70  Resp:  [14-42] 18  SpO2:  [98 %-100 %] 98 %  BP: (111-138)/(55-82) 121/60     Weight: 56 kg (123 lb 7.3 oz) (07/30/19 1000)  Body mass index is 24.11 kg/m².  Body surface area is 1.54 meters squared.    I/O last 3 completed shifts:  In: 330 [P.O.:240; I.V.:50; NG/GT:40]  Out: 1560 [Urine:710; Stool:850]    Physical Exam   Constitutional: She appears cachectic. No distress.   HENT:   Head: Normocephalic and atraumatic.   Eyes: EOM are normal. Scleral icterus is present.   Neck: Normal range of motion.   Cardiovascular: Normal rate, regular  rhythm and normal heart sounds.   No murmur heard.  Pulmonary/Chest: Effort normal and breath sounds normal.        Abdominal: She exhibits distension (Ascites ). There is hepatosplenomegaly.   Musculoskeletal: She exhibits no edema.   Neurological: She is alert. She is disoriented (Oriented to Person, Place).   Skin: Skin is warm and dry. Ecchymosis noted. She is not diaphoretic.   Vitals reviewed.      Significant Labs:  CBC:   Recent Labs   Lab 07/30/19  0606   WBC 3.61*   RBC 2.44*   HGB 8.4*   HCT 25.9*   PLT 29*   *   MCH 34.4*   MCHC 32.4     CMP:   Recent Labs   Lab 07/30/19  0606   *   CALCIUM 9.0   ALBUMIN 4.6   PROT 7.2      K 3.5   CO2 22*      BUN 86*   CREATININE 2.6*   ALKPHOS 154*   ALT 29   AST 48*   BILITOT 1.9*     Recent Labs   Lab 07/24/19  1352   COLORU Shahla   SPECGRAV 1.015   PHUR 5.0   PROTEINUA Negative   NITRITE Negative   LEUKOCYTESUR Negative     All labs within the past 24 hours have been reviewed.     Significant Imaging:  No recent imaging

## 2019-07-31 LAB
ALBUMIN SERPL BCP-MCNC: 3.5 G/DL (ref 3.5–5.2)
ALLENS TEST: ABNORMAL
ALP SERPL-CCNC: 143 U/L (ref 55–135)
ALT SERPL W/O P-5'-P-CCNC: 34 U/L (ref 10–44)
ANION GAP SERPL CALC-SCNC: 11 MMOL/L (ref 8–16)
ANISOCYTOSIS BLD QL SMEAR: ABNORMAL
APPEARANCE FLD: NORMAL
AST SERPL-CCNC: 50 U/L (ref 10–40)
BACTERIA SPEC ANAEROBE CULT: NORMAL
BASOPHILS # BLD AUTO: 0.03 K/UL (ref 0–0.2)
BASOPHILS NFR BLD: 0.6 % (ref 0–1.9)
BILIRUB SERPL-MCNC: 1.7 MG/DL (ref 0.1–1)
BODY FLD TYPE: NORMAL
BUN SERPL-MCNC: 83 MG/DL (ref 8–23)
CALCIUM SERPL-MCNC: 8.4 MG/DL (ref 8.7–10.5)
CHLORIDE SERPL-SCNC: 102 MMOL/L (ref 95–110)
CO2 SERPL-SCNC: 19 MMOL/L (ref 23–29)
COLOR FLD: NORMAL
CREAT SERPL-MCNC: 2.3 MG/DL (ref 0.5–1.4)
DELSYS: ABNORMAL
DIFFERENTIAL METHOD: ABNORMAL
EOSINOPHIL # BLD AUTO: 0.1 K/UL (ref 0–0.5)
EOSINOPHIL NFR BLD: 1.3 % (ref 0–8)
ERYTHROCYTE [DISTWIDTH] IN BLOOD BY AUTOMATED COUNT: 14.6 % (ref 11.5–14.5)
EST. GFR  (AFRICAN AMERICAN): 24.1 ML/MIN/1.73 M^2
EST. GFR  (NON AFRICAN AMERICAN): 20.9 ML/MIN/1.73 M^2
FOLATE SERPL-MCNC: 12.6 NG/ML (ref 4–24)
GLUCOSE SERPL-MCNC: 259 MG/DL (ref 70–110)
HCO3 UR-SCNC: 25.7 MMOL/L (ref 24–28)
HCT VFR BLD AUTO: 22.3 % (ref 37–48.5)
HGB BLD-MCNC: 7.5 G/DL (ref 12–16)
IMM GRANULOCYTES # BLD AUTO: 0.09 K/UL (ref 0–0.04)
IMM GRANULOCYTES NFR BLD AUTO: 1.7 % (ref 0–0.5)
INR PPP: 1.6 (ref 0.8–1.2)
LYMPHOCYTES # BLD AUTO: 0.4 K/UL (ref 1–4.8)
LYMPHOCYTES NFR BLD: 8.3 % (ref 18–48)
LYMPHOCYTES NFR FLD MANUAL: 78 %
MAGNESIUM SERPL-MCNC: 3 MG/DL (ref 1.6–2.6)
MCH RBC QN AUTO: 33.9 PG (ref 27–31)
MCHC RBC AUTO-ENTMCNC: 33.6 G/DL (ref 32–36)
MCV RBC AUTO: 101 FL (ref 82–98)
MESOTHL CELL NFR FLD MANUAL: 3 %
MONOCYTES # BLD AUTO: 0.4 K/UL (ref 0.3–1)
MONOCYTES NFR BLD: 7.3 % (ref 4–15)
MONOS+MACROS NFR FLD MANUAL: 10 %
NEUTROPHILS # BLD AUTO: 4.3 K/UL (ref 1.8–7.7)
NEUTROPHILS NFR BLD: 80.8 % (ref 38–73)
NEUTROPHILS NFR FLD MANUAL: 9 %
NRBC BLD-RTO: 0 /100 WBC
PATH INTERP FLD-IMP: NORMAL
PCO2 BLDA: 30.5 MMHG (ref 35–45)
PH SMN: 7.53 [PH] (ref 7.35–7.45)
PHOSPHATE SERPL-MCNC: 2.7 MG/DL (ref 2.7–4.5)
PLATELET # BLD AUTO: 32 K/UL (ref 150–350)
PLATELET BLD QL SMEAR: ABNORMAL
PMV BLD AUTO: 11 FL (ref 9.2–12.9)
PO2 BLDA: 91 MMHG (ref 80–100)
POC BE: 3 MMOL/L
POC SATURATED O2: 98 % (ref 95–100)
POC TCO2: 27 MMOL/L (ref 23–27)
POCT GLUCOSE: 235 MG/DL (ref 70–110)
POCT GLUCOSE: 317 MG/DL (ref 70–110)
POCT GLUCOSE: 387 MG/DL (ref 70–110)
POCT GLUCOSE: 461 MG/DL (ref 70–110)
POTASSIUM SERPL-SCNC: 3.3 MMOL/L (ref 3.5–5.1)
PROT SERPL-MCNC: 5.9 G/DL (ref 6–8.4)
PROTHROMBIN TIME: 15.6 SEC (ref 9–12.5)
RBC # BLD AUTO: 2.21 M/UL (ref 4–5.4)
RETICS/RBC NFR AUTO: 4.6 % (ref 0.5–2.5)
SAMPLE: ABNORMAL
SITE: ABNORMAL
SODIUM SERPL-SCNC: 132 MMOL/L (ref 136–145)
VIT B12 SERPL-MCNC: 1583 PG/ML (ref 210–950)
WBC # BLD AUTO: 5.31 K/UL (ref 3.9–12.7)
WBC # FLD: 199 /CU MM

## 2019-07-31 PROCEDURE — 25000003 PHARM REV CODE 250: Mod: NTX | Performed by: STUDENT IN AN ORGANIZED HEALTH CARE EDUCATION/TRAINING PROGRAM

## 2019-07-31 PROCEDURE — 99233 SBSQ HOSP IP/OBS HIGH 50: CPT | Mod: GC,NTX,, | Performed by: INTERNAL MEDICINE

## 2019-07-31 PROCEDURE — 99233 PR SUBSEQUENT HOSPITAL CARE,LEVL III: ICD-10-PCS | Mod: NTX,,, | Performed by: HOSPITALIST

## 2019-07-31 PROCEDURE — 85610 PROTHROMBIN TIME: CPT | Mod: NTX

## 2019-07-31 PROCEDURE — 25000003 PHARM REV CODE 250: Mod: NTX | Performed by: HOSPITALIST

## 2019-07-31 PROCEDURE — 85025 COMPLETE CBC W/AUTO DIFF WBC: CPT | Mod: NTX

## 2019-07-31 PROCEDURE — 99233 PR SUBSEQUENT HOSPITAL CARE,LEVL III: ICD-10-PCS | Mod: GC,NTX,, | Performed by: INTERNAL MEDICINE

## 2019-07-31 PROCEDURE — 36415 COLL VENOUS BLD VENIPUNCTURE: CPT | Mod: NTX

## 2019-07-31 PROCEDURE — 63600175 PHARM REV CODE 636 W HCPCS: Mod: NTX | Performed by: HOSPITALIST

## 2019-07-31 PROCEDURE — 85045 AUTOMATED RETICULOCYTE COUNT: CPT | Mod: NTX

## 2019-07-31 PROCEDURE — 89051 BODY FLUID CELL COUNT: CPT | Mod: NTX

## 2019-07-31 PROCEDURE — 80053 COMPREHEN METABOLIC PANEL: CPT | Mod: NTX

## 2019-07-31 PROCEDURE — 84100 ASSAY OF PHOSPHORUS: CPT | Mod: NTX

## 2019-07-31 PROCEDURE — 97116 GAIT TRAINING THERAPY: CPT | Mod: NTX

## 2019-07-31 PROCEDURE — 97530 THERAPEUTIC ACTIVITIES: CPT | Mod: NTX

## 2019-07-31 PROCEDURE — 20600001 HC STEP DOWN PRIVATE ROOM: Mod: NTX

## 2019-07-31 PROCEDURE — 82607 VITAMIN B-12: CPT | Mod: NTX

## 2019-07-31 PROCEDURE — 99233 SBSQ HOSP IP/OBS HIGH 50: CPT | Mod: NTX,,, | Performed by: HOSPITALIST

## 2019-07-31 PROCEDURE — 83735 ASSAY OF MAGNESIUM: CPT | Mod: NTX

## 2019-07-31 PROCEDURE — 82746 ASSAY OF FOLIC ACID SERUM: CPT | Mod: NTX

## 2019-07-31 RX ORDER — LACTULOSE 10 G/15ML
15 SOLUTION ORAL 3 TIMES DAILY
Qty: 675 ML | Refills: 0
Start: 2019-07-31 | End: 2019-08-10

## 2019-07-31 RX ORDER — CIPROFLOXACIN 250 MG/1
250 TABLET, FILM COATED ORAL DAILY
Start: 2019-07-31

## 2019-07-31 RX ORDER — MIDODRINE HYDROCHLORIDE 10 MG/1
10 TABLET ORAL 3 TIMES DAILY
Qty: 90 TABLET | Refills: 11
Start: 2019-07-31 | End: 2020-07-30

## 2019-07-31 RX ORDER — ZINC SULFATE 50(220)MG
220 CAPSULE ORAL DAILY
COMMUNITY
Start: 2019-07-31

## 2019-07-31 RX ORDER — POTASSIUM CHLORIDE 20 MEQ/1
40 TABLET, EXTENDED RELEASE ORAL ONCE
Status: COMPLETED | OUTPATIENT
Start: 2019-07-31 | End: 2019-07-31

## 2019-07-31 RX ADMIN — INSULIN ASPART 4 UNITS: 100 INJECTION, SOLUTION INTRAVENOUS; SUBCUTANEOUS at 11:07

## 2019-07-31 RX ADMIN — MIDODRINE HYDROCHLORIDE 10 MG: 5 TABLET ORAL at 08:07

## 2019-07-31 RX ADMIN — INSULIN ASPART 3 UNITS: 100 INJECTION, SOLUTION INTRAVENOUS; SUBCUTANEOUS at 08:07

## 2019-07-31 RX ADMIN — LACTULOSE 15 G: 20 SOLUTION ORAL at 02:07

## 2019-07-31 RX ADMIN — INSULIN DETEMIR 5 UNITS: 100 INJECTION, SOLUTION SUBCUTANEOUS at 08:07

## 2019-07-31 RX ADMIN — POTASSIUM BICARBONATE 50 MEQ: 978 TABLET, EFFERVESCENT ORAL at 01:07

## 2019-07-31 RX ADMIN — CIPROFLOXACIN HYDROCHLORIDE 250 MG: 250 TABLET, FILM COATED ORAL at 08:07

## 2019-07-31 RX ADMIN — RIFAXIMIN 550 MG: 550 TABLET ORAL at 08:07

## 2019-07-31 RX ADMIN — POTASSIUM CHLORIDE 40 MEQ: 20 TABLET, EXTENDED RELEASE ORAL at 11:07

## 2019-07-31 RX ADMIN — INSULIN ASPART 4 UNITS: 100 INJECTION, SOLUTION INTRAVENOUS; SUBCUTANEOUS at 04:07

## 2019-07-31 RX ADMIN — MIDODRINE HYDROCHLORIDE 10 MG: 5 TABLET ORAL at 02:07

## 2019-07-31 RX ADMIN — Medication 220 MG: at 11:07

## 2019-07-31 RX ADMIN — ONDANSETRON 4 MG: 2 INJECTION INTRAMUSCULAR; INTRAVENOUS at 11:07

## 2019-07-31 RX ADMIN — INSULIN ASPART 2 UNITS: 100 INJECTION, SOLUTION INTRAVENOUS; SUBCUTANEOUS at 08:07

## 2019-07-31 RX ADMIN — LEVOTHYROXINE SODIUM 88 MCG: 88 TABLET ORAL at 06:07

## 2019-07-31 NOTE — ASSESSMENT & PLAN NOTE
Rusty is a 69 yo F with liver cirrhosis and CKD admitted for hyponatremia. Hyponatremia may be related to several factors primarily liver cirrhosis requiring biweekly paracentesis. This leads to a hypovolemic hyponatremic state in which fluid is pulled out of the vasculature. Additional contributing factors related to ileus, NPO status, and TPN nutrition. Extra sodium in TPN has increased sodium. Now on renal diet with 1000mL fluid restriction. Baseline Na+ from labs in [place] between 123-124.   - Improved with albumin and Midodrine to 10 mg TID;  - Strict I/O monitoring

## 2019-07-31 NOTE — PROGRESS NOTES
Progress Note   Hospital Medicine         Patient Name: Michelle Ojeda  MRN:  [id number]  [facility] Medicine Team: [facility] Ty Breaux MD  Date of Admission:  7/18/2019     Length of Stay:  LOS: 12 days   Expected Discharge Date: 7/31/2019  Principal Problem:  Acute metabolic encephalopathy       Subjective:     Interval History/Overnight Events:  Patient stepped down from the ICU over night; mentation is well; did very well with PT/OT today; patient would go to our SNF if it meant becoming a liver transplant candidate; patient would likely also need a kidney transplant with it;   - family at the bedside; pulling fecal management tube today; patient to be presented today at transplant meeting;     Review of Systems   Constitutional: Negative for chills, fatigue, fever.   HENT: Negative for sore throat, trouble swallowing.    Eyes: Negative for photophobia, visual disturbance.   Respiratory: Negative for cough, shortness of breath.    Cardiovascular: Negative for chest pain, palpitations, leg swelling.   Gastrointestinal: Negative for abdominal pain, constipation, diarrhea, nausea, vomiting.   Endocrine: Negative for cold intolerance, heat intolerance.   Genitourinary: Negative for dysuria, frequency.   Musculoskeletal: Negative for arthralgias, myalgias.   Skin: Negative for rash, wound, erythema   Neurological: Negative for dizziness, syncope, weakness, light-headedness.   Psychiatric/Behavioral: Negative for confusion, hallucinations, anxiety  All other systems reviewed and are negative.    Objective:     Temp:  [97.5 °F (36.4 °C)-98.6 °F (37 °C)]   Pulse:  [52-82]   Resp:  [16-18]   BP: (111-144)/(54-82)   SpO2:  [94 %-100 %]       Physical Exam:  Constitutional: Appears well-developed and well-nourished.   Head: Normocephalic and atraumatic.   Mouth/Throat: Oropharynx is clear and moist.   Eyes: EOM are normal. Pupils are equal, round, and reactive to light. No scleral icterus.   Neck: Normal range of  motion. Neck supple.   Cardiovascular: Normal rate and regular rhythm.  No murmur heard.  Pulmonary/Chest: Effort normal and breath sounds normal. No respiratory distress. No wheezes, rales, or rhonchi  Abdominal: Soft. Bowel sounds are normal.  No distension or tenderness  Musculoskeletal: Normal range of motion. No edema.   Neurological: Alert and oriented to person, place, and time.   Skin: Skin is warm and dry.   Psychiatric: Normal mood and affect. Behavior is normal.     Recent Labs   Lab 07/28/19  0306 07/28/19  0924 07/28/19  1704 07/29/19  0558 07/29/19  1254 07/30/19  0606   WBC 2.99* 3.19* 3.62* 2.52* 1.71* 3.61*   HGB 7.5* 8.1* 7.9* 7.2* 7.3* 8.4*   HCT 23.1* 24.6* 24.6* 22.0* 22.3* 25.9*   PLT 26* 27* 27* 26* 23* 29*     Recent Labs   Lab 07/28/19  0306 07/29/19  0558 07/30/19  0606    143 142   K 3.7 3.5 3.5    111* 109   CO2 22* 22* 22*   BUN 90* 86* 86*   CREATININE 2.9* 2.5* 2.6*   * 169* 232*   CALCIUM 8.7 8.9 9.0   MG 3.3* 3.4* 3.4*   PHOS 4.1 3.5 2.6*     Recent Labs   Lab 07/28/19  0306 07/29/19  0558 07/30/19  0606   ALKPHOS 127 114 154*   ALT 30 24 29   AST 28 24 48*   ALBUMIN 3.5 4.2 4.6   PROT 6.1 6.5 7.2   BILITOT 3.2* 2.5* 1.9*   INR 1.6* 1.6* 1.5*     Recent Labs   Lab 07/29/19  2332 07/30/19  0600 07/30/19  1117 07/30/19  1439 07/30/19  1606 07/30/19  2100   POCTGLUCOSE 286* 242* 406* 287* 341* 359*        [START ON 7/31/2019] ciprofloxacin HCl  250 mg Oral Daily    insulin detemir U-100  5 Units Subcutaneous Daily    lactulose  15 g Oral TID    [START ON 7/31/2019] levothyroxine  88 mcg Oral Before breakfast    midodrine  10 mg Oral TID    rifAXImin  550 mg Oral BID    zinc sulfate  220 mg Oral Daily       Assessment and Plan     Ms. Michelle Ojeda is a 68 y.o. female who presented to [facility] on 7/18/2019 with     Hospital Course:    Ms. Michelle Ojeda was admitted to Hospital Medicine for management of     Active Hospital Problems    Diagnosis  POA     *Acute metabolic encephalopathy [G93.41]  Yes    Other dysphagia [R13.19]  Clinically Undetermined    Acute hypoxemic respiratory failure [J96.01]  No    Shock, unspecified [R57.9]  No    Hyperglycemia [R73.9]  No    Acute hepatic encephalopathy [K72.00]  Yes    Right kidney mass [N28.89]  No    Kidney transplant candidate [Z76.82]  Not Applicable    Ileus [K56.7]  No    Physical debility [R53.81]  Yes    Ascites [R18.8]  Yes    Pancytopenia [D61.818]  Yes    Hypothyroidism [E03.9]  Yes    Moderate malnutrition [E44.0]  Yes    Coagulopathy [D68.9]  Yes    Hyponatremia [E87.1]  Yes    Organ transplant candidate [Z76.82]  Not Applicable    Acute kidney injury superimposed on chronic kidney disease [N17.9, N18.9]  No    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         Acute metabolic encephalopathy  Likely 2/2 hepatic encephalopathy with ammonia 171 upon ICU tx (64 on 7/22).   --CT head negative for acute intracranial abnormality  --EEG pending read  --continue lactulose and rifaximin  --back to baseline      Pulmonary  Acute hypoxemic respiratory failure  Intubated for airway protection in setting of severe encephalopathy and episodes of emesis/concern for aspiration  --Sputum culture negative from 7/24.  Off antibiotics.  --No focal signs of consolidation on CXR  --on room air  --extubated on 7/27        Renal/  Right kidney mass  R kidney mass measures 2.4 cm x 2.3 cm found on CT abd/pelvis during transplant workup  --Urology following with plans to hold on any further work up in setting of acute decompensation     Acute kidney injury superimposed on chronic kidney disease  --Baseline reportedly sCr 1.6. Possible candidate for liver/kidney transplant  --Nephrology following,concern iATN given recent shock.  Also concern for intravascular volume depletion given  significant stool output.   Retroperitoneal US on 7/23 negative for hydronephrosis  --urine studies concerning for pre-renal etiology.  Started on albumin and continued on midodrine (MAPs 80's)  --sCr downtrending and urine output improved following albumin  --Strict I/Os     Hematology  Coagulopathy  See cirrhosis     Oncology  Pancytopenia  See cirrhosis     Endocrine  Hyperglycemia  History of DM however was taken off metformin due to improvement  --A1c 5.1  --insulin infusion discontinued on 7/27 when enteral feedings where held.   --frequent glucose checks with SSI  --glucose goal 140-180     Hypothyroidism  --Continue home levothyroxine     GI  Other dysphagia  --SLP following.  Dental soft diet with thin liquids     Ileus  --improved.  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 Cytology pending.  --Cipro for SBP prophylaxis   MELD-Na score: 23 at 7/30/2019  6:06 AM  MELD score: 23 at 7/30/2019  6:06 AM  Calculated from:  Serum Creatinine: 2.6 mg/dL at 7/30/2019  6:06 AM  Serum Sodium: 142 mmol/L (Rounded to 137 mmol/L) at 7/30/2019  6:06 AM  Total Bilirubin: 1.9 mg/dL at 7/30/2019  6:06 AM  INR(ratio): 1.5 at 7/30/2019  6:06 AM  Age: 68 years  - patient to be presented today; planning for IR paracentesis in the AM       Other  Shock, unspecified  --Likely due to sedating medications however patient at risk for infection  --Blood culture, sputum culture, and peritoneal fluid negative  --UA appearing noninfectious  --Received 4 doses of piptazo,  Received 3 days of vancomycin.  --Resolved; weaned off norepinephrine infusion on 7/25  --Continue midodrine 10 mg TID     Physical debility  --PT/OT consulted    Disposition: possibly [facility]  SNF this week    Ty Breaux MD  Medical Director [facility]  Spectra:  [phone]  Pager: [phone]

## 2019-07-31 NOTE — PROGRESS NOTES
[facility]  Nephrology  Progress Note    Patient Name: Michelle Ojeda  MRN: [id number]  Admission Date: 7/18/2019  Hospital Length of Stay: 13 days  Attending Provider: Ty Breaux MD   Primary Care Physician: Primary Doctor No  Principal Problem:Acute metabolic encephalopathy    Subjective:     HPI: Rusty Martinez is 67 yo F with CKD stage 4 presenting from transplant hepatology clinic for hyponatremia on 7/18/19. She is originally from [place], here for liver transplant evaluation. She was diagnosed with IZAGUIRRE in grade 1 hepatic encephalopathy with severe ascites and esophogeal varices (s/p banding). During the month prior to admission she felt more lethargic and was requiring biweekly paracentesis. Of note, she is also being treated for Ileus her acute hepatic encephalopathy with lactulose. Recent 24 hour cr clearance study was near 21. Reported baseline Cr 1.6-1.8 and chronic hyponatremia range between 123-124.     Interval History: No acute events overnight. To IR for paracentesis this AM.      Review of patient's allergies indicates:  No Known Allergies  Current Facility-Administered Medications   Medication Frequency    acetaminophen tablet 325 mg Q4H PRN    ciprofloxacin HCl tablet 250 mg Daily    dextrose 10% (D10W) Bolus PRN    dextrose 10% (D10W) Bolus PRN    dicyclomine capsule 10 mg QID PRN    glucagon (human recombinant) injection 1 mg PRN    glucose chewable tablet 16 g PRN    glucose chewable tablet 24 g PRN    insulin aspart U-100 pen 0-5 Units Q6H PRN    [START ON 8/1/2019] insulin detemir U-100 pen 10 Units Daily    insulin detemir U-100 pen 5 Units Once    lactulose 20 gram/30 mL solution Soln 15 g TID    levothyroxine tablet 88 mcg Before breakfast    midodrine tablet 10 mg TID    ondansetron injection 4 mg Q6H PRN    rifAXIMin tablet 550 mg BID    simethicone chewable tablet 80 mg TID PRN    sodium chloride 0.9% flush 10 mL PRN    sodium chloride  0.9% flush 10 mL PRN    zinc sulfate capsule 220 mg Daily       Objective:     Vital Signs (Most Recent):  Temp: 97.9 °F (36.6 °C) (07/31/19 1142)  Pulse: 70 (07/31/19 1142)  Resp: 16 (07/31/19 1142)  BP: (!) 114/56 (07/31/19 1142)  SpO2: 99 % (07/31/19 1142)  O2 Device (Oxygen Therapy): room air (07/31/19 0948) Vital Signs (24h Range):  Temp:  [97.1 °F (36.2 °C)-98.4 °F (36.9 °C)] 97.9 °F (36.6 °C)  Pulse:  [52-87] 70  Resp:  [16-18] 16  SpO2:  [94 %-100 %] 99 %  BP: (100-144)/(53-77) 114/56     Weight: 56 kg (123 lb 7.3 oz) (07/30/19 1000)  Body mass index is 24.11 kg/m².  Body surface area is 1.54 meters squared.    I/O last 3 completed shifts:  In: 610 [P.O.:610]  Out: -     Physical Exam   Constitutional: She appears cachectic. No distress.   HENT:   Head: Normocephalic and atraumatic.   Eyes: EOM are normal. Scleral icterus is present.   Neck: Normal range of motion.   Cardiovascular: Normal rate, regular rhythm and normal heart sounds.   No murmur heard.  Pulmonary/Chest: Effort normal and breath sounds normal.   Abdominal: Bowel sounds are normal. She exhibits no distension. There is hepatosplenomegaly. There is no tenderness.   Musculoskeletal: She exhibits no edema.   Neurological: She is alert. She is disoriented (Oriented to Person, Place).   Skin: Skin is warm and dry. Ecchymosis noted. She is not diaphoretic.   Vitals reviewed.      Significant Labs:  CBC:   Recent Labs   Lab 07/31/19  0525   WBC 5.31   RBC 2.21*   HGB 7.5*   HCT 22.3*   PLT 32*   *   MCH 33.9*   MCHC 33.6     CMP:   Recent Labs   Lab 07/31/19  0525   *   CALCIUM 8.4*   ALBUMIN 3.5   PROT 5.9*   *   K 3.3*   CO2 19*      BUN 83*   CREATININE 2.3*   ALKPHOS 143*   ALT 34   AST 50*   BILITOT 1.7*     No results for input(s): COLORU, CLARITYU, SPECGRAV, PHUR, PROTEINUA, GLUCOSEU, BILIRUBINCON, BLOODU, WBCU, RBCU, BACTERIA, MUCUS, NITRITE, LEUKOCYTESUR, UROBILINOGEN, HYALINECASTS in the last 168 hours.  All labs  within the past 24 hours have been reviewed.     Significant Imaging:  No recent imaging    Assessment/Plan:     * Acute metabolic encephalopathy  Treatment plan per primary team     Right kidney mass  Patient has new Right renal mass found on CT abd on 7/22/19. Previous Ct from 2016 and MRI studies from 2019 in [place] did not demonstrate this finding.     Recommendations  - Urology recommends f/u outpatient as she is not a good surgical or biopsy candidate.   - Would prefer not expose kidneys to contrast but will discuss with other teams if necessary.  - If pt to be evaluated for simultaneous liver/kidney transplant, please place consult to Kidney Transplant Medicine    Acute kidney injury superimposed on chronic kidney disease  Labs from [place] nephrology demonstrated Cr of 1.77 in 3/12/19, 1.67 on 4/5/19, 1.96 in 6/13/19, and 1.83 on 6/24. Cr of 2.8 on 7/26/19 above her baseline due to ATN secondary to hypoperfusion of kidneys related to hypotensive episodes. Hepatorenal syndrome not suspected at this time as the patient's Cr and urine output have improved with an albumin challenge. TERA could be exacerbated with noted Hgb drop from 9.9 on 7/27 to 7.2 on 7/29.     Recommendations:   - c/w Midodrine 10mg TID added to prevent hypoperfusion to kidneys. Goal MAP>70  - c/w albumin 25g TID  - Monitor with daily BMP  - Strict I/Os   - Avoid Nephrotoxic agents  - Monitor for signs of blood loss  - Hgb > 7     Hyponatremia  Rusty is a 69 yo F with liver cirrhosis and CKD admitted for hyponatremia. Hyponatremia may be related to several factors primarily liver cirrhosis requiring biweekly paracentesis. This leads to a hypovolemic hyponatremic state in which fluid is pulled out of the vasculature. Additional contributing factors related to ileus, NPO status, and TPN nutrition. Extra sodium in TPN has increased sodium. Now on renal diet with 1000mL fluid restriction. Baseline Na+ from labs in [place] between  123-124.   - Improved with albumin and Midodrine to 10 mg TID;  - Strict I/O monitoring     Liver cirrhosis secondary to IZAGUIRRE  Treatment plan per primary team         Thank you for your consult. I will follow-up with patient. Please contact us if you have any additional questions.    Otilio Gibson,   Nephrology  [facility]   95

## 2019-07-31 NOTE — SUBJECTIVE & OBJECTIVE
Interval History:   Discussed at transplant committee, not a candidate for transplant listing at this time due to frailty.  Feeling better, working with therapy.    Current Facility-Administered Medications   Medication    acetaminophen tablet 325 mg    ciprofloxacin HCl tablet 250 mg    dextrose 10% (D10W) Bolus    dextrose 10% (D10W) Bolus    dicyclomine capsule 10 mg    glucagon (human recombinant) injection 1 mg    glucose chewable tablet 16 g    glucose chewable tablet 24 g    insulin aspart U-100 pen 0-5 Units    [START ON 8/1/2019] insulin detemir U-100 pen 10 Units    insulin detemir U-100 pen 5 Units    lactulose 20 gram/30 mL solution Soln 15 g    levothyroxine tablet 88 mcg    midodrine tablet 10 mg    ondansetron injection 4 mg    rifAXIMin tablet 550 mg    simethicone chewable tablet 80 mg    sodium chloride 0.9% flush 10 mL    sodium chloride 0.9% flush 10 mL    zinc sulfate capsule 220 mg       Objective:     Vital Signs (Most Recent):  Temp: 97.9 °F (36.6 °C) (07/31/19 1142)  Pulse: 70 (07/31/19 1142)  Resp: 16 (07/31/19 1142)  BP: (!) 114/56 (07/31/19 1142)  SpO2: 99 % (07/31/19 1142) Vital Signs (24h Range):  Temp:  [97.1 °F (36.2 °C)-98.4 °F (36.9 °C)] 97.9 °F (36.6 °C)  Pulse:  [52-87] 70  Resp:  [16-18] 16  SpO2:  [94 %-100 %] 99 %  BP: (100-144)/(53-77) 114/56     Weight: 56 kg (123 lb 7.3 oz) (07/30/19 1000)  Body mass index is 24.11 kg/m².    Physical Exam   Constitutional: She appears ill. No distress.   Cardiovascular: Normal rate and regular rhythm.   Pulmonary/Chest: Effort normal. No respiratory distress.   Abdominal: Soft. Bowel sounds are normal. She exhibits distension (mild to moderate). There is no tenderness.   Neurological: She is alert.   Skin: Skin is warm.   Psychiatric: She has a normal mood and affect. Her behavior is normal.   Nursing note and vitals reviewed.      MELD-Na score: 25 at 7/31/2019  5:25 AM  MELD score: 22 at 7/31/2019  5:25 AM  Calculated  from:  Serum Creatinine: 2.3 mg/dL at 7/31/2019  5:25 AM  Serum Sodium: 132 mmol/L at 7/31/2019  5:25 AM  Total Bilirubin: 1.7 mg/dL at 7/31/2019  5:25 AM  INR(ratio): 1.6 at 7/31/2019  5:25 AM  Age: 68 years    Significant Labs:  Labs within the past month have been reviewed.    Significant Imaging:  Reviewed

## 2019-07-31 NOTE — ASSESSMENT & PLAN NOTE
Ms Ojeda is a 68 year old female with a history of IZAGUIRRE ESLD, with decompensations including Gr 1 HE, ascites, EV, hyponatremia, T2DM, HTN, hypothyroidism, who was admitted from hepatology clinic due to concern for hyponatremia. Also found to have ileus which has resolved. Hyponatremia has resolved. Unresponsive on 7/24, transferred to ICU and intubated for airway protection. Now extubated, transferred out of ICU with improved mentation. Other ongoing issues include incidentally discovered R renal mass too high risk for biopsy at this time, deconditioning requiring PT/OT and eventual SNF. Declined for transplant listing at this time.     Plan  - encephalopathy: Continue lactulose and rifaximin.  - ascites: Negative for SBP on 07/19. Total protein 1.5. Cont SBP ppx.  - diuretics: hold given TERA  - TERA on CKD: unclear baseline. Cr up to 3.2 from 2s earlier this admission, now improving s/p IV albumin.  - Protein calorie malnutrition: nutrition consult. Calorie count. Supplements. High protein intake.  - Anemia: secondary anemia workup, monitor for overt GI bleed. Transfuse if <7 but do not over transfuse to avoid increased portal pressures.  - Appreciate Renal transplant evaluation re: SLK given GFR <30.  - Transplant: Discussed at transplant committee, but declined for transplant at this time. May be able to revisit this after patient completes rehab. Discussed with family and patient.

## 2019-07-31 NOTE — SUBJECTIVE & OBJECTIVE
Interval History: No acute events overnight. To IR for paracentesis this AM.      Review of patient's allergies indicates:  No Known Allergies  Current Facility-Administered Medications   Medication Frequency    acetaminophen tablet 325 mg Q4H PRN    ciprofloxacin HCl tablet 250 mg Daily    dextrose 10% (D10W) Bolus PRN    dextrose 10% (D10W) Bolus PRN    dicyclomine capsule 10 mg QID PRN    glucagon (human recombinant) injection 1 mg PRN    glucose chewable tablet 16 g PRN    glucose chewable tablet 24 g PRN    insulin aspart U-100 pen 0-5 Units Q6H PRN    [START ON 8/1/2019] insulin detemir U-100 pen 10 Units Daily    insulin detemir U-100 pen 5 Units Once    lactulose 20 gram/30 mL solution Soln 15 g TID    levothyroxine tablet 88 mcg Before breakfast    midodrine tablet 10 mg TID    ondansetron injection 4 mg Q6H PRN    rifAXIMin tablet 550 mg BID    simethicone chewable tablet 80 mg TID PRN    sodium chloride 0.9% flush 10 mL PRN    sodium chloride 0.9% flush 10 mL PRN    zinc sulfate capsule 220 mg Daily       Objective:     Vital Signs (Most Recent):  Temp: 97.9 °F (36.6 °C) (07/31/19 1142)  Pulse: 70 (07/31/19 1142)  Resp: 16 (07/31/19 1142)  BP: (!) 114/56 (07/31/19 1142)  SpO2: 99 % (07/31/19 1142)  O2 Device (Oxygen Therapy): room air (07/31/19 0948) Vital Signs (24h Range):  Temp:  [97.1 °F (36.2 °C)-98.4 °F (36.9 °C)] 97.9 °F (36.6 °C)  Pulse:  [52-87] 70  Resp:  [16-18] 16  SpO2:  [94 %-100 %] 99 %  BP: (100-144)/(53-77) 114/56     Weight: 56 kg (123 lb 7.3 oz) (07/30/19 1000)  Body mass index is 24.11 kg/m².  Body surface area is 1.54 meters squared.    I/O last 3 completed shifts:  In: 610 [P.O.:610]  Out: -     Physical Exam   Constitutional: She appears cachectic. No distress.   HENT:   Head: Normocephalic and atraumatic.   Eyes: EOM are normal. Scleral icterus is present.   Neck: Normal range of motion.   Cardiovascular: Normal rate, regular rhythm and normal heart sounds.   No  murmur heard.  Pulmonary/Chest: Effort normal and breath sounds normal.   Abdominal: Bowel sounds are normal. She exhibits no distension. There is hepatosplenomegaly. There is no tenderness.   Musculoskeletal: She exhibits no edema.   Neurological: She is alert. She is disoriented (Oriented to Person, Place).   Skin: Skin is warm and dry. Ecchymosis noted. She is not diaphoretic.   Vitals reviewed.      Significant Labs:  CBC:   Recent Labs   Lab 07/31/19  0525   WBC 5.31   RBC 2.21*   HGB 7.5*   HCT 22.3*   PLT 32*   *   MCH 33.9*   MCHC 33.6     CMP:   Recent Labs   Lab 07/31/19  0525   *   CALCIUM 8.4*   ALBUMIN 3.5   PROT 5.9*   *   K 3.3*   CO2 19*      BUN 83*   CREATININE 2.3*   ALKPHOS 143*   ALT 34   AST 50*   BILITOT 1.7*     No results for input(s): COLORU, CLARITYU, SPECGRAV, PHUR, PROTEINUA, GLUCOSEU, BILIRUBINCON, BLOODU, WBCU, RBCU, BACTERIA, MUCUS, NITRITE, LEUKOCYTESUR, UROBILINOGEN, HYALINECASTS in the last 168 hours.  All labs within the past 24 hours have been reviewed.     Significant Imaging:  No recent imaging

## 2019-07-31 NOTE — PLAN OF CARE
Problem: Adult Inpatient Plan of Care  Goal: Plan of Care Review  Outcome: Ongoing (interventions implemented as appropriate)  POC reviewed with Pt. VSS. No acute changes. A&Ox4. Pt denies pain. Pt complains of some nausea with an episode of vomiting. MD aware, Zofran given per MD order. Pt received mild relief from Zofran IV. Paracentesis completed today. Pt tolerating it well. BG checked before meals and insulin given per MD order. Safety checks preformed. Call light in reach. All questions answered. Will continue to monitor.

## 2019-07-31 NOTE — PLAN OF CARE
SW faxed referral to [facility] via  for review. GIOVANNI will continue to follow.      07/31/19 8930   Post-Acute Status   Post-Acute Authorization Placement   Post-Acute Placement Status Referrals Sent     Rossi Edward LMSW   - [facility]  Ext. [phone]

## 2019-07-31 NOTE — PROGRESS NOTES
[facility]  Hepatology  Progress Note    Patient Name: Michelle Ojeda  MRN: [id number]  Admission Date: 7/18/2019  Hospital Length of Stay: 13 days  Attending Provider: Ty Breaux MD   Primary Care Physician: Primary Doctor No  Principal Problem:Acute metabolic encephalopathy    Subjective:     Transplant status: No    HPI: Ms Ojeda is a 68 year old female with a history of HERRMANN ESLD, with decompensations including Gr 1 HE, ascites, EV, hyponatremia, T2DM, HTN, hypothyroidism, who is being admitted from hepatology clinic due to concern for hyponatremia.    She presents to transplant Clinic for initial evaluation on 07/18 with Dr. Cannon for initial transplant evaluation.  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  Her cirrhosis has been complicated by grade 1 hepatic encephalopathy, recently started Aldo [place] min but never been on lactulose, ascites, sarcopenia, esophageal varices status post ligation, hyponatremia.  She was previously evaluated at [place] with Dr. Mccormick who recommended evaluation at [facility].  Does not appear she was evaluated for transplant at that time.    Due to hyponatremia with sodium of 120 she was admitted to the hospital for further management.  Currently she reports she is feeling well without any complaints.  Denies any abdominal pain, nausea, vomiting, diarrhea.  Denies any fevers, chills, sweats or other infectious complaints.    Family who is present in the room note that she is doing extremely well until approximately 2 months prior to presentation when she began to be more frail, fatigue, increased peripheral edema, decreased oral intake.  They note they have been extremely careful to avoid T here to low-sodium diet.  She has never been hospitalized in the past  for hyponatremia.  No other recent hospitalizations.  Regarding her ascites she has approximately received a paracentesis every 2 weeks.      Interval History:   Discussed at transplant committee, not a candidate for transplant listing at this time due to frailty.  Feeling better, working with therapy.    Current Facility-Administered Medications   Medication    acetaminophen tablet 325 mg    ciprofloxacin HCl tablet 250 mg    dextrose 10% (D10W) Bolus    dextrose 10% (D10W) Bolus    dicyclomine capsule 10 mg    glucagon (human recombinant) injection 1 mg    glucose chewable tablet 16 g    glucose chewable tablet 24 g    insulin aspart U-100 pen 0-5 Units    [START ON 8/1/2019] insulin detemir U-100 pen 10 Units    insulin detemir U-100 pen 5 Units    lactulose 20 gram/30 mL solution Soln 15 g    levothyroxine tablet 88 mcg    midodrine tablet 10 mg    ondansetron injection 4 mg    rifAXIMin tablet 550 mg    simethicone chewable tablet 80 mg    sodium chloride 0.9% flush 10 mL    sodium chloride 0.9% flush 10 mL    zinc sulfate capsule 220 mg       Objective:     Vital Signs (Most Recent):  Temp: 97.9 °F (36.6 °C) (07/31/19 1142)  Pulse: 70 (07/31/19 1142)  Resp: 16 (07/31/19 1142)  BP: (!) 114/56 (07/31/19 1142)  SpO2: 99 % (07/31/19 1142) Vital Signs (24h Range):  Temp:  [97.1 °F (36.2 °C)-98.4 °F (36.9 °C)] 97.9 °F (36.6 °C)  Pulse:  [52-87] 70  Resp:  [16-18] 16  SpO2:  [94 %-100 %] 99 %  BP: (100-144)/(53-77) 114/56     Weight: 56 kg (123 lb 7.3 oz) (07/30/19 1000)  Body mass index is 24.11 kg/m².    Physical Exam   Constitutional: She appears ill. No distress.   Cardiovascular: Normal rate and regular rhythm.   Pulmonary/Chest: Effort normal. No respiratory distress.   Abdominal: Soft. Bowel sounds are normal. She exhibits distension (mild to moderate). There is no tenderness.   Neurological: She is alert.   Skin: Skin is warm.   Psychiatric: She has a normal mood and affect. Her behavior  is normal.   Nursing note and vitals reviewed.      MELD-Na score: 25 at 7/31/2019  5:25 AM  MELD score: 22 at 7/31/2019  5:25 AM  Calculated from:  Serum Creatinine: 2.3 mg/dL at 7/31/2019  5:25 AM  Serum Sodium: 132 mmol/L at 7/31/2019  5:25 AM  Total Bilirubin: 1.7 mg/dL at 7/31/2019  5:25 AM  INR(ratio): 1.6 at 7/31/2019  5:25 AM  Age: 68 years    Significant Labs:  Labs within the past month have been reviewed.    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 Also found to have ileus which has resolved. Hyponatremia has resolved. Unresponsive on 7/24, transferred to ICU and intubated for airway protection. Now extubated, transferred out of ICU with improved mentation. Other ongoing issues include incidentally discovered R renal mass too high risk for biopsy at this time, deconditioning requiring PT/OT and eventual SNF. Declined for transplant listing at this time.     Plan  - encephalopathy: Continue lactulose and rifaximin.  - ascites: Negative for SBP on 07/19. Total protein 1.5. Cont SBP ppx.  - diuretics: hold given TERA  - TERA on CKD: unclear baseline. Cr up to 3.2 from 2s earlier this admission, now improving s/p IV albumin.  - Protein calorie malnutrition: nutrition consult. Calorie count. Supplements. High protein intake.  - Anemia: secondary anemia workup, monitor for overt GI bleed. Transfuse if <7 but do not over transfuse to avoid increased portal pressures.  - Appreciate Renal transplant evaluation re: SLK given GFR <30.  - Transplant: Discussed at transplant committee, but declined for transplant at this time. May be able to revisit this after patient completes rehab. Discussed with family and patient.          Thank you for  your consult. I will follow-up with patient. Please contact us if you have any additional questions.    Andrea Eaton MD  Hepatology  [facility]

## 2019-07-31 NOTE — PROGRESS NOTES
Paracentesis complete,, 3200 ml's removed via procedure,, patient tolerated well,, transported back to room,,

## 2019-07-31 NOTE — H&P
Radiology History & Physical      SUBJECTIVE:     Chief Complaint: abdominal distention    History of Present Illness:  Michelle Ojeda is a 68 y.o. female who presents for evaluation of ascites  Past Medical History:   Diagnosis Date    Cirrhosis     Diabetes mellitus, type 2     Disorder of kidney and ureter     Hypertension     Hypothyroidism     NAFLD (nonalcoholic fatty liver disease)      History reviewed. No pertinent surgical history.    Home Meds:   Prior to Admission medications    Medication Sig Start Date End Date Taking? Authorizing Provider   biotin 5 mg Cap Take 5,000 mcg by mouth once daily.   Yes Historical Provider, MD   calcium carbonate/vitamin D3 (CALTRATE-600 PLUS VITAMIN D3 ORAL) Take 1 tablet by mouth once daily.   Yes Historical Provider, MD   cholecalciferol, vitamin D3, (VITAMIN D3) 2,000 unit Cap Take 2,000 Units by mouth once daily.    Yes Historical Provider, MD   CINNAMON BARK ORAL Take 2,000 mg by mouth once daily.   Yes Historical Provider, MD   ciprofloxacin HCl (CIPRO) 500 MG tablet Take 500 mg by mouth once daily. FOR PARACENTESIS PROCEDURES ONLY 3 DAYS POST 7/17/19  Yes Historical Provider, MD   furosemide (LASIX) 40 MG tablet Take 40 mg by mouth once daily.   Yes Historical Provider, MD   levothyroxine (SYNTHROID) 88 MCG tablet Take 88 mcg by mouth before breakfast.   Yes Historical Provider, MD   magnesium 250 mg Tab Take 250 mg by mouth once daily.   Yes Historical Provider, MD   spironolactone (ALDACTONE) 50 MG tablet Take 50 mg by mouth once daily.   Yes Historical Provider, MD   XIFAXAN 200 mg Tab Take 200 mg by mouth 2 (two) times daily. 7/3/19  Yes Historical Provider, MD     Anticoagulants/Antiplatelets: no anticoagulation    Allergies: Review of patient's allergies indicates:  No Known Allergies  Sedation History:  no adverse reactions    Review of Systems:   Hematological: no known coagulopathies  Respiratory: no shortness of breath  Cardiovascular: no chest  pain  Gastrointestinal: no abdominal pain  Genito-Urinary: no dysuria  Musculoskeletal: negative  Neurological: no TIA or stroke symptoms         OBJECTIVE:     Vital Signs (Most Recent)  Temp: 97.1 °F (36.2 °C) (07/31/19 0723)  Pulse: 75 (07/31/19 0901)  Resp: 17 (07/31/19 0815)  BP: (!) 123/56 (07/31/19 0901)  SpO2: 100 % (07/31/19 0901)    Physical Exam:  ASA: 2  Mallampati: na    General: no acute distress  Mental Status: alert and oriented to person, place and time  HEENT: normocephalic, atraumatic  Chest: unlabored breathing  Heart: regular heart rate  Abdomen: distended  Extremity: moves all extremities    ASSESSMENT/PLAN:     Sedation Plan: local anesthesia  Patient will undergo US guided paracentesis

## 2019-07-31 NOTE — PT/OT/SLP PROGRESS
"Physical Therapy Treatment    Patient Name:  Michelle Ojeda   MRN:  [id number]    Recommendations:     Discharge Recommendations:  nursing facility, skilled(SS-SNF)   Discharge Equipment Recommendations: walker, rolling   Barriers to discharge: Inaccessible home and pt not at prior level of function    Assessment:     Michelle Ojeda is a 68 y.o. female admitted with a medical diagnosis of Acute metabolic encephalopathy.  She presents with the following impairments/functional limitations:  weakness, impaired endurance, impaired functional mobilty, impaired self care skills, gait instability, impaired balance, decreased coordination, decreased lower extremity function, decreased safety awareness, impaired cardiopulmonary response to activity. Pt is progressing with therapy, requiring less assistance for transfers and able to ambulate for further distances this session. Pt was ModA for bed mobility and CGA for transfers with RW. Pt ambulated 20 feet x 2 trials with RW and Fanny. Pt ambulates with decreased step length, decreased gait speed, and uncoordinated stepping - pt requires Fanny for RW management, especially during turns. Pt with improved standing balance this session, significantly reduced posterior lean and able to maintain with CGA. Pt would continue to benefit from skilled PT services to improve functional mobility, ambulation, and endurance to return to OF.      Rehab Prognosis: Good; patient would benefit from acute skilled PT services to address these deficits and reach maximum level of function.    Recent Surgery: * No surgery found *      Plan:     During this hospitalization, patient to be seen 4 x/week to address the identified rehab impairments via therapeutic activities, gait training, therapeutic exercises, neuromuscular re-education and progress toward the following goals:    · Plan of Care Expires:  08/29/19    Subjective     Chief Complaint: none  Patient/Family Comments/goals: "I want to sit " "up"   Pain/Comfort:  · Pain Rating 1: 0/10  · Pain Rating Post-Intervention 1: 0/10      Objective:     Communicated with RN prior to session.  Patient found supine with telemetry, PureWick upon PT entry to room.     General Precautions: Standard, fall   Orthopedic Precautions:N/A   Braces: N/A     Functional Mobility:  · Bed Mobility:    · Rolling Right: minimum assistance  · Scooting: contact guard assistance  · Supine to Sit: moderate assistance  · Sit to Supine: moderate assistance  · Transfers:    · Sit to Stand:  contact guard assistance with rolling walker x 1 from EOB, x 1 from bedside chair   · Gait:   · 20 feet x 2 trials with RW and Fanny  · Pt ambulates with decreased step length, decreased gait speed, and uncoordinated stepping - pt requires Fanny for RW management, especially during turns   · Balance:   · Sitting: SBA   · Standing: CGA       AM-PAC 6 CLICK MOBILITY  Turning over in bed (including adjusting bedclothes, sheets and blankets)?: 3  Sitting down on and standing up from a chair with arms (e.g., wheelchair, bedside commode, etc.): 3  Moving from lying on back to sitting on the side of the bed?: 2  Moving to and from a bed to a chair (including a wheelchair)?: 3  Need to walk in hospital room?: 3  Climbing 3-5 steps with a railing?: 2  Basic Mobility Total Score: 16       Therapeutic Activities and Exercises:    -Pt educated on:              -PT roles, expectations, and POC               -Safety with mobility              -Benefits of OOB activities to increase strength and functional mobility               -Performing ther ex for increasing LE ROM and strength              -Discharge recommendations       Patient left supine with all lines intact, call button in reach, RN notified and family and PCT present..    GOALS:   Multidisciplinary Problems     Physical Therapy Goals        Problem: Physical Therapy Goal    Goal Priority Disciplines Outcome Goal Variances Interventions   Physical Therapy " Goal     PT, PT/OT Ongoing (interventions implemented as appropriate)     Description:  Goals to be met by: 2019     Patient will increase functional independence with mobility by performin. Supine to sit with contact guard assistance   2. Sit to supine with contact guard assistance   3. Sit to stand transfer with stand by assistance with LRAD   4. Gait  x 50 feet with stand by assistance using Rolling Walker.   5. Ascend/descend 3 stair with bilateral Handrails Stand-by Assistance                      Time Tracking:     PT Received On: 19  PT Start Time: 1021     PT Stop Time: 1049  PT Total Time (min): 28 min     Billable Minutes: Gait Training 14 and Therapeutic Activity 14    Treatment Type: Treatment  PT/PTA: PT     PTA Visit Number: 0     Belinda Christie, PT  2019

## 2019-07-31 NOTE — PLAN OF CARE
Problem: Adult Inpatient Plan of Care  Goal: Plan of Care Review  Outcome: Ongoing (interventions implemented as appropriate)  POC reviewed with patient and family. Questions and concerns addressed. VSS. Blood sugar remains elevated. DTI with small blister to sacrum. Waffle overlay to bed. Encouraged frequent position changes. Assisted with repositioning q2hr. HOB elevated. Incont of liquid stool and urine. Safety maintained. Bed in low and locked position. Call light within reach. Side rails up x2. Frequent rounds.

## 2019-08-01 DIAGNOSIS — Z76.82 KIDNEY TRANSPLANT CANDIDATE: ICD-10-CM

## 2019-08-01 LAB
ALBUMIN SERPL BCP-MCNC: 3.3 G/DL (ref 3.5–5.2)
ALBUMIN SERPL BCP-MCNC: 3.4 G/DL (ref 3.5–5.2)
ALP SERPL-CCNC: 157 U/L (ref 55–135)
ALP SERPL-CCNC: 160 U/L (ref 55–135)
ALT SERPL W/O P-5'-P-CCNC: 47 U/L (ref 10–44)
ALT SERPL W/O P-5'-P-CCNC: 54 U/L (ref 10–44)
ANION GAP SERPL CALC-SCNC: 10 MMOL/L (ref 8–16)
ANION GAP SERPL CALC-SCNC: 9 MMOL/L (ref 8–16)
AST SERPL-CCNC: 61 U/L (ref 10–40)
AST SERPL-CCNC: 63 U/L (ref 10–40)
BASOPHILS # BLD AUTO: 0.05 K/UL (ref 0–0.2)
BASOPHILS NFR BLD: 0.6 % (ref 0–1.9)
BILIRUB SERPL-MCNC: 2 MG/DL (ref 0.1–1)
BILIRUB SERPL-MCNC: 2.1 MG/DL (ref 0.1–1)
BILIRUB UR QL STRIP: NEGATIVE
BUN SERPL-MCNC: 102 MG/DL (ref 8–23)
BUN SERPL-MCNC: 98 MG/DL (ref 8–23)
CALCIUM SERPL-MCNC: 8.1 MG/DL (ref 8.7–10.5)
CALCIUM SERPL-MCNC: 8.3 MG/DL (ref 8.7–10.5)
CHLORIDE SERPL-SCNC: 97 MMOL/L (ref 95–110)
CHLORIDE SERPL-SCNC: 98 MMOL/L (ref 95–110)
CLARITY UR REFRACT.AUTO: ABNORMAL
CO2 SERPL-SCNC: 19 MMOL/L (ref 23–29)
CO2 SERPL-SCNC: 19 MMOL/L (ref 23–29)
COLOR UR AUTO: YELLOW
CREAT SERPL-MCNC: 2.8 MG/DL (ref 0.5–1.4)
CREAT SERPL-MCNC: 3.2 MG/DL (ref 0.5–1.4)
DIFFERENTIAL METHOD: ABNORMAL
EOSINOPHIL # BLD AUTO: 0.1 K/UL (ref 0–0.5)
EOSINOPHIL NFR BLD: 0.9 % (ref 0–8)
ERYTHROCYTE [DISTWIDTH] IN BLOOD BY AUTOMATED COUNT: 15.2 % (ref 11.5–14.5)
EST. GFR  (AFRICAN AMERICAN): 16.2 ML/MIN/1.73 M^2
EST. GFR  (AFRICAN AMERICAN): 19 ML/MIN/1.73 M^2
EST. GFR  (NON AFRICAN AMERICAN): 14 ML/MIN/1.73 M^2
EST. GFR  (NON AFRICAN AMERICAN): 16.5 ML/MIN/1.73 M^2
GLUCOSE SERPL-MCNC: 252 MG/DL (ref 70–110)
GLUCOSE SERPL-MCNC: 332 MG/DL (ref 70–110)
GLUCOSE UR QL STRIP: ABNORMAL
HCT VFR BLD AUTO: 24.6 % (ref 37–48.5)
HGB BLD-MCNC: 8.6 G/DL (ref 12–16)
HGB UR QL STRIP: NEGATIVE
IMM GRANULOCYTES # BLD AUTO: 0.13 K/UL (ref 0–0.04)
IMM GRANULOCYTES NFR BLD AUTO: 1.7 % (ref 0–0.5)
INR PPP: 1.4 (ref 0.8–1.2)
KETONES UR QL STRIP: NEGATIVE
LACTATE SERPL-SCNC: 2.4 MMOL/L (ref 0.5–2.2)
LEUKOCYTE ESTERASE UR QL STRIP: NEGATIVE
LYMPHOCYTES # BLD AUTO: 0.6 K/UL (ref 1–4.8)
LYMPHOCYTES NFR BLD: 7.1 % (ref 18–48)
MAGNESIUM SERPL-MCNC: 3.1 MG/DL (ref 1.6–2.6)
MCH RBC QN AUTO: 34.8 PG (ref 27–31)
MCHC RBC AUTO-ENTMCNC: 35 G/DL (ref 32–36)
MCV RBC AUTO: 100 FL (ref 82–98)
MONOCYTES # BLD AUTO: 0.7 K/UL (ref 0.3–1)
MONOCYTES NFR BLD: 9.5 % (ref 4–15)
NEUTROPHILS # BLD AUTO: 6.2 K/UL (ref 1.8–7.7)
NEUTROPHILS NFR BLD: 80.2 % (ref 38–73)
NITRITE UR QL STRIP: NEGATIVE
NRBC BLD-RTO: 0 /100 WBC
OSMOLALITY UR: 375 MOSM/KG (ref 50–1200)
PH UR STRIP: 5 [PH] (ref 5–8)
PHOSPHATE SERPL-MCNC: 3.3 MG/DL (ref 2.7–4.5)
PLATELET # BLD AUTO: 45 K/UL (ref 150–350)
PMV BLD AUTO: 11.3 FL (ref 9.2–12.9)
POCT GLUCOSE: 242 MG/DL (ref 70–110)
POCT GLUCOSE: 321 MG/DL (ref 70–110)
POCT GLUCOSE: 371 MG/DL (ref 70–110)
POCT GLUCOSE: 404 MG/DL (ref 70–110)
POTASSIUM SERPL-SCNC: 4 MMOL/L (ref 3.5–5.1)
POTASSIUM SERPL-SCNC: 4.2 MMOL/L (ref 3.5–5.1)
POTASSIUM UR-SCNC: 22 MMOL/L (ref 15–95)
PROT SERPL-MCNC: 6.1 G/DL (ref 6–8.4)
PROT SERPL-MCNC: 6.6 G/DL (ref 6–8.4)
PROT UR QL STRIP: NEGATIVE
PROTHROMBIN TIME: 14.4 SEC (ref 9–12.5)
RBC # BLD AUTO: 2.47 M/UL (ref 4–5.4)
SODIUM SERPL-SCNC: 125 MMOL/L (ref 136–145)
SODIUM SERPL-SCNC: 127 MMOL/L (ref 136–145)
SODIUM UR-SCNC: <20 MMOL/L (ref 20–250)
SP GR UR STRIP: 1.01 (ref 1–1.03)
URN SPEC COLLECT METH UR: ABNORMAL
WBC # BLD AUTO: 7.7 K/UL (ref 3.9–12.7)

## 2019-08-01 PROCEDURE — 85025 COMPLETE CBC W/AUTO DIFF WBC: CPT

## 2019-08-01 PROCEDURE — 93010 ELECTROCARDIOGRAM REPORT: CPT | Mod: ,,, | Performed by: INTERNAL MEDICINE

## 2019-08-01 PROCEDURE — 84133 ASSAY OF URINE POTASSIUM: CPT

## 2019-08-01 PROCEDURE — 99233 PR SUBSEQUENT HOSPITAL CARE,LEVL III: ICD-10-PCS | Mod: ,,, | Performed by: HOSPITALIST

## 2019-08-01 PROCEDURE — 99233 SBSQ HOSP IP/OBS HIGH 50: CPT | Mod: ,,, | Performed by: HOSPITALIST

## 2019-08-01 PROCEDURE — 20600001 HC STEP DOWN PRIVATE ROOM

## 2019-08-01 PROCEDURE — 94761 N-INVAS EAR/PLS OXIMETRY MLT: CPT

## 2019-08-01 PROCEDURE — 63600175 PHARM REV CODE 636 W HCPCS: Mod: JG | Performed by: HOSPITALIST

## 2019-08-01 PROCEDURE — 84100 ASSAY OF PHOSPHORUS: CPT

## 2019-08-01 PROCEDURE — 99232 SBSQ HOSP IP/OBS MODERATE 35: CPT | Mod: ,,, | Performed by: INTERNAL MEDICINE

## 2019-08-01 PROCEDURE — 87040 BLOOD CULTURE FOR BACTERIA: CPT

## 2019-08-01 PROCEDURE — 93010 EKG 12-LEAD: ICD-10-PCS | Mod: ,,, | Performed by: INTERNAL MEDICINE

## 2019-08-01 PROCEDURE — 25000003 PHARM REV CODE 250: Mod: NTX | Performed by: STUDENT IN AN ORGANIZED HEALTH CARE EDUCATION/TRAINING PROGRAM

## 2019-08-01 PROCEDURE — 93005 ELECTROCARDIOGRAM TRACING: CPT

## 2019-08-01 PROCEDURE — 83735 ASSAY OF MAGNESIUM: CPT

## 2019-08-01 PROCEDURE — 99232 PR SUBSEQUENT HOSPITAL CARE,LEVL II: ICD-10-PCS | Mod: ,,, | Performed by: INTERNAL MEDICINE

## 2019-08-01 PROCEDURE — 36415 COLL VENOUS BLD VENIPUNCTURE: CPT

## 2019-08-01 PROCEDURE — 83935 ASSAY OF URINE OSMOLALITY: CPT

## 2019-08-01 PROCEDURE — 25000003 PHARM REV CODE 250: Mod: NTX | Performed by: HOSPITALIST

## 2019-08-01 PROCEDURE — 80053 COMPREHEN METABOLIC PANEL: CPT

## 2019-08-01 PROCEDURE — 83605 ASSAY OF LACTIC ACID: CPT

## 2019-08-01 PROCEDURE — 85610 PROTHROMBIN TIME: CPT

## 2019-08-01 PROCEDURE — P9047 ALBUMIN (HUMAN), 25%, 50ML: HCPCS | Mod: JG | Performed by: HOSPITALIST

## 2019-08-01 PROCEDURE — 63600175 PHARM REV CODE 636 W HCPCS: Performed by: HOSPITALIST

## 2019-08-01 PROCEDURE — 84300 ASSAY OF URINE SODIUM: CPT

## 2019-08-01 PROCEDURE — 81003 URINALYSIS AUTO W/O SCOPE: CPT

## 2019-08-01 RX ORDER — GUAIFENESIN 600 MG/1
600 TABLET, EXTENDED RELEASE ORAL 2 TIMES DAILY
Status: DISCONTINUED | OUTPATIENT
Start: 2019-08-01 | End: 2019-08-05

## 2019-08-01 RX ORDER — INSULIN ASPART 100 [IU]/ML
4 INJECTION, SOLUTION INTRAVENOUS; SUBCUTANEOUS
Status: DISCONTINUED | OUTPATIENT
Start: 2019-08-01 | End: 2019-08-05

## 2019-08-01 RX ORDER — ALBUMIN HUMAN 250 G/1000ML
25 SOLUTION INTRAVENOUS EVERY 6 HOURS
Status: DISCONTINUED | OUTPATIENT
Start: 2019-08-01 | End: 2019-08-01

## 2019-08-01 RX ORDER — MIDODRINE HYDROCHLORIDE 5 MG/1
15 TABLET ORAL 3 TIMES DAILY
Status: DISCONTINUED | OUTPATIENT
Start: 2019-08-01 | End: 2019-08-07

## 2019-08-01 RX ORDER — CHLORPROMAZINE HYDROCHLORIDE 25 MG/1
25 TABLET, FILM COATED ORAL 3 TIMES DAILY PRN
Status: DISCONTINUED | OUTPATIENT
Start: 2019-08-01 | End: 2019-08-03

## 2019-08-01 RX ORDER — LACTULOSE 10 G/15ML
30 SOLUTION ORAL 3 TIMES DAILY
Status: DISCONTINUED | OUTPATIENT
Start: 2019-08-01 | End: 2019-08-03

## 2019-08-01 RX ORDER — ALBUMIN HUMAN 250 G/1000ML
50 SOLUTION INTRAVENOUS DAILY
Status: COMPLETED | OUTPATIENT
Start: 2019-08-01 | End: 2019-08-02

## 2019-08-01 RX ADMIN — INSULIN ASPART 2 UNITS: 100 INJECTION, SOLUTION INTRAVENOUS; SUBCUTANEOUS at 09:08

## 2019-08-01 RX ADMIN — INSULIN ASPART 5 UNITS: 100 INJECTION, SOLUTION INTRAVENOUS; SUBCUTANEOUS at 11:08

## 2019-08-01 RX ADMIN — MIDODRINE HYDROCHLORIDE 15 MG: 5 TABLET ORAL at 02:08

## 2019-08-01 RX ADMIN — INSULIN ASPART 5 UNITS: 100 INJECTION, SOLUTION INTRAVENOUS; SUBCUTANEOUS at 04:08

## 2019-08-01 RX ADMIN — RIFAXIMIN 550 MG: 550 TABLET ORAL at 09:08

## 2019-08-01 RX ADMIN — GUAIFENESIN 600 MG: 600 TABLET, EXTENDED RELEASE ORAL at 09:08

## 2019-08-01 RX ADMIN — ONDANSETRON 4 MG: 2 INJECTION INTRAMUSCULAR; INTRAVENOUS at 02:08

## 2019-08-01 RX ADMIN — LACTULOSE 30 G: 20 SOLUTION ORAL at 12:08

## 2019-08-01 RX ADMIN — LACTULOSE 15 G: 20 SOLUTION ORAL at 09:08

## 2019-08-01 RX ADMIN — LEVOTHYROXINE SODIUM 88 MCG: 88 TABLET ORAL at 05:08

## 2019-08-01 RX ADMIN — SODIUM CHLORIDE 1000 ML: 0.9 INJECTION, SOLUTION INTRAVENOUS at 10:08

## 2019-08-01 RX ADMIN — LACTULOSE 30 G: 20 SOLUTION ORAL at 09:08

## 2019-08-01 RX ADMIN — GUAIFENESIN 600 MG: 600 TABLET, EXTENDED RELEASE ORAL at 12:08

## 2019-08-01 RX ADMIN — Medication 220 MG: at 11:08

## 2019-08-01 RX ADMIN — MIDODRINE HYDROCHLORIDE 10 MG: 5 TABLET ORAL at 09:08

## 2019-08-01 RX ADMIN — LACTULOSE 30 G: 20 SOLUTION ORAL at 02:08

## 2019-08-01 RX ADMIN — ALBUMIN (HUMAN) 50 G: 12.5 SOLUTION INTRAVENOUS at 04:08

## 2019-08-01 RX ADMIN — MIDODRINE HYDROCHLORIDE 15 MG: 5 TABLET ORAL at 09:08

## 2019-08-01 RX ADMIN — CIPROFLOXACIN HYDROCHLORIDE 250 MG: 250 TABLET, FILM COATED ORAL at 09:08

## 2019-08-01 RX ADMIN — CHLORPROMAZINE HYDROCHLORIDE 25 MG: 25 TABLET, SUGAR COATED ORAL at 02:08

## 2019-08-01 RX ADMIN — INSULIN ASPART 4 UNITS: 100 INJECTION, SOLUTION INTRAVENOUS; SUBCUTANEOUS at 04:08

## 2019-08-01 NOTE — PROGRESS NOTES
Progress Note   Hospital Medicine         Patient Name: Michelle Ojeda  MRN:  [id number]  Hospital Medicine Team: [facility] Ty Breaux MD  Date of Admission:  7/18/2019     Length of Stay:  LOS: 14 days   Expected Discharge Date: 8/2/2019  Principal Problem:  Acute metabolic encephalopathy       Subjective:     Interval History/Overnight Events:  Patient seems a little weak today, with asterexis, increasing lactulose today to have 4 BMs; sodium down to 124 and Cr has risen, given 1L NS in the AM and then albumin 50 mg daily as per nephrology  - plan for SNF tomorrow; eating 75% of her food    Review of Systems   Constitutional: Negative for chills, fatigue, fever.   HENT: Negative for sore throat, trouble swallowing.    Eyes: Negative for photophobia, visual disturbance.   Respiratory: Negative for cough, shortness of breath.    Cardiovascular: Negative for chest pain, palpitations, leg swelling.   Gastrointestinal: Negative for abdominal pain, constipation, diarrhea, nausea, vomiting.   Endocrine: Negative for cold intolerance, heat intolerance.   Genitourinary: Negative for dysuria, frequency.   Musculoskeletal: Negative for arthralgias, myalgias.   Skin: Negative for rash, wound, erythema   Neurological: Negative for dizziness, syncope, weakness, light-headedness.   Psychiatric/Behavioral: Negative for confusion, hallucinations, anxiety  All other systems reviewed and are negative.    Objective:     Temp:  [96.1 °F (35.6 °C)-97.9 °F (36.6 °C)]   Pulse:  [63-73]   Resp:  [18]   BP: ()/(48-58)   SpO2:  [98 %-100 %]       Physical Exam:  Constitutional: apears weak and frail   Head: Normocephalic and atraumatic.   Mouth/Throat: Oropharynx is clear and moist.   Eyes: EOM are normal. Pupils are equal, round, and reactive to light. No scleral icterus.   Neck: Normal range of motion. Neck supple.   Cardiovascular: Normal rate and regular rhythm.  No murmur heard.  Pulmonary/Chest: Effort normal and breath  sounds normal. No respiratory distress. No wheezes, rales, or rhonchi  Abdominal: Soft. Bowel sounds are normal.  No distension or tenderness  Musculoskeletal: Normal range of motion. No edema.   Neurological: Alert and oriented to person, place, and time.   Skin: Skin is warm and dry.   Psychiatric: Normal mood and affect. Behavior is normal.     Recent Labs   Lab 07/28/19  1704 07/29/19  0558 07/29/19  1254 07/30/19  0606 07/31/19  0525 08/01/19  0548   WBC 3.62* 2.52* 1.71* 3.61* 5.31 7.70   HGB 7.9* 7.2* 7.3* 8.4* 7.5* 8.6*   HCT 24.6* 22.0* 22.3* 25.9* 22.3* 24.6*   PLT 27* 26* 23* 29* 32* 45*     Recent Labs   Lab 07/30/19  0606 07/31/19  0525 08/01/19  0548    132* 125*   K 3.5 3.3* 4.2    102 97   CO2 22* 19* 19*   BUN 86* 83* 98*   CREATININE 2.6* 2.3* 2.8*   * 259* 252*   CALCIUM 9.0 8.4* 8.1*   MG 3.4* 3.0* 3.1*   PHOS 2.6* 2.7 3.3     Recent Labs   Lab 07/30/19  0606 07/31/19  0525 08/01/19  0548   ALKPHOS 154* 143* 157*   ALT 29 34 47*   AST 48* 50* 61*   ALBUMIN 4.6 3.5 3.4*   PROT 7.2 5.9* 6.1   BILITOT 1.9* 1.7* 2.0*   INR 1.5* 1.6* 1.4*     Recent Labs   Lab 07/31/19  0801 07/31/19  1135 07/31/19  1651 07/31/19  2057 08/01/19  0742 08/01/19  1130   POCTGLUCOSE 235* 387* 317* 461* 242* 404*        albumin human 25%  50 g Intravenous Daily    ciprofloxacin HCl  250 mg Oral Daily    guaiFENesin  600 mg Oral BID    insulin aspart U-100  4 Units Subcutaneous TIDWM    insulin detemir U-100  10 Units Subcutaneous Daily    insulin detemir U-100  5 Units Subcutaneous Once    lactulose  30 g Oral TID    levothyroxine  88 mcg Oral Before breakfast    midodrine  15 mg Oral TID    rifAXImin  550 mg Oral BID    sodium chloride 0.9%  1,000 mL Intravenous Once    zinc sulfate  220 mg Oral Daily       Assessment and Plan     Ms. Michelle Ojeda is a 68 y.o. female who presented to [facility] on 7/18/2019 with     Hospital Course:    Ms. Michelle Ojeda was admitted to [facility]  for management of     Active Hospital Problems    Diagnosis  POA    *Acute metabolic encephalopathy [G93.41]  Yes    Other dysphagia [R13.19]  Clinically Undetermined    Acute hypoxemic respiratory failure [J96.01]  No    Shock, unspecified [R57.9]  No    Hyperglycemia [R73.9]  No    Acute hepatic encephalopathy [K72.00]  Yes    Right kidney mass [N28.89]  No    Kidney transplant candidate [Z76.82]  Not Applicable    Ileus [K56.7]  No    Physical debility [R53.81]  Yes    Ascites [R18.8]  Yes    Pancytopenia [D61.818]  Yes    Hypothyroidism [E03.9]  Yes    Moderate malnutrition [E44.0]  Yes    Coagulopathy [D68.9]  Yes    Hyponatremia [E87.1]  Yes    Organ transplant candidate [Z76.82]  Not Applicable    Acute kidney injury superimposed on chronic kidney disease [N17.9, N18.9]  No    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       Acute metabolic encephalopathy  Likely 2/2 hepatic encephalopathy with ammonia 171 upon ICU tx (64 on 7/22).   --CT head negative for acute intracranial abnormality  --continue lactulose and rifaximin  --back to baseline      Pulmonary  Acute hypoxemic respiratory failure  Intubated for airway protection in setting of severe encephalopathy and episodes of emesis/concern for aspiration  --Sputum culture negative from 7/24.  Off antibiotics.  --No focal signs of consolidation on CXR  --on room air  --extubated on 7/27        Renal/  Right kidney mass  R kidney mass measures 2.4 cm x 2.3 cm found on CT abd/pelvis during transplant workup  --Urology following with plans to hold on any further work up in setting of acute decompensation  - will just monitor for now;     Acute kidney injury superimposed on chronic kidney disease  --Baseline reportedly sCr 1.6. Possible candidate for liver/kidney transplant  --Nephrology following,concern  iATN given recent shock.  Also concern for intravascular volume depletion given significant stool output.   Retroperitoneal US on 7/23 negative for hydronephrosis  --urine studies concerning for pre-renal etiology.  Started on albumin and continued on midodrine (MAPs 80's)  --sCr downtrending and urine output improved following albumin  --Strict I/Os  - Cr trending up, giving NS and albumin today; upto 2.8     Hematology  Coagulopathy  See cirrhosis     Oncology  Pancytopenia  See cirrhosis     Endocrine  Hyperglycemia  History of DM however was taken off metformin due to improvement  --A1c 5.1  --insulin infusion discontinued on 7/27 when enteral feedings where held.   --frequent glucose checks with SSI  --glucose goal 140-180     Hypothyroidism  --Continue home levothyroxine     GI  Other dysphagia  --SLP following.  Dental soft diet with thin liquids     Ileus  --improved.  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 Cytology pending.  --Cipro for SBP prophylaxis   MELD-Na score: 30 at 8/1/2019  5:48 AM  MELD score: 23 at 8/1/2019  5:48 AM  Calculated from:  Serum Creatinine: 2.8 mg/dL at 8/1/2019  5:48 AM  Serum Sodium: 125 mmol/L at 8/1/2019  5:48 AM  Total Bilirubin: 2.0 mg/dL at 8/1/2019  5:48 AM  INR(ratio): 1.4 at 8/1/2019  5:48 AM  Age: 68 years  - patient not currently a liver transplant candidate due to frailty; IR paracentesis on 7/31 with 3.2L removed negative for SBP       Other  Shock, unspecified  --Likely due to sedating medications however patient at risk for infection  --Blood culture, sputum culture, and peritoneal fluid negative  --UA appearing noninfectious  --Received 4 doses of piptazo,  Received 3 days of  vancomycin.  --Resolved; weaned off norepinephrine infusion on 7/25  --Continue midodrine 10 mg TID     Physical debility  --PT/OT consulted    # Hypokalemia  - replace    # Hyponatremia  - NS and albumin; down to 124    Disposition: [facility] tomorrow     Ty Breaux MD  Medical Director [facility]  Spectra:  [phone]  Pager: [phone]

## 2019-08-01 NOTE — PHYSICIAN QUERY
PT Name: Michelle Ojeda  MR #: [id number]     Physician Query Form - Diagnosis Clarification      CDS/: ABRIL Flanagan, RN, CDS               Contact information:[email or web address]    This form is a permanent document in the medical record.     Query Date: August 1, 2019    By submitting this query, we are merely seeking further clarification of documentation.  Please utilize your independent clinical judgment when addressing the question(s) below.     The medical record contains the following:      Findings Supporting Clinical Information Location in Medical Record    possible aspiration PNA     Multiple episodes of emesis during exam requiring aggressive suctioning to protect airway. Patient admitted to the CMICU at that time for emergent intubation.        Intubated for airway protection in setting of severe encephalopathy and episodes of emesis/concern for aspiration  --Started empiric abx for possible aspiration PNA    No focal signs of consolidation on CXR      No focal signs of consolidation on CXR  --Minimal ventilator requirements  --Wean supplemental O2 for goal SpO2 >88%  --extubated on 7/27      Sputum culture negative from 7/24.  De-escalated antibiotics.   --No focal signs of consolidation on CXR    CXR impression:  ET tip 1 cm above jillian, NG tip descending duodenum.  Heart size is normal there is mild perihilar atelectasis Critical care H&P, Bonny Hathaway PA-C/Dr. Burns, 7/24          Critical care note, Chucho Garcia NP/Dr. Burns, 7/25                Critical are note, Morena Garcia NP/Dr. Burns, 7/27        Critical are note, Morena Garcia NP/Dr. Burns, 7/28      CXR, 7/24       Please clarify if the Aspiration Pneumonia diagnosis has been:    [  ] Ruled In   [  ] Ruled In, Now Resolved   [x  ] Ruled Out   [  ] Other/Clarification of findings (please specify):     [  ] Clinically undetermined     Please document in your progress notes daily for the  duration of treatment, until resolved, and include in your discharge summary.

## 2019-08-01 NOTE — NURSING
Family wanted team notified of pt's continuous hiccups. Pt only stops while sleeping. Vital signs are stable, pt sats % on room air. No new orders given.

## 2019-08-01 NOTE — PROGRESS NOTES
[facility]  Nephrology  Progress Note    Patient Name: Michelle Ojeda  MRN: [id number]  Admission Date: 7/18/2019  Hospital Length of Stay: 14 days  Attending Provider: Ty Breaux MD   Primary Care Physician: Primary Doctor No  Principal Problem:Acute metabolic encephalopathy    Subjective:     HPI: Rusty Martinez is 67 yo F with CKD stage 4 presenting from transplant hepatology clinic for hyponatremia on 7/18/19. She is originally from [place], here for liver transplant evaluation. She was diagnosed with IZAGUIRRE in grade 1 hepatic encephalopathy with severe ascites and esophogeal varices (s/p banding). During the month prior to admission she felt more lethargic and was requiring biweekly paracentesis. Of note, she is also being treated for Ileus her acute hepatic encephalopathy with lactulose. Recent 24 hour cr clearance study was near 21. Reported baseline Cr 1.6-1.8 and chronic hyponatremia range between 123-124.     Interval History: Pt sitting in bed this AM; notes improvement in SOB and abdominal distension after paracentesis yesterday, but now with frequent hiccups.     Review of patient's allergies indicates:  No Known Allergies  Current Facility-Administered Medications   Medication Frequency    acetaminophen tablet 325 mg Q4H PRN    ciprofloxacin HCl tablet 250 mg Daily    dextrose 10% (D10W) Bolus PRN    dextrose 10% (D10W) Bolus PRN    dicyclomine capsule 10 mg QID PRN    glucagon (human recombinant) injection 1 mg PRN    glucose chewable tablet 16 g PRN    glucose chewable tablet 24 g PRN    insulin aspart U-100 pen 0-5 Units Q6H PRN    insulin detemir U-100 pen 10 Units Daily    insulin detemir U-100 pen 5 Units Once    lactulose 20 gram/30 mL solution Soln 15 g TID    levothyroxine tablet 88 mcg Before breakfast    midodrine tablet 10 mg TID    ondansetron injection 4 mg Q6H PRN    rifAXIMin tablet 550 mg BID    simethicone chewable tablet 80 mg TID PRN     sodium chloride 0.9% bolus 1,000 mL Once    sodium chloride 0.9% flush 10 mL PRN    sodium chloride 0.9% flush 10 mL PRN    zinc sulfate capsule 220 mg Daily       Objective:     Vital Signs (Most Recent):  Temp: 96.1 °F (35.6 °C) (08/01/19 0744)  Pulse: 63 (08/01/19 0744)  Resp: 18 (08/01/19 0744)  BP: (!) 100/53 (08/01/19 0744)  SpO2: 100 % (08/01/19 0744)  O2 Device (Oxygen Therapy): room air (08/01/19 1000) Vital Signs (24h Range):  Temp:  [96.1 °F (35.6 °C)-97.9 °F (36.6 °C)] 96.1 °F (35.6 °C)  Pulse:  [63-73] 63  Resp:  [16-18] 18  SpO2:  [98 %-100 %] 100 %  BP: (100-116)/(53-58) 100/53     Weight: 56 kg (123 lb 7.3 oz) (07/30/19 1000)  Body mass index is 24.11 kg/m².  Body surface area is 1.54 meters squared.    I/O last 3 completed shifts:  In: 540 [P.O.:540]  Out: 3200 [Other:3200]    Physical Exam   Constitutional: She appears well-developed and well-nourished. She appears cachectic. No distress.   HENT:   Head: Normocephalic and atraumatic.   Eyes: EOM are normal. Scleral icterus is present.   Neck: Normal range of motion.   Cardiovascular: Normal rate, regular rhythm and normal heart sounds.   No murmur heard.  Pulmonary/Chest: Effort normal and breath sounds normal.   Abdominal: Soft. Bowel sounds are normal. She exhibits ascites. There is hepatosplenomegaly. There is no tenderness.   Musculoskeletal: She exhibits no edema.   Neurological: She is alert. She is disoriented (Oriented to Person, Place).   Skin: Skin is warm and dry. Ecchymosis noted. She is not diaphoretic.   Nursing note and vitals reviewed.      Significant Labs:  CBC:   Recent Labs   Lab 08/01/19  0548   WBC 7.70   RBC 2.47*   HGB 8.6*   HCT 24.6*   PLT 45*   *   MCH 34.8*   MCHC 35.0     CMP:   Recent Labs   Lab 08/01/19  0548   *   CALCIUM 8.1*   ALBUMIN 3.4*   PROT 6.1   *   K 4.2   CO2 19*   CL 97   BUN 98*   CREATININE 2.8*   ALKPHOS 157*   ALT 47*   AST 61*   BILITOT 2.0*     No results for input(s): COLORU,  CLARITYU, SPECGRAV, PHUR, PROTEINUA, GLUCOSEU, BILIRUBINCON, BLOODU, WBCU, RBCU, BACTERIA, MUCUS, NITRITE, LEUKOCYTESUR, UROBILINOGEN, HYALINECASTS in the last 168 hours.  All labs within the past 24 hours have been reviewed.     Significant Imaging:  US: Reviewed    Assessment/Plan:     * Acute metabolic encephalopathy  Treatment plan per primary team     Right kidney mass  Patient has new Right renal mass found on CT abd on 7/22/19. Previous Ct from 2016 and MRI studies from 2019 in [place] did not demonstrate this finding.     Recommendations  - Urology recommends f/u outpatient as she is not a good surgical or biopsy candidate.   - Would prefer not expose kidneys to contrast but will discuss with other teams if necessary.  - If pt to be evaluated for simultaneous liver/kidney transplant, please place consult to Kidney Transplant Medicine    Acute kidney injury superimposed on chronic kidney disease  Labs from [place] nephrology demonstrated Cr of 1.77 in 3/12/19, 1.67 on 4/5/19, 1.96 in 6/13/19, and 1.83 on 6/24. Cr of 2.8 on 7/26/19 above her baseline due to ATN secondary to hypoperfusion of kidneys related to hypotensive episodes. Hepatorenal syndrome not suspected at this time as the patient's Cr and urine output have improved with an albumin challenge. TERA could be exacerbated with noted Hgb drop from 9.9 on 7/27 to 7.2 on 7/29.     Recommendations:   - Cr had been improving, but 8/1 has worsened to 2.8 from 2.3 the previous day. Possibly 2/2 fluid shifting after paracentesis vs over-activation of ADH  - UA, UOsm, Saige+, UK+   - c/w Midodrine 10mg TID added to prevent hypoperfusion to kidneys. Goal MAP>70  - Recommend resuming at albumin 50g IV daily  - Monitor with BMP  - Strict I/Os   - Avoid Nephrotoxic agents  - Monitor for signs of blood loss  - Hgb > 7     Hyponatremia  Rusty is a 67 yo F with liver cirrhosis and CKD admitted for hyponatremia. Hyponatremia may be related to several factors  primarily liver cirrhosis requiring biweekly paracentesis. This leads to a hypovolemic hyponatremic state in which fluid is pulled out of the vasculature. Additional contributing factors related to ileus, NPO status, and TPN nutrition. Extra sodium in TPN has increased sodium. Now on renal diet with 1000mL fluid restriction. Baseline Na+ from labs in [place] between 123-124.   - 8/1 Na 125; while this is closer to pt's baseline, does represent a drop since 8/30 from 142  - Pt given NS bolus this AM; recommend obtaining BMP q8H to evaluate for overcorrection or worsening of hyponatremia.   - Midodrine 10 mg TID;  - Strict I/O monitoring     Liver cirrhosis secondary to IZAGUIRRE  Treatment plan per primary team         Thank you for your consult. I will follow-up with patient. Please contact us if you have any additional questions.    Otilio Gibson,   Nephrology  [facility]

## 2019-08-01 NOTE — PLAN OF CARE
Problem: Adult Inpatient Plan of Care  Goal: Plan of Care Review  Pt remains AAOx4, vital signs are stable. Daughter at bedside. No complaints of nausea throughout shift. Call light and personal belongings within reach. Pt had 3loose BMs over night. Suction at bedside. Plan of care reviewed with pt and daughter, all questions and concerns were addressed. Will continue to monitor.

## 2019-08-01 NOTE — ASSESSMENT & PLAN NOTE
Rusty is a 69 yo F with liver cirrhosis and CKD admitted for hyponatremia. Hyponatremia may be related to several factors primarily liver cirrhosis requiring biweekly paracentesis. This leads to a hypovolemic hyponatremic state in which fluid is pulled out of the vasculature. Additional contributing factors related to ileus, NPO status, and TPN nutrition. Extra sodium in TPN has increased sodium. Now on renal diet with 1000mL fluid restriction. Baseline Na+ from labs in [place] between 123-124.   - 8/1 Na 125; while this is closer to pt's baseline, does represent a drop since 8/30 from 142  - Pt given NS bolus this AM; recommend obtaining BMP q8H to evaluate for overcorrection or worsening of hyponatremia.   - Midodrine 10 mg TID;  - Strict I/O monitoring

## 2019-08-01 NOTE — SUBJECTIVE & OBJECTIVE
Interval History: Pt sitting in bed this AM; notes improvement in SOB and abdominal distension after paracentesis yesterday, but now with frequent hiccups.     Review of patient's allergies indicates:  No Known Allergies  Current Facility-Administered Medications   Medication Frequency    acetaminophen tablet 325 mg Q4H PRN    ciprofloxacin HCl tablet 250 mg Daily    dextrose 10% (D10W) Bolus PRN    dextrose 10% (D10W) Bolus PRN    dicyclomine capsule 10 mg QID PRN    glucagon (human recombinant) injection 1 mg PRN    glucose chewable tablet 16 g PRN    glucose chewable tablet 24 g PRN    insulin aspart U-100 pen 0-5 Units Q6H PRN    insulin detemir U-100 pen 10 Units Daily    insulin detemir U-100 pen 5 Units Once    lactulose 20 gram/30 mL solution Soln 15 g TID    levothyroxine tablet 88 mcg Before breakfast    midodrine tablet 10 mg TID    ondansetron injection 4 mg Q6H PRN    rifAXIMin tablet 550 mg BID    simethicone chewable tablet 80 mg TID PRN    sodium chloride 0.9% bolus 1,000 mL Once    sodium chloride 0.9% flush 10 mL PRN    sodium chloride 0.9% flush 10 mL PRN    zinc sulfate capsule 220 mg Daily       Objective:     Vital Signs (Most Recent):  Temp: 96.1 °F (35.6 °C) (08/01/19 0744)  Pulse: 63 (08/01/19 0744)  Resp: 18 (08/01/19 0744)  BP: (!) 100/53 (08/01/19 0744)  SpO2: 100 % (08/01/19 0744)  O2 Device (Oxygen Therapy): room air (08/01/19 1000) Vital Signs (24h Range):  Temp:  [96.1 °F (35.6 °C)-97.9 °F (36.6 °C)] 96.1 °F (35.6 °C)  Pulse:  [63-73] 63  Resp:  [16-18] 18  SpO2:  [98 %-100 %] 100 %  BP: (100-116)/(53-58) 100/53     Weight: 56 kg (123 lb 7.3 oz) (07/30/19 1000)  Body mass index is 24.11 kg/m².  Body surface area is 1.54 meters squared.    I/O last 3 completed shifts:  In: 540 [P.O.:540]  Out: 3200 [Other:3200]    Physical Exam   Constitutional: She appears well-developed and well-nourished. She appears cachectic. No distress.   HENT:   Head: Normocephalic and  atraumatic.   Eyes: EOM are normal. Scleral icterus is present.   Neck: Normal range of motion.   Cardiovascular: Normal rate, regular rhythm and normal heart sounds.   No murmur heard.  Pulmonary/Chest: Effort normal and breath sounds normal.   Abdominal: Soft. Bowel sounds are normal. She exhibits ascites. There is hepatosplenomegaly. There is no tenderness.   Musculoskeletal: She exhibits no edema.   Neurological: She is alert. She is disoriented (Oriented to Person, Place).   Skin: Skin is warm and dry. Ecchymosis noted. She is not diaphoretic.   Nursing note and vitals reviewed.      Significant Labs:  CBC:   Recent Labs   Lab 08/01/19  0548   WBC 7.70   RBC 2.47*   HGB 8.6*   HCT 24.6*   PLT 45*   *   MCH 34.8*   MCHC 35.0     CMP:   Recent Labs   Lab 08/01/19  0548   *   CALCIUM 8.1*   ALBUMIN 3.4*   PROT 6.1   *   K 4.2   CO2 19*   CL 97   BUN 98*   CREATININE 2.8*   ALKPHOS 157*   ALT 47*   AST 61*   BILITOT 2.0*     No results for input(s): COLORU, CLARITYU, SPECGRAV, PHUR, PROTEINUA, GLUCOSEU, BILIRUBINCON, BLOODU, WBCU, RBCU, BACTERIA, MUCUS, NITRITE, LEUKOCYTESUR, UROBILINOGEN, HYALINECASTS in the last 168 hours.  All labs within the past 24 hours have been reviewed.     Significant Imaging:  US: Reviewed

## 2019-08-01 NOTE — PLAN OF CARE
Problem: Adult Inpatient Plan of Care  Goal: Plan of Care Review  Outcome: Ongoing (interventions implemented as appropriate)  Pt AAx4, breathing even and unlabored, call light in reach, bed in lowest position, daughter at bedside throughout shift. Adminisered bolus and albumin per orders. Lower R PIV infiltrated, D/C IV and elevated arm. New skin tear noted, refer to flowsheet. VSS, frequent hourly rounding completed. No significant events throughout shift. Pt has had multiple large BM, refer to charting. Will continue to monitor.

## 2019-08-01 NOTE — ASSESSMENT & PLAN NOTE
Labs from [place] nephrology demonstrated Cr of 1.77 in 3/12/19, 1.67 on 4/5/19, 1.96 in 6/13/19, and 1.83 on 6/24. Cr of 2.8 on 7/26/19 above her baseline due to ATN secondary to hypoperfusion of kidneys related to hypotensive episodes. Hepatorenal syndrome not suspected at this time as the patient's Cr and urine output have improved with an albumin challenge. TERA could be exacerbated with noted Hgb drop from 9.9 on 7/27 to 7.2 on 7/29.     Recommendations:   - Cr had been improving, but 8/1 has worsened to 2.8 from 2.3 the previous day. Possibly 2/2 fluid shifting after paracentesis vs over-activation of ADH  - UA, UOsm, Saige+, UK+   - c/w Midodrine 10mg TID added to prevent hypoperfusion to kidneys. Goal MAP>70  - Recommend resuming at albumin 50g IV daily  - Monitor with BMP  - Strict I/Os   - Avoid Nephrotoxic agents  - Monitor for signs of blood loss  - Hgb > 7

## 2019-08-01 NOTE — PROGRESS NOTES
Progress Note   Hospital Medicine         Patient Name: Michelle Ojeda  MRN:  [id number]  Hospital Medicine Team: [facility] MED L Ty Breaux MD  Date of Admission:  7/18/2019     Length of Stay:  LOS: 13 days   Expected Discharge Date: 8/2/2019  Principal Problem:  Acute metabolic encephalopathy       Subjective:     Interval History/Overnight Events:  Patient doing well today; went for IR paracentesis with 3.2L removed and negative for SBP; Cr and T.bili improving; currently not a liver transplant candidate due to frailty, planning for [facility], likely tomorrow; family at the bedside and updated on plan;    Review of Systems   Constitutional: Negative for chills, fatigue, fever.   HENT: Negative for sore throat, trouble swallowing.    Eyes: Negative for photophobia, visual disturbance.   Respiratory: Negative for cough, shortness of breath.    Cardiovascular: Negative for chest pain, palpitations, leg swelling.   Gastrointestinal: Negative for abdominal pain, constipation, diarrhea, nausea, vomiting.   Endocrine: Negative for cold intolerance, heat intolerance.   Genitourinary: Negative for dysuria, frequency.   Musculoskeletal: Negative for arthralgias, myalgias.   Skin: Negative for rash, wound, erythema   Neurological: Negative for dizziness, syncope, weakness, light-headedness.   Psychiatric/Behavioral: Negative for confusion, hallucinations, anxiety  All other systems reviewed and are negative.    Objective:     Temp:  [97.1 °F (36.2 °C)-98.4 °F (36.9 °C)]   Pulse:  [52-87]   Resp:  [16-18]   BP: (100-123)/(53-77)   SpO2:  [94 %-100 %]       Physical Exam:  Constitutional: apears weak and frail   Head: Normocephalic and atraumatic.   Mouth/Throat: Oropharynx is clear and moist.   Eyes: EOM are normal. Pupils are equal, round, and reactive to light. No scleral icterus.   Neck: Normal range of motion. Neck supple.   Cardiovascular: Normal rate and regular rhythm.  No murmur heard.  Pulmonary/Chest: Effort  normal and breath sounds normal. No respiratory distress. No wheezes, rales, or rhonchi  Abdominal: Soft. Bowel sounds are normal.  No distension or tenderness  Musculoskeletal: Normal range of motion. No edema.   Neurological: Alert and oriented to person, place, and time.   Skin: Skin is warm and dry.   Psychiatric: Normal mood and affect. Behavior is normal.     Recent Labs   Lab 07/28/19  0924 07/28/19  1704 07/29/19  0558 07/29/19  1254 07/30/19  0606 07/31/19  0525   WBC 3.19* 3.62* 2.52* 1.71* 3.61* 5.31   HGB 8.1* 7.9* 7.2* 7.3* 8.4* 7.5*   HCT 24.6* 24.6* 22.0* 22.3* 25.9* 22.3*   PLT 27* 27* 26* 23* 29* 32*     Recent Labs   Lab 07/29/19  0558 07/30/19  0606 07/31/19  0525    142 132*   K 3.5 3.5 3.3*   * 109 102   CO2 22* 22* 19*   BUN 86* 86* 83*   CREATININE 2.5* 2.6* 2.3*   * 232* 259*   CALCIUM 8.9 9.0 8.4*   MG 3.4* 3.4* 3.0*   PHOS 3.5 2.6* 2.7     Recent Labs   Lab 07/29/19  0558 07/30/19  0606 07/31/19  0525   ALKPHOS 114 154* 143*   ALT 24 29 34   AST 24 48* 50*   ALBUMIN 4.2 4.6 3.5   PROT 6.5 7.2 5.9*   BILITOT 2.5* 1.9* 1.7*   INR 1.6* 1.5* 1.6*     Recent Labs   Lab 07/30/19  1439 07/30/19  1606 07/30/19  2100 07/31/19  0801 07/31/19  1135 07/31/19  1651   POCTGLUCOSE 287* 341* 359* 235* 387* 317*        ciprofloxacin HCl  250 mg Oral Daily    [START ON 8/1/2019] insulin detemir U-100  10 Units Subcutaneous Daily    insulin detemir U-100  5 Units Subcutaneous Once    lactulose  15 g Oral TID    levothyroxine  88 mcg Oral Before breakfast    midodrine  10 mg Oral TID    rifAXImin  550 mg Oral BID    zinc sulfate  220 mg Oral Daily       Assessment and Plan     Ms. Michelle Ojeda is a 68 y.o. female who presented to [facility] on 7/18/2019 with     Hospital Course:    Ms. Michelle Ojeda was admitted to Hospital Medicine for management of     Active Hospital Problems    Diagnosis  POA    *Acute metabolic encephalopathy [G93.41]  Yes    Other dysphagia [R13.19]   Clinically Undetermined    Acute hypoxemic respiratory failure [J96.01]  No    Shock, unspecified [R57.9]  No    Hyperglycemia [R73.9]  No    Acute hepatic encephalopathy [K72.00]  Yes    Right kidney mass [N28.89]  No    Kidney transplant candidate [Z76.82]  Not Applicable    Ileus [K56.7]  No    Physical debility [R53.81]  Yes    Ascites [R18.8]  Yes    Pancytopenia [D61.818]  Yes    Hypothyroidism [E03.9]  Yes    Moderate malnutrition [E44.0]  Yes    Coagulopathy [D68.9]  Yes    Hyponatremia [E87.1]  Yes    Organ transplant candidate [Z76.82]  Not Applicable    Acute kidney injury superimposed on chronic kidney disease [N17.9, N18.9]  No    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       Acute metabolic encephalopathy  Likely 2/2 hepatic encephalopathy with ammonia 171 upon ICU tx (64 on 7/22).   --CT head negative for acute intracranial abnormality  --continue lactulose and rifaximin  --back to baseline      Pulmonary  Acute hypoxemic respiratory failure  Intubated for airway protection in setting of severe encephalopathy and episodes of emesis/concern for aspiration  --Sputum culture negative from 7/24.  Off antibiotics.  --No focal signs of consolidation on CXR  --on room air  --extubated on 7/27        Renal/  Right kidney mass  R kidney mass measures 2.4 cm x 2.3 cm found on CT abd/pelvis during transplant workup  --Urology following with plans to hold on any further work up in setting of acute decompensation  - will just monitor for now;     Acute kidney injury superimposed on chronic kidney disease  --Baseline reportedly sCr 1.6. Possible candidate for liver/kidney transplant  --Nephrology following,concern iATN given recent shock.  Also concern for intravascular volume depletion given significant stool output.   Retroperitoneal US on 7/23 negative for  hydronephrosis  --urine studies concerning for pre-renal etiology.  Started on albumin and continued on midodrine (MAPs 80's)  --sCr downtrending and urine output improved following albumin  --Strict I/Os  - Cr trending down     Hematology  Coagulopathy  See cirrhosis     Oncology  Pancytopenia  See cirrhosis     Endocrine  Hyperglycemia  History of DM however was taken off metformin due to improvement  --A1c 5.1  --insulin infusion discontinued on 7/27 when enteral feedings where held.   --frequent glucose checks with SSI  --glucose goal 140-180     Hypothyroidism  --Continue home levothyroxine     GI  Other dysphagia  --SLP following.  Dental soft diet with thin liquids     Ileus  --improved.  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 Cytology pending.  --Cipro for SBP prophylaxis   MELD-Na score: 25 at 7/31/2019  5:25 AM  MELD score: 22 at 7/31/2019  5:25 AM  Calculated from:  Serum Creatinine: 2.3 mg/dL at 7/31/2019  5:25 AM  Serum Sodium: 132 mmol/L at 7/31/2019  5:25 AM  Total Bilirubin: 1.7 mg/dL at 7/31/2019  5:25 AM  INR(ratio): 1.6 at 7/31/2019  5:25 AM  Age: 68 years  - patient not currently a liver transplant candidate due to frailty; IR paracentesis on 7/31 with 3.2L removed negative for SBP       Other  Shock, unspecified  --Likely due to sedating medications however patient at risk for infection  --Blood culture, sputum culture, and peritoneal fluid negative  --UA appearing noninfectious  --Received 4 doses of piptazo,  Received 3 days of vancomycin.  --Resolved; weaned off norepinephrine infusion on 7/25  --Continue midodrine 10 mg TID     Physical debility  --PT/OT consulted    # Hypokalemia  - replace    Disposition:  [facility] tomorrow     Ty Breaux MD  Medical Director [facility]  Spectra:  [phone]  Pager: [phone]

## 2019-08-02 PROBLEM — E87.20 METABOLIC ACIDOSIS: Status: ACTIVE | Noted: 2019-08-02

## 2019-08-02 LAB
ABO GROUP BLD: NORMAL
ALBUMIN SERPL BCP-MCNC: 3.9 G/DL (ref 3.5–5.2)
ALBUMIN SERPL BCP-MCNC: 4.8 G/DL (ref 3.5–5.2)
ALP SERPL-CCNC: 149 U/L (ref 55–135)
ALP SERPL-CCNC: 156 U/L (ref 55–135)
ALT SERPL W/O P-5'-P-CCNC: 40 U/L (ref 10–44)
ALT SERPL W/O P-5'-P-CCNC: 42 U/L (ref 10–44)
ANION GAP SERPL CALC-SCNC: 14 MMOL/L (ref 8–16)
ANION GAP SERPL CALC-SCNC: 15 MMOL/L (ref 8–16)
ANISOCYTOSIS BLD QL SMEAR: ABNORMAL
AST SERPL-CCNC: 54 U/L (ref 10–40)
AST SERPL-CCNC: 54 U/L (ref 10–40)
BASOPHILS # BLD AUTO: 0.02 K/UL (ref 0–0.2)
BASOPHILS # BLD AUTO: 0.04 K/UL (ref 0–0.2)
BASOPHILS NFR BLD: 0.6 % (ref 0–1.9)
BASOPHILS NFR BLD: 0.8 % (ref 0–1.9)
BILIRUB SERPL-MCNC: 2 MG/DL (ref 0.1–1)
BILIRUB SERPL-MCNC: 3 MG/DL (ref 0.1–1)
BLD GP AB SCN CELLS X3 SERPL QL: NORMAL
BLD PROD TYP BPU: NORMAL
BLOOD UNIT EXPIRATION DATE: NORMAL
BLOOD UNIT TYPE CODE: 600
BLOOD UNIT TYPE: NORMAL
BUN SERPL-MCNC: 105 MG/DL (ref 8–23)
BUN SERPL-MCNC: 106 MG/DL (ref 8–23)
CALCIUM SERPL-MCNC: 8.2 MG/DL (ref 8.7–10.5)
CALCIUM SERPL-MCNC: 8.7 MG/DL (ref 8.7–10.5)
CHLORIDE SERPL-SCNC: 99 MMOL/L (ref 95–110)
CHLORIDE SERPL-SCNC: 99 MMOL/L (ref 95–110)
CO2 SERPL-SCNC: 14 MMOL/L (ref 23–29)
CO2 SERPL-SCNC: 15 MMOL/L (ref 23–29)
CODING SYSTEM: NORMAL
CREAT SERPL-MCNC: 3.3 MG/DL (ref 0.5–1.4)
CREAT SERPL-MCNC: 3.4 MG/DL (ref 0.5–1.4)
DIFFERENTIAL METHOD: ABNORMAL
DIFFERENTIAL METHOD: ABNORMAL
DISPENSE STATUS: NORMAL
EOSINOPHIL # BLD AUTO: 0 K/UL (ref 0–0.5)
EOSINOPHIL # BLD AUTO: 0 K/UL (ref 0–0.5)
EOSINOPHIL NFR BLD: 0.8 % (ref 0–8)
EOSINOPHIL NFR BLD: 1.1 % (ref 0–8)
ERYTHROCYTE [DISTWIDTH] IN BLOOD BY AUTOMATED COUNT: 15.7 % (ref 11.5–14.5)
ERYTHROCYTE [DISTWIDTH] IN BLOOD BY AUTOMATED COUNT: 16.5 % (ref 11.5–14.5)
EST. GFR  (AFRICAN AMERICAN): 15 ML/MIN/1.73 M^2
EST. GFR  (AFRICAN AMERICAN): 15.6 ML/MIN/1.73 M^2
EST. GFR  (NON AFRICAN AMERICAN): 13 ML/MIN/1.73 M^2
EST. GFR  (NON AFRICAN AMERICAN): 13.5 ML/MIN/1.73 M^2
FIBRINOGEN PPP-MCNC: 191 MG/DL (ref 182–366)
GLUCOSE SERPL-MCNC: 247 MG/DL (ref 70–110)
GLUCOSE SERPL-MCNC: 284 MG/DL (ref 70–110)
HAPTOGLOB SERPL-MCNC: 67 MG/DL (ref 30–250)
HCT VFR BLD AUTO: 20.7 % (ref 37–48.5)
HCT VFR BLD AUTO: 24.5 % (ref 37–48.5)
HGB BLD-MCNC: 7 G/DL (ref 12–16)
HGB BLD-MCNC: 7.9 G/DL (ref 12–16)
IMM GRANULOCYTES # BLD AUTO: 0.03 K/UL (ref 0–0.04)
IMM GRANULOCYTES # BLD AUTO: 0.1 K/UL (ref 0–0.04)
IMM GRANULOCYTES NFR BLD AUTO: 0.9 % (ref 0–0.5)
IMM GRANULOCYTES NFR BLD AUTO: 1.9 % (ref 0–0.5)
INR PPP: 1.5 (ref 0.8–1.2)
LDH SERPL L TO P-CCNC: 145 U/L (ref 110–260)
LYMPHOCYTES # BLD AUTO: 0.4 K/UL (ref 1–4.8)
LYMPHOCYTES # BLD AUTO: 0.4 K/UL (ref 1–4.8)
LYMPHOCYTES NFR BLD: 10.5 % (ref 18–48)
LYMPHOCYTES NFR BLD: 6.9 % (ref 18–48)
MAGNESIUM SERPL-MCNC: 3.2 MG/DL (ref 1.6–2.6)
MCH RBC QN AUTO: 33.9 PG (ref 27–31)
MCH RBC QN AUTO: 34.3 PG (ref 27–31)
MCHC RBC AUTO-ENTMCNC: 32.2 G/DL (ref 32–36)
MCHC RBC AUTO-ENTMCNC: 33.8 G/DL (ref 32–36)
MCV RBC AUTO: 102 FL (ref 82–98)
MCV RBC AUTO: 105 FL (ref 82–98)
MONOCYTES # BLD AUTO: 0.4 K/UL (ref 0.3–1)
MONOCYTES # BLD AUTO: 0.4 K/UL (ref 0.3–1)
MONOCYTES NFR BLD: 10 % (ref 4–15)
MONOCYTES NFR BLD: 8.4 % (ref 4–15)
NEUTROPHILS # BLD AUTO: 2.7 K/UL (ref 1.8–7.7)
NEUTROPHILS # BLD AUTO: 4.2 K/UL (ref 1.8–7.7)
NEUTROPHILS NFR BLD: 76.9 % (ref 38–73)
NEUTROPHILS NFR BLD: 81.2 % (ref 38–73)
NRBC BLD-RTO: 0 /100 WBC
NRBC BLD-RTO: 0 /100 WBC
PHOSPHATE SERPL-MCNC: 3.6 MG/DL (ref 2.7–4.5)
PLATELET # BLD AUTO: 31 K/UL (ref 150–350)
PLATELET # BLD AUTO: 37 K/UL (ref 150–350)
PLATELET BLD QL SMEAR: ABNORMAL
PMV BLD AUTO: 11.3 FL (ref 9.2–12.9)
PMV BLD AUTO: 11.7 FL (ref 9.2–12.9)
POCT GLUCOSE: 228 MG/DL (ref 70–110)
POCT GLUCOSE: 261 MG/DL (ref 70–110)
POCT GLUCOSE: 311 MG/DL (ref 70–110)
POCT GLUCOSE: 312 MG/DL (ref 70–110)
POTASSIUM SERPL-SCNC: 3.2 MMOL/L (ref 3.5–5.1)
POTASSIUM SERPL-SCNC: 3.4 MMOL/L (ref 3.5–5.1)
PROT SERPL-MCNC: 6.2 G/DL (ref 6–8.4)
PROT SERPL-MCNC: 6.9 G/DL (ref 6–8.4)
PROTHROMBIN TIME: 14.6 SEC (ref 9–12.5)
RBC # BLD AUTO: 2.04 M/UL (ref 4–5.4)
RBC # BLD AUTO: 2.33 M/UL (ref 4–5.4)
RH BLD: NORMAL
SODIUM SERPL-SCNC: 128 MMOL/L (ref 136–145)
SODIUM SERPL-SCNC: 128 MMOL/L (ref 136–145)
TRANS ERYTHROCYTES VOL PATIENT: NORMAL ML
WBC # BLD AUTO: 3.51 K/UL (ref 3.9–12.7)
WBC # BLD AUTO: 5.21 K/UL (ref 3.9–12.7)

## 2019-08-02 PROCEDURE — 83615 LACTATE (LD) (LDH) ENZYME: CPT

## 2019-08-02 PROCEDURE — 99232 SBSQ HOSP IP/OBS MODERATE 35: CPT | Mod: GC,,, | Performed by: INTERNAL MEDICINE

## 2019-08-02 PROCEDURE — 85384 FIBRINOGEN ACTIVITY: CPT

## 2019-08-02 PROCEDURE — 86900 BLOOD TYPING SEROLOGIC ABO: CPT

## 2019-08-02 PROCEDURE — 84100 ASSAY OF PHOSPHORUS: CPT

## 2019-08-02 PROCEDURE — 25000003 PHARM REV CODE 250: Performed by: HOSPITALIST

## 2019-08-02 PROCEDURE — 86920 COMPATIBILITY TEST SPIN: CPT

## 2019-08-02 PROCEDURE — 99232 PR SUBSEQUENT HOSPITAL CARE,LEVL II: ICD-10-PCS | Mod: GC,,, | Performed by: INTERNAL MEDICINE

## 2019-08-02 PROCEDURE — 94761 N-INVAS EAR/PLS OXIMETRY MLT: CPT

## 2019-08-02 PROCEDURE — P9021 RED BLOOD CELLS UNIT: HCPCS

## 2019-08-02 PROCEDURE — 85610 PROTHROMBIN TIME: CPT

## 2019-08-02 PROCEDURE — 99222 1ST HOSP IP/OBS MODERATE 55: CPT | Mod: ,,, | Performed by: NURSE PRACTITIONER

## 2019-08-02 PROCEDURE — 97116 GAIT TRAINING THERAPY: CPT

## 2019-08-02 PROCEDURE — 36430 TRANSFUSION BLD/BLD COMPNT: CPT

## 2019-08-02 PROCEDURE — 86850 RBC ANTIBODY SCREEN: CPT

## 2019-08-02 PROCEDURE — 97535 SELF CARE MNGMENT TRAINING: CPT

## 2019-08-02 PROCEDURE — 86901 BLOOD TYPING SEROLOGIC RH(D): CPT

## 2019-08-02 PROCEDURE — 80053 COMPREHEN METABOLIC PANEL: CPT

## 2019-08-02 PROCEDURE — 27201040 HC RC 50 FILTER

## 2019-08-02 PROCEDURE — 97530 THERAPEUTIC ACTIVITIES: CPT

## 2019-08-02 PROCEDURE — 99233 SBSQ HOSP IP/OBS HIGH 50: CPT | Mod: ,,, | Performed by: HOSPITALIST

## 2019-08-02 PROCEDURE — 20600001 HC STEP DOWN PRIVATE ROOM

## 2019-08-02 PROCEDURE — 85025 COMPLETE CBC W/AUTO DIFF WBC: CPT

## 2019-08-02 PROCEDURE — 99233 PR SUBSEQUENT HOSPITAL CARE,LEVL III: ICD-10-PCS | Mod: ,,, | Performed by: HOSPITALIST

## 2019-08-02 PROCEDURE — 63600175 PHARM REV CODE 636 W HCPCS: Mod: JG | Performed by: HOSPITALIST

## 2019-08-02 PROCEDURE — 83735 ASSAY OF MAGNESIUM: CPT

## 2019-08-02 PROCEDURE — 99222 PR INITIAL HOSPITAL CARE,LEVL II: ICD-10-PCS | Mod: ,,, | Performed by: NURSE PRACTITIONER

## 2019-08-02 PROCEDURE — 83010 ASSAY OF HAPTOGLOBIN QUANT: CPT

## 2019-08-02 PROCEDURE — P9047 ALBUMIN (HUMAN), 25%, 50ML: HCPCS | Mod: JG | Performed by: HOSPITALIST

## 2019-08-02 PROCEDURE — 36415 COLL VENOUS BLD VENIPUNCTURE: CPT

## 2019-08-02 RX ORDER — HYDROCODONE BITARTRATE AND ACETAMINOPHEN 500; 5 MG/1; MG/1
TABLET ORAL
Status: DISCONTINUED | OUTPATIENT
Start: 2019-08-02 | End: 2019-08-05

## 2019-08-02 RX ORDER — ALBUMIN HUMAN 250 G/1000ML
50 SOLUTION INTRAVENOUS DAILY
Status: COMPLETED | OUTPATIENT
Start: 2019-08-03 | End: 2019-08-04

## 2019-08-02 RX ORDER — SODIUM BICARBONATE 650 MG/1
1350 TABLET ORAL 2 TIMES DAILY
Status: DISCONTINUED | OUTPATIENT
Start: 2019-08-02 | End: 2019-08-07

## 2019-08-02 RX ORDER — SODIUM CHLORIDE 0.9 % (FLUSH) 0.9 %
10 SYRINGE (ML) INJECTION
Status: DISCONTINUED | OUTPATIENT
Start: 2019-08-02 | End: 2019-08-05

## 2019-08-02 RX ADMIN — MIDODRINE HYDROCHLORIDE 15 MG: 5 TABLET ORAL at 10:08

## 2019-08-02 RX ADMIN — LEVOTHYROXINE SODIUM 88 MCG: 88 TABLET ORAL at 05:08

## 2019-08-02 RX ADMIN — Medication 220 MG: at 11:08

## 2019-08-02 RX ADMIN — MIDODRINE HYDROCHLORIDE 15 MG: 5 TABLET ORAL at 04:08

## 2019-08-02 RX ADMIN — SODIUM BICARBONATE 650 MG TABLET 1300 MG: at 06:08

## 2019-08-02 RX ADMIN — INSULIN ASPART 4 UNITS: 100 INJECTION, SOLUTION INTRAVENOUS; SUBCUTANEOUS at 08:08

## 2019-08-02 RX ADMIN — INSULIN ASPART 4 UNITS: 100 INJECTION, SOLUTION INTRAVENOUS; SUBCUTANEOUS at 11:08

## 2019-08-02 RX ADMIN — ALBUMIN (HUMAN) 50 G: 12.5 SOLUTION INTRAVENOUS at 08:08

## 2019-08-02 RX ADMIN — INSULIN ASPART 3 UNITS: 100 INJECTION, SOLUTION INTRAVENOUS; SUBCUTANEOUS at 08:08

## 2019-08-02 RX ADMIN — LACTULOSE 30 G: 20 SOLUTION ORAL at 08:08

## 2019-08-02 RX ADMIN — CIPROFLOXACIN HYDROCHLORIDE 250 MG: 250 TABLET, FILM COATED ORAL at 08:08

## 2019-08-02 RX ADMIN — GUAIFENESIN 600 MG: 600 TABLET, EXTENDED RELEASE ORAL at 08:08

## 2019-08-02 RX ADMIN — INSULIN ASPART 4 UNITS: 100 INJECTION, SOLUTION INTRAVENOUS; SUBCUTANEOUS at 04:08

## 2019-08-02 RX ADMIN — INSULIN ASPART 1 UNITS: 100 INJECTION, SOLUTION INTRAVENOUS; SUBCUTANEOUS at 09:08

## 2019-08-02 RX ADMIN — MIDODRINE HYDROCHLORIDE 15 MG: 5 TABLET ORAL at 08:08

## 2019-08-02 RX ADMIN — RIFAXIMIN 550 MG: 550 TABLET ORAL at 10:08

## 2019-08-02 RX ADMIN — RIFAXIMIN 550 MG: 550 TABLET ORAL at 08:08

## 2019-08-02 RX ADMIN — GUAIFENESIN 600 MG: 600 TABLET, EXTENDED RELEASE ORAL at 10:08

## 2019-08-02 NOTE — PLAN OF CARE
Problem: Physical Therapy Goal  Goal: Physical Therapy Goal  Goals to be met by: 2019     Patient will increase functional independence with mobility by performin. Supine to sit with contact guard assistance   2. Sit to supine with contact guard assistance   3. Sit to stand transfer with stand by assistance with LRAD   4. Gait  x 50 feet with stand by assistance using Rolling Walker.   5. Ascend/descend 3 stair with bilateral Handrails Stand-by Assistance       Discharge Recommendations: [facility] SN[facility]    Pt safe to transfer OOB/BTB with RN/PCT via SPT: Use RW with mod A of 1 person.    Goals remain appropriate.     Matilde Francis, PTA.   [phone]   2019

## 2019-08-02 NOTE — ASSESSMENT & PLAN NOTE
Rusty is a 69 yo F with liver cirrhosis and CKD admitted for hyponatremia. Hyponatremia may be related to several factors primarily liver cirrhosis requiring biweekly paracentesis. This leads to a hypovolemic hyponatremic state in which fluid is pulled out of the vasculature. Additional contributing factors related to ileus, NPO status, and TPN nutrition. Extra sodium in TPN has increased sodium. Now on renal diet with 1000mL fluid restriction. Baseline Na+ from labs in [place] between 123-124.   - 8/1 Na 125; while this is closer to pt's baseline, does represent a drop since 8/30 from 142. Improving with IV albumin and NS  - Midodrine 10 mg TID;  - Strict I/O monitoring

## 2019-08-02 NOTE — PLAN OF CARE
Problem: Adult Inpatient Plan of Care  Goal: Plan of Care Review  Outcome: Ongoing (interventions implemented as appropriate)  No acute changes overnight. VSS. Pt remained free of falls. Safety maintained. Hourly rounding performed. BG monitored ACHS. Pt resting. Daughter at bedside. No needs at this time. Call bell in reach. Will continue to monitor.

## 2019-08-02 NOTE — SUBJECTIVE & OBJECTIVE
Interval History: No acute events overnight. Pt hypotensive, MAP~70 but did not require intervention. Pt more somnolent this AM.     Review of patient's allergies indicates:  No Known Allergies  Current Facility-Administered Medications   Medication Frequency    0.9%  NaCl infusion (for blood administration) Q24H PRN    acetaminophen tablet 325 mg Q4H PRN    chlorproMAZINE tablet 25 mg TID PRN    ciprofloxacin HCl tablet 250 mg Daily    dextrose 10% (D10W) Bolus PRN    dextrose 10% (D10W) Bolus PRN    dicyclomine capsule 10 mg QID PRN    glucagon (human recombinant) injection 1 mg PRN    glucose chewable tablet 16 g PRN    glucose chewable tablet 24 g PRN    guaiFENesin 12 hr tablet 600 mg BID    insulin aspart U-100 pen 0-5 Units Q6H PRN    insulin aspart U-100 pen 4 Units TIDWM    [START ON 8/3/2019] insulin detemir U-100 pen 12 Units Daily    lactulose 20 gram/30 mL solution Soln 30 g TID    levothyroxine tablet 88 mcg Before breakfast    midodrine tablet 15 mg TID    ondansetron injection 4 mg Q6H PRN    rifAXIMin tablet 550 mg BID    simethicone chewable tablet 80 mg TID PRN    sodium chloride 0.9% flush 10 mL PRN    sodium chloride 0.9% flush 10 mL PRN    sodium chloride 0.9% flush 10 mL PRN    zinc sulfate capsule 220 mg Daily       Objective:     Vital Signs (Most Recent):  Temp: 97.6 °F (36.4 °C) (08/02/19 1149)  Pulse: 76 (08/02/19 1149)  Resp: 18 (08/02/19 1149)  BP: (!) 114/54 (08/02/19 1149)  SpO2: 100 % (08/02/19 1149)  O2 Device (Oxygen Therapy): room air (08/01/19 1712) Vital Signs (24h Range):  Temp:  [96.4 °F (35.8 °C)-98 °F (36.7 °C)] 97.6 °F (36.4 °C)  Pulse:  [64-77] 76  Resp:  [12-18] 18  SpO2:  [99 %-100 %] 100 %  BP: ()/(51-55) 114/54     Weight: 56 kg (123 lb 7.3 oz) (07/30/19 1000)  Body mass index is 24.11 kg/m².  Body surface area is 1.54 meters squared.    I/O last 3 completed shifts:  In: 2020 [P.O.:1020; IV Piggyback:1000]  Out: 800 [Urine:800]    Physical  Exam   Constitutional: She appears well-developed and well-nourished. She appears listless. She appears cachectic. No distress.   HENT:   Head: Normocephalic and atraumatic.   Eyes: EOM are normal. Scleral icterus is present.   Neck: Normal range of motion.   Cardiovascular: Normal rate, regular rhythm and normal heart sounds.   No murmur heard.  Pulmonary/Chest: Effort normal and breath sounds normal.   Abdominal: Soft. Bowel sounds are normal. She exhibits ascites. There is hepatosplenomegaly. There is no tenderness.   Musculoskeletal: She exhibits no edema.   Neurological: She appears listless. She is disoriented (Oriented to Person, Place).   Skin: Skin is warm and dry. Ecchymosis noted. She is not diaphoretic.   Nursing note and vitals reviewed.      Significant Labs:  CBC:   Recent Labs   Lab 08/02/19  0538   WBC 3.51*   RBC 2.04*   HGB 7.0*   HCT 20.7*   PLT 31*   *   MCH 34.3*   MCHC 33.8     CMP:   Recent Labs   Lab 08/02/19  0538   *   CALCIUM 8.2*   ALBUMIN 3.9   PROT 6.2   *   K 3.4*   CO2 15*   CL 99   *   CREATININE 3.3*   ALKPHOS 149*   ALT 42   AST 54*   BILITOT 2.0*     Recent Labs   Lab 08/01/19  2047   COLORU Yellow   SPECGRAV 1.015   PHUR 5.0   PROTEINUA Negative   NITRITE Negative   LEUKOCYTESUR Negative     All labs within the past 24 hours have been reviewed.     Significant Imaging:  No recent imaging

## 2019-08-02 NOTE — PT/OT/SLP PROGRESS
Occupational Therapy   Treatment    Name: Michelle Ojeda  MRN: [id number]  Admitting Diagnosis:  Acute metabolic encephalopathy       Recommendations:     Discharge Recommendations: nursing facility, skilled, other (see comments)(SS)  Discharge Equipment Recommendations:  walker, rolling  Barriers to discharge:  None    Assessment:     Michelle Ojeda is a 68 y.o. female with a medical diagnosis of Acute metabolic encephalopathy.  She presents with impaired ADL and functional mobility performance. Pt very pleasant and grateful for therapy services with focus on self-care this date. Pt found following 'sunshine therapy' with daughter with pt in personal w/c. Pt with setup with face cloth, teeth brushing items, moisturizer, and hair styling comb. Pt with noted improvements in postural control while in wheelchair at sink. Performance deficits affecting function are weakness, impaired endurance, impaired self care skills, impaired balance, impaired functional mobilty, gait instability, decreased coordination, decreased safety awareness, impaired skin. Pt would benefit from continued OT skilled services 3x/wk to improve daily living skills to optimize QOL. Pt is recommended to discharge to -SNF at this time.      Rehab Prognosis:  Good; patient would benefit from acute skilled OT services to address these deficits and reach maximum level of function.       Plan:     Patient to be seen 4 x/week to address the above listed problems via self-care/home management, therapeutic activities, therapeutic exercises  · Plan of Care Expires: 08/29/19  · Plan of Care Reviewed with: patient, daughter    Subjective     Pain/Comfort:  · Pain Rating 1: 0/10  · Pain Rating 2: 0/10    Objective:     Communicated with: RN prior to session.  Patient found up in wheelchair with telemetry upon OT entry to room.    General Precautions: Standard, fall   Orthopedic Precautions:N/A   Braces: N/A     Occupational Performance:     Activities of  Daily Living:  · Grooming: setup with face washing, teeth brushing, applying facial lotion, and hair styling items at sink with pt in w/c      [facility] 6 Click ADL: 17    Treatment & Education:  Pt educated on role of occupational therapy, POC, and safety during ADLs and functional mobility. Pt and OT discussed importance of safe, continued mobility to optimize daily living skills. Pt verbalized understanding.   Pt completed the following during session: ADL tasks while seated in w/c at sink.White board updated during session. Pt given instruction to call for medical staff/nurse for assistance.       Patient left up in w/c with all lines intact, call button in reach and daughter Tegan presentEducation:      GOALS:   Multidisciplinary Problems     Occupational Therapy Goals        Problem: Occupational Therapy Goal    Goal Priority Disciplines Outcome Interventions   Occupational Therapy Goal     OT, PT/OT Ongoing (interventions implemented as appropriate)    Description:  Goals to be met by: 8/29/19     Patient will increase functional independence with ADLs by performing:    UE Dressing with Modified [place].  LE Dressing with Modified [place] and Assistive Devices as needed.  Grooming while standing at sink with Modified [place].  Toileting from bedside commode with Modified [place] for hygiene and clothing management.   Bathing from  shower chair/bench with Modified [place].  Toilet transfer to bedside commode with Modified [place].  Increased functional strength to WFL for B UE.  Upper extremity exercise program x15 reps per handout, with assistance as needed.                      Time Tracking:     OT Date of Treatment: 08/02/19  OT Start Time: 1312  OT Stop Time: 1335  OT Total Time (min): 23 min    Billable Minutes:Self Care/Home Management 23 min    Jayshree Samano OT  8/2/2019

## 2019-08-02 NOTE — CONSULTS
[facility]  Physical Medicine & Rehab  Consult Note    Patient Name: Michelle Ojeda  MRN: [id number]  Admission Date: 7/18/2019  Hospital Length of Stay: 15 days  Attending Physician: Ty Breaux MD     Inpatient consult to Physical Medicine & Rehabilitation  Consult performed by: Fifi Foster NP  Consult requested by:  Ty Breaux MD    Collaborating Physician: Pilar Thornton MD  Reason for Consult:  Rehab evaluation     Consults  Subjective:     Principal Problem: Acute metabolic encephalopathy    HPI: Michelle Ojeda is a 68-year-old female with PMHx of HTN, HLD, DMII, hypothyroidism, and IZAGUIRRE cirrhosis complicated by CKD IV, hepatic encephalopathy, ascites, sarcopenia, esophageal varices, and hyponatremia.  [place] [facility] [place] [facility] [place] for significant hyponatremia.  On arrival, admitted to Hospital Medicine for management of hyponatremia and decompensated IZAGUIRRE cirrhosis complicated by acute metabolic encephalopathy, ascites (s/p paracentesis on 7/19, 7/24, 7/31), coagulopathy, pancytopenia, protein calorie malnutrition, electrolyte derangement, and physical debility.  Hepatology following for co-management.  Transplant team consulted and initiated transplant work-up.  During work-up, incidental R renal mass found on CT.  Urology consulted and recommended outpatient surveillance.  Hospital course further complicated by ileus with subsequent worsening encephalopathy 2/2 lactulose intolerance requiring intubation for acute hypoxemic respiratory failure and ICU transfer on 7/24/19.  Extubated on 7/27/19.  Stepped down to floor on 7/29/19.  Currently not candidate for liver transplant 2/2 frailty and physical debility.  PT and OT following.        Functional History: Patient lives in [place], [place] with her  in a single story home with 3 steps to enter.  Prior to admission, she was (I) with ADLs and mobility.  Functional decline 2/2  fatigue/lethargy x 1 month prior to admission.  DME: [facility], grab bar.    Hospital Course:   7/30/19:  Participated with PT and OT.  Bed mobility SBA-modA.  Sit to stand and transfers modA with RW.  Ambulated 12 ft Lonnie with RW.  LBD totalA.    7/31/19:  Participated with PT.  Bed mobility CGA-modA.  Sit to stand CGA with RW.  Ambulated 20 ft x 2 Lonnie with RW.      Past Medical History:   Diagnosis Date    Cirrhosis     Diabetes mellitus, type 2     Disorder of kidney and ureter     Hypertension     Hypothyroidism     NAFLD (nonalcoholic fatty liver disease)      History reviewed. No pertinent surgical history.  Review of patient's allergies indicates:  No Known Allergies    Scheduled Medications:    ciprofloxacin HCl  250 mg Oral Daily    guaiFENesin  600 mg Oral BID    insulin aspart U-100  4 Units Subcutaneous TIDWM    [START ON 8/3/2019] insulin detemir U-100  12 Units Subcutaneous Daily    lactulose  30 g Oral TID    levothyroxine  88 mcg Oral Before breakfast    midodrine  15 mg Oral TID    rifAXImin  550 mg Oral BID    zinc sulfate  220 mg Oral Daily       PRN Medications: sodium chloride, acetaminophen, chlorproMAZINE, Dextrose 10% Bolus, Dextrose 10% Bolus, dicyclomine, glucagon (human recombinant), glucose, glucose, insulin aspart U-100, ondansetron, simethicone, sodium chloride 0.9%, sodium chloride 0.9%, sodium chloride 0.9%    Family History     Problem Relation (Age of Onset)    No Known Problems Father        Tobacco Use    Smoking status: Never Smoker    Smokeless tobacco: Never Used   Substance and Sexual Activity    Alcohol use: Not Currently    Drug use: Never    Sexual activity: Not on file     Review of Systems   Constitutional: Positive for activity change and fatigue. Negative for chills and fever.   HENT: Positive for trouble swallowing and voice change. Negative for drooling and hearing loss.    Eyes: Negative for pain and visual disturbance.   Respiratory: Negative for  cough, shortness of breath and wheezing.    Cardiovascular: Negative for chest pain and palpitations.   Gastrointestinal: Positive for abdominal distention. Negative for nausea and vomiting.   Genitourinary: Negative for difficulty urinating and flank pain.   Musculoskeletal: Positive for gait problem. Negative for back pain, myalgias and neck pain.   Skin: Positive for color change. Negative for rash.   Neurological: Positive for weakness. Negative for dizziness, numbness and headaches.   Psychiatric/Behavioral: Negative for agitation and confusion. The patient is not nervous/anxious.      Objective:     Vital Signs (Most Recent):  Temp: 97.6 °F (36.4 °C) (08/02/19 1149)  Pulse: 76 (08/02/19 1149)  Resp: 18 (08/02/19 1149)  BP: (!) 114/54 (08/02/19 1149)  SpO2: 100 % (08/02/19 1149)    Vital Signs (24h Range):  Temp:  [96.4 °F (35.8 °C)-98 °F (36.7 °C)] 97.6 °F (36.4 °C)  Pulse:  [64-77] 76  Resp:  [12-18] 18  SpO2:  [99 %-100 %] 100 %  BP: ()/(51-55) 114/54     Body mass index is 24.11 kg/m².    Physical Exam   Constitutional: She is oriented to person, place, and time. She appears well-developed and well-nourished. She appears ill. No distress.   HENT:   Head: Normocephalic and atraumatic.   Right Ear: External ear normal.   Left Ear: External ear normal.   Nose: Nose normal.   Dysphonia   Eyes: Right eye exhibits no discharge. Left eye exhibits no discharge. No scleral icterus.   Neck: Normal range of motion.   Cardiovascular: Normal rate and intact distal pulses.   Pulmonary/Chest: Effort normal. No respiratory distress. She has no wheezes.   Abdominal: Soft. She exhibits ascites. There is no tenderness.   Musculoskeletal: Normal range of motion. She exhibits no edema or tenderness.   General deconditioning/debility   Neurological: She is alert and oriented to person, place, and time. No sensory deficit. She exhibits normal muscle tone.   Skin: Skin is warm and dry. Bruising and ecchymosis noted. No rash  noted.   Psychiatric: She has a normal mood and affect. She is slowed.   Vitals reviewed.    Diagnostic Results:   Labs: Reviewed  X-Ray: Reviewed  CT: Reviewed    Assessment/Plan:     Acute hypoxemic respiratory failure  -  Intubated 7/24/19-7/27/19  -  Improved     Kidney transplant candidate  Acute kidney injury superimposed on chronic kidney disease  -  Nephrology following    * Acute metabolic encephalopathy  Liver cirrhosis secondary to IZAGUIRRE  Hyponatremia  Coagulopathy  Pancytopenia  Ascites  -  H/o IZAGUIRRE cirrhosis complicated by acute metabolic encephalopathy, ascites (s/p paracentesis on 7/19, 7/24, 7/31), coagulopathy, pancytopenia, protein calorie malnutrition, electrolyte derangement, and physical debility  -  Hepatology following  -  Transplant team consulted and initiated transplant work-up-->  Currently not candidate for liver transplant 2/2 frailty and physical debility  -  Management per primary team    Physical debility  -  Related to liver failure, co-morbidities, and acute hospital course  -  Independent at baseline with functional decline x ~1 month prior to admission  See hospital course for functional status.    Recommendations  -  Encourage mobility, OOB in chair, and early ambulation as appropriate  -  PT/OT evaluate and treat  -  Pain management  -  DVT prophylaxis  -  Monitor for and prevent skin breakdown and pressure ulcers  · Early mobility, repositioning/weight shifting every 20-30 minutes when sitting, turn patient every 2 hours, proper mattress/overlay and chair cushioning, pressure relief/heel protector boots    Patient with therapy/rehab needs, endurance limited. More fatigue/weakness with PT today.  Possible candidate for Inpatient Rehab.  Will follow over the weekend for tolerance and progress with therapy for final post-acute recommendation.      Thank you for your consult.     GEOVANNY Saleem  Department of Physical Medicine & Rehab  [facility]

## 2019-08-02 NOTE — PT/OT/SLP PROGRESS
Physical Therapy Treatment    Patient Name:  Michelle Ojeda   MRN:  [id number]  Admitting Diagnosis: Acute metabolic encephalopathy  Recent Surgery: * No surgery found *      Recommendations:     Discharge Recommendations:  nursing facility, skilled(Short stay)   Discharge Equipment Recommendations: walker, rolling   Barriers to discharge: Inaccessible home  Not at PLOF    Plan:     During this hospitalization, patient to be seen 4 x/week to address the above listed problems via gait training, therapeutic activities, therapeutic exercises, neuromuscular re-education  · Plan of Care Expires:  08/29/19   Plan of Care Reviewed with: patient, daughter    This Plan of care has been discussed with the patient who was involved in its development and understands and is in agreement with the identified goals and treatment plan    Subjective     Communicated with RN (Iveth) prior to session.     Patient comments: pt denies dizziness  Pain/Comfort:  · Pain Rating 1: 0/10  · Pain Rating Post-Intervention 1: 0/10    Objective:     Patient found with: telemetry(waffle pad)    Patient found sup in bed upon PT entry to room, agreeable to treatment.  Daughter present in the room.    General Precautions: Standard, fall   Orthopedic Precautions:N/A   Braces: N/A     Pt had BM in bed and requires assistance for hygiene and donning of diaper    BED MOBILITY (vc's for hand placement sequencing of task):        Rolling to the R:  Min A, with bedrail.       Rolling to the L:  Min A, with bedrail.        Sup > sit at the EOB:  Mod A for trunk elevation and to guide LE's, from L side lying.       Sit > sup:  Not performed 2* pt left seated UIC.       Scooting hips to EOB with SBA                SITTING AT THE EDGE OF THE BED    Assistance Level Required: SBA for safety with B UE support     TRANSFERS  (vc's for hand placement, sequencing of task and safety)   Patient completed Sit <> Stand Transfer from EOB/transport w/c with mod/min A for  hip elevation/balance with rolling walker x2 trial(s)   Patient completed Stand <> Sit Transfer to transport w/c with min A for balance with rolling walker    Patient completed Bed <> transport w/c Transfer using Step Transfer technique to the  with mod A with rolling walker     GAIT: in hallway, daughter follows with transport w/c   Patient ambulated: 30ft   Patient required: initially with mod A then min A for balance and mgt of AD   Patient used:  Rolling Walker   Gait Pattern observed: reciprocal gait   Gait Deviation(s): decreased denise, increased time in double stance, decreased velocity of limb motion, decreased step length, decreased stride length, decreased toe-to-floor clearance, decreased weight-shifting ability and wide PRECIOUS, poor initiation    Comments: vc's and tc's for upright posture, placement of AD, weight shift, directional guidance, B step length and safety    EDUCATION  Patient provided with daily orientation and goals of this PT session. They were educated to call for assistance and to transfer with hospital staff only.  Also, pt was educated on the effects of prolonged immobility and the importance of performing OOB activity and exercises to promote healing and reduce recovery time    Whiteboard updated with correct mobility information. RN/PCT notified.  Pt safe to transfer OOB/BTB with RN/PCT via SPT: Use RW with mod A of 1 person.      Patient left up in transport w/c, with  all lines intact, call button in reach, RN notified and RN and daughter present    AM-PAC 6 CLICK MOBILITY  Turning over in bed (including adjusting bedclothes, sheets and blankets)?: 3  Sitting down on and standing up from a chair with arms (e.g., wheelchair, bedside commode, etc.): 2  Moving from lying on back to sitting on the side of the bed?: 2  Moving to and from a bed to a chair (including a wheelchair)?: 2  Need to walk in hospital room?: 2  Climbing 3-5 steps with a railing?: 1  Basic Mobility Total Score:  12     Assessment:     Michelle Ojeda is a 68 y.o. female admitted with a medical diagnosis of Acute metabolic encephalopathy.  She presents with the following impairments/functional limitations:  weakness, impaired endurance, impaired self care skills, impaired functional mobilty, gait instability, impaired balance, decreased coordination, decreased upper extremity function, decreased lower extremity function, impaired coordination. requiring significant assistance and verbal cues for bed mob, transfers and gait 2* weakness, poor initiation, fatigue.   In light of pt's current functional level and deficits, it is anticipated that pt will need to participate in an intense rehab program consisting of PT and OT in order to achieve full rehab potential to return to previous level of function and roles.  Pt remains motivated to participate in PT session and will cont to benefit from skilled PT intervention.    Rehab Prognosis:  Good; patient would benefit from acute skilled PT services to address these deficits and reach maximum level of function.      GOALS:   Multidisciplinary Problems     Physical Therapy Goals        Problem: Physical Therapy Goal    Goal Priority Disciplines Outcome Goal Variances Interventions   Physical Therapy Goal     PT, PT/OT Ongoing (interventions implemented as appropriate)     Description:  Goals to be met by: 2019     Patient will increase functional independence with mobility by performin. Supine to sit with contact guard assistance   2. Sit to supine with contact guard assistance   3. Sit to stand transfer with stand by assistance with LRAD   4. Gait  x 50 feet with stand by assistance using Rolling Walker.   5. Ascend/descend 3 stair with bilateral Handrails Stand-by Assistance                      Time Tracking:     PT Received On: 19  PT Start Time: 1048     PT Stop Time: 1144  PT Total Time (min): 56 min     Billable Minutes: Gait Training 15 and Therapeutic  Activity 41    Treatment Type: Treatment  PT/PTA: PTA     PTA Visit Number: 1       Matilde Francis PTA.  Pager [phone]    8/2/2019    .

## 2019-08-02 NOTE — SUBJECTIVE & OBJECTIVE
Past Medical History:   Diagnosis Date    Cirrhosis     Diabetes mellitus, type 2     Disorder of kidney and ureter     Hypertension     Hypothyroidism     NAFLD (nonalcoholic fatty liver disease)      History reviewed. No pertinent surgical history.  Review of patient's allergies indicates:  No Known Allergies    Scheduled Medications:    ciprofloxacin HCl  250 mg Oral Daily    guaiFENesin  600 mg Oral BID    insulin aspart U-100  4 Units Subcutaneous TIDWM    [START ON 8/3/2019] insulin detemir U-100  12 Units Subcutaneous Daily    lactulose  30 g Oral TID    levothyroxine  88 mcg Oral Before breakfast    midodrine  15 mg Oral TID    rifAXImin  550 mg Oral BID    zinc sulfate  220 mg Oral Daily       PRN Medications: sodium chloride, acetaminophen, chlorproMAZINE, Dextrose 10% Bolus, Dextrose 10% Bolus, dicyclomine, glucagon (human recombinant), glucose, glucose, insulin aspart U-100, ondansetron, simethicone, sodium chloride 0.9%, sodium chloride 0.9%, sodium chloride 0.9%    Family History     Problem Relation (Age of Onset)    No Known Problems Father        Tobacco Use    Smoking status: Never Smoker    Smokeless tobacco: Never Used   Substance and Sexual Activity    Alcohol use: Not Currently    Drug use: Never    Sexual activity: Not on file     Review of Systems   Constitutional: Positive for activity change and fatigue. Negative for chills and fever.   HENT: Positive for trouble swallowing and voice change. Negative for drooling and hearing loss.    Eyes: Negative for pain and visual disturbance.   Respiratory: Negative for cough, shortness of breath and wheezing.    Cardiovascular: Negative for chest pain and palpitations.   Gastrointestinal: Positive for abdominal distention. Negative for nausea and vomiting.   Genitourinary: Negative for difficulty urinating and flank pain.   Musculoskeletal: Positive for gait problem. Negative for back pain, myalgias and neck pain.   Skin: Positive  for color change. Negative for rash.   Neurological: Positive for weakness. Negative for dizziness, numbness and headaches.   Psychiatric/Behavioral: Negative for agitation and confusion. The patient is not nervous/anxious.      Objective:     Vital Signs (Most Recent):  Temp: 97.6 °F (36.4 °C) (08/02/19 1149)  Pulse: 76 (08/02/19 1149)  Resp: 18 (08/02/19 1149)  BP: (!) 114/54 (08/02/19 1149)  SpO2: 100 % (08/02/19 1149)    Vital Signs (24h Range):  Temp:  [96.4 °F (35.8 °C)-98 °F (36.7 °C)] 97.6 °F (36.4 °C)  Pulse:  [64-77] 76  Resp:  [12-18] 18  SpO2:  [99 %-100 %] 100 %  BP: ()/(51-55) 114/54     Body mass index is 24.11 kg/m².    Physical Exam   Constitutional: She is oriented to person, place, and time. She appears well-developed and well-nourished. She appears ill. No distress.   HENT:   Head: Normocephalic and atraumatic.   Right Ear: External ear normal.   Left Ear: External ear normal.   Nose: Nose normal.   Dysphonia   Eyes: Right eye exhibits no discharge. Left eye exhibits no discharge. No scleral icterus.   Neck: Normal range of motion.   Cardiovascular: Normal rate and intact distal pulses.   Pulmonary/Chest: Effort normal. No respiratory distress. She has no wheezes.   Abdominal: Soft. She exhibits ascites. There is no tenderness.   Musculoskeletal: Normal range of motion. She exhibits no edema or tenderness.   General deconditioning/debility   Neurological: She is alert and oriented to person, place, and time. No sensory deficit. She exhibits normal muscle tone.   Skin: Skin is warm and dry. Bruising and ecchymosis noted. No rash noted.   Psychiatric: She has a normal mood and affect. She is slowed.   Vitals reviewed.    NEUROLOGICAL EXAMINATION:     MENTAL STATUS   Oriented to person, place, and time.       Diagnostic Results:   Labs: Reviewed  X-Ray: Reviewed  CT: Reviewed

## 2019-08-02 NOTE — ASSESSMENT & PLAN NOTE
Labs from [place] [facility] demonstrated Cr of 1.77 in 3/12/19, 1.67 on 4/5/19, 1.96 in 6/13/19, and 1.83 on 6/24. Cr of 2.8 on 7/26/19 above her baseline due to ATN secondary to hypoperfusion of kidneys related to hypotensive episodes. Hepatorenal syndrome not suspected at this time as the patient's Cr and urine output have improved with an albumin challenge. TERA could be exacerbated with noted Hgb drop from 9.9 on 7/27 to 7.2 on 7/29.     Recommendations:   - Cr had been improving, but 8/1 has worsened to 2.8 from 2.3 the previous day. Possibly 2/2 fluid shifting after paracentesis vs over-activation of ADH. 8/2 3.3  - c/w Midodrine 10mg TID added to prevent hypoperfusion to kidneys. Goal MAP>70  - Recommend continuing albumin 50g IV daily; Start Sodium Bicarbonate 1300mg PO BID  - Monitor with BMP  - Strict I/Os   - Avoid Nephrotoxic agents  - Monitor for signs of blood loss  - Hgb > 7

## 2019-08-02 NOTE — SUBJECTIVE & OBJECTIVE
Interval History:   Hgb 7, no overt bleeding.  Cr rising, now 3.4.  Working with PT/OT.    Current Facility-Administered Medications   Medication    0.9%  NaCl infusion (for blood administration)    acetaminophen tablet 325 mg    [START ON 8/3/2019] albumin human 25% bottle 50 g    chlorproMAZINE tablet 25 mg    ciprofloxacin HCl tablet 250 mg    dextrose 10% (D10W) Bolus    dextrose 10% (D10W) Bolus    dicyclomine capsule 10 mg    glucagon (human recombinant) injection 1 mg    glucose chewable tablet 16 g    glucose chewable tablet 24 g    guaiFENesin 12 hr tablet 600 mg    insulin aspart U-100 pen 0-5 Units    insulin aspart U-100 pen 4 Units    [START ON 8/3/2019] insulin detemir U-100 pen 12 Units    lactulose 20 gram/30 mL solution Soln 30 g    levothyroxine tablet 88 mcg    midodrine tablet 15 mg    ondansetron injection 4 mg    rifAXIMin tablet 550 mg    simethicone chewable tablet 80 mg    sodium bicarbonate tablet 1,300 mg    sodium chloride 0.9% flush 10 mL    sodium chloride 0.9% flush 10 mL    sodium chloride 0.9% flush 10 mL    zinc sulfate capsule 220 mg       Objective:     Vital Signs (Most Recent):  Temp: 98 °F (36.7 °C) (08/02/19 1649)  Pulse: 78 (08/02/19 1649)  Resp: 18 (08/02/19 1649)  BP: (!) 123/58 (08/02/19 1649)  SpO2: 100 % (08/02/19 1649) Vital Signs (24h Range):  Temp:  [96.4 °F (35.8 °C)-98 °F (36.7 °C)] 98 °F (36.7 °C)  Pulse:  [64-79] 78  Resp:  [12-18] 18  SpO2:  [100 %] 100 %  BP: ()/(51-64) 123/58     Weight: 56 kg (123 lb 7.3 oz) (07/30/19 1000)  Body mass index is 24.11 kg/m².    Physical Exam   Constitutional: She appears ill. No distress.   Cardiovascular: Normal rate and regular rhythm.   Pulmonary/Chest: Effort normal. No respiratory distress.   Abdominal: Soft. Bowel sounds are normal. She exhibits distension. There is no tenderness.   Neurological: She is alert.   Psychiatric: Her behavior is normal.   Nursing note and vitals  reviewed.      MELD-Na score: 31 at 8/2/2019  3:59 PM  MELD score: 27 at 8/2/2019  3:59 PM  Calculated from:  Serum Creatinine: 3.4 mg/dL at 8/2/2019  3:59 PM  Serum Sodium: 128 mmol/L at 8/2/2019  3:59 PM  Total Bilirubin: 3.0 mg/dL at 8/2/2019  3:59 PM  INR(ratio): 1.5 at 8/2/2019  5:38 AM  Age: 68 years    Significant Labs:  Labs within the past month have been reviewed.    Significant Imaging:  Reviewed

## 2019-08-02 NOTE — PROGRESS NOTES
Progress Note   Hospital Medicine         Patient Name: Michelle Ojeda  MRN:  [id number]  [place]ospital Medicine [place] [facility] Ty Breaux MD  Date of Admission:  7/18/2019     Length of Stay:  LOS: 15 days   Expected Discharge Date: 8/5/2019  Principal Problem:  Acute metabolic encephalopathy       Subjective:     Interval History/Overnight Events:  Patient's hemoglobin down to 7 today, no overt signs of GI bleed; likely multi-factorial due to hemolysis, CKD and dilution; transfusing 1 unit of PRBC; daughter at bedside; Cr still rising and BUN of 105, having some uremic symptoms of nausea and vomiting yesterday; nephrology following and recommend daily albumin and bicarb;   - plan for rehab on Monday once medically stable;    Review of Systems   Constitutional: Negative for chills, fatigue, fever.   HENT: Negative for sore throat, trouble swallowing.    Eyes: Negative for photophobia, visual disturbance.   Respiratory: Negative for cough, shortness of breath.    Cardiovascular: Negative for chest pain, palpitations, leg swelling.   Gastrointestinal: Negative for abdominal pain, constipation, diarrhea, nausea, vomiting.   Endocrine: Negative for cold intolerance, heat intolerance.   Genitourinary: Negative for dysuria, frequency.   Musculoskeletal: Negative for arthralgias, myalgias.   Skin: Negative for rash, wound, erythema   Neurological: Negative for dizziness, syncope, weakness, light-headedness.   Psychiatric/Behavioral: Negative for confusion, hallucinations, anxiety  All other systems reviewed and are negative.    Objective:     Temp:  [96.4 °F (35.8 °C)-98 °F (36.7 °C)]   Pulse:  [64-79]   Resp:  [12-18]   BP: ()/(51-64)   SpO2:  [99 %-100 %]       Physical Exam:  Constitutional: apears weak and frail   Head: Normocephalic and atraumatic.   Mouth/Throat: Oropharynx is clear and moist.   Eyes: EOM are normal. Pupils are equal, round, and reactive to light. No scleral icterus.   Neck: Normal range  of motion. Neck supple.   Cardiovascular: Normal rate and regular rhythm.  No murmur heard.  Pulmonary/Chest: Effort normal and breath sounds normal. No respiratory distress. No wheezes, rales, or rhonchi  Abdominal: Soft. Bowel sounds are normal. Positive distension; no TTP  Musculoskeletal: Normal range of motion. No edema.   Neurological: Alert and oriented to person, place, and time.   Skin: Skin is warm and dry.   Psychiatric: Normal mood and affect. Behavior is normal.     Recent Labs   Lab 07/29/19  0558 07/29/19  1254 07/30/19  0606 07/31/19  0525 08/01/19  0548 08/02/19  0538 08/02/19  1559   WBC 2.52* 1.71* 3.61* 5.31 7.70 3.51* 5.21   HGB 7.2* 7.3* 8.4* 7.5* 8.6* 7.0* 7.9*   HCT 22.0* 22.3* 25.9* 22.3* 24.6* 20.7* 24.5*   PLT 26* 23* 29* 32* 45* 31*  --      Recent Labs   Lab 07/31/19  0525 08/01/19  0548 08/01/19  1527 08/02/19  0538   * 125* 127* 128*   K 3.3* 4.2 4.0 3.4*    97 98 99   CO2 19* 19* 19* 15*   BUN 83* 98* 102* 105*   CREATININE 2.3* 2.8* 3.2* 3.3*   * 252* 332* 247*   CALCIUM 8.4* 8.1* 8.3* 8.2*   MG 3.0* 3.1*  --  3.2*   PHOS 2.7 3.3  --  3.6     Recent Labs   Lab 07/31/19  0525 08/01/19  0548 08/01/19  1527 08/02/19  0538   ALKPHOS 143* 157* 160* 149*   ALT 34 47* 54* 42   AST 50* 61* 63* 54*   ALBUMIN 3.5 3.4* 3.3* 3.9   PROT 5.9* 6.1 6.6 6.2   BILITOT 1.7* 2.0* 2.1* 2.0*   INR 1.6* 1.4*  --  1.5*     Recent Labs   Lab 08/01/19  0742 08/01/19  1130 08/01/19  1620 08/01/19  2146 08/02/19  0713 08/02/19  1145   POCTGLUCOSE 242* 404* 371* 321* 261* 311*        ciprofloxacin HCl  250 mg Oral Daily    guaiFENesin  600 mg Oral BID    insulin aspart U-100  4 Units Subcutaneous TIDWM    [START ON 8/3/2019] insulin detemir U-100  12 Units Subcutaneous Daily    lactulose  30 g Oral TID    levothyroxine  88 mcg Oral Before breakfast    midodrine  15 mg Oral TID    rifAXImin  550 mg Oral BID    zinc sulfate  220 mg Oral Daily       Assessment and Plan     Ms. Martinez  Rusty is a 68 y.o. female who presented to [facility] on 7/18/2019 with     Hospital Course:    Ms. Michelle Ojeda was admitted to Hospital Medicine for management of     Active Hospital Problems    Diagnosis  POA    *Acute metabolic encephalopathy [G93.41]  Yes    Metabolic acidosis [E87.2]  No    Other dysphagia [R13.19]  Clinically Undetermined    Acute hypoxemic respiratory failure [J96.01]  No    Shock, unspecified [R57.9]  No    Hyperglycemia [R73.9]  No    Acute hepatic encephalopathy [K72.00]  Yes    Right kidney mass [N28.89]  No    Kidney transplant candidate [Z76.82]  Not Applicable    Ileus [K56.7]  No    Physical debility [R53.81]  Yes    Ascites [R18.8]  Yes    Pancytopenia [D61.818]  Yes    Hypothyroidism [E03.9]  Yes    Moderate malnutrition [E44.0]  Yes    Coagulopathy [D68.9]  Yes    Hyponatremia [E87.1]  Yes    Organ transplant candidate [Z76.82]  Not Applicable    Acute kidney injury superimposed on chronic kidney disease [N17.9, N18.9]  No    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       Acute metabolic encephalopathy  Likely 2/2 hepatic encephalopathy with ammonia 171 upon ICU tx (64 on 7/22).   --CT head negative for acute intracranial abnormality  --continue lactulose and rifaximin  --close to baseline; having multiple BMs       Pulmonary  Acute hypoxemic respiratory failure  Intubated for airway protection in setting of severe encephalopathy and episodes of emesis/concern for aspiration  --Sputum culture negative from 7/24.  Off antibiotics.  --No focal signs of consolidation on CXR  --on room air  --extubated on 7/27        Renal/  Right kidney mass  R kidney mass measures 2.4 cm x 2.3 cm found on CT abd/pelvis during transplant workup  --Urology following with plans to hold on any further work up in setting of acute  decompensation  - will just monitor for now;     Acute kidney injury superimposed on chronic kidney disease  Metabolic acidosis   --Baseline reportedly sCr 1.6. Possible candidate for liver/kidney transplant  --Nephrology following,concern iATN given recent shock.  Also concern for intravascular volume depletion given significant stool output.   Retroperitoneal US on 7/23 negative for hydronephrosis  --urine studies concerning for pre-renal etiology.  Started on albumin and continued on midodrine (MAPs 80's)  --sCr downtrending and urine output improved following albumin  --Strict I/Os  - Cr trending up, giving albumin today and blood today; upto 3.3  - starting sodium bicarbonate 1350 mg PO BID     Hematology  Coagulopathy  See cirrhosis     Anemia of chronic disease  - 8/2- transfusing 1 unit of PRBC; no overt signs of GI bleed     Endocrine  Hyperglycemia  History of DM however was taken off metformin due to improvement  --A1c 5.1  --insulin infusion discontinued on 7/27 when enteral feedings where held.   --frequent glucose checks with SSI  --glucose goal 140-180     Hypothyroidism  --Continue home levothyroxine     GI  Other dysphagia  --SLP following.  Dental soft diet with thin liquids     Ileus  --improved.  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 Cytology pending.  --Cipro for SBP prophylaxis   MELD-Na score: 29 at 8/2/2019  5:38 AM  MELD score: 25 at 8/2/2019  5:38 AM  Calculated from:  Serum Creatinine: 3.3 mg/dL at 8/2/2019  5:38 AM  Serum Sodium: 128 mmol/L at 8/2/2019  5:38 AM  Total Bilirubin: 2.0 mg/dL at 8/2/2019  5:38 AM  INR(ratio): 1.5 at 8/2/2019  5:38 AM  Age: 68 years  - patient not currently a liver transplant  candidate due to frailty; IR paracentesis on 7/31 with 3.2L removed negative for SBP       Other  Shock, unspecified  --Likely due to sedating medications however patient at risk for infection  --Blood culture, sputum culture, and peritoneal fluid negative  --UA appearing noninfectious  --Received 4 doses of piptazo,  Received 3 days of vancomycin.  --Resolved; weaned off norepinephrine infusion on 7/25  --Continue midodrine 15 mg TID     Physical debility  --PT/OT consulted    # Hypokalemia  - replace    # Hyponatremia  - improving with albumin     Disposition: [facility] rehab on monday    Ty Breaux MD  Medical Director [facility] Medicine  Spectra:  [phone]  Pager: [phone]

## 2019-08-02 NOTE — PROGRESS NOTES
[facility]  Hepatology  Progress Note    Patient Name: Michelle Ojeda  MRN: [id number]  Admission Date: 7/18/2019  Hospital Length of Stay: 15 days  Attending Provider: Ty Breaux MD   Primary Care Physician: Primary Doctor No  Principal Problem:Acute metabolic encephalopathy    Subjective:     Transplant status: No    HPI: Ms jOeda is a 68 year old female with a history of HERRMANN ESLD, with decompensations including Gr 1 HE, ascites, EV, hyponatremia, T2DM, HTN, hypothyroidism, who is being admitted from hepatology clinic due to concern for hyponatremia.    She presents to transplant Clinic for initial evaluation on 07/18 with Dr. Cannon for initial transplant evaluation.  She has a history of Herrmann cirrhosis with a history of biopsy in 1998 with steatosis unclear fibrosis both diagnosed with cirrhosis in July of 2015 in the setting of decompensation due while in [place] with encephalopathy and hematemesis.  Her cirrhosis has been complicated by grade 1 hepatic encephalopathy, recently started Aldo Benzie min but never been on lactulose, ascites, sarcopenia, esophageal varices status post ligation, hyponatremia.  She was previously evaluated at [place] with Dr. Mccormick who recommended evaluation at [facility].  Does not appear she was evaluated for transplant at that time.    Due to hyponatremia with sodium of 120 she was admitted to the hospital for further management.  Currently she reports she is feeling well without any complaints.  Denies any abdominal pain, nausea, vomiting, diarrhea.  Denies any fevers, chills, sweats or other infectious complaints.    Family who is present in the room note that she is doing extremely well until approximately 2 months prior to presentation when she began to be more frail, fatigue, increased peripheral edema, decreased oral intake.  They note they have been extremely careful to avoid T here to low-sodium diet.  She has never been hospitalized in the past  for hyponatremia.  No other recent hospitalizations.  Regarding her ascites she has approximately received a paracentesis every 2 weeks.      Interval History:   Hgb 7, no overt bleeding.  Cr rising, now 3.4.  Working with PT/OT.    Current Facility-Administered Medications   Medication    0.9%  NaCl infusion (for blood administration)    acetaminophen tablet 325 mg    [START ON 8/3/2019] albumin human 25% bottle 50 g    chlorproMAZINE tablet 25 mg    ciprofloxacin HCl tablet 250 mg    dextrose 10% (D10W) Bolus    dextrose 10% (D10W) Bolus    dicyclomine capsule 10 mg    glucagon (human recombinant) injection 1 mg    glucose chewable tablet 16 g    glucose chewable tablet 24 g    guaiFENesin 12 hr tablet 600 mg    insulin aspart U-100 pen 0-5 Units    insulin aspart U-100 pen 4 Units    [START ON 8/3/2019] insulin detemir U-100 pen 12 Units    lactulose 20 gram/30 mL solution Soln 30 g    levothyroxine tablet 88 mcg    midodrine tablet 15 mg    ondansetron injection 4 mg    rifAXIMin tablet 550 mg    simethicone chewable tablet 80 mg    sodium bicarbonate tablet 1,300 mg    sodium chloride 0.9% flush 10 mL    sodium chloride 0.9% flush 10 mL    sodium chloride 0.9% flush 10 mL    zinc sulfate capsule 220 mg       Objective:     Vital Signs (Most Recent):  Temp: 98 °F (36.7 °C) (08/02/19 1649)  Pulse: 78 (08/02/19 1649)  Resp: 18 (08/02/19 1649)  BP: (!) 123/58 (08/02/19 1649)  SpO2: 100 % (08/02/19 1649) Vital Signs (24h Range):  Temp:  [96.4 °F (35.8 °C)-98 °F (36.7 °C)] 98 °F (36.7 °C)  Pulse:  [64-79] 78  Resp:  [12-18] 18  SpO2:  [100 %] 100 %  BP: ()/(51-64) 123/58     Weight: 56 kg (123 lb 7.3 oz) (07/30/19 1000)  Body mass index is 24.11 kg/m².    Physical Exam   Constitutional: She appears ill. No distress.   Cardiovascular: Normal rate and regular rhythm.   Pulmonary/Chest: Effort normal. No respiratory distress.   Abdominal: Soft. Bowel sounds are normal. She exhibits  distension. There is no tenderness.   Neurological: She is alert.   Psychiatric: Her behavior is normal.   Nursing note and vitals reviewed.      MELD-Na score: 31 at 8/2/2019  3:59 PM  MELD score: 27 at 8/2/2019  3:59 PM  Calculated from:  Serum Creatinine: 3.4 mg/dL at 8/2/2019  3:59 PM  Serum Sodium: 128 mmol/L at 8/2/2019  3:59 PM  Total Bilirubin: 3.0 mg/dL at 8/2/2019  3:59 PM  INR(ratio): 1.5 at 8/2/2019  5:38 AM  Age: 68 years    Significant Labs:  Labs within the past month have been reviewed.    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 Also found to have ileus which has resolved. Hyponatremia has resolved. Unresponsive on 7/24, transferred to ICU and intubated for airway protection. Now extubated, transferred out of ICU with improved mentation. Other ongoing issues include incidentally discovered R renal mass too high risk for biopsy at this time, deconditioning requiring PT/OT and eventual SNF. Declined for transplant listing at this time.     Plan  - encephalopathy: Continue lactulose and rifaximin.  - ascites: Negative for SBP on 07/19. Total protein 1.5. Cont SBP ppx.  - diuretics: hold given TERA  - TERA on CKD: unclear baseline. Cr up to 3.2 from 2s earlier this admission. Appreciate Nephrology recs - IV albumin.  - Protein calorie malnutrition: nutrition consult. High protein intake. Consider prehab supplements on discharge.  - Anemia: secondary anemia workup, monitor for overt GI bleed. Transfuse 1U PRBC today.  - Appreciate Renal transplant evaluation re: SLK given GFR <30.  - Transplant: Discussed at transplant committee, but declined for transplant at this time. May be able to revisit this after patient completes rehab.  Discussed with family and patient.          Thank you for your consult. I will follow-up with patient. Please contact us if you have any additional questions.    Andrea Eaton MD  Hepatology  [facility]

## 2019-08-02 NOTE — HOSPITAL COURSE
7/30/19:  Participated with PT and OT.  Bed mobility SBA-modA.  Sit to stand and transfers modA with RW.  Ambulated 12 ft Lonnie with RW.  LBD totalA.    7/31/19:  Participated with PT.  Bed mobility CGA-modA.  Sit to stand CGA with RW.  Ambulated 20 ft x 2 Lonnie with RW.    8/2/19:  Participated with PT and OT.  Bed mobility SBA-modA.  EOB SBA.  Sit to stand min-modA with RW, stand to sit Lonnie with RW, and transfers modA with RW.  Ambulated 30 ft mod-Lonnie with RW.  Grooming set-up while seated in WC.    8/5/19:  Participated with PT and OT.  Bed mobility maxA.  EOB CGA.

## 2019-08-02 NOTE — ASSESSMENT & PLAN NOTE
Ms Ojeda is a 68 year old female with a history of IZAGUIRRE ESLD, with decompensations including Gr 1 HE, ascites, EV, hyponatremia, T2DM, HTN, hypothyroidism, who was admitted from hepatology clinic due to concern for hyponatremia. Also found to have ileus which has resolved. Hyponatremia has resolved. Unresponsive on 7/24, transferred to ICU and intubated for airway protection. Now extubated, transferred out of ICU with improved mentation. Other ongoing issues include incidentally discovered R renal mass too high risk for biopsy at this time, deconditioning requiring PT/OT and eventual SNF. Declined for transplant listing at this time.     Plan  - encephalopathy: Continue lactulose and rifaximin.  - ascites: Negative for SBP on 07/19. Total protein 1.5. Cont SBP ppx.  - diuretics: hold given TERA  - TERA on CKD: unclear baseline. Cr up to 3.2 from 2s earlier this admission. Appreciate Nephrology recs - IV albumin.  - Protein calorie malnutrition: nutrition consult. High protein intake. Consider prehab supplements on discharge.  - Anemia: secondary anemia workup, monitor for overt GI bleed. Transfuse 1U PRBC today.  - Appreciate Renal transplant evaluation re: SLK given GFR <30.  - Transplant: Discussed at transplant committee, but declined for transplant at this time. May be able to revisit this after patient completes rehab. Discussed with family and patient.

## 2019-08-02 NOTE — ASSESSMENT & PLAN NOTE
-  H/o IZAGUIRRE cirrhosis complicated by acute metabolic encephalopathy, ascites (s/p paracentesis on 7/19, 7/24, 7/31), coagulopathy, pancytopenia, protein calorie malnutrition, electrolyte derangement, and physical debility  -  Hepatology following  -  Transplant team consulted and initiated transplant work-up-->  Currently not candidate for liver transplant 2/2 frailty and physical debility  -  Management per primary team

## 2019-08-02 NOTE — PLAN OF CARE
Problem: Adult Inpatient Plan of Care  Goal: Plan of Care Review  Outcome: Ongoing (interventions implemented as appropriate)  Pt AAx4, breathing even and unlabored, call light in reach, bed in lowest position. Daughter at bedside throughout shift. Pt received 1 unit prbc. Pt had 5-6 BM, evening lactulose held. Pt participated in PT. VSS, frequent hourly rounding completed. No significant events throughout shift. Will continue to monitor.

## 2019-08-02 NOTE — PLAN OF CARE
Problem: Physical Therapy Goal  Goal: Physical Therapy Goal  Goals to be met by: 2019     Patient will increase functional independence with mobility by performin. Supine to sit with contact guard assistance   2. Sit to supine with contact guard assistance   3. Sit to stand transfer with stand by assistance with LRAD   4. Gait  x 50 feet with stand by assistance using Rolling Walker.   5. Ascend/descend 3 stair with bilateral Handrails Stand-by Assistance       Discharge Recommendations: SS SNF    Pt safe to transfer OOB/BTB with RN/PCT via SPT: Use RW with mod A of 1 person.    Goals remain appropriate.     Matilde Francis, PTA.   [phone]   2019

## 2019-08-02 NOTE — PLAN OF CARE
Problem: Occupational Therapy Goal  Goal: Occupational Therapy Goal  Goals to be met by: 8/29/19     Patient will increase functional independence with ADLs by performing:    UE Dressing with Modified [place].  LE Dressing with Modified [place] and Assistive Devices as needed.  Grooming while standing at sink with Modified [place].  Toileting from bedside commode with Modified [place] for hygiene and clothing management.   Bathing from  shower chair/bench with Modified [place].  Toilet transfer to bedside commode with Modified [place].  Increased functional strength to WFL for B UE.  Upper extremity exercise program x15 reps per handout, with assistance as needed.     Outcome: Ongoing (interventions implemented as appropriate)  Continue OT POC as able. Goals remain appropriate.  Jayshree Samano, OT  8/2/2019

## 2019-08-02 NOTE — CONSULTS
Inpatient consult to Physical Medicine Rehab  Consult performed by: GEOVANNY Curran  Consult ordered by: Ty Breaux MD  Reason for consult: rehab evaluation       Consult received.  Reviewed patient history and current admission.  Rehab team following.  Full consult to follow.    PATRICK Briscoe, BERTHAP-C  Physical Medicine & Rehabilitation   08/02/2019  Spectralink: [id number]

## 2019-08-02 NOTE — PROGRESS NOTES
[facility]  Nephrology  Progress Note    Patient Name: Michelle Ojeda  MRN: [id number]  Admission Date: 7/18/2019  Hospital Length of Stay: 15 days  Attending Provider: Ty Breaux MD   Primary Care Physician: Primary Doctor No  Principal Problem:Acute metabolic encephalopathy    Subjective:     HPI: Rusty Martinez is 69 yo F with CKD stage 4 presenting from transplant hepatology clinic for hyponatremia on 7/18/19. She is originally from [place], here for liver transplant evaluation. She was diagnosed with IZAGUIRRE in grade 1 hepatic encephalopathy with severe ascites and esophogeal varices (s/p banding). During the month prior to admission she felt more lethargic and was requiring biweekly paracentesis. Of note, she is also being treated for Ileus her acute hepatic encephalopathy with lactulose. Recent 24 hour cr clearance study was near 21. Reported baseline Cr 1.6-1.8 and chronic hyponatremia range between 123-124.     Interval History: No acute events overnight. Pt hypotensive, MAP~70 but did not require intervention. Pt more somnolent this AM.     Review of patient's allergies indicates:  No Known Allergies  Current Facility-Administered Medications   Medication Frequency    0.9%  NaCl infusion (for blood administration) Q24H PRN    acetaminophen tablet 325 mg Q4H PRN    chlorproMAZINE tablet 25 mg TID PRN    ciprofloxacin HCl tablet 250 mg Daily    dextrose 10% (D10W) Bolus PRN    dextrose 10% (D10W) Bolus PRN    dicyclomine capsule 10 mg QID PRN    glucagon (human recombinant) injection 1 mg PRN    glucose chewable tablet 16 g PRN    glucose chewable tablet 24 g PRN    guaiFENesin 12 hr tablet 600 mg BID    insulin aspart U-100 pen 0-5 Units Q6H PRN    insulin aspart U-100 pen 4 Units TIDWM    [START ON 8/3/2019] insulin detemir U-100 pen 12 Units Daily    lactulose 20 gram/30 mL solution Soln 30 g TID    levothyroxine tablet 88 mcg Before breakfast    midodrine tablet  15 mg TID    ondansetron injection 4 mg Q6H PRN    rifAXIMin tablet 550 mg BID    simethicone chewable tablet 80 mg TID PRN    sodium chloride 0.9% flush 10 mL PRN    sodium chloride 0.9% flush 10 mL PRN    sodium chloride 0.9% flush 10 mL PRN    zinc sulfate capsule 220 mg Daily       Objective:     Vital Signs (Most Recent):  Temp: 97.6 °F (36.4 °C) (08/02/19 1149)  Pulse: 76 (08/02/19 1149)  Resp: 18 (08/02/19 1149)  BP: (!) 114/54 (08/02/19 1149)  SpO2: 100 % (08/02/19 1149)  O2 Device (Oxygen Therapy): room air (08/01/19 1712) Vital Signs (24h Range):  Temp:  [96.4 °F (35.8 °C)-98 °F (36.7 °C)] 97.6 °F (36.4 °C)  Pulse:  [64-77] 76  Resp:  [12-18] 18  SpO2:  [99 %-100 %] 100 %  BP: ()/(51-55) 114/54     Weight: 56 kg (123 lb 7.3 oz) (07/30/19 1000)  Body mass index is 24.11 kg/m².  Body surface area is 1.54 meters squared.    I/O last 3 completed shifts:  In: 2020 [P.O.:1020; IV Piggyback:1000]  Out: 800 [Urine:800]    Physical Exam   Constitutional: She appears well-developed and well-nourished. She appears listless. She appears cachectic. No distress.   HENT:   Head: Normocephalic and atraumatic.   Eyes: EOM are normal. Scleral icterus is present.   Neck: Normal range of motion.   Cardiovascular: Normal rate, regular rhythm and normal heart sounds.   No murmur heard.  Pulmonary/Chest: Effort normal and breath sounds normal.   Abdominal: Soft. Bowel sounds are normal. She exhibits ascites. There is hepatosplenomegaly. There is no tenderness.   Musculoskeletal: She exhibits no edema.   Neurological: She appears listless. She is disoriented (Oriented to Person, Place).   Skin: Skin is warm and dry. Ecchymosis noted. She is not diaphoretic.   Nursing note and vitals reviewed.      Significant Labs:  CBC:   Recent Labs   Lab 08/02/19  0538   WBC 3.51*   RBC 2.04*   HGB 7.0*   HCT 20.7*   PLT 31*   *   MCH 34.3*   MCHC 33.8     CMP:   Recent Labs   Lab 08/02/19  0538   *   CALCIUM 8.2*    ALBUMIN 3.9   PROT 6.2   *   K 3.4*   CO2 15*   CL 99   *   CREATININE 3.3*   ALKPHOS 149*   ALT 42   AST 54*   BILITOT 2.0*     Recent Labs   Lab 08/01/19 2047   COLORU Yellow   SPECGRAV 1.015   PHUR 5.0   PROTEINUA Negative   NITRITE Negative   LEUKOCYTESUR Negative     All labs within the past 24 hours have been reviewed.     Significant Imaging:  No recent imaging    Assessment/Plan:     * Acute metabolic encephalopathy  Treatment plan per primary team     Acute kidney injury superimposed on chronic kidney disease  Labs from [place] nephrology demonstrated Cr of 1.77 in 3/12/19, 1.67 on 4/5/19, 1.96 in 6/13/19, and 1.83 on 6/24. Cr of 2.8 on 7/26/19 above her baseline due to ATN secondary to hypoperfusion of kidneys related to hypotensive episodes. Hepatorenal syndrome not suspected at this time as the patient's Cr and urine output have improved with an albumin challenge. TERA could be exacerbated with noted Hgb drop from 9.9 on 7/27 to 7.2 on 7/29.     Recommendations:   - Cr had been improving, but 8/1 has worsened to 2.8 from 2.3 the previous day. Possibly 2/2 fluid shifting after paracentesis vs over-activation of ADH. 8/2 3.3  - c/w Midodrine 10mg TID added to prevent hypoperfusion to kidneys. Goal MAP>70  - Recommend continuing albumin 50g IV daily; Start Sodium Bicarbonate 1300mg PO BID  - Monitor with BMP  - Strict I/Os   - Avoid Nephrotoxic agents  - Monitor for signs of blood loss  - Hgb > 7     Hyponatremia  Rusty is a 69 yo F with liver cirrhosis and CKD admitted for hyponatremia. Hyponatremia may be related to several factors primarily liver cirrhosis requiring biweekly paracentesis. This leads to a hypovolemic hyponatremic state in which fluid is pulled out of the vasculature. Additional contributing factors related to ileus, NPO status, and TPN nutrition. Extra sodium in TPN has increased sodium. Now on renal diet with 1000mL fluid restriction. Baseline Na+ from labs in [facility] between 123-124.   - 8/1 Na 125; while this is closer to pt's baseline, does represent a drop since 8/30 from 142. Improving with IV albumin and NS  - Midodrine 10 mg TID;  - Strict I/O monitoring     Liver cirrhosis secondary to IZAGUIRRE  Treatment plan per primary team         Thank you for your consult. I will follow-up with patient. Please contact us if you have any additional questions.    Otilio Gibson DO  Nephrology  [facility]

## 2019-08-02 NOTE — ASSESSMENT & PLAN NOTE
-  Related to liver failure, co-morbidities, and acute hospital course  -  Independent at baseline with functional decline x ~1 month prior to admission  See hospital course for functional status.    Recommendations  -  Encourage mobility, OOB in chair, and early ambulation as appropriate  -  PT/OT evaluate and treat  -  Pain management  -  DVT prophylaxis  -  Monitor for and prevent skin breakdown and pressure ulcers  · Early mobility, repositioning/weight shifting every 20-30 minutes when sitting, turn patient every 2 hours, proper mattress/overlay and chair cushioning, pressure relief/heel protector boots

## 2019-08-03 LAB
ALBUMIN SERPL BCP-MCNC: 3.9 G/DL (ref 3.5–5.2)
ALP SERPL-CCNC: 156 U/L (ref 55–135)
ALT SERPL W/O P-5'-P-CCNC: 37 U/L (ref 10–44)
ANION GAP SERPL CALC-SCNC: 11 MMOL/L (ref 8–16)
ANISOCYTOSIS BLD QL SMEAR: SLIGHT
AST SERPL-CCNC: 52 U/L (ref 10–40)
BASOPHILS # BLD AUTO: 0.03 K/UL (ref 0–0.2)
BASOPHILS NFR BLD: 0.6 % (ref 0–1.9)
BILIRUB SERPL-MCNC: 4.7 MG/DL (ref 0.1–1)
BUN SERPL-MCNC: 111 MG/DL (ref 8–23)
CALCIUM SERPL-MCNC: 8.6 MG/DL (ref 8.7–10.5)
CHLORIDE SERPL-SCNC: 100 MMOL/L (ref 95–110)
CO2 SERPL-SCNC: 18 MMOL/L (ref 23–29)
CREAT SERPL-MCNC: 3.1 MG/DL (ref 0.5–1.4)
DIFFERENTIAL METHOD: ABNORMAL
EOSINOPHIL # BLD AUTO: 0.1 K/UL (ref 0–0.5)
EOSINOPHIL NFR BLD: 1 % (ref 0–8)
ERYTHROCYTE [DISTWIDTH] IN BLOOD BY AUTOMATED COUNT: 18.1 % (ref 11.5–14.5)
EST. GFR  (AFRICAN AMERICAN): 16.8 ML/MIN/1.73 M^2
EST. GFR  (NON AFRICAN AMERICAN): 14.6 ML/MIN/1.73 M^2
FIBRINOGEN PPP-MCNC: 202 MG/DL (ref 182–366)
GLUCOSE SERPL-MCNC: 144 MG/DL (ref 70–110)
HCT VFR BLD AUTO: 25.9 % (ref 37–48.5)
HGB BLD-MCNC: 8.8 G/DL (ref 12–16)
HYPOCHROMIA BLD QL SMEAR: ABNORMAL
IMM GRANULOCYTES # BLD AUTO: 0.04 K/UL (ref 0–0.04)
IMM GRANULOCYTES NFR BLD AUTO: 0.8 % (ref 0–0.5)
INR PPP: 1.6 (ref 0.8–1.2)
LYMPHOCYTES # BLD AUTO: 0.5 K/UL (ref 1–4.8)
LYMPHOCYTES NFR BLD: 9.6 % (ref 18–48)
MAGNESIUM SERPL-MCNC: 3.2 MG/DL (ref 1.6–2.6)
MCH RBC QN AUTO: 33.5 PG (ref 27–31)
MCHC RBC AUTO-ENTMCNC: 34 G/DL (ref 32–36)
MCV RBC AUTO: 99 FL (ref 82–98)
MONOCYTES # BLD AUTO: 0.5 K/UL (ref 0.3–1)
MONOCYTES NFR BLD: 9.2 % (ref 4–15)
NEUTROPHILS # BLD AUTO: 3.9 K/UL (ref 1.8–7.7)
NEUTROPHILS NFR BLD: 78.8 % (ref 38–73)
NRBC BLD-RTO: 0 /100 WBC
PHOSPHATE SERPL-MCNC: 3.3 MG/DL (ref 2.7–4.5)
PLATELET # BLD AUTO: 42 K/UL (ref 150–350)
PLATELET BLD QL SMEAR: ABNORMAL
PMV BLD AUTO: 11.7 FL (ref 9.2–12.9)
POCT GLUCOSE: 162 MG/DL (ref 70–110)
POCT GLUCOSE: 235 MG/DL (ref 70–110)
POCT GLUCOSE: 265 MG/DL (ref 70–110)
POCT GLUCOSE: 279 MG/DL (ref 70–110)
POLYCHROMASIA BLD QL SMEAR: ABNORMAL
POTASSIUM SERPL-SCNC: 3.1 MMOL/L (ref 3.5–5.1)
PROT SERPL-MCNC: 6.2 G/DL (ref 6–8.4)
PROTHROMBIN TIME: 15.5 SEC (ref 9–12.5)
RBC # BLD AUTO: 2.63 M/UL (ref 4–5.4)
SODIUM SERPL-SCNC: 129 MMOL/L (ref 136–145)
TARGETS BLD QL SMEAR: ABNORMAL
WBC # BLD AUTO: 4.98 K/UL (ref 3.9–12.7)

## 2019-08-03 PROCEDURE — 85610 PROTHROMBIN TIME: CPT

## 2019-08-03 PROCEDURE — 99232 SBSQ HOSP IP/OBS MODERATE 35: CPT | Mod: ,,, | Performed by: INTERNAL MEDICINE

## 2019-08-03 PROCEDURE — 80053 COMPREHEN METABOLIC PANEL: CPT

## 2019-08-03 PROCEDURE — 85025 COMPLETE CBC W/AUTO DIFF WBC: CPT

## 2019-08-03 PROCEDURE — 83735 ASSAY OF MAGNESIUM: CPT

## 2019-08-03 PROCEDURE — 85384 FIBRINOGEN ACTIVITY: CPT

## 2019-08-03 PROCEDURE — 25000003 PHARM REV CODE 250: Performed by: HOSPITALIST

## 2019-08-03 PROCEDURE — 20600001 HC STEP DOWN PRIVATE ROOM

## 2019-08-03 PROCEDURE — 84100 ASSAY OF PHOSPHORUS: CPT

## 2019-08-03 PROCEDURE — 63600175 PHARM REV CODE 636 W HCPCS: Mod: JG | Performed by: HOSPITALIST

## 2019-08-03 PROCEDURE — P9047 ALBUMIN (HUMAN), 25%, 50ML: HCPCS | Mod: JG | Performed by: HOSPITALIST

## 2019-08-03 PROCEDURE — 99232 PR SUBSEQUENT HOSPITAL CARE,LEVL II: ICD-10-PCS | Mod: ,,, | Performed by: INTERNAL MEDICINE

## 2019-08-03 RX ORDER — LACTULOSE 10 G/15ML
15 SOLUTION ORAL 3 TIMES DAILY
Status: DISCONTINUED | OUTPATIENT
Start: 2019-08-03 | End: 2019-08-05

## 2019-08-03 RX ADMIN — SODIUM BICARBONATE 650 MG TABLET 1300 MG: at 09:08

## 2019-08-03 RX ADMIN — GUAIFENESIN 600 MG: 600 TABLET, EXTENDED RELEASE ORAL at 09:08

## 2019-08-03 RX ADMIN — INSULIN ASPART 3 UNITS: 100 INJECTION, SOLUTION INTRAVENOUS; SUBCUTANEOUS at 12:08

## 2019-08-03 RX ADMIN — RIFAXIMIN 550 MG: 550 TABLET ORAL at 09:08

## 2019-08-03 RX ADMIN — LEVOTHYROXINE SODIUM 88 MCG: 88 TABLET ORAL at 05:08

## 2019-08-03 RX ADMIN — MIDODRINE HYDROCHLORIDE 15 MG: 5 TABLET ORAL at 09:08

## 2019-08-03 RX ADMIN — MIDODRINE HYDROCHLORIDE 15 MG: 5 TABLET ORAL at 03:08

## 2019-08-03 RX ADMIN — INSULIN ASPART 1 UNITS: 100 INJECTION, SOLUTION INTRAVENOUS; SUBCUTANEOUS at 09:08

## 2019-08-03 RX ADMIN — ALBUMIN (HUMAN) 50 G: 12.5 SOLUTION INTRAVENOUS at 09:08

## 2019-08-03 RX ADMIN — CIPROFLOXACIN HYDROCHLORIDE 250 MG: 250 TABLET, FILM COATED ORAL at 09:08

## 2019-08-03 RX ADMIN — LACTULOSE 30 G: 20 SOLUTION ORAL at 09:08

## 2019-08-03 RX ADMIN — Medication 220 MG: at 12:08

## 2019-08-03 NOTE — CARE UPDATE
RAPID RESPONSE NURSE PROACTIVE ROUNDING NOTE     Time of Visit: 956    Admit Date: 2019  LOS: 16  Code Status: Full Code   Date of Visit: 2019  : 1950  Age: 68 y.o.  Sex: female  Race: Unknown  [facility]:   MRN: [id number]  Was the patient discharged from an ICU this admission? yes   Was the patient discharged from a PACU within last 24 hours?  no  Did the patient receive conscious sedation/general anesthesia in last 24 hours?  no  Was the patient in the ED within the past 24 hours?  no  Was the patient started on NIPPV within the past 24 hours?  no  Attending Physician: Jesus Corado MD  Primary Service: [facility]    ASSESSMENT     Diagnosis: Acute metabolic encephalopathy    Abnormal Vital Signs: BP (!) 106/52 (BP Location: Left arm, Patient Position: Lying)   Pulse 61   Temp 97.9 °F (36.6 °C) (Axillary)   Resp 18   Ht 5' (1.524 m)   Wt 56 kg (123 lb 7.3 oz)   LMP  (LMP Unknown)   SpO2 100%   Breastfeeding? No   BMI 24.11 kg/m²      Clinical Issues: Circulatory    Patient  has a past medical history of Cirrhosis, Diabetes mellitus, type 2, Disorder of kidney and ureter, Hypertension, Hypothyroidism, and NAFLD (nonalcoholic fatty liver disease).    Proactively seen for tachypnea. On bedside assessment, patient sitting up in her wheelchair looking [place]. She is in great spirits. Denies SOB, denies CP. No complaints from patient. RR normal. HR 62, sats 100% RA. Hemodynamically stable at this time.      INTERVENTIONS/ RECOMMENDATIONS     VS per unit protocol.     Discussed plan of care with RNJacquelin.    PHYSICIAN ESCALATION     Yes/No  no    Orders received and case discussed with NA.    Disposition: Remain in room 8088.    FOLLOW-UP     Call back the Rapid Response Nurse, Leighann Gilbert RN at [phone] for additional questions or concerns.

## 2019-08-03 NOTE — PROGRESS NOTES
Progress Note   Hospital Medicine         Patient Name: Michelle Ojeda  MRN:  [id number]  Hospital Medicine Team: [facility] Jesus Corado MD  Date of Admission:  7/18/2019     Length of Stay:  LOS: 16 days   Expected Discharge Date: 8/5/2019  Principal Problem:  Acute metabolic encephalopathy       Subjective:     Interval History/Overnight Events:      Ms. Ojeda felt well this morning, up in her wheelchair looking out the window. She has an excellent response to pRBC transfusion and still no signs of GIB. Her sCr is improving somewhat, though still with BUN elevation. She had an episode of suspected urinary retention, though shortly after bladder scan voiding > 600 mL spontaneously.    Review of Systems   Constitutional: Negative for chills, fatigue, fever.   Respiratory: Negative for cough, shortness of breath.    Cardiovascular: Negative for chest pain, palpitations, leg swelling.   Gastrointestinal: Negative for abdominal pain, constipation, diarrhea, nausea, vomiting.   Musculoskeletal: Negative for arthralgias, myalgias.   Neurological: Negative for dizziness, syncope, weakness, light-headedness.     Objective:     Temp:  [97.5 °F (36.4 °C)-98 °F (36.7 °C)]   Pulse:  [61-79]   Resp:  [16-22]   BP: (105-132)/(53-64)   SpO2:  [98 %-100 %]       Physical Exam:  Constitutional: apears weak and frail   Cardiovascular: Normal rate and regular rhythm.  No murmur heard.  Pulmonary/Chest: Effort normal and breath sounds normal. No respiratory distress. No wheezes, rales, or rhonchi  Abdominal: Soft. Bowel sounds are normal. Positive distension; no TTP  Musculoskeletal: Normal range of motion. No edema.   Neurological: Alert and oriented to person, place, and time.   Skin: Skin is warm and dry.     Recent Labs   Lab 07/30/19  0606 07/31/19  0525 08/01/19  0548 08/02/19  0538 08/02/19  1559 08/03/19  0352   WBC 3.61* 5.31 7.70 3.51* 5.21 4.98   HGB 8.4* 7.5* 8.6* 7.0* 7.9* 8.8*   HCT 25.9* 22.3* 24.6* 20.7*  24.5* 25.9*   PLT 29* 32* 45* 31* 37* 42*     Recent Labs   Lab 08/01/19  0548  08/02/19  0538 08/02/19  1559 08/03/19  0352   *   < > 128* 128* 129*   K 4.2   < > 3.4* 3.2* 3.1*   CL 97   < > 99 99 100   CO2 19*   < > 15* 14* 18*   BUN 98*   < > 105* 106* 111*   CREATININE 2.8*   < > 3.3* 3.4* 3.1*   *   < > 247* 284* 144*   CALCIUM 8.1*   < > 8.2* 8.7 8.6*   MG 3.1*  --  3.2*  --  3.2*   PHOS 3.3  --  3.6  --  3.3    < > = values in this interval not displayed.     Recent Labs   Lab 08/01/19  0548  08/02/19  0538 08/02/19  1559 08/03/19  0352   ALKPHOS 157*   < > 149* 156* 156*   ALT 47*   < > 42 40 37   AST 61*   < > 54* 54* 52*   ALBUMIN 3.4*   < > 3.9 4.8 3.9   PROT 6.1   < > 6.2 6.9 6.2   BILITOT 2.0*   < > 2.0* 3.0* 4.7*   INR 1.4*  --  1.5*  --  1.6*    < > = values in this interval not displayed.     Recent Labs   Lab 08/02/19  0713 08/02/19  1145 08/02/19  1643 08/02/19  2158 08/03/19  0921 08/03/19  1215   POCTGLUCOSE 261* 311* 312* 228* 162* 265*        albumin human 25%  50 g Intravenous Daily    ciprofloxacin HCl  250 mg Oral Daily    guaiFENesin  600 mg Oral BID    insulin aspart U-100  4 Units Subcutaneous TIDWM    insulin detemir U-100  12 Units Subcutaneous Daily    lactulose  15 g Oral TID    levothyroxine  88 mcg Oral Before breakfast    midodrine  15 mg Oral TID    rifAXImin  550 mg Oral BID    sodium bicarbonate  1,300 mg Oral BID    zinc sulfate  220 mg Oral Daily       Assessment and Plan     Ms. Michelle Ojeda is a 68 y.o. female who presented to [facility] on 7/18/2019 with     Hospital Course:    Ms. Michelle Ojeda was admitted to Hospital Medicine for management of     Active Hospital Problems    Diagnosis  POA    *Acute metabolic encephalopathy [G93.41]  Yes    Metabolic acidosis [E87.2]  No    Other dysphagia [R13.19]  Clinically Undetermined    Acute hypoxemic respiratory failure [J96.01]  No    Shock, unspecified [R57.9]  No    Hyperglycemia [R73.9]  No     Acute hepatic encephalopathy [K72.00]  Yes    Right kidney mass [N28.89]  No    Kidney transplant candidate [Z76.82]  Not Applicable    Ileus [K56.7]  No    Physical debility [R53.81]  Yes    Ascites [R18.8]  Yes    Pancytopenia [D61.818]  Yes    Hypothyroidism [E03.9]  Yes    Moderate malnutrition [E44.0]  Yes    Coagulopathy [D68.9]  Yes    Hyponatremia [E87.1]  Yes    Organ transplant candidate [Z76.82]  Not Applicable    Acute kidney injury superimposed on chronic kidney disease [N17.9, N18.9]  No    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       Acute metabolic encephalopathy  Likely 2/2 hepatic encephalopathy with ammonia 171 upon ICU tx (64 on 7/22).   --Improving  --CT head negative for acute intracranial abnormality  --continue lactulose and rifaximin  --close to baseline; having multiple BMs       Pulmonary  Acute hypoxemic respiratory failure      --Resolved  Intubated for airway protection in setting of severe encephalopathy and episodes of emesis/concern for aspiration  --Sputum culture negative from 7/24.  Off antibiotics.  --No focal signs of consolidation on CXR  --on room air  --extubated on 7/27    Renal/  Right kidney mass  R kidney mass measures 2.4 cm x 2.3 cm found on CT abd/pelvis during transplant workup  --Urology following with plans to hold on any further work up in setting of acute decompensation  - will just monitor for now;     Acute kidney injury superimposed on chronic kidney disease  Metabolic acidosis   --Baseline reportedly sCr 1.6. Possible candidate for liver/kidney transplant  --Nephrology following,concern iATN given recent shock.  Also concern for intravascular volume depletion given significant stool output.   Retroperitoneal US on 7/23 negative for hydronephrosis  --urine studies concerning for pre-renal etiology.   Started on albumin and continued on midodrine (MAPs 80's)  --sCr downtrending and urine output improved following albumin  --Strict I/Os  - Cr mildly improved, continue albumin today, along with sodium bicarb. BUN still markedly elevated  - Nephrology assistance appreciated     Hematology  Coagulopathy  See cirrhosis     Anemia of chronic disease  - 8/2- transfusing 1 unit of PRBC with excellent response; no overt signs of GI bleed     Endocrine  Hyperglycemia  History of DM however was taken off metformin due to improvement  --A1c 5.1  --insulin infusion discontinued on 7/27 when enteral feedings where held.   --frequent glucose checks with SSI  --glucose goal 140-180     Hypothyroidism  --Continue home levothyroxine     GI  Other dysphagia  --SLP following.  Dental soft diet with thin liquids     Ileus  --improved.  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 Cytology pending.  --Cipro for SBP prophylaxis   MELD-Na score: 31 at 8/3/2019  3:52 AM  MELD score: 28 at 8/3/2019  3:52 AM  Calculated from:  Serum Creatinine: 3.1 mg/dL at 8/3/2019  3:52 AM  Serum Sodium: 129 mmol/L at 8/3/2019  3:52 AM  Total Bilirubin: 4.7 mg/dL at 8/3/2019  3:52 AM  INR(ratio): 1.6 at 8/3/2019  3:52 AM  Age: 68 years  - patient not currently a liver transplant candidate due to frailty; IR paracentesis on 7/31 with 3.2L removed negative for SBP       Other  Shock, unspecified      -- Resolved  --Likely due to sedating medications however patient at risk for infection  --Blood culture, sputum culture, and peritoneal fluid negative  --UA appearing noninfectious  --Received 4 doses of piptazo,  Received 3 days of vancomycin.  --Resolved; weaned off norepinephrine  infusion on 7/25  --Continue midodrine 15 mg TID     Physical debility  --PT/OT consulted    # Hypokalemia  - replace    # Hyponatremia  - improving with albumin     Disposition: [facility] on monday    Jesus Corado M.D.  Department of Hospital Medicine  [facility]  [phone] (pager)

## 2019-08-04 LAB
ALBUMIN SERPL BCP-MCNC: 4.3 G/DL (ref 3.5–5.2)
ALP SERPL-CCNC: 147 U/L (ref 55–135)
ALT SERPL W/O P-5'-P-CCNC: 33 U/L (ref 10–44)
ANION GAP SERPL CALC-SCNC: 12 MMOL/L (ref 8–16)
AST SERPL-CCNC: 42 U/L (ref 10–40)
BASOPHILS # BLD AUTO: 0.04 K/UL (ref 0–0.2)
BASOPHILS NFR BLD: 1 % (ref 0–1.9)
BILIRUB SERPL-MCNC: 2.9 MG/DL (ref 0.1–1)
BUN SERPL-MCNC: 120 MG/DL (ref 8–23)
CALCIUM SERPL-MCNC: 8.8 MG/DL (ref 8.7–10.5)
CHLORIDE SERPL-SCNC: 98 MMOL/L (ref 95–110)
CO2 SERPL-SCNC: 19 MMOL/L (ref 23–29)
CREAT SERPL-MCNC: 3.3 MG/DL (ref 0.5–1.4)
DIFFERENTIAL METHOD: ABNORMAL
EOSINOPHIL # BLD AUTO: 0 K/UL (ref 0–0.5)
EOSINOPHIL NFR BLD: 0.7 % (ref 0–8)
ERYTHROCYTE [DISTWIDTH] IN BLOOD BY AUTOMATED COUNT: 18.9 % (ref 11.5–14.5)
EST. GFR  (AFRICAN AMERICAN): 15.6 ML/MIN/1.73 M^2
EST. GFR  (NON AFRICAN AMERICAN): 13.5 ML/MIN/1.73 M^2
GLUCOSE SERPL-MCNC: 135 MG/DL (ref 70–110)
HCT VFR BLD AUTO: 26.7 % (ref 37–48.5)
HGB BLD-MCNC: 9 G/DL (ref 12–16)
IMM GRANULOCYTES # BLD AUTO: 0.04 K/UL (ref 0–0.04)
IMM GRANULOCYTES NFR BLD AUTO: 1 % (ref 0–0.5)
INR PPP: 1.6 (ref 0.8–1.2)
LYMPHOCYTES # BLD AUTO: 0.4 K/UL (ref 1–4.8)
LYMPHOCYTES NFR BLD: 10.7 % (ref 18–48)
MAGNESIUM SERPL-MCNC: 3.4 MG/DL (ref 1.6–2.6)
MCH RBC QN AUTO: 33.3 PG (ref 27–31)
MCHC RBC AUTO-ENTMCNC: 33.7 G/DL (ref 32–36)
MCV RBC AUTO: 99 FL (ref 82–98)
MONOCYTES # BLD AUTO: 0.4 K/UL (ref 0.3–1)
MONOCYTES NFR BLD: 9 % (ref 4–15)
NEUTROPHILS # BLD AUTO: 3.1 K/UL (ref 1.8–7.7)
NEUTROPHILS NFR BLD: 77.6 % (ref 38–73)
NRBC BLD-RTO: 0 /100 WBC
PHOSPHATE SERPL-MCNC: 4.4 MG/DL (ref 2.7–4.5)
PLATELET # BLD AUTO: 45 K/UL (ref 150–350)
PMV BLD AUTO: 11.4 FL (ref 9.2–12.9)
POCT GLUCOSE: 124 MG/DL (ref 70–110)
POCT GLUCOSE: 145 MG/DL (ref 70–110)
POCT GLUCOSE: 149 MG/DL (ref 70–110)
POCT GLUCOSE: 237 MG/DL (ref 70–110)
POTASSIUM SERPL-SCNC: 3.2 MMOL/L (ref 3.5–5.1)
PROT SERPL-MCNC: 6.5 G/DL (ref 6–8.4)
PROTHROMBIN TIME: 15.5 SEC (ref 9–12.5)
RBC # BLD AUTO: 2.7 M/UL (ref 4–5.4)
SODIUM SERPL-SCNC: 129 MMOL/L (ref 136–145)
WBC # BLD AUTO: 4.01 K/UL (ref 3.9–12.7)

## 2019-08-04 PROCEDURE — 85025 COMPLETE CBC W/AUTO DIFF WBC: CPT

## 2019-08-04 PROCEDURE — 85610 PROTHROMBIN TIME: CPT

## 2019-08-04 PROCEDURE — 63600175 PHARM REV CODE 636 W HCPCS: Mod: JG | Performed by: INTERNAL MEDICINE

## 2019-08-04 PROCEDURE — 99232 SBSQ HOSP IP/OBS MODERATE 35: CPT | Mod: GC,,, | Performed by: INTERNAL MEDICINE

## 2019-08-04 PROCEDURE — 80053 COMPREHEN METABOLIC PANEL: CPT

## 2019-08-04 PROCEDURE — P9047 ALBUMIN (HUMAN), 25%, 50ML: HCPCS | Mod: JG | Performed by: INTERNAL MEDICINE

## 2019-08-04 PROCEDURE — P9047 ALBUMIN (HUMAN), 25%, 50ML: HCPCS | Mod: JG | Performed by: HOSPITALIST

## 2019-08-04 PROCEDURE — 99232 PR SUBSEQUENT HOSPITAL CARE,LEVL II: ICD-10-PCS | Mod: GC,,, | Performed by: INTERNAL MEDICINE

## 2019-08-04 PROCEDURE — 63600175 PHARM REV CODE 636 W HCPCS: Mod: JG | Performed by: HOSPITALIST

## 2019-08-04 PROCEDURE — 63600175 PHARM REV CODE 636 W HCPCS: Performed by: HOSPITALIST

## 2019-08-04 PROCEDURE — 25000003 PHARM REV CODE 250: Performed by: INTERNAL MEDICINE

## 2019-08-04 PROCEDURE — 83735 ASSAY OF MAGNESIUM: CPT

## 2019-08-04 PROCEDURE — 99232 SBSQ HOSP IP/OBS MODERATE 35: CPT | Mod: ,,, | Performed by: INTERNAL MEDICINE

## 2019-08-04 PROCEDURE — 84100 ASSAY OF PHOSPHORUS: CPT

## 2019-08-04 PROCEDURE — 99232 PR SUBSEQUENT HOSPITAL CARE,LEVL II: ICD-10-PCS | Mod: ,,, | Performed by: INTERNAL MEDICINE

## 2019-08-04 PROCEDURE — 20600001 HC STEP DOWN PRIVATE ROOM

## 2019-08-04 PROCEDURE — 25000003 PHARM REV CODE 250: Performed by: HOSPITALIST

## 2019-08-04 PROCEDURE — 36415 COLL VENOUS BLD VENIPUNCTURE: CPT

## 2019-08-04 RX ORDER — POTASSIUM CHLORIDE 20 MEQ/15ML
40 SOLUTION ORAL ONCE
Status: DISCONTINUED | OUTPATIENT
Start: 2019-08-04 | End: 2019-08-04

## 2019-08-04 RX ORDER — POTASSIUM CHLORIDE 20 MEQ/15ML
20 SOLUTION ORAL ONCE
Status: COMPLETED | OUTPATIENT
Start: 2019-08-04 | End: 2019-08-04

## 2019-08-04 RX ORDER — ALBUMIN HUMAN 250 G/1000ML
50 SOLUTION INTRAVENOUS ONCE
Status: COMPLETED | OUTPATIENT
Start: 2019-08-04 | End: 2019-08-04

## 2019-08-04 RX ADMIN — MIDODRINE HYDROCHLORIDE 15 MG: 5 TABLET ORAL at 10:08

## 2019-08-04 RX ADMIN — MIDODRINE HYDROCHLORIDE 15 MG: 5 TABLET ORAL at 09:08

## 2019-08-04 RX ADMIN — SODIUM BICARBONATE 650 MG TABLET 1300 MG: at 10:08

## 2019-08-04 RX ADMIN — RIFAXIMIN 550 MG: 550 TABLET ORAL at 09:08

## 2019-08-04 RX ADMIN — ALBUMIN (HUMAN) 50 G: 25 SOLUTION INTRAVENOUS at 09:08

## 2019-08-04 RX ADMIN — INSULIN ASPART 4 UNITS: 100 INJECTION, SOLUTION INTRAVENOUS; SUBCUTANEOUS at 12:08

## 2019-08-04 RX ADMIN — LACTULOSE 15 G: 20 SOLUTION ORAL at 03:08

## 2019-08-04 RX ADMIN — POTASSIUM CHLORIDE 20 MEQ: 20 SOLUTION ORAL at 09:08

## 2019-08-04 RX ADMIN — CIPROFLOXACIN HYDROCHLORIDE 250 MG: 250 TABLET, FILM COATED ORAL at 09:08

## 2019-08-04 RX ADMIN — GUAIFENESIN 600 MG: 600 TABLET, EXTENDED RELEASE ORAL at 09:08

## 2019-08-04 RX ADMIN — Medication 220 MG: at 12:08

## 2019-08-04 RX ADMIN — ONDANSETRON 4 MG: 2 INJECTION INTRAMUSCULAR; INTRAVENOUS at 12:08

## 2019-08-04 RX ADMIN — LEVOTHYROXINE SODIUM 88 MCG: 88 TABLET ORAL at 05:08

## 2019-08-04 RX ADMIN — MIDODRINE HYDROCHLORIDE 15 MG: 5 TABLET ORAL at 03:08

## 2019-08-04 RX ADMIN — LACTULOSE 15 G: 20 SOLUTION ORAL at 09:08

## 2019-08-04 RX ADMIN — ALBUMIN (HUMAN) 50 G: 12.5 SOLUTION INTRAVENOUS at 09:08

## 2019-08-04 RX ADMIN — SODIUM BICARBONATE 650 MG TABLET 1300 MG: at 09:08

## 2019-08-04 RX ADMIN — GUAIFENESIN 600 MG: 600 TABLET, EXTENDED RELEASE ORAL at 10:08

## 2019-08-04 RX ADMIN — ONDANSETRON 4 MG: 2 INJECTION INTRAMUSCULAR; INTRAVENOUS at 04:08

## 2019-08-04 RX ADMIN — RIFAXIMIN 550 MG: 550 TABLET ORAL at 10:08

## 2019-08-04 NOTE — PROGRESS NOTES
Progress Note   Hospital Medicine         Patient Name: Michelle Ojeda  MRN:  [id number]  Hospital Medicine Team: [facility]OS[facility] MED L Jesus Corado MD  Date of Admission:  7/18/2019     Length of Stay:  LOS: 17 days   Expected Discharge Date: 8/5/2019  Principal Problem:  Acute metabolic encephalopathy       Subjective:     Interval History/Overnight Events:      Ms. Ojeda felt well this morning, though fatigued. She had one episode of nausea and vomiting after coughing yesterday evening with no recurrence since. Yesterday she spent most of the day in the chair by her window, and even went outside with her daughter via wheelchair. She has had worsening diarrhea for which her lactulose was held yesterday afternoon and the dose decreased for this morning. Her BUN continues to increase; I discussed with Dr. Dean who thought the increase was likely due to volume depletion from the diarrhea and recommended increasing albumin to BID for today and monitoring.      Review of Systems   Constitutional: +fatigue; Negative for chills, fever.   Respiratory: Negative for cough, shortness of breath.    Cardiovascular: Negative for chest pain, palpitations, leg swelling.   Gastrointestinal: +nausea, vomiting; Negative for abdominal pain, constipation, diarrhea.   Musculoskeletal: Negative for arthralgias, myalgias.   Neurological: Negative for dizziness, syncope, weakness, light-headedness.     Objective:     Temp:  [97.6 °F (36.4 °C)-97.9 °F (36.6 °C)]   Pulse:  [56-63]   Resp:  [16-18]   BP: (105-117)/(52-61)   SpO2:  [100 %]       Physical Exam:  Constitutional: apears weak and frail   Cardiovascular: Normal rate and regular rhythm.  No murmur heard.  Pulmonary/Chest: Effort normal and breath sounds normal. No respiratory distress. No wheezes, rales, or rhonchi  Abdominal: Soft. Bowel sounds are normal. Positive distension; no TTP  Musculoskeletal: Normal range of motion. No edema.   Neurological: Alert and oriented to  person, place, and time.   Skin: Skin is warm and dry.     Recent Labs   Lab 07/31/19  0525 08/01/19  0548 08/02/19  0538 08/02/19  1559 08/03/19 0352 08/04/19  0628   WBC 5.31 7.70 3.51* 5.21 4.98 4.01   HGB 7.5* 8.6* 7.0* 7.9* 8.8* 9.0*   HCT 22.3* 24.6* 20.7* 24.5* 25.9* 26.7*   PLT 32* 45* 31* 37* 42* 45*     Recent Labs   Lab 08/02/19  0538 08/02/19  1559 08/03/19 0352 08/04/19  0628   * 128* 129* 129*   K 3.4* 3.2* 3.1* 3.2*   CL 99 99 100 98   CO2 15* 14* 18* 19*   * 106* 111* 120*   CREATININE 3.3* 3.4* 3.1* 3.3*   * 284* 144* 135*   CALCIUM 8.2* 8.7 8.6* 8.8   MG 3.2*  --  3.2* 3.4*   PHOS 3.6  --  3.3 4.4     Recent Labs   Lab 08/02/19  0538 08/02/19  1559 08/03/19 0352 08/04/19  0628   ALKPHOS 149* 156* 156* 147*   ALT 42 40 37 33   AST 54* 54* 52* 42*   ALBUMIN 3.9 4.8 3.9 4.3   PROT 6.2 6.9 6.2 6.5   BILITOT 2.0* 3.0* 4.7* 2.9*   INR 1.5*  --  1.6* 1.6*     Recent Labs   Lab 08/03/19  0921 08/03/19  1215 08/03/19  1619 08/03/19  2109 08/04/19  0726 08/04/19  1135   POCTGLUCOSE 162* 265* 235* 279* 124* 149*        albumin human 25%  50 g Intravenous Once    ciprofloxacin HCl  250 mg Oral Daily    guaiFENesin  600 mg Oral BID    insulin aspart U-100  4 Units Subcutaneous TIDWM    insulin detemir U-100  12 Units Subcutaneous Daily    lactulose  15 g Oral TID    levothyroxine  88 mcg Oral Before breakfast    midodrine  15 mg Oral TID    rifAXImin  550 mg Oral BID    sodium bicarbonate  1,300 mg Oral BID    zinc sulfate  220 mg Oral Daily       Assessment and Plan     Ms. Michelle Ojeda is a 68 y.o. female who presented to [facility] on 7/18/2019 with     Hospital Course:    Ms. Michelle Ojeda was admitted to Hospital Medicine for management of     Active Hospital Problems    Diagnosis  POA    *Acute metabolic encephalopathy [G93.41]  Yes    Metabolic acidosis [E87.2]  No    Other dysphagia [R13.19]  Clinically Undetermined    Acute hypoxemic respiratory failure  [J96.01]  No    Shock, unspecified [R57.9]  No    Hyperglycemia [R73.9]  No    Acute hepatic encephalopathy [K72.00]  Yes    Right kidney mass [N28.89]  No    Kidney transplant candidate [Z76.82]  Not Applicable    Ileus [K56.7]  No    Physical debility [R53.81]  Yes    Ascites [R18.8]  Yes    Pancytopenia [D61.818]  Yes    Hypothyroidism [E03.9]  Yes    Moderate malnutrition [E44.0]  Yes    Coagulopathy [D68.9]  Yes    Hyponatremia [E87.1]  Yes    Organ transplant candidate [Z76.82]  Not Applicable    Acute kidney injury superimposed on chronic kidney disease [N17.9, N18.9]  No    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       Acute metabolic encephalopathy  Likely 2/2 hepatic encephalopathy with ammonia 171 upon ICU tx (64 on 7/22).   --Improving  --CT head negative for acute intracranial abnormality  --continue lactulose (dose reduced 8/3) and rifaximin  --close to baseline; having multiple BMs       Pulmonary  Acute hypoxemic respiratory failure      --Resolved  Intubated for airway protection in setting of severe encephalopathy and episodes of emesis/concern for aspiration  --Sputum culture negative from 7/24.  Off antibiotics.  --No focal signs of consolidation on CXR  --on room air  --extubated on 7/27    Renal/  Right kidney mass  R kidney mass measures 2.4 cm x 2.3 cm found on CT abd/pelvis during transplant workup  --Urology following with plans to hold on any further work up in setting of acute decompensation  - will just monitor for now;     Acute kidney injury superimposed on chronic kidney disease  Metabolic acidosis   --Baseline reportedly sCr 1.6. Possible candidate for liver/kidney transplant  --Nephrology following,concern iATN given recent shock.  Also concern for intravascular volume depletion given significant stool output.   Retroperitoneal  US on 7/23 negative for hydronephrosis  --urine studies concerning for pre-renal etiology.  Started on albumin and continued on midodrine (MAPs 80's)  --sCr downtrending and urine output improved following albumin  --Strict I/Os  - Cr mildly worsened, increased albumin to BID. Continue sodium bicarb. Continued BUN elevation likely secondary to volume depletion. Discussed with Dr. Dean  - Nephrology assistance appreciated     Hematology  Coagulopathy  See cirrhosis     Anemia of chronic disease  - 8/2- transfusing 1 unit of PRBC with excellent response; no overt signs of GI bleed     Endocrine  Hyperglycemia  History of DM however was taken off metformin due to improvement  --A1c 5.1  --insulin infusion discontinued on 7/27 when enteral feedings where held.   --frequent glucose checks with SSI  --glucose goal 140-180     Hypothyroidism  --Continue home levothyroxine     GI  Other dysphagia  --SLP following.  Dental soft diet with thin liquids     Ileus  --improved.  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 Cytology pending.  --Cipro for SBP prophylaxis   MELD-Na score: 30 at 8/4/2019  6:28 AM  MELD score: 27 at 8/4/2019  6:28 AM  Calculated from:  Serum Creatinine: 3.3 mg/dL at 8/4/2019  6:28 AM  Serum Sodium: 129 mmol/L at 8/4/2019  6:28 AM  Total Bilirubin: 2.9 mg/dL at 8/4/2019  6:28 AM  INR(ratio): 1.6 at 8/4/2019  6:28 AM  Age: 68 years  - patient not currently a liver transplant candidate due to frailty; IR paracentesis on 7/31 with 3.2L removed negative for SBP       Other  Shock, unspecified      -- Resolved  --Likely due to sedating medications however patient at risk for infection  --Blood culture, sputum culture, and  peritoneal fluid negative  --UA appearing noninfectious  --Received 4 doses of piptazo,  Received 3 days of vancomycin.  --Resolved; weaned off norepinephrine infusion on 7/25  --Continue midodrine 15 mg TID     Physical debility  --PT/OT consulted    # Hypokalemia  - replace    # Hyponatremia  - improving with albumin     Disposition: [facility] rehab on monday    Jesus Corado M.D.  Department of Hospital Medicine  [facility]  [phone] (pager)

## 2019-08-04 NOTE — SUBJECTIVE & OBJECTIVE
Interval History:   No complaints, more awake and interactive. Spent some time outside with daughter yesterday. 100cc UOP recorded overnight. BUN rising.    Current Facility-Administered Medications   Medication    0.9%  NaCl infusion (for blood administration)    acetaminophen tablet 325 mg    albumin human 25% bottle 50 g    ciprofloxacin HCl tablet 250 mg    dextrose 10% (D10W) Bolus    dextrose 10% (D10W) Bolus    glucagon (human recombinant) injection 1 mg    glucose chewable tablet 16 g    glucose chewable tablet 24 g    guaiFENesin 12 hr tablet 600 mg    insulin aspart U-100 pen 0-5 Units    insulin aspart U-100 pen 4 Units    insulin detemir U-100 pen 12 Units    lactulose 20 gram/30 mL solution Soln 15 g    levothyroxine tablet 88 mcg    midodrine tablet 15 mg    ondansetron injection 4 mg    rifAXIMin tablet 550 mg    simethicone chewable tablet 80 mg    sodium bicarbonate tablet 1,300 mg    sodium chloride 0.9% flush 10 mL    sodium chloride 0.9% flush 10 mL    sodium chloride 0.9% flush 10 mL    zinc sulfate capsule 220 mg       Objective:     Vital Signs (Most Recent):  Temp: 97.9 °F (36.6 °C) (08/04/19 1226)  Pulse: 63 (08/04/19 1226)  Resp: 18 (08/04/19 1226)  BP: (!) 107/52 (08/04/19 1226)  SpO2: 100 % (08/04/19 1226) Vital Signs (24h Range):  Temp:  [97.6 °F (36.4 °C)-97.9 °F (36.6 °C)] 97.9 °F (36.6 °C)  Pulse:  [56-63] 63  Resp:  [16-18] 18  SpO2:  [100 %] 100 %  BP: (105-117)/(52-61) 107/52     Weight: 56 kg (123 lb 7.3 oz) (07/30/19 1000)  Body mass index is 24.11 kg/m².    Physical Exam   Constitutional: No distress.   Cardiovascular: Normal rate and regular rhythm.   Pulmonary/Chest: Effort normal and breath sounds normal. No respiratory distress.   Abdominal: Soft. Bowel sounds are normal. She exhibits distension. There is no tenderness.   Neurological: She is alert.   Psychiatric: She has a normal mood and affect. Her behavior is normal.   Nursing note and vitals  reviewed.      MELD-Na score: 30 at 8/4/2019  6:28 AM  MELD score: 27 at 8/4/2019  6:28 AM  Calculated from:  Serum Creatinine: 3.3 mg/dL at 8/4/2019  6:28 AM  Serum Sodium: 129 mmol/L at 8/4/2019  6:28 AM  Total Bilirubin: 2.9 mg/dL at 8/4/2019  6:28 AM  INR(ratio): 1.6 at 8/4/2019  6:28 AM  Age: 68 years    Significant Labs:  Labs within the past month have been reviewed.    Significant Imaging:  Reviewed

## 2019-08-04 NOTE — PLAN OF CARE
Problem: Adult Inpatient Plan of Care  Goal: Plan of Care Review  Outcome: Ongoing (interventions implemented as appropriate)  Patient is oriented to self and daughter at bedside. Appetite is poor, much needed encouragement with meals. C/O nausea with one episode of vomiting. Zofran administered for n/v, effective. Comfort measures maintained. Turned and repositioned. Daughter at bedside. Voice no further concerns. Encouraged to call for assistance as needed.

## 2019-08-04 NOTE — PROGRESS NOTES
[facility]  Hepatology  Progress Note    Patient Name: Michelle Ojeda  MRN: [id number]  Admission Date: 7/18/2019  Hospital Length of Stay: 17 days  Attending Provider: Jesus Corado MD   Primary Care Physician: Primary Doctor No  Principal Problem:Acute metabolic encephalopathy    Subjective:     Transplant status: No    HPI: Ms Ojeda is a 68 year old female with a history of HERRMANN ESLD, with decompensations including Gr 1 HE, ascites, EV, hyponatremia, T2DM, HTN, hypothyroidism, who is being admitted from hepatology clinic due to concern for hyponatremia.    She presents to transplant Clinic for initial evaluation on 07/18 with Dr. Cannon for initial transplant evaluation.  She has a history of Herrmann cirrhosis with a history of biopsy in 1998 with steatosis unclear fibrosis both diagnosed with cirrhosis in July of 2015 in the setting of decompensation due while in [place] with encephalopathy and hematemesis.  Her cirrhosis has been complicated by grade 1 hepatic encephalopathy, recently started Aldo [place] min but never been on lactulose, ascites, sarcopenia, esophageal varices status post ligation, hyponatremia.  She was previously evaluated at [place] with Dr. Mccormick who recommended evaluation at [facility].  Does not appear she was evaluated for transplant at that time.    Due to hyponatremia with sodium of 120 she was admitted to the hospital for further management.  Currently she reports she is feeling well without any complaints.  Denies any abdominal pain, nausea, vomiting, diarrhea.  Denies any fevers, chills, sweats or other infectious complaints.    Family who is present in the room note that she is doing extremely well until approximately 2 months prior to presentation when she began to be more frail, fatigue, increased peripheral edema, decreased oral intake.  They note they have been extremely careful to avoid T here to low-sodium diet.  She has never been hospitalized in the  past for hyponatremia.  No other recent hospitalizations.  Regarding her ascites she has approximately received a paracentesis every 2 weeks.      Interval History:   No complaints, more awake and interactive. Spent some time outside with daughter yesterday. 100cc UOP recorded overnight. BUN rising.    Current Facility-Administered Medications   Medication    0.9%  NaCl infusion (for blood administration)    acetaminophen tablet 325 mg    albumin human 25% bottle 50 g    ciprofloxacin HCl tablet 250 mg    dextrose 10% (D10W) Bolus    dextrose 10% (D10W) Bolus    glucagon (human recombinant) injection 1 mg    glucose chewable tablet 16 g    glucose chewable tablet 24 g    guaiFENesin 12 hr tablet 600 mg    insulin aspart U-100 pen 0-5 Units    insulin aspart U-100 pen 4 Units    insulin detemir U-100 pen 12 Units    lactulose 20 gram/30 mL solution Soln 15 g    levothyroxine tablet 88 mcg    midodrine tablet 15 mg    ondansetron injection 4 mg    rifAXIMin tablet 550 mg    simethicone chewable tablet 80 mg    sodium bicarbonate tablet 1,300 mg    sodium chloride 0.9% flush 10 mL    sodium chloride 0.9% flush 10 mL    sodium chloride 0.9% flush 10 mL    zinc sulfate capsule 220 mg       Objective:     Vital Signs (Most Recent):  Temp: 97.9 °F (36.6 °C) (08/04/19 1226)  Pulse: 63 (08/04/19 1226)  Resp: 18 (08/04/19 1226)  BP: (!) 107/52 (08/04/19 1226)  SpO2: 100 % (08/04/19 1226) Vital Signs (24h Range):  Temp:  [97.6 °F (36.4 °C)-97.9 °F (36.6 °C)] 97.9 °F (36.6 °C)  Pulse:  [56-63] 63  Resp:  [16-18] 18  SpO2:  [100 %] 100 %  BP: (105-117)/(52-61) 107/52     Weight: 56 kg (123 lb 7.3 oz) (07/30/19 1000)  Body mass index is 24.11 kg/m².    Physical Exam   Constitutional: No distress.   Cardiovascular: Normal rate and regular rhythm.   Pulmonary/Chest: Effort normal and breath sounds normal. No respiratory distress.   Abdominal: Soft. Bowel sounds are normal. She exhibits distension. There is  no tenderness.   Neurological: She is alert.   Psychiatric: She has a normal mood and affect. Her behavior is normal.   Nursing note and vitals reviewed.      MELD-Na score: 30 at 8/4/2019  6:28 AM  MELD score: 27 at 8/4/2019  6:28 AM  Calculated from:  Serum Creatinine: 3.3 mg/dL at 8/4/2019  6:28 AM  Serum Sodium: 129 mmol/L at 8/4/2019  6:28 AM  Total Bilirubin: 2.9 mg/dL at 8/4/2019  6:28 AM  INR(ratio): 1.6 at 8/4/2019  6:28 AM  Age: 68 years    Significant Labs:  Labs within the past month have been reviewed.    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 Also found to have ileus which has resolved. Unresponsive on 7/24, transferred to ICU and intubated for airway protection. Now extubated, transferred out of ICU with improved mentation. Hyponatremia initially resolved, back down to 125 and now improving. Worsening renal function with significant uremia. Other ongoing issues include incidentally discovered R renal mass too high risk for biopsy at this time, deconditioning requiring PT/OT and eventual SNF. Declined for transplant listing at this time.     Plan  - Hyponatremia: Nephrology consulted, plan for IV albumin today  - encephalopathy: Continue lactulose and rifaximin.  - ascites: Negative for SBP on 07/19. Total protein 1.5. Cont SBP ppx.  - diuretics: hold given TERA  - TERA on CKD: unclear baseline. Cr up to 3.2 from 2s earlier this admission. Appreciate Nephrology recs - IV albumin.  - Protein calorie malnutrition: nutrition consult. High protein intake. Consider prehab supplements on discharge.  - Anemia: secondary anemia workup, monitor for overt GI bleed  - Appreciate Renal transplant evaluation re: SLK given GFR <30.  -  Transplant: Discussed at transplant committee, but declined for transplant at this time. May be able to revisit this after patient completes rehab. Discussed with family and patient. May need further evaluation of R renal mass.          Thank you for your consult. I will follow-up with patient. Please contact us if you have any additional questions.    Andrea Eaton MD  Hepatology  [facility]

## 2019-08-04 NOTE — ASSESSMENT & PLAN NOTE
Ms Ojeda is a 68 year old female with a history of IZAGUIRRE ESLD, with decompensations including Gr 1 HE, ascites, EV, hyponatremia, T2DM, HTN, hypothyroidism, who was admitted from hepatology clinic due to concern for hyponatremia. Also found to have ileus which has resolved. Unresponsive on 7/24, transferred to ICU and intubated for airway protection. Now extubated, transferred out of ICU with improved mentation. Hyponatremia initially resolved, back down to 125 and now improving. Worsening renal function with significant uremia. Other ongoing issues include incidentally discovered R renal mass too high risk for biopsy at this time, deconditioning requiring PT/OT and eventual SNF. Declined for transplant listing at this time.     Plan  - Hyponatremia: Nephrology consulted, plan for IV albumin today  - encephalopathy: Continue lactulose and rifaximin.  - ascites: Negative for SBP on 07/19. Total protein 1.5. Cont SBP ppx.  - diuretics: hold given TERA  - TERA on CKD: unclear baseline. Cr up to 3.2 from 2s earlier this admission. Appreciate Nephrology recs - IV albumin.  - Protein calorie malnutrition: nutrition consult. High protein intake. Consider prehab supplements on discharge.  - Anemia: secondary anemia workup, monitor for overt GI bleed  - Appreciate Renal transplant evaluation re: SLK given GFR <30.  - Transplant: Discussed at transplant committee, but declined for transplant at this time. May be able to revisit this after patient completes rehab. Discussed with family and patient. May need further evaluation of R renal mass.

## 2019-08-04 NOTE — PLAN OF CARE
Problem: Adult Inpatient Plan of Care  Goal: Plan of Care Review  Outcome: Ongoing (interventions implemented as appropriate)  No acute changes overnight. VSS. Pt remained free of falls. Safety maintained. Hourly rounding performed. BG monitored ACHS. Pt had episode of emesis x2 this shift. PRN zofran given with relief. Pt resting. Daughter at bedside. No needs at this time. Call bell in reach. Will continue to monitor.

## 2019-08-05 LAB
ALBUMIN SERPL BCP-MCNC: 5 G/DL (ref 3.5–5.2)
ALP SERPL-CCNC: 128 U/L (ref 55–135)
ALT SERPL W/O P-5'-P-CCNC: 25 U/L (ref 10–44)
AMMONIA PLAS-SCNC: 121 UMOL/L (ref 10–50)
ANION GAP SERPL CALC-SCNC: 16 MMOL/L (ref 8–16)
APPEARANCE FLD: NORMAL
AST SERPL-CCNC: 35 U/L (ref 10–40)
BASOPHILS # BLD AUTO: 0.03 K/UL (ref 0–0.2)
BASOPHILS NFR BLD: 0.7 % (ref 0–1.9)
BILIRUB SERPL-MCNC: 3.2 MG/DL (ref 0.1–1)
BILIRUB UR QL STRIP: NEGATIVE
BODY FLD TYPE: NORMAL
BUN SERPL-MCNC: 114 MG/DL (ref 8–23)
CALCIUM SERPL-MCNC: 9.5 MG/DL (ref 8.7–10.5)
CHLORIDE SERPL-SCNC: 98 MMOL/L (ref 95–110)
CLARITY UR REFRACT.AUTO: CLEAR
CO2 SERPL-SCNC: 18 MMOL/L (ref 23–29)
COLOR FLD: YELLOW
COLOR UR AUTO: YELLOW
CREAT SERPL-MCNC: 3.5 MG/DL (ref 0.5–1.4)
DIFFERENTIAL METHOD: ABNORMAL
EOSINOPHIL # BLD AUTO: 0 K/UL (ref 0–0.5)
EOSINOPHIL NFR BLD: 0.5 % (ref 0–8)
ERYTHROCYTE [DISTWIDTH] IN BLOOD BY AUTOMATED COUNT: 18.9 % (ref 11.5–14.5)
EST. GFR  (AFRICAN AMERICAN): 14.5 ML/MIN/1.73 M^2
EST. GFR  (NON AFRICAN AMERICAN): 12.6 ML/MIN/1.73 M^2
GLUCOSE SERPL-MCNC: 113 MG/DL (ref 70–110)
GLUCOSE UR QL STRIP: NEGATIVE
HCT VFR BLD AUTO: 23.8 % (ref 37–48.5)
HGB BLD-MCNC: 8.1 G/DL (ref 12–16)
HGB UR QL STRIP: ABNORMAL
HYALINE CASTS UR QL AUTO: 10 /LPF
IMM GRANULOCYTES # BLD AUTO: 0.03 K/UL (ref 0–0.04)
IMM GRANULOCYTES NFR BLD AUTO: 0.7 % (ref 0–0.5)
INR PPP: 1.7 (ref 0.8–1.2)
KETONES UR QL STRIP: NEGATIVE
LACTATE SERPL-SCNC: 1.4 MMOL/L (ref 0.5–2.2)
LEUKOCYTE ESTERASE UR QL STRIP: NEGATIVE
LYMPHOCYTES # BLD AUTO: 0.4 K/UL (ref 1–4.8)
LYMPHOCYTES NFR BLD: 10.9 % (ref 18–48)
LYMPHOCYTES NFR FLD MANUAL: 52 %
MAGNESIUM SERPL-MCNC: 3.3 MG/DL (ref 1.6–2.6)
MCH RBC QN AUTO: 33.5 PG (ref 27–31)
MCHC RBC AUTO-ENTMCNC: 34 G/DL (ref 32–36)
MCV RBC AUTO: 98 FL (ref 82–98)
MESOTHL CELL NFR FLD MANUAL: 2 %
MICROSCOPIC COMMENT: ABNORMAL
MONOCYTES # BLD AUTO: 0.4 K/UL (ref 0.3–1)
MONOCYTES NFR BLD: 9.9 % (ref 4–15)
MONOS+MACROS NFR FLD MANUAL: 31 %
NEUTROPHILS # BLD AUTO: 3.1 K/UL (ref 1.8–7.7)
NEUTROPHILS NFR BLD: 77.3 % (ref 38–73)
NEUTROPHILS NFR FLD MANUAL: 15 %
NITRITE UR QL STRIP: NEGATIVE
NRBC BLD-RTO: 0 /100 WBC
PH UR STRIP: 5 [PH] (ref 5–8)
PHOSPHATE SERPL-MCNC: 4.8 MG/DL (ref 2.7–4.5)
PLATELET # BLD AUTO: 46 K/UL (ref 150–350)
PMV BLD AUTO: 11.4 FL (ref 9.2–12.9)
POCT GLUCOSE: 110 MG/DL (ref 70–110)
POCT GLUCOSE: 129 MG/DL (ref 70–110)
POCT GLUCOSE: 143 MG/DL (ref 70–110)
POCT GLUCOSE: 192 MG/DL (ref 70–110)
POTASSIUM SERPL-SCNC: 3.3 MMOL/L (ref 3.5–5.1)
PROT SERPL-MCNC: 6.7 G/DL (ref 6–8.4)
PROT UR QL STRIP: NEGATIVE
PROTHROMBIN TIME: 16.3 SEC (ref 9–12.5)
RBC # BLD AUTO: 2.42 M/UL (ref 4–5.4)
RBC #/AREA URNS AUTO: 2 /HPF (ref 0–4)
SODIUM SERPL-SCNC: 132 MMOL/L (ref 136–145)
SP GR UR STRIP: 1.01 (ref 1–1.03)
SQUAMOUS #/AREA URNS AUTO: 1 /HPF
URN SPEC COLLECT METH UR: ABNORMAL
WBC # BLD AUTO: 4.03 K/UL (ref 3.9–12.7)
WBC # FLD: 182 /CU MM
WBC #/AREA URNS AUTO: 1 /HPF (ref 0–5)

## 2019-08-05 PROCEDURE — 99233 SBSQ HOSP IP/OBS HIGH 50: CPT | Mod: ,,, | Performed by: HOSPITALIST

## 2019-08-05 PROCEDURE — 99233 PR SUBSEQUENT HOSPITAL CARE,LEVL III: ICD-10-PCS | Mod: ,,, | Performed by: HOSPITALIST

## 2019-08-05 PROCEDURE — 63600175 PHARM REV CODE 636 W HCPCS: Performed by: HOSPITALIST

## 2019-08-05 PROCEDURE — 97535 SELF CARE MNGMENT TRAINING: CPT

## 2019-08-05 PROCEDURE — 83735 ASSAY OF MAGNESIUM: CPT

## 2019-08-05 PROCEDURE — 82140 ASSAY OF AMMONIA: CPT

## 2019-08-05 PROCEDURE — 36415 COLL VENOUS BLD VENIPUNCTURE: CPT

## 2019-08-05 PROCEDURE — 85025 COMPLETE CBC W/AUTO DIFF WBC: CPT

## 2019-08-05 PROCEDURE — A4217 STERILE WATER/SALINE, 500 ML: HCPCS | Performed by: HOSPITALIST

## 2019-08-05 PROCEDURE — 51702 INSERT TEMP BLADDER CATH: CPT

## 2019-08-05 PROCEDURE — B4185 PARENTERAL SOL 10 GM LIPIDS: HCPCS | Performed by: HOSPITALIST

## 2019-08-05 PROCEDURE — 25000003 PHARM REV CODE 250: Performed by: HOSPITALIST

## 2019-08-05 PROCEDURE — 25000003 PHARM REV CODE 250: Performed by: INTERNAL MEDICINE

## 2019-08-05 PROCEDURE — 89051 BODY FLUID CELL COUNT: CPT

## 2019-08-05 PROCEDURE — 99233 SBSQ HOSP IP/OBS HIGH 50: CPT | Mod: GC,,, | Performed by: INTERNAL MEDICINE

## 2019-08-05 PROCEDURE — 97530 THERAPEUTIC ACTIVITIES: CPT

## 2019-08-05 PROCEDURE — 85610 PROTHROMBIN TIME: CPT

## 2019-08-05 PROCEDURE — 99233 PR SUBSEQUENT HOSPITAL CARE,LEVL III: ICD-10-PCS | Mod: GC,,, | Performed by: INTERNAL MEDICINE

## 2019-08-05 PROCEDURE — 80053 COMPREHEN METABOLIC PANEL: CPT

## 2019-08-05 PROCEDURE — 84100 ASSAY OF PHOSPHORUS: CPT

## 2019-08-05 PROCEDURE — 87040 BLOOD CULTURE FOR BACTERIA: CPT

## 2019-08-05 PROCEDURE — 99232 PR SUBSEQUENT HOSPITAL CARE,LEVL II: ICD-10-PCS | Mod: ,,, | Performed by: NURSE PRACTITIONER

## 2019-08-05 PROCEDURE — 20600001 HC STEP DOWN PRIVATE ROOM

## 2019-08-05 PROCEDURE — 99232 SBSQ HOSP IP/OBS MODERATE 35: CPT | Mod: ,,, | Performed by: NURSE PRACTITIONER

## 2019-08-05 PROCEDURE — 83605 ASSAY OF LACTIC ACID: CPT

## 2019-08-05 PROCEDURE — 94761 N-INVAS EAR/PLS OXIMETRY MLT: CPT

## 2019-08-05 PROCEDURE — 81001 URINALYSIS AUTO W/SCOPE: CPT

## 2019-08-05 RX ORDER — LACTULOSE 10 G/15ML
30 SOLUTION ORAL 3 TIMES DAILY
Status: DISCONTINUED | OUTPATIENT
Start: 2019-08-05 | End: 2019-08-05

## 2019-08-05 RX ORDER — LACTULOSE 10 G/15ML
200 SOLUTION ORAL; RECTAL ONCE
Status: COMPLETED | OUTPATIENT
Start: 2019-08-05 | End: 2019-08-05

## 2019-08-05 RX ORDER — POTASSIUM CHLORIDE 20 MEQ/1
40 TABLET, EXTENDED RELEASE ORAL ONCE
Status: COMPLETED | OUTPATIENT
Start: 2019-08-05 | End: 2019-08-05

## 2019-08-05 RX ORDER — LACTULOSE 10 G/15ML
45 SOLUTION ORAL 3 TIMES DAILY
Status: DISCONTINUED | OUTPATIENT
Start: 2019-08-05 | End: 2019-08-06

## 2019-08-05 RX ADMIN — MIDODRINE HYDROCHLORIDE 15 MG: 5 TABLET ORAL at 11:08

## 2019-08-05 RX ADMIN — LACTULOSE 45 G: 20 SOLUTION ORAL at 11:08

## 2019-08-05 RX ADMIN — LEVOTHYROXINE SODIUM 88 MCG: 88 TABLET ORAL at 05:08

## 2019-08-05 RX ADMIN — I.V. FAT EMULSION 250 ML: 20 EMULSION INTRAVENOUS at 10:08

## 2019-08-05 RX ADMIN — POTASSIUM CHLORIDE 40 MEQ: 1500 TABLET, EXTENDED RELEASE ORAL at 10:08

## 2019-08-05 RX ADMIN — LACTULOSE 200 G: 10 SOLUTION ORAL at 11:08

## 2019-08-05 RX ADMIN — LACTULOSE 200 G: 10 SOLUTION ORAL at 08:08

## 2019-08-05 RX ADMIN — ASCORBIC ACID, VITAMIN A PALMITATE, CHOLECALCIFEROL, THIAMINE HYDROCHLORIDE, RIBOFLAVIN-5 PHOSPHATE SODIUM, PYRIDOXINE HYDROCHLORIDE, NIACINAMIDE, DEXPANTHENOL, ALPHA-TOCOPHEROL ACETATE, VITAMIN K1, FOLIC ACID, BIOTIN, CYANOCOBALAMIN: 200; 3300; 200; 6; 3.6; 6; 40; 15; 10; 150; 600; 60; 5 INJECTION, SOLUTION INTRAVENOUS at 10:08

## 2019-08-05 RX ADMIN — RIFAXIMIN 550 MG: 550 TABLET ORAL at 11:08

## 2019-08-05 RX ADMIN — SODIUM BICARBONATE 650 MG TABLET 1300 MG: at 11:08

## 2019-08-05 NOTE — SUBJECTIVE & OBJECTIVE
Interval History: Pt mental status worse today; pt appears more fatigued. Per daughter, pt no longer has an appetite. One episode of N/V overnight.     Review of patient's allergies indicates:  No Known Allergies  Current Facility-Administered Medications   Medication Frequency    acetaminophen tablet 325 mg Q4H PRN    cefTRIAXone (ROCEPHIN) 2 g in dextrose 5 % 50 mL IVPB Q24H    ciprofloxacin HCl tablet 250 mg Daily    dextrose 10% (D10W) Bolus PRN    dextrose 10% (D10W) Bolus PRN    fat emulsion 20% infusion 250 mL Daily    glucagon (human recombinant) injection 1 mg PRN    glucose chewable tablet 16 g PRN    glucose chewable tablet 24 g PRN    insulin aspart U-100 pen 0-5 Units Q6H PRN    insulin detemir U-100 pen 12 Units Daily    lactulose 10 gram/15 mL solution (enema) 200 g Once    lactulose 20 gram/30 mL solution Soln 45 g TID    levothyroxine tablet 88 mcg Before breakfast    midodrine tablet 15 mg TID    ondansetron injection 4 mg Q6H PRN    rifAXIMin tablet 550 mg BID    simethicone chewable tablet 80 mg TID PRN    sodium bicarbonate tablet 1,300 mg BID    Standard Custom Day One ADULT TPN for patient WITH electrolyte abnormality or renal dysfunction (PERIPHERAL) Continuous    zinc sulfate capsule 220 mg Daily       Objective:     Vital Signs (Most Recent):  Temp: 97.6 °F (36.4 °C) (08/05/19 1147)  Pulse: 62 (08/05/19 1147)  Resp: 16 (08/05/19 0712)  BP: 109/61 (08/05/19 1147)  SpO2: 100 % (08/05/19 1147)  O2 Device (Oxygen Therapy): room air (08/05/19 0421) Vital Signs (24h Range):  Temp:  [97.2 °F (36.2 °C)-98.5 °F (36.9 °C)] 97.6 °F (36.4 °C)  Pulse:  [57-66] 62  Resp:  [14-18] 16  SpO2:  [99 %-100 %] 100 %  BP: (100-120)/(53-61) 109/61     Weight: 56 kg (123 lb 7.3 oz) (07/30/19 1000)  Body mass index is 24.11 kg/m².  Body surface area is 1.54 meters squared.    I/O last 3 completed shifts:  In: 340 [P.O.:340]  Out: 950 [Urine:950]    Physical Exam   Constitutional: She appears  well-developed and well-nourished. She appears listless. She appears cachectic. No distress.   HENT:   Head: Normocephalic and atraumatic.   Eyes: EOM are normal. Scleral icterus is present.   Neck: Normal range of motion.   Cardiovascular: Normal rate, regular rhythm and normal heart sounds.   No murmur heard.  Pulmonary/Chest: Effort normal and breath sounds normal.   Abdominal: Soft. Bowel sounds are normal. She exhibits ascites. There is hepatosplenomegaly. There is no tenderness.   Musculoskeletal: She exhibits no edema.   Neurological: She appears listless. She is disoriented (Oriented to Person, Place).   Skin: Skin is warm and dry. Ecchymosis noted. She is not diaphoretic.   Nursing note and vitals reviewed.      Significant Labs:  CBC:   Recent Labs   Lab 08/05/19  0550   WBC 4.03   RBC 2.42*   HGB 8.1*   HCT 23.8*   PLT 46*   MCV 98   MCH 33.5*   MCHC 34.0     CMP:   Recent Labs   Lab 08/05/19  0549   *   CALCIUM 9.5   ALBUMIN 5.0   PROT 6.7   *   K 3.3*   CO2 18*   CL 98   *   CREATININE 3.5*   ALKPHOS 128   ALT 25   AST 35   BILITOT 3.2*     Recent Labs   Lab 08/01/19  2047   COLORU Yellow   SPECGRAV 1.015   PHUR 5.0   PROTEINUA Negative   NITRITE Negative   LEUKOCYTESUR Negative     All labs within the past 24 hours have been reviewed.     Significant Imaging:  No new imaging in the past 24 hours

## 2019-08-05 NOTE — PROGRESS NOTES
Progress Note   Hospital Medicine         Patient Name: Michelle Ojeda  MRN:  [id number]  [place]ospital Medicine [place] [facility] [place] [facility] [place] Ty Breaux [place]  Date of Admission:  7/18/2019     Length of Stay:  LOS: 18 days   Expected Discharge Date: 8/5/2019  Principal Problem:  Acute metabolic encephalopathy       Subjective:     Interval History/Overnight Events:    - patient is confused and lethargic this morning, only oriented to person, had 4 charted BMs last night, ordered ammonia and it came back at 121, infectious work up begun and lactulose enemas started;   - there certainly can be a uremic component to this encephalopathy today, Cr 3.5 and , spoke with nephrology staff, plan is to improve her mentation by treating hepatic encephalopathy and if after having multiple BMs over night patient is still confused and no improvement in Cr, will plan to place a line and due HD to help ; daughter at the bedside and updated on the plan  - also patient has been retaining urine an had to have 2 in an outs over night and this AM with 600 cc and 200 cc output, will place a leigh and see if that helps Cr  - giving PPN to help with nutritional status; needs [facility] once medically stable   - IR para also ordered    - update- patient could not retain enema long enough and only a little BM came out and mostly water, placing NG tube and will need to give lactulose through that;      Review of Systems   Constitutional: +fatigue; Negative for chills, fever.   Respiratory: Negative for cough, shortness of breath.    Cardiovascular: Negative for chest pain, palpitations, leg swelling.   Gastrointestinal: +nausea, vomiting; Negative for abdominal pain, constipation, diarrhea.   Musculoskeletal: Negative for arthralgias, myalgias.   Neurological: Negative for dizziness, syncope, weakness, light-headedness.     Objective:     Temp:  [97.2 °F (36.2 °C)-98.5 °F (36.9 °C)]   Pulse:  [57-66]   Resp:  [14-18]   BP: (100-120)/(53-61)    SpO2:  [99 %-100 %]       Physical Exam:  Constitutional: apears weak and frail   Cardiovascular: Normal rate and regular rhythm.  No murmur heard.  Pulmonary/Chest: Effort normal and breath sounds normal. No respiratory distress. No wheezes, rales, or rhonchi  Abdominal: Soft. Bowel sounds are normal. Positive distension; no TTP  Musculoskeletal: Normal range of motion. No edema.   Neurological: Alert only to person  Skin: Skin is warm and dry.     Recent Labs   Lab 08/01/19  0548 08/02/19  0538 08/02/19  1559 08/03/19  0352 08/04/19  0628 08/05/19  0550   WBC 7.70 3.51* 5.21 4.98 4.01 4.03   HGB 8.6* 7.0* 7.9* 8.8* 9.0* 8.1*   HCT 24.6* 20.7* 24.5* 25.9* 26.7* 23.8*   PLT 45* 31* 37* 42* 45* 46*     Recent Labs   Lab 08/03/19  0352 08/04/19  0628 08/05/19  0549   * 129* 132*   K 3.1* 3.2* 3.3*    98 98   CO2 18* 19* 18*   * 120* 114*   CREATININE 3.1* 3.3* 3.5*   * 135* 113*   CALCIUM 8.6* 8.8 9.5   MG 3.2* 3.4* 3.3*   PHOS 3.3 4.4 4.8*     Recent Labs   Lab 08/03/19  0352 08/04/19  0628 08/05/19  0549 08/05/19  0550   ALKPHOS 156* 147* 128  --    ALT 37 33 25  --    AST 52* 42* 35  --    ALBUMIN 3.9 4.3 5.0  --    PROT 6.2 6.5 6.7  --    BILITOT 4.7* 2.9* 3.2*  --    INR 1.6* 1.6*  --  1.7*     Recent Labs   Lab 08/04/19  0726 08/04/19  1135 08/04/19  1630 08/04/19  2214 08/05/19  0739 08/05/19  1219   POCTGLUCOSE 124* 149* 237* 145* 110 143*        ciprofloxacin HCl  250 mg Oral Daily    fat emulsion 20%  250 mL Intravenous Daily    insulin detemir U-100  12 Units Subcutaneous Daily    lactulose  200 g Rectal Once    lactulose  30 g Oral TID    levothyroxine  88 mcg Oral Before breakfast    midodrine  15 mg Oral TID    rifAXImin  550 mg Oral BID    sodium bicarbonate  1,300 mg Oral BID    zinc sulfate  220 mg Oral Daily       Assessment and Plan     Ms. Michelle Ojeda is a 68 y.o. female who presented to [facility] on 7/18/2019 with     Hospital Course:    Ms. Martinez  Rusty was admitted to Hospital Medicine for management of     Active Hospital Problems    Diagnosis  POA    *Acute metabolic encephalopathy [G93.41]  Yes    Metabolic acidosis [E87.2]  No    Other dysphagia [R13.19]  Clinically Undetermined    Acute hypoxemic respiratory failure [J96.01]  No    Shock, unspecified [R57.9]  No    Hyperglycemia [R73.9]  No    Acute hepatic encephalopathy [K72.00]  Yes    Right kidney mass [N28.89]  No    Kidney transplant candidate [Z76.82]  Not Applicable    Ileus [K56.7]  No    Physical debility [R53.81]  Yes    Ascites [R18.8]  Yes    Pancytopenia [D61.818]  Yes    Hypothyroidism [E03.9]  Yes    Moderate malnutrition [E44.0]  Yes    Coagulopathy [D68.9]  Yes    Hyponatremia [E87.1]  Yes    Organ transplant candidate [Z76.82]  Not Applicable    Acute kidney injury superimposed on chronic kidney disease [N17.9, N18.9]  No    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     # Acute hepatic encephalopathy  - ammonia 121 today, very lethargic, giving lactulose enema now and then scheduled; cont rifaximin and zinc, however holding most other PO meds; doing infectious work up as well      Pulmonary  Acute hypoxemic respiratory failure      --Resolved  Intubated for airway protection in setting of severe encephalopathy and episodes of emesis/concern for aspiration  --Sputum culture negative from 7/24.  Off antibiotics.  --No focal signs of consolidation on CXR  --on room air  --extubated on 7/27    Renal/  Right kidney mass  R kidney mass measures 2.4 cm x 2.3 cm found on CT abd/pelvis during transplant workup  --Urology following with plans to hold on any further work up in setting of acute decompensation  - will just monitor for now;     Acute kidney injury superimposed on chronic kidney disease  Metabolic acidosis   Uremia    --Baseline reportedly sCr 1.6. Possible candidate for liver/kidney transplant  --Nephrology following,concern iATN given recent shock.  Also concern for intravascular volume depletion given significant stool output.   Retroperitoneal US on 7/23 negative for hydronephrosis  --urine studies concerning for pre-renal etiology.  Started on albumin and continued on midodrine (MAPs 80's)  --sCr downtrending and urine output improved following albumin  --Strict I/Os  - Cr mildly worsened, BUN improved; nephrology following, placed leigh cath as there may be an obstructive component as patient required in an out caths; discussed case with nephrology staff and if Cr is not improved tomorrow, will do a round of HD to help with clearance and mentation     Hematology  Coagulopathy  See cirrhosis     Anemia of chronic disease  - 8/2- transfusing 1 unit of PRBC with excellent response; no overt signs of GI bleed     Endocrine  Hyperglycemia  History of DM however was taken off metformin due to improvement  --A1c 5.1  --insulin infusion discontinued on 7/27 when enteral feedings where held.   --frequent glucose checks with SSI  --glucose goal 140-180     Hypothyroidism  --Continue home levothyroxine     GI  Other dysphagia  --SLP following.  Dental soft diet with thin liquids     Ileus  --improved.  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 Cytology pending.  --Cipro for SBP prophylaxis   MELD-Na score: 31 at 8/5/2019  5:50 AM  MELD score: 29 at 8/5/2019  5:50 AM  Calculated from:  Serum Creatinine: 3.5 mg/dL at 8/5/2019  5:49 AM  Serum Sodium: 132 mmol/L at 8/5/2019  5:49 AM  Total Bilirubin: 3.2 mg/dL at 8/5/2019  5:49 AM  INR(ratio): 1.7 at 8/5/2019   5:50 AM  Age: 68 years  - patient not currently a liver transplant candidate due to frailty; IR paracentesis on 7/31 with 3.2L removed negative for SBP          Physical debility  --PT/OT consulted    # Hypokalemia  - replace    # Hyponatremia  - improving with albumin     # Moderate protein payton malnutrition  - giving PPN today    Disposition: [facility] possibly wednesday

## 2019-08-05 NOTE — NURSING
Was told in report that pt had no urine output for entire shift. Ita Gonzales NP notified. Ordered to bladder scan and straight cath if necessary.

## 2019-08-05 NOTE — PLAN OF CARE
Problem: Occupational Therapy Goal  Goal: Occupational Therapy Goal  Goals to be met by: 8/29/19     Patient will increase functional independence with ADLs by performing:    UE Dressing with Modified [place].  LE Dressing with Modified [place] and Assistive Devices as needed.  Grooming while standing at sink with Modified [place].  Toileting from bedside commode with Modified [place] for hygiene and clothing management.   Bathing from  shower chair/bench with Modified [place].  Toilet transfer to bedside commode with Modified [place].  Increased functional strength to WFL for B UE.  Upper extremity exercise program x15 reps per handout, with assistance as needed.     Outcome: Ongoing (interventions implemented as appropriate)  Continue OT POC.  Jayshree Samano OT  8/5/2019

## 2019-08-05 NOTE — PT/OT/SLP PROGRESS
Physical Therapy Treatment    Patient Name:  Michelle Ojeda   MRN:  [id number]    Recommendations:     Discharge Recommendations:  nursing facility, skilled(SS-SNF)   Discharge Equipment Recommendations: walker, rolling   Barriers to discharge: Inaccessible home and pt not at prior level of function    Assessment:     Michelle Ojeda is a 68 y.o. female admitted with a medical diagnosis of Acute metabolic encephalopathy.  She presents with the following impairments/functional limitations:  weakness, impaired endurance, impaired balance, gait instability, impaired functional mobilty, impaired cognition, decreased coordination, decreased upper extremity function, decreased lower extremity function. Per daughter, pt has been confused and not doing well today, but pt agreeable to participate with therapy. Pt disoriented with reduced ability to follow commands this session. Pt requiring increased assistance today than in previous sessions, she was MaxA for bed mobility and CGA for static sitting EOB. At first, pt wanted to perform ADLs, but with attempt from OT then refused, likely due to decreased cognition at this time. Pt with increased anxiety, eyes closed for majority of time, and decreased participation so not able to perform OOB activities this session. Discussed with daughter PT POC. Pt would continue to benefit from skilled PT services to improve functional mobility, ambulation, and activity tolerance to return to PLOF.     Rehab Prognosis: Good; patient would benefit from acute skilled PT services to address these deficits and reach maximum level of function.    Recent Surgery: * No surgery found *      Plan:     During this hospitalization, patient to be seen 3 x/week to address the identified rehab impairments via gait training, therapeutic activities, therapeutic exercises, neuromuscular re-education and progress toward the following goals:    · Plan of Care Expires:  08/29/19    Subjective     Chief  "Complaint: none  Patient/Family Comments/goals: "What do you want me to do, don't make me do it"   Pain/Comfort:  · Pain Rating 1: (pt did not rate)  · Pain Addressed 1: Reposition, Nurse notified, Cessation of Activity, Distraction      Objective:     Communicated with RN prior to session.  Patient found supine with telemetry upon PT entry to room.     General Precautions: Standard, fall   Orthopedic Precautions:N/A   Braces: N/A     Functional Mobility:  · Bed Mobility:  Scooting: maximal assistance  · Supine to Sit: maximal assistance  · Sit to Supine: maximal assistance  · Balance: Sitting EOB: CGA      AM-PAC 6 CLICK MOBILITY  Turning over in bed (including adjusting bedclothes, sheets and blankets)?: 3  Sitting down on and standing up from a chair with arms (e.g., wheelchair, bedside commode, etc.): 2  Moving from lying on back to sitting on the side of the bed?: 2  Moving to and from a bed to a chair (including a wheelchair)?: 2  Need to walk in hospital room?: 2  Climbing 3-5 steps with a railing?: 1  Basic Mobility Total Score: 12       Therapeutic Activities and Exercises:    -Pt educated on:              -PT roles, expectations, and POC               -Safety with mobility              -Benefits of OOB activities to increase strength and functional mobility               -Discharge recommendations   -Pt sat EOB x 5 min, attempted ADLs but then refused       Patient left supine with all lines intact, call button in reach, bed alarm on, RN notified and daughter and MD present..    GOALS:   Multidisciplinary Problems     Physical Therapy Goals        Problem: Physical Therapy Goal    Goal Priority Disciplines Outcome Goal Variances Interventions   Physical Therapy Goal     PT, PT/OT Ongoing (interventions implemented as appropriate)     Description:  Goals to be met by: 2019     Patient will increase functional independence with mobility by performin. Supine to sit with contact guard assistance "   2. Sit to supine with contact guard assistance   3. Sit to stand transfer with stand by assistance with LRAD   4. Gait  x 50 feet with stand by assistance using Rolling Walker.   5. Ascend/descend 3 stair with bilateral Handrails Stand-by Assistance                      Time Tracking:     PT Received On: 08/05/19  PT Start Time: 1113     PT Stop Time: 1125  PT Total Time (min): 12 min     Billable Minutes: Therapeutic Activity 12    Treatment Type: Treatment  PT/PTA: PT     PTA Visit Number: 0     Belinda Christie, PT  08/05/2019

## 2019-08-05 NOTE — ASSESSMENT & PLAN NOTE
Rusty is a 67 yo F with liver cirrhosis and CKD admitted for hyponatremia. Hyponatremia may be related to several factors primarily liver cirrhosis requiring biweekly paracentesis. This leads to a hypovolemic hyponatremic state in which fluid is pulled out of the vasculature. Additional contributing factors related to ileus, NPO status, and TPN nutrition. Extra sodium in TPN has increased sodium. Now on renal diet with 1000mL fluid restriction. Baseline Na+ from labs in [place] between 123-124.   - 8/1 Na 125; while this is closer to pt's baseline, does represent a drop since 8/30 from 142. Improving with IV albumin and NS  - Midodrine 10 mg TID;  - Strict I/O monitoring

## 2019-08-05 NOTE — PROCEDURES
Radiology Post-Procedure Note    Pre Op Diagnosis: Ascites  Post Op Diagnosis: Same    Procedure: Paracentesis    Procedure performed by: Pardeep Pappas MD and Mike Cho MD    Written Informed Consent Obtained: Yes  Specimen Removed: YES 2 tubes sent for analysis  Estimated Blood Loss: Minimal    Findings:   Successful paracentesis.  Albumin administered PRN per protocol.    Patient tolerated procedure well.    Pardeep Pappas MD  Resident  Department of Radiology  Pager: [phone]

## 2019-08-05 NOTE — NURSING
Pt slightly confused. Per daughter this is a change from this morning. Ita Gonzales NP notified. No new orders given.

## 2019-08-05 NOTE — H&P
Inpatient Radiology Pre-procedure Note    History of Present Illness:  Michelle Ojeda is a 68 y.o. female who presents for IR paracentesis on a b/g of liver failure complicated by encephalopathy and recurrent ascites.  Admission H&P reviewed.  Past Medical History:   Diagnosis Date    Cirrhosis     Diabetes mellitus, type 2     Disorder of kidney and ureter     Hypertension     Hypothyroidism     NAFLD (nonalcoholic fatty liver disease)      History reviewed. No pertinent surgical history.    Review of Systems:   As documented in primary team H&P    Home Meds:   Prior to Admission medications    Medication Sig Start Date End Date Taking? Authorizing Provider   biotin 5 mg Cap Take 5,000 mcg by mouth once daily.   Yes Historical Provider, MD   calcium carbonate/vitamin D3 (CALTRATE-600 PLUS VITAMIN D3 ORAL) Take 1 tablet by mouth once daily.   Yes Historical Provider, MD   cholecalciferol, vitamin D3, (VITAMIN D3) 2,000 unit Cap Take 2,000 Units by mouth once daily.    Yes Historical Provider, MD   levothyroxine (SYNTHROID) 88 MCG tablet Take 88 mcg by mouth before breakfast.   Yes Historical Provider, MD   ciprofloxacin HCl (CIPRO) 250 MG tablet Take 1 tablet (250 mg total) by mouth once daily. 7/31/19   Ty Breaux MD   insulin detemir U-100 (LEVEMIR FLEXTOUCH) 100 unit/mL (3 mL) SubQ InPn pen Inject 10 Units into the skin once daily. 8/1/19 7/31/20  Ty Breaux MD   lactulose (CHRONULAC) 20 gram/30 mL Soln Take 22.5 mLs (15 g total) by mouth 3 (three) times daily. for 10 days 7/31/19 8/10/19  Ty Breaux MD   midodrine (PROAMATINE) 10 MG tablet Take 1 tablet (10 mg total) by mouth 3 (three) times daily. 7/31/19 7/30/20  Ty Breaux MD   rifAXIMin (XIFAXAN) 550 mg Tab Take 1 tablet (550 mg total) by mouth 2 (two) times daily. 7/31/19   Ty Breaux MD   zinc sulfate (ZINCATE) 220 (50) mg capsule Take 1 capsule (220 mg total) by mouth once daily. 7/31/19   Ty Breaux MD     Scheduled Meds:     cefTRIAXone (ROCEPHIN) IVPB  2 g Intravenous Q24H    ciprofloxacin HCl  250 mg Oral Daily    fat emulsion 20%  250 mL Intravenous Daily    insulin detemir U-100  12 Units Subcutaneous Daily    lactulose  200 g Rectal Once    lactulose  45 g Per NG tube TID    levothyroxine  88 mcg Oral Before breakfast    midodrine  15 mg Oral TID    rifAXImin  550 mg Oral BID    sodium bicarbonate  1,300 mg Oral BID    zinc sulfate  220 mg Oral Daily     Continuous Infusions:    Standard [employer] Day [employer] ADULT TPN for patient WITH electrolyte abnormality or renal dysfunction (PERIPHERAL)       PRN Meds:acetaminophen, Dextrose 10% Bolus, Dextrose 10% Bolus, glucagon (human recombinant), glucose, glucose, insulin aspart U-100, ondansetron, simethicone  Anticoagulants/Antiplatelets: no anticoagulation    Allergies: Review of patient's allergies indicates:  No Known Allergies  Sedation Hx: have not been any systemic reactions    Labs:  Recent Labs   Lab 08/05/19  0550   INR 1.7*       Recent Labs   Lab 08/05/19 0550   WBC 4.03   HGB 8.1*   HCT 23.8*   MCV 98   PLT 46*      Recent Labs   Lab 08/05/19  0549   *   *   K 3.3*   CL 98   CO2 18*   *   CREATININE 3.5*   CALCIUM 9.5   MG 3.3*   ALT 25   AST 35   ALBUMIN 5.0   BILITOT 3.2*         Vitals:  Temp: 97.6 °F (36.4 °C) (08/05/19 1147)  Pulse: 62 (08/05/19 1719)  Resp: 20 (08/05/19 1719)  BP: (!) 105/46 (08/05/19 1719)  SpO2: 100 % (08/05/19 1719)     Physical Exam:  ASA: 3  Mallampati: 3    General: no acute distress  Mental Status: confused  HEENT: normocephalic, atraumatic  Chest: unlabored breathing  Heart: regular heart rate  Abdomen: distended  Extremity: moves all extremities    Plan: paracentesis  Sedation Plan: local    Pardeep Pappas MD  Resident  Department of Radiology  Pager: [phone]

## 2019-08-05 NOTE — PROGRESS NOTES
Hepatology Progress Note    Michelle Ojeda is a 68 y.o. female admitted to hospital 7/18/2019 (Hospital Day: 19) due to Acute metabolic encephalopathy.     Subjective:  Encephalopathic this AM, very frail, barley answers to verbal commands.     Interval History  68F w/IZAGUIRRE cirrhosis and CKD who came from [place] for liver-kidney transplant evaluation. She was admitted from Hepatology clinic few weeks ago for hypoNa. Course complicated by lieus, encephalopathy (in setting of holding lactulose for ileus), briefly in ICU and intubated but extubated and to the floor last week. Transplant committee declined her due to frailty. She was supposed to go to acute rehab last week, but worsening hypoNa and renal function in setting of decreased PO intake. Nephrology following and they think it is volume depletion so getting albumin. Other issue taking a back seat is an incidental R renal mass (too high risk for biopsy and just surveillance per Urology).      Objective  Temp:  [97.2 °F (36.2 °C)-98.5 °F (36.9 °C)] 97.6 °F (36.4 °C) (08/05 1147)  Pulse:  [57-66] 62 (08/05 1147)  BP: (100-120)/(53-61) 109/61 (08/05 1147)  Resp:  [14-18] 16 (08/05 0712)  SpO2:  [99 %-100 %] 100 % (08/05 1147)    General: Alert, Oriented x1, no distress  Eyes:scleral icterus absent  Neurologic: Asterixis unable to assess due to encephalopathy   Abdomen: Normoactive bowel sounds. distended. Normal tympany. Soft. Non-tender. No peritoneal signs.  Extremities: edema absent    Laboratory    Lab Results   Component Value Date    WBC 4.03 08/05/2019    HGB 8.1 (L) 08/05/2019    HCT 23.8 (L) 08/05/2019    MCV 98 08/05/2019    PLT 46 (L) 08/05/2019       Lab Results   Component Value Date     (L) 08/05/2019    K 3.3 (L) 08/05/2019    CL 98 08/05/2019    CO2 18 (L) 08/05/2019     (H) 08/05/2019    CREATININE 3.5 (H) 08/05/2019    CALCIUM 9.5 08/05/2019       Lab Results   Component Value Date    ALBUMIN 5.0 08/05/2019    ALT 25 08/05/2019     AST 35 08/05/2019    GGT 98 (H) 07/18/2019    ALKPHOS 128 08/05/2019    BILITOT 3.2 (H) 08/05/2019       Lab Results   Component Value Date    INR 1.7 (H) 08/05/2019    INR 1.6 (H) 08/04/2019    INR 1.6 (H) 08/03/2019       MELD-Na score: 31 at 8/5/2019  5:50 AM  MELD score: 29 at 8/5/2019  5:50 AM  Calculated from:  Serum Creatinine: 3.5 mg/dL at 8/5/2019  5:49 AM  Serum Sodium: 132 mmol/L at 8/5/2019  5:49 AM  Total Bilirubin: 3.2 mg/dL at 8/5/2019  5:49 AM  INR(ratio): 1.7 at 8/5/2019  5:50 AM  Age: 68 years    Assessment  Ms Ojeda is a 68 year old female with a history of IZAGUIRRE ESLD, with decompensations including Gr 1 HE, ascites, EV, hyponatremia, T2DM, HTN, hypothyroidism, who was admitted from hepatology clinic due to concern for hyponatremia. Also found to have ileus which has resolved. Unresponsive on 7/24, transferred to ICU and intubated for airway protection. Now extubated, transferred out of ICU with improved mentation. Hyponatremia initially resolved, back down to 125 and now improving. Worsening renal function with significant uremia. Other ongoing issues include incidentally discovered R renal mass too high risk for biopsy at this time, deconditioning requiring PT/OT and eventual SNF. Declined for transplant listing at this time. Repeat blood cultures, NGT with lactulose ordered today.      Plan  - Hyponatremia: Nephrology consulted, continue albumin  - encephalopathy: Continue lactulose and rifaximin. NGT if required (failed enema), repeat infectious workup   - ascites: Negative for SBP on 07/19. Total protein 1.5. Cont SBP ppx.  - diuretics: hold given TERA  - TERA on CKD: unclear baseline. Cr up to 3.5 from 2s earlier this admission. Appreciate Nephrology recs - IV albumin.  - Protein calorie malnutrition: nutrition consult. High protein intake. Consider prehab supplements on discharge.  - Anemia: secondary anemia workup, monitor for overt GI bleed  - Appreciate Renal transplant evaluation re:  SLK given GFR <30.  - Transplant: Discussed at transplant committee, but declined for transplant at this time. May be able to revisit this after patient completes rehab. Discussed with family and patient. May need further evaluation of R renal mass.- please obtain daily CBC, BMP, LFT, INR      Thank you for involving us in the care of Michelle Ojeda. Please call with any additional concerns or questions.    Michael Horan MD  Gastroenterology Fellow

## 2019-08-05 NOTE — PT/OT/SLP PROGRESS
"Occupational Therapy   Treatment    Name: Michelle Ojeda  MRN: [id number]  Admitting Diagnosis:  Acute metabolic encephalopathy       Recommendations:     Discharge Recommendations: nursing facility, skilled(SS)  Discharge Equipment Recommendations:  walker, rolling  Barriers to discharge:  None    Assessment:     Michelle Ojeda is a 68 y.o. female with a medical diagnosis of Acute metabolic encephalopathy.  She presents with impaired ADLs and functional mobility performance. Pt with significant decrease in cognition and overall mobility performance. Therapist spoke with Dr. Breaux prior to entering pt room with physician explaining pt's decline in status over weekend with methods in place to assist pt in clearing bowels to improve abdominal pain and overall mentation. During session, pt repeatedly stated "What do you want from me?" with evident fear of falling once EOB with therapists. OT attempted ADLs with pt providing [place] (A) however pt was unable to cognitively nor functionally utilize grooming objects properly. Daughter, Tegan, who has been with pt throughout hospitalization, explains her mother (the pt) has been acting out of her normal since the weekend and hoping pt's abilities improve.  Performance deficits affecting function are weakness, impaired endurance, impaired self care skills, decreased coordination, impaired functional mobilty, gait instability, impaired cognition, impaired balance, decreased upper extremity function, decreased lower extremity function, decreased safety awareness. Pt would benefit from continued OT skilled services 3x/wk to improve daily living skills to optimize QOL. Pt is recommended to discharge to SS-S[facility] at this time.      Rehab Prognosis:  Good; patient would benefit from acute skilled OT services to address these deficits and reach maximum level of function.       Plan:     Patient to be seen 4 x/week to address the above listed problems via self-care/home management, " therapeutic activities, therapeutic exercises  · Plan of Care Expires: 08/29/19  · Plan of Care Reviewed with: patient, daughter    Subjective     Pain/Comfort:  · Pain Rating 1: other (see comments)(no pain rating given)    Objective:     Communicated with: RN prior to session.  Patient found HOB elevated with telemetry upon OT entry to room.    General Precautions: Standard, fall   Orthopedic Precautions:N/A   Braces: N/A     Occupational Performance:     Bed Mobility:    · Patient completed Rolling/Turning to Left with  maximal assistance  · Patient completed Scooting/Bridging with maximal assistance  · Patient completed Supine to Sit with maximal assistance  · Patient completed Sit to Supine with maximal assistance       Activities of Daily Living:  · Grooming: [place] (A) for comb for hair at EOB  however pt unwilling to participate in tasks even after encouragement.      [facility] 6 Click ADL: 9    Treatment & Education:  Pt educated on role of occupational therapy, POC, and safety during ADLs and functional mobility. Pt and OT discussed importance of safe, continued mobility to optimize daily living skills. Pt verbalized understanding.   Pt completed the following during session: bed mobility, EOB sitting balance, grooming tasks attempted. See above for associated levels of performance. White board updated during session. Pt given instruction to call for medical staff/nurse for assistance.       Patient left HOB elevated with all lines intact, call button in reach, bed alarm on and daughter presentEducation:      GOALS:   Multidisciplinary Problems     Occupational Therapy Goals        Problem: Occupational Therapy Goal    Goal Priority Disciplines Outcome Interventions   Occupational Therapy Goal     OT, PT/OT Ongoing (interventions implemented as appropriate)    Description:  Goals to be met by: 8/29/19     Patient will increase functional independence with ADLs by performing:    UE Dressing with Modified  [place].  LE Dressing with Modified [place] and Assistive Devices as needed.  Grooming while standing at sink with Modified [place].  Toileting from bedside commode with Modified [place] for hygiene and clothing management.   Bathing from  shower chair/bench with Modified [place].  Toilet transfer to bedside commode with Modified [place].  Increased functional strength to WFL for B UE.  Upper extremity exercise program x15 reps per handout, with assistance as needed.                      Time Tracking:     OT Date of Treatment: 08/05/19  OT Start Time: 1113  OT Stop Time: 1125  OT Total Time (min): 12 min    Billable Minutes:Self Care/Home Management 12 min    Jayshree Samano OT  8/5/2019

## 2019-08-05 NOTE — PROGRESS NOTES
[facility]  Nephrology  Progress Note    Patient Name: Michelle Ojeda  MRN: [id number]  Admission Date: 7/18/2019  Hospital Length of Stay: 18 days  Attending Provider: Ty Breaux MD   Primary Care Physician: Primary Doctor No  Principal Problem:Acute metabolic encephalopathy    Subjective:     HPI: Rusty Martinez is 69 yo F with CKD stage 4 presenting from transplant hepatology clinic for hyponatremia on 7/18/19. She is originally from [place], here for liver transplant evaluation. She was diagnosed with IZAGUIRRE in grade 1 hepatic encephalopathy with severe ascites and esophogeal varices (s/p banding). During the month prior to admission she felt more lethargic and was requiring biweekly paracentesis. Of note, she is also being treated for Ileus her acute hepatic encephalopathy with lactulose. Recent 24 hour cr clearance study was near 21. Reported baseline Cr 1.6-1.8 and chronic hyponatremia range between 123-124.     Interval History: Pt mental status worse today; pt appears more fatigued. Per daughter, pt no longer has an appetite. One episode of N/V overnight.     Review of patient's allergies indicates:  No Known Allergies  Current Facility-Administered Medications   Medication Frequency    acetaminophen tablet 325 mg Q4H PRN    cefTRIAXone (ROCEPHIN) 2 g in dextrose 5 % 50 mL IVPB Q24H    ciprofloxacin HCl tablet 250 mg Daily    dextrose 10% (D10W) Bolus PRN    dextrose 10% (D10W) Bolus PRN    fat emulsion 20% infusion 250 mL Daily    glucagon (human recombinant) injection 1 mg PRN    glucose chewable tablet 16 g PRN    glucose chewable tablet 24 g PRN    insulin aspart U-100 pen 0-5 Units Q6H PRN    insulin detemir U-100 pen 12 Units Daily    lactulose 10 gram/15 mL solution (enema) 200 g Once    lactulose 20 gram/30 mL solution Soln 45 g TID    levothyroxine tablet 88 mcg Before breakfast    midodrine tablet 15 mg TID    ondansetron injection 4 mg Q6H PRN     rifAXIMin tablet 550 mg BID    simethicone chewable tablet 80 mg TID PRN    sodium bicarbonate tablet 1,300 mg BID    Standard Custom Day One ADULT TPN for patient WITH electrolyte abnormality or renal dysfunction (PERIPHERAL) Continuous    zinc sulfate capsule 220 mg Daily       Objective:     Vital Signs (Most Recent):  Temp: 97.6 °F (36.4 °C) (08/05/19 1147)  Pulse: 62 (08/05/19 1147)  Resp: 16 (08/05/19 0712)  BP: 109/61 (08/05/19 1147)  SpO2: 100 % (08/05/19 1147)  O2 Device (Oxygen Therapy): room air (08/05/19 0421) Vital Signs (24h Range):  Temp:  [97.2 °F (36.2 °C)-98.5 °F (36.9 °C)] 97.6 °F (36.4 °C)  Pulse:  [57-66] 62  Resp:  [14-18] 16  SpO2:  [99 %-100 %] 100 %  BP: (100-120)/(53-61) 109/61     Weight: 56 kg (123 lb 7.3 oz) (07/30/19 1000)  Body mass index is 24.11 kg/m².  Body surface area is 1.54 meters squared.    I/O last 3 completed shifts:  In: 340 [P.O.:340]  Out: 950 [Urine:950]    Physical Exam   Constitutional: She appears well-developed and well-nourished. She appears listless. She appears cachectic. No distress.   HENT:   Head: Normocephalic and atraumatic.   Eyes: EOM are normal. Scleral icterus is present.   Neck: Normal range of motion.   Cardiovascular: Normal rate, regular rhythm and normal heart sounds.   No murmur heard.  Pulmonary/Chest: Effort normal and breath sounds normal.   Abdominal: Soft. Bowel sounds are normal. She exhibits ascites. There is hepatosplenomegaly. There is no tenderness.   Musculoskeletal: She exhibits no edema.   Neurological: She appears listless. She is disoriented (Oriented to Person, Place).   Skin: Skin is warm and dry. Ecchymosis noted. She is not diaphoretic.   Nursing note and vitals reviewed.      Significant Labs:  CBC:   Recent Labs   Lab 08/05/19  0550   WBC 4.03   RBC 2.42*   HGB 8.1*   HCT 23.8*   PLT 46*   MCV 98   MCH 33.5*   MCHC 34.0     CMP:   Recent Labs   Lab 08/05/19  0549   *   CALCIUM 9.5   ALBUMIN 5.0   PROT 6.7   *    K 3.3*   CO2 18*   CL 98   *   CREATININE 3.5*   ALKPHOS 128   ALT 25   AST 35   BILITOT 3.2*     Recent Labs   Lab 08/01/19  2047   COLORU Yellow   SPECGRAV 1.015   PHUR 5.0   PROTEINUA Negative   NITRITE Negative   LEUKOCYTESUR Negative     All labs within the past 24 hours have been reviewed.     Significant Imaging:  No new imaging in the past 24 hours    Assessment/Plan:     * Acute metabolic encephalopathy  As TERA above    Right kidney mass  Patient has new Right renal mass found on CT abd on 7/22/19. Previous Ct from 2016 and MRI studies from 2019 in [place] did not demonstrate this finding.     Recommendations  - Urology recommends f/u outpatient as she is not a good surgical or biopsy candidate.   - Would prefer not expose kidneys to contrast but will discuss with other teams if necessary.  - If pt to be evaluated for simultaneous liver/kidney transplant, please place consult to Kidney Transplant Medicine    Acute kidney injury superimposed on chronic kidney disease  Labs from [place] nephrology demonstrated Cr of 1.77 in 3/12/19, 1.67 on 4/5/19, 1.96 in 6/13/19, and 1.83 on 6/24. Cr of 2.8 on 7/26/19 above her baseline due to ATN secondary to hypoperfusion of kidneys related to hypotensive episodes. Hepatorenal syndrome not suspected at this time as the patient's Cr and urine output have improved with an albumin challenge. TERA could be exacerbated with noted Hgb drop from 9.9 on 7/27 to 7.2 on 7/29.     Recommendations:   - Improving BUN but worsening Cr today; increased fatigue, nausea, loss of appetite concerning for uremia. Primary team placed leigh cath as there may be an obstructive component as patient required in an out caths; if Cr continues to worsen, will do a round of HD to help with clearance and mentation.  - c/w Midodrine 10mg TID added to prevent hypoperfusion to kidneys. Goal MAP>70  - Recommend continuing albumin 50g IV daily; Start Sodium Bicarbonate 1300mg PO BID  -  Monitor with BMP  - Strict I/Os   - Avoid Nephrotoxic agents  - Monitor for signs of blood loss  - Hgb > 7     Hyponatremia  Rusty is a 67 yo F with liver cirrhosis and CKD admitted for hyponatremia. Hyponatremia may be related to several factors primarily liver cirrhosis requiring biweekly paracentesis. This leads to a hypovolemic hyponatremic state in which fluid is pulled out of the vasculature. Additional contributing factors related to ileus, NPO status, and TPN nutrition. Extra sodium in TPN has increased sodium. Now on renal diet with 1000mL fluid restriction. Baseline Na+ from labs in [place] between 123-124.   - 8/1 Na 125; while this is closer to pt's baseline, does represent a drop since 8/30 from 142. Improving with IV albumin and NS  - Midodrine 10 mg TID;  - Strict I/O monitoring     Liver cirrhosis secondary to IZAGUIRRE  Treatment plan per primary team         Thank you for your consult. I will follow-up with patient. Please contact us if you have any additional questions.    Otilio Gibson, DO  Nephrology  [facility]

## 2019-08-05 NOTE — PLAN OF CARE
Problem: Adult Inpatient Plan of Care  Goal: Plan of Care Review  Outcome: Ongoing (interventions implemented as appropriate)  Report received from IR, paracentesis procedure went well. Removed 3100 of fluid. V/S stabled. Patient will be transported back to unit awaiting transport.

## 2019-08-05 NOTE — ASSESSMENT & PLAN NOTE
Labs from [place] nephrology demonstrated Cr of 1.77 in 3/12/19, 1.67 on 4/5/19, 1.96 in 6/13/19, and 1.83 on 6/24. Cr of 2.8 on 7/26/19 above her baseline due to ATN secondary to hypoperfusion of kidneys related to hypotensive episodes. Hepatorenal syndrome not suspected at this time as the patient's Cr and urine output have improved with an albumin challenge. TERA could be exacerbated with noted Hgb drop from 9.9 on 7/27 to 7.2 on 7/29.     Recommendations:   - Improving BUN but worsening Cr today; increased fatigue, nausea, loss of appetite concerning for uremia. Primary team placed leigh cath as there may be an obstructive component as patient required in an out caths; if Cr continues to worsen, will do a round of HD to help with clearance and mentation.  - c/w Midodrine 10mg TID added to prevent hypoperfusion to kidneys. Goal MAP>70  - Recommend continuing albumin 50g IV daily; Start Sodium Bicarbonate 1300mg PO BID  - Monitor with BMP  - Strict I/Os   - Avoid Nephrotoxic agents  - Monitor for signs of blood loss  - Hgb > 7

## 2019-08-05 NOTE — PROGRESS NOTES
Pt paracentesis completed. Pt tolerated well. Post-Op incision care instructions given to pt's daughter as the pt is quite confused. VSS. Report given to pt's inpatient nurse. Awaiting transport.

## 2019-08-05 NOTE — NURSING
Pt bladder scanned again per order from Ita Gonzales NP. Bladder scan read 430 cc. Pt straight cathed per order with 200 cc drained. TEJA Cuellar notified.

## 2019-08-05 NOTE — NURSING
Bladder scan read 413 cc. Pt straight cathed with 650 cc drained from bladder.  Ita Gonzales NP notified.

## 2019-08-05 NOTE — PLAN OF CARE
Problem: Adult Inpatient Plan of Care  Goal: Plan of Care Review  Patient is oriented to self, appeared sleepy throughout shift. Aroused easily to touch. Refused morning meds. Appetite remains poor. New orders given for lactulose enema and insert leigh catheter. 200ml of deborah colored urine returned to  bag. Left unit today for Paracentesis, accompanied by daughter at side. TPN will start on tonight and NG tube per MD orders.

## 2019-08-05 NOTE — SUBJECTIVE & OBJECTIVE
Interval History 8/5/2019:  Patient is seen for follow-up rehab evaluation and recommendations: Not feeling well today, lethargic and more confused.  Nephrology following for worsening kidney function.  Participated with PT on Friday.        HPI, Past Medical, Family, and Social History remains the same as documented in the initial encounter.    Scheduled Medications:    ciprofloxacin HCl  250 mg Oral Daily    fat emulsion 20%  250 mL Intravenous Daily    insulin detemir U-100  12 Units Subcutaneous Daily    lactulose  200 g Rectal Once    lactulose  30 g Oral TID    levothyroxine  88 mcg Oral Before breakfast    midodrine  15 mg Oral TID    rifAXImin  550 mg Oral BID    sodium bicarbonate  1,300 mg Oral BID    zinc sulfate  220 mg Oral Daily     PRN Medications: acetaminophen, Dextrose 10% Bolus, Dextrose 10% Bolus, glucagon (human recombinant), glucose, glucose, insulin aspart U-100, ondansetron, simethicone    Review of Systems   Constitutional: Positive for activity change and fatigue.   HENT: Positive for trouble swallowing and voice change.    Eyes: Negative for pain and visual disturbance.   Respiratory: Negative for cough and shortness of breath.    Cardiovascular: Negative for chest pain and palpitations.   Gastrointestinal: Positive for abdominal distention and diarrhea.   Genitourinary: Positive for difficulty urinating. Negative for flank pain.   Musculoskeletal: Positive for gait problem. Negative for back pain and neck pain.   Skin: Positive for color change. Negative for rash.   Neurological: Positive for weakness and headaches. Negative for numbness.   Psychiatric/Behavioral: Positive for confusion. Negative for agitation.     Objective:     Vital Signs (Most Recent):  Temp: 98.5 °F (36.9 °C) (08/05/19 0712)  Pulse: 60 (08/05/19 0712)  Resp: 16 (08/05/19 0712)  BP: (!) 100/53 (08/05/19 0712)  SpO2: 100 % (08/05/19 0712)    Vital Signs (24h Range):  Temp:  [97.2 °F (36.2 °C)-98.5 °F (36.9  °C)] 98.5 °F (36.9 °C)  Pulse:  [57-66] 60  Resp:  [14-18] 16  SpO2:  [99 %-100 %] 100 %  BP: (100-120)/(52-56) 100/53     Physical Exam   Constitutional: She appears well-developed and well-nourished. She appears lethargic. She appears ill. No distress.   HENT:   Head: Normocephalic and atraumatic.   Right Ear: External ear normal.   Left Ear: External ear normal.   Nose: Nose normal.   Dysphonia   Eyes: Right eye exhibits no discharge. Left eye exhibits no discharge. No scleral icterus.   Neck: Normal range of motion.   Cardiovascular: Normal rate and intact distal pulses.   Pulmonary/Chest: Effort normal. No respiratory distress.   Abdominal: Soft. She exhibits distension and ascites.   Musculoskeletal: She exhibits no edema or tenderness.   General deconditioning/debility   Neurological: She appears lethargic. No sensory deficit. She exhibits normal muscle tone.   Skin: Skin is warm and dry. Bruising and ecchymosis noted.   Psychiatric: She is slowed. Cognition and memory are impaired.   Vitals reviewed.    Diagnostic Results:   Labs: Reviewed  X-Ray: Reviewed  CT: Reviewed

## 2019-08-05 NOTE — PROGRESS NOTES
[facility]  Physical Medicine & Rehab  Progress Note    Patient Name: Michelle Ojeda  MRN: [id number]  Admission Date: 7/18/2019  Length of Stay: 18 days  Attending Physician: Ty Breaux MD    Subjective:     Principal Problem:Acute metabolic encephalopathy    Hospital Course:   7/30/19:  Participated with PT and OT.  Bed mobility SBA-modA.  Sit to stand and transfers modA with RW.  Ambulated 12 ft [place] with RW.  LBD totalA.    7/31/19:  Participated with PT.  Bed mobility CGA-modA.  Sit to stand CGA with RW.  Ambulated 20 ft x 2 [place] with RW.    8/2/19:  Participated with PT and OT.  Bed mobility SBA-modA.  EOB SBA.  Sit to stand min-modA with RW, stand to sit [place] with RW, and transfers modA with RW.  Ambulated 30 ft mod-[place]on[place] with RW.  Grooming set-up while seated in WC.      Interval History 8/5/2019:  Patient is seen for follow-up rehab evaluation and recommendations: Not feeling well today, lethargic and more confused.  Nephrology following for worsening kidney function.  Participated with PT on Friday.        HPI, Past Medical, Family, and Social History remains the same as documented in the initial encounter.    Scheduled Medications:    ciprofloxacin HCl  250 mg Oral Daily    fat emulsion 20%  250 mL Intravenous Daily    insulin detemir U-100  12 Units Subcutaneous Daily    lactulose  200 g Rectal Once    lactulose  30 g Oral TID    levothyroxine  88 mcg Oral Before breakfast    midodrine  15 mg Oral TID    rifAXImin  550 mg Oral BID    sodium bicarbonate  1,300 mg Oral BID    zinc sulfate  220 mg Oral Daily     PRN Medications: acetaminophen, Dextrose 10% Bolus, Dextrose 10% Bolus, glucagon (human recombinant), glucose, glucose, insulin aspart U-100, ondansetron, simethicone    Review of Systems   Constitutional: Positive for activity change and fatigue.   HENT: Positive for trouble swallowing and voice change.    Eyes: Negative for pain and visual disturbance.    Respiratory: Negative for cough and shortness of breath.    Cardiovascular: Negative for chest pain and palpitations.   Gastrointestinal: Positive for abdominal distention and diarrhea.   Genitourinary: Positive for difficulty urinating. Negative for flank pain.   Musculoskeletal: Positive for gait problem. Negative for back pain and neck pain.   Skin: Positive for color change. Negative for rash.   Neurological: Positive for weakness and headaches. Negative for numbness.   Psychiatric/Behavioral: Positive for confusion. Negative for agitation.     Objective:     Vital Signs (Most Recent):  Temp: 98.5 °F (36.9 °C) (08/05/19 0712)  Pulse: 60 (08/05/19 0712)  Resp: 16 (08/05/19 0712)  BP: (!) 100/53 (08/05/19 0712)  SpO2: 100 % (08/05/19 0712)    Vital Signs (24h Range):  Temp:  [97.2 °F (36.2 °C)-98.5 °F (36.9 °C)] 98.5 °F (36.9 °C)  Pulse:  [57-66] 60  Resp:  [14-18] 16  SpO2:  [99 %-100 %] 100 %  BP: (100-120)/(52-56) 100/53     Physical Exam   Constitutional: She appears well-developed and well-nourished. She appears lethargic. She appears ill. No distress.   HENT:   Head: Normocephalic and atraumatic.   Right Ear: External ear normal.   Left Ear: External ear normal.   Nose: Nose normal.   Dysphonia   Eyes: Right eye exhibits no discharge. Left eye exhibits no discharge. No scleral icterus.   Neck: Normal range of motion.   Cardiovascular: Normal rate and intact distal pulses.   Pulmonary/Chest: Effort normal. No respiratory distress.   Abdominal: Soft. She exhibits distension and ascites.   Musculoskeletal: She exhibits no edema or tenderness.   General deconditioning/debility   Neurological: She appears lethargic. No sensory deficit. She exhibits normal muscle tone.   Skin: Skin is warm and dry. Bruising and ecchymosis noted.   Psychiatric: She is slowed. Cognition and memory are impaired.   Vitals reviewed.    Diagnostic Results:   Labs: Reviewed  X-Ray: Reviewed  CT: Reviewed     Assessment/Plan:      *  Acute metabolic encephalopathy  Coagulopathy   Pancytopenia  Ascites  Hyponatremia  Liver cirrhosis secondary to IZAGUIRRE  -  H/o IZAGUIRRE cirrhosis complicated by acute metabolic encephalopathy, ascites (s/p paracentesis on 7/19, 7/24, 7/31), coagulopathy, pancytopenia, protein calorie malnutrition, electrolyte derangement, and physical debility  -  Hepatology following  -  Transplant team consulted and initiated transplant work-up-->  Currently not candidate for liver transplant 2/2 frailty and physical debility  -  Management per primary team    Acute kidney injury superimposed on chronic kidney disease  -  Nephrology following    Acute hypoxemic respiratory failure  -  Intubated 7/24/19-7/27/19  -  Improved     Kidney transplant candidate  -  Kidney transplant following    Physical debility  -  Related to liver failure, co-morbidities, and acute hospital course  -  Independent at baseline with functional decline x ~1 month prior to admission  See hospital course for functional status.    Recommendations  -  Encourage mobility, OOB in chair, and early ambulation as appropriate  -  PT/OT evaluate and treat  -  Pain management  -  DVT prophylaxis  -  Monitor for and prevent skin breakdown and pressure ulcers  · Early mobility, repositioning/weight shifting every 20-30 minutes when sitting, turn patient every 2 hours, proper mattress/overlay and chair cushioning, pressure relief/heel protector boots    Likely a good candidate for Inpatient Rehab.  Not ready for discharge- more lethargic today, Nephrology following for TERA on CKD.  Continue therapy.  Will follow for final post-acute recommendation.    GEOVANNY Saleem  Department of Physical Medicine & Rehab   [facility]

## 2019-08-06 ENCOUNTER — ANESTHESIA EVENT (OUTPATIENT)
Dept: ENDOSCOPY | Facility: HOSPITAL | Age: 69
DRG: 432 | End: 2019-08-06
Payer: MEDICARE

## 2019-08-06 ENCOUNTER — ANESTHESIA (OUTPATIENT)
Dept: ENDOSCOPY | Facility: HOSPITAL | Age: 69
DRG: 432 | End: 2019-08-06
Payer: MEDICARE

## 2019-08-06 PROBLEM — Z51.5 PALLIATIVE CARE ENCOUNTER: Status: ACTIVE | Noted: 2019-08-06

## 2019-08-06 LAB
ABO + RH BLD: NORMAL
ALBUMIN SERPL BCP-MCNC: 4.9 G/DL (ref 3.5–5.2)
ALP SERPL-CCNC: 167 U/L (ref 55–135)
ALT SERPL W/O P-5'-P-CCNC: 25 U/L (ref 10–44)
AMMONIA PLAS-SCNC: 188 UMOL/L (ref 10–50)
ANION GAP SERPL CALC-SCNC: 18 MMOL/L (ref 8–16)
ANION GAP SERPL CALC-SCNC: 19 MMOL/L (ref 8–16)
ANISOCYTOSIS BLD QL SMEAR: SLIGHT
AST SERPL-CCNC: 37 U/L (ref 10–40)
BACTERIA BLD CULT: NORMAL
BASO STIPL BLD QL SMEAR: ABNORMAL
BASOPHILS # BLD AUTO: 0.02 K/UL (ref 0–0.2)
BASOPHILS # BLD AUTO: 0.03 K/UL (ref 0–0.2)
BASOPHILS NFR BLD: 0.5 % (ref 0–1.9)
BASOPHILS NFR BLD: 0.6 % (ref 0–1.9)
BILIRUB SERPL-MCNC: 4.3 MG/DL (ref 0.1–1)
BLD GP AB SCN CELLS X3 SERPL QL: NORMAL
BLD PROD TYP BPU: NORMAL
BLOOD UNIT EXPIRATION DATE: NORMAL
BLOOD UNIT TYPE CODE: 5100
BLOOD UNIT TYPE: NORMAL
BUN SERPL-MCNC: 123 MG/DL (ref 8–23)
BUN SERPL-MCNC: 125 MG/DL (ref 8–23)
CALCIUM SERPL-MCNC: 9.1 MG/DL (ref 8.7–10.5)
CALCIUM SERPL-MCNC: 9.3 MG/DL (ref 8.7–10.5)
CHLORIDE SERPL-SCNC: 101 MMOL/L (ref 95–110)
CHLORIDE SERPL-SCNC: 99 MMOL/L (ref 95–110)
CO2 SERPL-SCNC: 16 MMOL/L (ref 23–29)
CO2 SERPL-SCNC: 20 MMOL/L (ref 23–29)
CODING SYSTEM: NORMAL
CREAT SERPL-MCNC: 3.8 MG/DL (ref 0.5–1.4)
CREAT SERPL-MCNC: 4.3 MG/DL (ref 0.5–1.4)
DIFFERENTIAL METHOD: ABNORMAL
DIFFERENTIAL METHOD: ABNORMAL
DISPENSE STATUS: NORMAL
EOSINOPHIL # BLD AUTO: 0 K/UL (ref 0–0.5)
EOSINOPHIL # BLD AUTO: 0 K/UL (ref 0–0.5)
EOSINOPHIL NFR BLD: 0.4 % (ref 0–8)
EOSINOPHIL NFR BLD: 0.5 % (ref 0–8)
ERYTHROCYTE [DISTWIDTH] IN BLOOD BY AUTOMATED COUNT: 19.8 % (ref 11.5–14.5)
ERYTHROCYTE [DISTWIDTH] IN BLOOD BY AUTOMATED COUNT: 20.3 % (ref 11.5–14.5)
EST. GFR  (AFRICAN AMERICAN): 11.3 ML/MIN/1.73 M^2
EST. GFR  (AFRICAN AMERICAN): 13.1 ML/MIN/1.73 M^2
EST. GFR  (NON AFRICAN AMERICAN): 11.4 ML/MIN/1.73 M^2
EST. GFR  (NON AFRICAN AMERICAN): 9.8 ML/MIN/1.73 M^2
GLUCOSE SERPL-MCNC: 238 MG/DL (ref 70–110)
GLUCOSE SERPL-MCNC: 257 MG/DL (ref 70–110)
HCT VFR BLD AUTO: 25.6 % (ref 37–48.5)
HCT VFR BLD AUTO: 27.7 % (ref 37–48.5)
HGB BLD-MCNC: 8.5 G/DL (ref 12–16)
HGB BLD-MCNC: 9.1 G/DL (ref 12–16)
HYPOCHROMIA BLD QL SMEAR: ABNORMAL
IMM GRANULOCYTES # BLD AUTO: 0.02 K/UL (ref 0–0.04)
IMM GRANULOCYTES # BLD AUTO: 0.05 K/UL (ref 0–0.04)
IMM GRANULOCYTES NFR BLD AUTO: 0.5 % (ref 0–0.5)
IMM GRANULOCYTES NFR BLD AUTO: 1 % (ref 0–0.5)
INR PPP: 1.5 (ref 0.8–1.2)
LACTATE SERPL-SCNC: 2.5 MMOL/L (ref 0.5–2.2)
LACTATE SERPL-SCNC: 2.6 MMOL/L (ref 0.5–2.2)
LYMPHOCYTES # BLD AUTO: 0.4 K/UL (ref 1–4.8)
LYMPHOCYTES # BLD AUTO: 0.5 K/UL (ref 1–4.8)
LYMPHOCYTES NFR BLD: 9 % (ref 18–48)
LYMPHOCYTES NFR BLD: 9.4 % (ref 18–48)
MAGNESIUM SERPL-MCNC: 3.7 MG/DL (ref 1.6–2.6)
MCH RBC QN AUTO: 33.5 PG (ref 27–31)
MCH RBC QN AUTO: 34.3 PG (ref 27–31)
MCHC RBC AUTO-ENTMCNC: 32.9 G/DL (ref 32–36)
MCHC RBC AUTO-ENTMCNC: 33.2 G/DL (ref 32–36)
MCV RBC AUTO: 102 FL (ref 82–98)
MCV RBC AUTO: 103 FL (ref 82–98)
MONOCYTES # BLD AUTO: 0.5 K/UL (ref 0.3–1)
MONOCYTES # BLD AUTO: 0.5 K/UL (ref 0.3–1)
MONOCYTES NFR BLD: 10.3 % (ref 4–15)
MONOCYTES NFR BLD: 11.7 % (ref 4–15)
NEUTROPHILS # BLD AUTO: 3 K/UL (ref 1.8–7.7)
NEUTROPHILS # BLD AUTO: 4.1 K/UL (ref 1.8–7.7)
NEUTROPHILS NFR BLD: 77.4 % (ref 38–73)
NEUTROPHILS NFR BLD: 78.7 % (ref 38–73)
NRBC BLD-RTO: 0 /100 WBC
NRBC BLD-RTO: 0 /100 WBC
OVALOCYTES BLD QL SMEAR: ABNORMAL
PHOSPHATE SERPL-MCNC: 6.3 MG/DL (ref 2.7–4.5)
PLATELET # BLD AUTO: 44 K/UL (ref 150–350)
PLATELET # BLD AUTO: 64 K/UL (ref 150–350)
PLATELET BLD QL SMEAR: ABNORMAL
PMV BLD AUTO: 10.8 FL (ref 9.2–12.9)
PMV BLD AUTO: 10.9 FL (ref 9.2–12.9)
POCT GLUCOSE: 200 MG/DL (ref 70–110)
POCT GLUCOSE: 206 MG/DL (ref 70–110)
POCT GLUCOSE: 216 MG/DL (ref 70–110)
POIKILOCYTOSIS BLD QL SMEAR: SLIGHT
POLYCHROMASIA BLD QL SMEAR: ABNORMAL
POTASSIUM SERPL-SCNC: 3.5 MMOL/L (ref 3.5–5.1)
POTASSIUM SERPL-SCNC: 3.5 MMOL/L (ref 3.5–5.1)
PROT SERPL-MCNC: 7.2 G/DL (ref 6–8.4)
PROTHROMBIN TIME: 15 SEC (ref 9–12.5)
RBC # BLD AUTO: 2.48 M/UL (ref 4–5.4)
RBC # BLD AUTO: 2.72 M/UL (ref 4–5.4)
SODIUM SERPL-SCNC: 134 MMOL/L (ref 136–145)
SODIUM SERPL-SCNC: 139 MMOL/L (ref 136–145)
TRANS PLATPHERESIS VOL PATIENT: NORMAL ML
WBC # BLD AUTO: 3.84 K/UL (ref 3.9–12.7)
WBC # BLD AUTO: 5.23 K/UL (ref 3.9–12.7)

## 2019-08-06 PROCEDURE — 86920 COMPATIBILITY TEST SPIN: CPT

## 2019-08-06 PROCEDURE — C1751 CATH, INF, PER/CENT/MIDLINE: HCPCS

## 2019-08-06 PROCEDURE — A4217 STERILE WATER/SALINE, 500 ML: HCPCS | Performed by: HOSPITALIST

## 2019-08-06 PROCEDURE — 84100 ASSAY OF PHOSPHORUS: CPT

## 2019-08-06 PROCEDURE — 82962 GLUCOSE BLOOD TEST: CPT | Performed by: INTERNAL MEDICINE

## 2019-08-06 PROCEDURE — 43235 EGD DIAGNOSTIC BRUSH WASH: CPT | Mod: GC,,, | Performed by: INTERNAL MEDICINE

## 2019-08-06 PROCEDURE — B4185 PARENTERAL SOL 10 GM LIPIDS: HCPCS | Performed by: HOSPITALIST

## 2019-08-06 PROCEDURE — C9113 INJ PANTOPRAZOLE SODIUM, VIA: HCPCS | Performed by: NURSE PRACTITIONER

## 2019-08-06 PROCEDURE — 86850 RBC ANTIBODY SCREEN: CPT

## 2019-08-06 PROCEDURE — 93010 ELECTROCARDIOGRAM REPORT: CPT | Mod: ,,, | Performed by: INTERNAL MEDICINE

## 2019-08-06 PROCEDURE — 83735 ASSAY OF MAGNESIUM: CPT

## 2019-08-06 PROCEDURE — D9220A PRA ANESTHESIA: ICD-10-PCS | Mod: ANES,,, | Performed by: ANESTHESIOLOGY

## 2019-08-06 PROCEDURE — 85025 COMPLETE CBC W/AUTO DIFF WBC: CPT

## 2019-08-06 PROCEDURE — 43235 PR EGD, FLEX, DIAGNOSTIC: ICD-10-PCS | Mod: GC,,, | Performed by: INTERNAL MEDICINE

## 2019-08-06 PROCEDURE — 99233 PR SUBSEQUENT HOSPITAL CARE,LEVL III: ICD-10-PCS | Mod: GC,,, | Performed by: INTERNAL MEDICINE

## 2019-08-06 PROCEDURE — C9113 INJ PANTOPRAZOLE SODIUM, VIA: HCPCS | Performed by: HOSPITALIST

## 2019-08-06 PROCEDURE — P9035 PLATELET PHERES LEUKOREDUCED: HCPCS

## 2019-08-06 PROCEDURE — 83605 ASSAY OF LACTIC ACID: CPT | Mod: 91

## 2019-08-06 PROCEDURE — 80053 COMPREHEN METABOLIC PANEL: CPT

## 2019-08-06 PROCEDURE — 83605 ASSAY OF LACTIC ACID: CPT

## 2019-08-06 PROCEDURE — 99223 PR INITIAL HOSPITAL CARE,LEVL III: ICD-10-PCS | Mod: 25,GC,ICN, | Performed by: INTERNAL MEDICINE

## 2019-08-06 PROCEDURE — 93005 ELECTROCARDIOGRAM TRACING: CPT

## 2019-08-06 PROCEDURE — D9220A PRA ANESTHESIA: Mod: CRNA,,, | Performed by: NURSE ANESTHETIST, CERTIFIED REGISTERED

## 2019-08-06 PROCEDURE — D9220A PRA ANESTHESIA: ICD-10-PCS | Mod: CRNA,,, | Performed by: NURSE ANESTHETIST, CERTIFIED REGISTERED

## 2019-08-06 PROCEDURE — 99900026 HC AIRWAY MAINTENANCE (STAT)

## 2019-08-06 PROCEDURE — 99223 1ST HOSP IP/OBS HIGH 75: CPT | Mod: 25,GC,ICN, | Performed by: INTERNAL MEDICINE

## 2019-08-06 PROCEDURE — 99233 SBSQ HOSP IP/OBS HIGH 50: CPT | Mod: GC,,, | Performed by: INTERNAL MEDICINE

## 2019-08-06 PROCEDURE — 36430 TRANSFUSION BLD/BLD COMPNT: CPT

## 2019-08-06 PROCEDURE — 20000000 HC ICU ROOM

## 2019-08-06 PROCEDURE — 63600175 PHARM REV CODE 636 W HCPCS: Performed by: HOSPITALIST

## 2019-08-06 PROCEDURE — 25000003 PHARM REV CODE 250: Performed by: PHYSICIAN ASSISTANT

## 2019-08-06 PROCEDURE — 93010 EKG 12-LEAD: ICD-10-PCS | Mod: ,,, | Performed by: INTERNAL MEDICINE

## 2019-08-06 PROCEDURE — 94761 N-INVAS EAR/PLS OXIMETRY MLT: CPT

## 2019-08-06 PROCEDURE — 37000008 HC ANESTHESIA 1ST 15 MINUTES: Performed by: INTERNAL MEDICINE

## 2019-08-06 PROCEDURE — 76937 US GUIDE VASCULAR ACCESS: CPT

## 2019-08-06 PROCEDURE — 27201040 HC RC 50 FILTER

## 2019-08-06 PROCEDURE — 25000003 PHARM REV CODE 250: Performed by: HOSPITALIST

## 2019-08-06 PROCEDURE — 99291 CRITICAL CARE FIRST HOUR: CPT | Mod: ,,, | Performed by: HOSPITALIST

## 2019-08-06 PROCEDURE — 43235 EGD DIAGNOSTIC BRUSH WASH: CPT | Performed by: INTERNAL MEDICINE

## 2019-08-06 PROCEDURE — 43752 NASAL/OROGASTRIC W/TUBE PLMT: CPT | Mod: 59,,, | Performed by: INTERNAL MEDICINE

## 2019-08-06 PROCEDURE — 43752 PR PLACEMENT NG/OG TUBE BY PHYSICIAN: ICD-10-PCS | Mod: 59,,, | Performed by: INTERNAL MEDICINE

## 2019-08-06 PROCEDURE — 82140 ASSAY OF AMMONIA: CPT

## 2019-08-06 PROCEDURE — 37000009 HC ANESTHESIA EA ADD 15 MINS: Performed by: INTERNAL MEDICINE

## 2019-08-06 PROCEDURE — 36415 COLL VENOUS BLD VENIPUNCTURE: CPT

## 2019-08-06 PROCEDURE — 82803 BLOOD GASES ANY COMBINATION: CPT

## 2019-08-06 PROCEDURE — 99900035 HC TECH TIME PER 15 MIN (STAT)

## 2019-08-06 PROCEDURE — 85610 PROTHROMBIN TIME: CPT

## 2019-08-06 PROCEDURE — D9220A PRA ANESTHESIA: Mod: ANES,,, | Performed by: ANESTHESIOLOGY

## 2019-08-06 PROCEDURE — 80048 BASIC METABOLIC PNL TOTAL CA: CPT

## 2019-08-06 PROCEDURE — 63600175 PHARM REV CODE 636 W HCPCS: Performed by: NURSE PRACTITIONER

## 2019-08-06 PROCEDURE — 63600175 PHARM REV CODE 636 W HCPCS: Performed by: NURSE ANESTHETIST, CERTIFIED REGISTERED

## 2019-08-06 PROCEDURE — 36410 VNPNXR 3YR/> PHY/QHP DX/THER: CPT

## 2019-08-06 PROCEDURE — 99291 PR CRITICAL CARE, E/M 30-74 MINUTES: ICD-10-PCS | Mod: ,,, | Performed by: HOSPITALIST

## 2019-08-06 RX ORDER — PROCHLORPERAZINE EDISYLATE 5 MG/ML
10 INJECTION INTRAMUSCULAR; INTRAVENOUS EVERY 6 HOURS PRN
Status: DISCONTINUED | OUTPATIENT
Start: 2019-08-06 | End: 2019-08-10 | Stop reason: HOSPADM

## 2019-08-06 RX ORDER — SODIUM CHLORIDE 0.9 % (FLUSH) 0.9 %
10 SYRINGE (ML) INJECTION
Status: DISCONTINUED | OUTPATIENT
Start: 2019-08-06 | End: 2019-08-10 | Stop reason: HOSPADM

## 2019-08-06 RX ORDER — HYDROCODONE BITARTRATE AND ACETAMINOPHEN 500; 5 MG/1; MG/1
TABLET ORAL
Status: DISCONTINUED | OUTPATIENT
Start: 2019-08-06 | End: 2019-08-08

## 2019-08-06 RX ORDER — SODIUM CHLORIDE 9 MG/ML
INJECTION, SOLUTION INTRAVENOUS ONCE
Status: DISCONTINUED | OUTPATIENT
Start: 2019-08-06 | End: 2019-08-07

## 2019-08-06 RX ORDER — PANTOPRAZOLE SODIUM 40 MG/1
40 TABLET, DELAYED RELEASE ORAL DAILY
Status: DISCONTINUED | OUTPATIENT
Start: 2019-08-09 | End: 2019-08-07

## 2019-08-06 RX ORDER — PANTOPRAZOLE SODIUM 40 MG/10ML
40 INJECTION, POWDER, LYOPHILIZED, FOR SOLUTION INTRAVENOUS 2 TIMES DAILY
Status: DISCONTINUED | OUTPATIENT
Start: 2019-08-06 | End: 2019-08-06

## 2019-08-06 RX ORDER — SODIUM CHLORIDE 0.9 % (FLUSH) 0.9 %
10 SYRINGE (ML) INJECTION
Status: DISCONTINUED | OUTPATIENT
Start: 2019-08-06 | End: 2019-08-06 | Stop reason: HOSPADM

## 2019-08-06 RX ORDER — OCTREOTIDE ACETATE 50 UG/ML
50 INJECTION, SOLUTION INTRAVENOUS; SUBCUTANEOUS ONCE
Status: COMPLETED | OUTPATIENT
Start: 2019-08-06 | End: 2019-08-06

## 2019-08-06 RX ORDER — LACTULOSE 10 G/15ML
45 SOLUTION ORAL EVERY 6 HOURS
Status: DISCONTINUED | OUTPATIENT
Start: 2019-08-06 | End: 2019-08-07

## 2019-08-06 RX ORDER — PANTOPRAZOLE SODIUM 40 MG/10ML
40 INJECTION, POWDER, LYOPHILIZED, FOR SOLUTION INTRAVENOUS DAILY
Status: DISPENSED | OUTPATIENT
Start: 2019-08-06 | End: 2019-08-09

## 2019-08-06 RX ORDER — LACTULOSE 10 G/15ML
200 SOLUTION ORAL; RECTAL ONCE
Status: COMPLETED | OUTPATIENT
Start: 2019-08-06 | End: 2019-08-06

## 2019-08-06 RX ORDER — CEFTRIAXONE 1 G/1
1 INJECTION, POWDER, FOR SOLUTION INTRAMUSCULAR; INTRAVENOUS
Status: DISCONTINUED | OUTPATIENT
Start: 2019-08-06 | End: 2019-08-06

## 2019-08-06 RX ORDER — LIDOCAINE HCL/PF 100 MG/5ML
SYRINGE (ML) INTRAVENOUS
Status: DISCONTINUED | OUTPATIENT
Start: 2019-08-06 | End: 2019-08-06

## 2019-08-06 RX ORDER — SODIUM CHLORIDE 9 MG/ML
INJECTION, SOLUTION INTRAVENOUS CONTINUOUS PRN
Status: DISCONTINUED | OUTPATIENT
Start: 2019-08-06 | End: 2019-08-06

## 2019-08-06 RX ORDER — ONDANSETRON 2 MG/ML
8 INJECTION INTRAMUSCULAR; INTRAVENOUS ONCE
Status: COMPLETED | OUTPATIENT
Start: 2019-08-06 | End: 2019-08-06

## 2019-08-06 RX ORDER — SUCCINYLCHOLINE CHLORIDE 20 MG/ML
INJECTION INTRAMUSCULAR; INTRAVENOUS
Status: DISCONTINUED | OUTPATIENT
Start: 2019-08-06 | End: 2019-08-06

## 2019-08-06 RX ORDER — PANTOPRAZOLE SODIUM 40 MG/10ML
40 INJECTION, POWDER, LYOPHILIZED, FOR SOLUTION INTRAVENOUS DAILY
Status: DISCONTINUED | OUTPATIENT
Start: 2019-08-06 | End: 2019-08-06

## 2019-08-06 RX ORDER — PROPOFOL 10 MG/ML
VIAL (ML) INTRAVENOUS
Status: DISCONTINUED | OUTPATIENT
Start: 2019-08-06 | End: 2019-08-06

## 2019-08-06 RX ORDER — PANTOPRAZOLE SODIUM 40 MG/10ML
80 INJECTION, POWDER, LYOPHILIZED, FOR SOLUTION INTRAVENOUS ONCE
Status: COMPLETED | OUTPATIENT
Start: 2019-08-06 | End: 2019-08-06

## 2019-08-06 RX ADMIN — MIDODRINE HYDROCHLORIDE 15 MG: 5 TABLET ORAL at 12:08

## 2019-08-06 RX ADMIN — ONDANSETRON 8 MG: 2 INJECTION INTRAMUSCULAR; INTRAVENOUS at 03:08

## 2019-08-06 RX ADMIN — I.V. FAT EMULSION 250 ML: 20 EMULSION INTRAVENOUS at 09:08

## 2019-08-06 RX ADMIN — SUCCINYLCHOLINE CHLORIDE 120 MG: 20 INJECTION, SOLUTION INTRAMUSCULAR; INTRAVENOUS at 10:08

## 2019-08-06 RX ADMIN — SODIUM CHLORIDE: 0.9 INJECTION, SOLUTION INTRAVENOUS at 10:08

## 2019-08-06 RX ADMIN — RIFAXIMIN 550 MG: 550 TABLET ORAL at 12:08

## 2019-08-06 RX ADMIN — LACTULOSE 45 G: 20 SOLUTION ORAL at 12:08

## 2019-08-06 RX ADMIN — MIDODRINE HYDROCHLORIDE 15 MG: 5 TABLET ORAL at 04:08

## 2019-08-06 RX ADMIN — LACTULOSE 45 G: 20 SOLUTION ORAL at 04:08

## 2019-08-06 RX ADMIN — INSULIN ASPART 2 UNITS: 100 INJECTION, SOLUTION INTRAVENOUS; SUBCUTANEOUS at 12:08

## 2019-08-06 RX ADMIN — INSULIN ASPART 2 UNITS: 100 INJECTION, SOLUTION INTRAVENOUS; SUBCUTANEOUS at 04:08

## 2019-08-06 RX ADMIN — LACTULOSE 200 G: 10 SOLUTION ORAL at 06:08

## 2019-08-06 RX ADMIN — CEFTRIAXONE SODIUM 2 G: 2 INJECTION, POWDER, FOR SOLUTION INTRAMUSCULAR; INTRAVENOUS at 01:08

## 2019-08-06 RX ADMIN — OCTREOTIDE ACETATE 50 MCG: 50 INJECTION, SOLUTION INTRAVENOUS; SUBCUTANEOUS at 03:08

## 2019-08-06 RX ADMIN — OCTREOTIDE ACETATE 50 MCG/HR: 500 INJECTION, SOLUTION INTRAVENOUS; SUBCUTANEOUS at 03:08

## 2019-08-06 RX ADMIN — PROPOFOL 100 MG: 10 INJECTION, EMULSION INTRAVENOUS at 10:08

## 2019-08-06 RX ADMIN — LACTULOSE 200 G: 10 SOLUTION ORAL at 09:08

## 2019-08-06 RX ADMIN — LIDOCAINE HYDROCHLORIDE 100 MG: 20 INJECTION, SOLUTION INTRAVENOUS at 10:08

## 2019-08-06 RX ADMIN — Medication 220 MG: at 12:08

## 2019-08-06 RX ADMIN — SODIUM BICARBONATE 650 MG TABLET 1300 MG: at 12:08

## 2019-08-06 RX ADMIN — ASCORBIC ACID, VITAMIN A PALMITATE, CHOLECALCIFEROL, THIAMINE HYDROCHLORIDE, RIBOFLAVIN-5 PHOSPHATE SODIUM, PYRIDOXINE HYDROCHLORIDE, NIACINAMIDE, DEXPANTHENOL, ALPHA-TOCOPHEROL ACETATE, VITAMIN K1, FOLIC ACID, BIOTIN, CYANOCOBALAMIN: 200; 3300; 200; 6; 3.6; 6; 40; 15; 10; 150; 600; 60; 5 INJECTION, SOLUTION INTRAVENOUS at 09:08

## 2019-08-06 RX ADMIN — PANTOPRAZOLE SODIUM 40 MG: 40 INJECTION, POWDER, LYOPHILIZED, FOR SOLUTION INTRAVENOUS at 10:08

## 2019-08-06 RX ADMIN — PANTOPRAZOLE SODIUM 80 MG: 40 INJECTION, POWDER, FOR SOLUTION INTRAVENOUS at 03:08

## 2019-08-06 NOTE — ANESTHESIA PREPROCEDURE EVALUATION
08/06/2019  Michelle Ojeda is a 68 y.o., female hospitalized for last several weeks for liver failure and had episode of hematemesis overnight. NG in place    Pre-operative evaluation for Procedure(s) (LRB):  EGD (ESOPHAGOGASTRODUODENOSCOPY) (N/A)    Michelle Ojeda is a 68 y.o. female     Patient Active Problem List   Diagnosis    Liver cirrhosis secondary to IZAGUIRRE    Organ transplant candidate    Liver cirrhosis secondary to IZAGUIRRE (nonalcoholic steatohepatitis)    Hyponatremia    Severe malnutrition    Acute kidney injury superimposed on chronic kidney disease    Hyponatremia with decreased serum osmolality    Portal hypertension    Thrombocytopenia due to hypersplenism    Encounter for pre-transplant evaluation for chronic liver disease    Acute metabolic encephalopathy    Physical debility    Ascites    Pancytopenia    Hypothyroidism    Moderate malnutrition    Coagulopathy    Ileus    Kidney transplant candidate    Right kidney mass    Acute hypoxemic respiratory failure    Shock, unspecified    Hyperglycemia    Acute hepatic encephalopathy    Other dysphagia    Metabolic acidosis       Review of patient's allergies indicates:  No Known Allergies    No current facility-administered medications on file prior to encounter.      Current Outpatient Medications on File Prior to Encounter   Medication Sig Dispense Refill    biotin 5 mg Cap Take 5,000 mcg by mouth once daily.      calcium carbonate/vitamin D3 (CALTRATE-600 PLUS VITAMIN D3 ORAL) Take 1 tablet by mouth once daily.      cholecalciferol, vitamin D3, (VITAMIN D3) 2,000 unit Cap Take 2,000 Units by mouth once daily.       levothyroxine (SYNTHROID) 88 MCG tablet Take 88 mcg by mouth before breakfast.         History reviewed. No pertinent surgical history.    Social History     Socioeconomic History    Marital status:  Unknown     Spouse name: Not on file    Number of children: Not on file    Years of education: Not on file    Highest education level: Not on file   Occupational History    Not on file   Social Needs    Financial resource strain: Not on file    Food insecurity:     Worry: Not on file     Inability: Not on file    Transportation needs:     Medical: Not on file     Non-medical: Not on file   Tobacco Use    Smoking status: Never Smoker    Smokeless tobacco: Never Used   Substance and Sexual Activity    Alcohol use: Not Currently    Drug use: Never    Sexual activity: Not on file   Lifestyle    Physical activity:     Days per week: Not on file     Minutes per session: Not on file    Stress: Not on file   Relationships    Social connections:     Talks on phone: Not on file     Gets together: Not on file     Attends Congregation service: Not on file     Active member of club or organization: Not on file     Attends meetings of clubs or organizations: Not on file     Relationship status: Not on file   Other Topics Concern    Not on file   Social History Narrative    Not on file         CBC:   Recent Labs     08/05/19  0550 08/06/19  0354   WBC 4.03 5.23   RBC 2.42* 2.72*   HGB 8.1* 9.1*   HCT 23.8* 27.7*   PLT 46* 44*   MCV 98 102*   MCH 33.5* 33.5*   MCHC 34.0 32.9       CMP:   Recent Labs     08/05/19  0549 08/06/19  0354   * 134*   K 3.3* 3.5   CL 98 99   CO2 18* 16*   * 123*   CREATININE 3.5* 3.8*   * 257*   MG 3.3* 3.7*   PHOS 4.8* 6.3*   CALCIUM 9.5 9.1   ALBUMIN 5.0 4.9   PROT 6.7 7.2   ALKPHOS 128 167*   ALT 25 25   AST 35 37   BILITOT 3.2* 4.3*       INR  Recent Labs     08/04/19  0628 08/05/19  0550 08/06/19  0354   INR 1.6* 1.7* 1.5*           Anesthesia Evaluation    I have reviewed the Patient Summary Reports.     I have reviewed the Medications.     Review of Systems  Anesthesia Hx:  No problems with previous Anesthesia  History of prior surgery of interest to airway  management or planning:  Denies Personal Hx of Anesthesia complications.   Social:  Non-Smoker    Cardiovascular:   Hypertension    Renal/:   Chronic Renal Disease, ESRD    Hepatic/GI:   Liver Disease,    Endocrine:   Diabetes        Physical Exam  General:  Malnutrition    Airway/Jaw/Neck:  Airway Findings: Mouth Opening: Small, but > 3cm Tongue: Normal  General Airway Assessment: Adult  TM Distance: 4 - 6 cm      Dental:  Dental Findings: In tact        Mental Status:  Mental Status Findings:  Confusion         Anesthesia Plan  Type of Anesthesia, risks & benefits discussed:  Anesthesia Type:  general  Patient's Preference:   Intra-op Monitoring Plan: standard ASA monitors  Intra-op Monitoring Plan Comments:   Post Op Pain Control Plan: per primary service following discharge from PACU  Post Op Pain Control Plan Comments:   Induction:   IV and rapid sequence  Beta Blocker:  Patient is not currently on a Beta-Blocker (No further documentation required).       Informed Consent: Patient representative understands risks and agrees with Anesthesia plan.  Questions answered. Anesthesia consent signed with patient representative.  ASA Score: 4     Day of Surgery Review of History & Physical:    H&P update referred to the surgeon.         Ready For Surgery From Anesthesia Perspective.

## 2019-08-06 NOTE — ASSESSMENT & PLAN NOTE
Labs from [place] [facility] demonstrated Cr of 1.77 in 3/12/19, 1.67 on 4/5/19, 1.96 in 6/13/19, and 1.83 on 6/24. Cr of 2.8 on 7/26/19 above her baseline due to ATN secondary to hypoperfusion of kidneys related to hypotensive episodes. Hepatorenal syndrome not suspected at this time as the patient's Cr and urine output have improved with an albumin challenge. TERA could be exacerbated with noted Hgb drop from 9.9 on 7/27 to 7.2 on 7/29.     Recommendations:   - Worsening of Cr, BUN, and ammonia despite medical therapy. Pt will need placement of dialysis catheter and will plan for 2h session of Hemodialysis. If pt's hemodynamics become unstable and she is transferred to ICU, will initiate 6h of CRRT.   - c/w Midodrine 10mg TID added to prevent hypoperfusion to kidneys. Goal MAP>70  - Recommend continuing albumin 50g IV daily; continue Sodium Bicarbonate 1300mg PO BID  - Monitor with BMP  - Strict I/Os   - Avoid Nephrotoxic agents  - Monitor for signs of blood loss  - Hgb > 7

## 2019-08-06 NOTE — PT/OT/SLP PROGRESS
Occupational Therapy      Patient Name:  Michelle Ojeda   MRN:  [id number]    Patient and pt's daughter, Tegan, visited this morning for attempt at OT treatment. Pt's daughter expressed pt with decline in status over the last 48 hours and if able to hold therapy today. Pt also with EGD procedure this afternoon with pt not in room. Pt remains appropriate for OT. Therapy will follow closely as medically able.     Jayshree Samano, OT  8/6/2019

## 2019-08-06 NOTE — CONSULTS
[facility]  Critical Care Medicine  Consult Note    Patient Name: Michelle Ojeda  MRN: [id number]  Admission Date: 7/18/2019  Hospital Length of Stay: 19 days  Code Status: Full Code  Attending Physician: Stefany Jasso MD   Primary Care Provider: Primary Doctor No   Principal Problem: Acute metabolic encephalopathy    Inpatient consult to Critical Care Medicine  Consult performed by: Elver Huertas MD  Consult ordered by: Ita Gonzales NP  Reason for consult: hematemesis in cirrhosis patient         Subjective:     HPI:  Michelle Ojeda is a 68 year old female with history of IZAGUIRRE cirrhosis, HTN and hypothyroidism who presented to [facility] on 7/18/2019 as a direct admit [facility] for hyponatremia. Patient is from [place] and requesting liver/kidney transplant evaluation. Her cirrhosis is complicated by ascites requiring biweekly paracentesis, sarcopenia, esophageal varices, hyponatremia and hepatic encephalopathy. Per daughter, patient with functional decline over the last 2 months with complaints of lethargy, decreased oral intake, and peripheral edema. Patient initially admitted to hospital medicine for treatment of hyponatremia with fluid restriction. During liver work up, incidental renal mass found and urology was consulted. Patient developed an ileus and unable to tolerate lactulose while on the floor.    Rapid response called 7/24/2019 for worsening encephalopathy. At that time, patient was hemodynamically stable however not following commands or withdrawing to pain. Found to have ammonia 171. Critical care medicine consulted as patient vomiting and concern for not protecting airway.     Upon arrival, patient unresponsive and will only withdraw to pain yet hemodynamically stable. Multiple episodes of emesis during exam requiring aggressive suctioning to protect airway. Patient admitted to the CMICU at that time for emergent intubation.      Hospital/ICU Course:  Ms. Ojeda was admitted to the ICU with [facility] for acute hepatic encephalopathy requiring intubation for airway protection.  Ammonia level 171.  Lactulose and Rifaximin started.  Paracentesis on 7/24 with 4 L removed, negative for SBP. Pan cultured and started on empiric antibiotics with vancomycin and pip/tazo. Cultures NGTD, discontinued antibiotics on 7/27.  Extubated to NC on 7/27.  Evaluated by SLP on 7/28 who cleared for dental soft diet with thin liquids.  Also initiated on albumin in combination with midodrine with MAPs 80's given urine studies indicating pre-renal picture with mild improvement in sCr and urine output.      Past Medical History:   Diagnosis Date    Cirrhosis     Diabetes mellitus, type 2     Disorder of kidney and ureter     Hypertension     Hypothyroidism     NAFLD (nonalcoholic fatty liver disease)        History reviewed. No pertinent surgical history.    Review of patient's allergies indicates:  No Known Allergies    Family History     Problem Relation (Age of Onset)    No Known Problems Father        Tobacco Use    Smoking status: Never Smoker    Smokeless tobacco: Never Used   Substance and Sexual Activity    Alcohol use: Not Currently    Drug use: Never    Sexual activity: Not on file      Review of Systems   Constitutional: Negative for diaphoresis and fever.   HENT: Negative for drooling and sore throat.    Eyes: Negative for pain and redness.   Respiratory: Negative for cough and shortness of breath.    Cardiovascular: Negative for palpitations and leg swelling.   Gastrointestinal: Positive for diarrhea and vomiting. Negative for abdominal pain.   Genitourinary: Negative for hematuria and menstrual problem.   Musculoskeletal: Negative for joint swelling and neck stiffness.   Skin: Positive for wound (facial brusing and general body bruising). Negative for pallor.   Neurological: Negative for syncope and headaches.     Objective:     Vital Signs  (Most Recent):  Temp: 97.9 °F (36.6 °C) (08/06/19 0331)  Pulse: 69 (08/06/19 0412)  Resp: 18 (08/05/19 2336)  BP: (!) 132/53 (08/06/19 0412)  SpO2: 99 % (08/06/19 0331) Vital Signs (24h Range):  Temp:  [97.6 °F (36.4 °C)-98.2 °F (36.8 °C)] 97.9 °F (36.6 °C)  Pulse:  [62-75] 69  Resp:  [18-20] 18  SpO2:  [99 %-100 %] 99 %  BP: (102-139)/(46-64) 132/53   Weight: 56 kg (123 lb 7.3 oz)  Body mass index is 24.11 kg/m².      Intake/Output Summary (Last 24 hours) at 8/6/2019 0754  Last data filed at 8/5/2019 1810  Gross per 24 hour   Intake --   Output 3100 ml   Net -3100 ml       Physical Exam   Constitutional: She appears well-developed.   Cachectic elderly female lying in bed with NG tube with dark brown/maroon output. Patient opening eyes but appears confused and not oriented to person, place, time or situation.    HENT:   Head: Normocephalic and atraumatic.   Right Ear: External ear normal.   Left Ear: External ear normal.   Unwilling to open mouth for oral pharynx exam but visible dry blood in mouth. Appears dry.    Eyes: Pupils are equal, round, and reactive to light. Conjunctivae and EOM are normal. Right eye exhibits no discharge. Left eye exhibits no discharge.   Neck: Normal range of motion. Neck supple.   Cardiovascular: Normal rate, normal heart sounds and intact distal pulses.   No murmur heard.  Pulmonary/Chest: Effort normal.   Abdominal: Soft. Bowel sounds are normal. She exhibits no distension. There is tenderness.   Musculoskeletal: She exhibits no edema.   Neurological: She is alert.   Skin: Skin is warm. Capillary refill takes less than 2 seconds. She is not diaphoretic. No pallor.   Nursing note and vitals reviewed.      Vents:  Vent Mode: Spont (07/27/19 0912)  Ventilator Initiated: Yes (07/24/19 1312)  Set Rate: 0 bmp (07/27/19 0912)  Vt Set: 300 mL (07/27/19 0912)  Pressure Support: 5 cmH20 (07/27/19 0912)  PEEP/CPAP: 5 cmH20 (07/27/19 0912)  Oxygen Concentration (%): 28 (07/27/19 1113)  Peak  Airway Pressure: 11 cmH2O (07/27/19 0912)  Plateau Pressure: 0 cmH20 (07/27/19 0912)  Total Ve: 8.93 mL (07/27/19 0912)  Negative Inspiratory Force (cm H2O): -35 (07/27/19 1105)  F/VT Ratio<105 (RSBI): (!) 36.25 (07/27/19 0912)  Lines/Drains/Airways     Drain                 NG/OG Tube 08/05/19 1928 nasogastric Right nostril less than 1 day         Urethral Catheter 08/05/19 1138 Straight-tip 16 Fr. less than 1 day          Peripheral Intravenous Line                 Peripheral IV - Single Lumen 08/02/19 1407 20 G Right Antecubital 3 days              Significant Labs:    CBC/Anemia Profile:  Recent Labs   Lab 08/05/19  0550 08/06/19  0354   WBC 4.03 5.23   HGB 8.1* 9.1*   HCT 23.8* 27.7*   PLT 46* 44*   MCV 98 102*   RDW 18.9* 19.8*        Chemistries:  Recent Labs   Lab 08/05/19  0549 08/06/19  0354   * 134*   K 3.3* 3.5   CL 98 99   CO2 18* 16*   * 123*   CREATININE 3.5* 3.8*   CALCIUM 9.5 9.1   ALBUMIN 5.0 4.9   PROT 6.7 7.2   BILITOT 3.2* 4.3*   ALKPHOS 128 167*   ALT 25 25   AST 35 37   MG 3.3* 3.7*   PHOS 4.8* 6.3*       All pertinent labs within the past 24 hours have been reviewed.    Significant Imaging: I have reviewed all pertinent imaging results/findings within the past 24 hours.      Assessment/Plan:     Liver cirrhosis secondary to IZAGUIRRE with hematemesis: Patient has a history of cirrhosis complicated by ascites, thrombocytopenia, esophageal varices (per family), hyponatremia and hepatic encephalopathy presenting with hematemesis episode after placement of NG tube. Patient had one acute episode of bright red hematemesis (estimated at 200-300 cc) and now has mild amount of dark black output from NG tube. No history of melena prior to this episode. On evaluation, patient had blood pressures in upper 130s/80s. Heart rate in 90s with increased RR but saturating well at 99%. Patient alert but confused with diffuse ecchymosis but not acute signs of trauma. Dried blood in airway. Lung sounds  clear with normal bowel signs. Abdomen non-distended with mild tenderness on evaluation. With history of prior varises and cirrhosis, concern for acute varices bleed following OG tube placement, though other considerations include trauma to nasal pharynx with epistasis and postnasal drip into oral pharynx, bronchitis, and gastric varices. Appears hemodynamically stable with no repeated hematemesis during encounter. Given zofran for symptoms. Currently already on lactulose for encephalopathy, rifaximin and ciprofloxacin for SBP prophylaxisi. Started on octreotide and PPI for GI bleed. New CBC and coagulopty labs ordered in setting of acute bleed with lactic acid. Labs show stable increase in H/H from 8.1/23.8 to 9.1/27.7. Platelets stable at 44 (from 46) and INR improving at 1.5 after episode. Lactate is mildly elevated at 2.5. However, with patient clinically stable with no acute H/H decrease and no repeat large volume hematemesis episode, she remains stable for the floor at this time with recommendation of GI evaluation. Would recommend continuing cirrhosis treatment plan, continuing octreotide/PPI, monitoring vitals, and repeating lactic acid in 6 hours. If patient acutely becomes worse at any point, recommend re-consulting MICU to step up to the ICU for closer monitoring but this is not indicated at this time.   -continue lactulose and rifaximin  -continue SBP prophylaxis   -octreotide bolus followed by infusion at 50mcg/hr  -continue PPI BID or infusion  -trend CBC/ INR  -repeat lactic acid after 6 hr  -consult GI for evaluation of bleeding  -PRN zofran for n/v  -closely follow blood pressures and heart rate  -NPO      Thank you for your consult. I will sign off. Please contact us if you have any additional questions.     Elver Huertas MD  Critical Care Medicine  [facility]

## 2019-08-06 NOTE — PROGRESS NOTES
Seen and examined by anesthesia Dr Sheehan at bedside, Vital signs and pti condition remain as baseline ( pre op), 12 led ekg result  seen  ok for Patient to go back to mtsu, Dr Quintanilla will update the family

## 2019-08-06 NOTE — ANESTHESIA POSTPROCEDURE EVALUATION
Anesthesia Post Evaluation    Patient: Michelle Ojeda    Procedure(s) Performed: Procedure(s) (LRB):  EGD (ESOPHAGOGASTRODUODENOSCOPY) (N/A)    Final Anesthesia Type: general  Patient location during evaluation: PACU  Patient participation: Yes- Able to Participate  Level of consciousness: awake and alert  Post-procedure vital signs: reviewed and stable  Pain management: adequate  Airway patency: patent  PONV status at discharge: No PONV  Anesthetic complications: no      Cardiovascular status: stable  Respiratory status: unassisted and spontaneous ventilation  Hydration status: euvolemic  Follow-up not needed.          Vitals Value Taken Time   /68 8/6/2019 11:47 AM   Temp 36.4 °C (97.5 °F) 8/6/2019 11:30 AM   Pulse 99 8/6/2019 11:50 AM   Resp 25 8/6/2019 11:50 AM   SpO2 100 % 8/6/2019 11:50 AM   Vitals shown include unvalidated device data.      Event Time     Out of Recovery 12:06:57      Pt had elevated T wave intarop, but only 3 lead. EKG ordered in pacu and was unchanged from prior.     Pain/Jagruti Score: Pain Rating Prior to Med Admin: 0 (8/6/2019 11:47 AM)  Pain Rating Post Med Admin: 0 (8/6/2019 11:47 AM)  Jagruti Score: 9 (8/6/2019 11:47 AM)

## 2019-08-06 NOTE — CONSULTS
[facility]  Critical Care Medicine  Consult Note    Patient Name: Michelle Ojeda  MRN: [id number]  Admission Date: 7/18/2019  Hospital Length of Stay: 19 days  Code Status: Full Code  Attending Physician: Stefany Jasso MD   Primary Care Provider: Primary Doctor No   Principal Problem: Acute metabolic encephalopathy    Inpatient consult to Critical Care Medicine  Consult performed by: Robb Zelaya MD  Consult ordered by: Stefany Jasso MD        Subjective:     HPI:  Michelle Ojeda is a 68 year old female with history of IZAGUIRRE cirrhosis, HTN and hypothyroidism who presented to [facility] on 7/18/2019 as a direct admit from Transplant Hepatology [facility] for hyponatremia. Patient is from [place] and requesting liver/kidney transplant evaluation. Her cirrhosis is complicated by ascites requiring biweekly paracentesis, sarcopenia, esophageal varices, hyponatremia and hepatic encephalopathy. Per daughter, patient with functional decline over the last 2 months with complaints of lethargy, decreased oral intake, and peripheral edema. Patient initially admitted to hospital medicine for treatment of hyponatremia with fluid restriction. During liver work up, incidental renal mass found and urology was consulted. Patient developed an ileus and unable to tolerate lactulose while on the floor. Patient intubated during her ICU stay, extubated - stepped down to IM-L team where she her course has now been complicated by worsening hepatic encephalopathy due to lack of oral lactulose and worsening uremia as her renal function is again worsening. Critical care consulted due to her multisystem decompensation.     Hospital/ICU Course:  No notes on file    Past Medical History:   Diagnosis Date    Cirrhosis     Diabetes mellitus, type 2     Disorder of kidney and ureter     Hypertension     Hypothyroidism     NAFLD (nonalcoholic fatty liver disease)        History reviewed. No pertinent surgical  history.    Review of patient's allergies indicates:  No Known Allergies    Family History     Problem Relation (Age of Onset)    No Known Problems Father        Tobacco Use    Smoking status: Never Smoker    Smokeless tobacco: Never Used   Substance and Sexual Activity    Alcohol use: Not Currently    Drug use: Never    Sexual activity: Not on file      Review of Systems   Unable to perform ROS: Mental status change     Objective:     Vital Signs (Most Recent):  Temp: 97.5 °F (36.4 °C) (08/06/19 1623)  Pulse: 76 (08/06/19 1623)  Resp: 18 (08/06/19 1623)  BP: 138/61 (08/06/19 1623)  SpO2: 99 % (08/06/19 1623) Vital Signs (24h Range):  Temp:  [96.2 °F (35.7 °C)-98.2 °F (36.8 °C)] 97.5 °F (36.4 °C)  Pulse:  [] 76  Resp:  [15-20] 18  SpO2:  [99 %-100 %] 99 %  BP: (118-175)/(53-81) 138/61   Weight: 56 kg (123 lb 7.3 oz)  Body mass index is 24.11 kg/m².      Intake/Output Summary (Last 24 hours) at 8/6/2019 1830  Last data filed at 8/6/2019 1623  Gross per 24 hour   Intake 1223.66 ml   Output 850 ml   Net 373.66 ml       Physical Exam   Constitutional: She appears distressed.   Frail  Moaning in discomfort  Unable to follow commands  Diffuse bruising  NG tube in place - rapidly pulled out by patient  Dry blood in oral cavity    Eyes: Pupils are equal, round, and reactive to light.   Neck: Neck supple.   Cardiovascular: Normal rate.   Pulmonary/Chest:   tachypneic    Abdominal: Soft.   Patient pulled NGT   Musculoskeletal: She exhibits no edema.   Neurological: She displays atrophy and tremor.   Profoundly encephalopathic   Unable to follow simple commands - nonverbal     Skin: Skin is dry.   Nursing note and vitals reviewed.      Vents:  Vent Mode: Spont (07/27/19 0912)  Ventilator Initiated: Yes (07/24/19 1312)  Set Rate: 0 bmp (07/27/19 0912)  Vt Set: 300 mL (07/27/19 0912)  Pressure Support: 5 cmH20 (07/27/19 0912)  PEEP/CPAP: 5 cmH20 (07/27/19 0912)  Oxygen Concentration (%): 28 (07/27/19 1113)  Peak  Airway Pressure: 11 cmH2O (07/27/19 0912)  Plateau Pressure: 0 cmH20 (07/27/19 0912)  Total Ve: 8.93 mL (07/27/19 0912)  Negative Inspiratory Force (cm H2O): -35 (07/27/19 1105)  F/VT Ratio<105 (RSBI): (!) 36.25 (07/27/19 0912)  Lines/Drains/Airways     Drain                 Urethral Catheter 08/05/19 1138 Straight-tip 16 Fr. 1 day         NG/OG Tube 08/06/19 1034 [place] sump 16 Fr. Left nostril less than 1 day          Peripheral Intravenous Line                 Peripheral IV - Single Lumen 08/02/19 1407 20 G Right Antecubital 4 days         Midline Catheter Insertion/Assessment  - Single Lumen 08/06/19 1552 Right basilic vein (medial side of arm) 18g x 8cm less than 1 day         Peripheral IV - Single Lumen 08/06/19 20 G Left;Posterior Hand less than 1 day              Significant Labs:    CBC/Anemia Profile:  Recent Labs   Lab 08/05/19  0550 08/06/19  0354 08/06/19  1451   WBC 4.03 5.23 3.84*   HGB 8.1* 9.1* 8.5*   HCT 23.8* 27.7* 25.6*   PLT 46* 44* 64*   MCV 98 102* 103*   RDW 18.9* 19.8* 20.3*        Chemistries:  Recent Labs   Lab 08/05/19  0549 08/06/19  0354 08/06/19  1451   * 134* 139   K 3.3* 3.5 3.5   CL 98 99 101   CO2 18* 16* 20*   * 123* 125*   CREATININE 3.5* 3.8* 4.3*   CALCIUM 9.5 9.1 9.3   ALBUMIN 5.0 4.9  --    PROT 6.7 7.2  --    BILITOT 3.2* 4.3*  --    ALKPHOS 128 167*  --    ALT 25 25  --    AST 35 37  --    MG 3.3* 3.7*  --    PHOS 4.8* 6.3*  --        All pertinent labs within the past 24 hours have been reviewed.    Significant Imaging: I have reviewed all pertinent imaging results/findings within the past 24 hours.      ABG  No results for input(s): PH, PO2, PCO2, HCO3, BE in the last 168 hours.  Assessment/Plan:     Neuro  * Acute metabolic encephalopathy  68 year old female with hepatic and kidney failure who is profoundly encephalopathic due to severe hyperammonemia and uremia. Discussed the clinical course of Ms. Ojeda with her children at bedside. She had temporarily  improved but is now declining rapidly due to her multiorgan dysfunction. Per her primary team she has been deferred/denied for transplant. The family understands that she is dying and their wishes are for her to be comfortable. They truly wanted to get her back to her home state of [place] as their ultimate goal. We discussed that getting her to [place] is likely not possible and that there are things we can do and things that we should do to her. They agreed that they would not want her to be intubated again or undergo chest compressions/defibrillation. I discussed that giving her lactulose via the NG tube would be beneficial in lowering her ammonia and hopefully clearing her mental state so they could communicate with her. Unfortunately shortly after our meeting she removed her NG tube. The conversation I had with family was relayed to primary team MD, Dr. Jasso and the encounter was discussed with Dr. Vicky Staples [facility] Fellow and Dr. Kim [facility] Attending.     Plan:  · Change patient's code status as family desires her to be DNR  · Focus on comfort as described by family  · Initiate comfort measures at the direction of Ms. Ojeda's family  · If assistance is needed with palliation please consult palliative care      Critical secondary to Patient has a condition that poses threat to life and bodily function: encephalopathy     Critical care was time spent personally by me on the following activities: development of treatment plan with patient or surrogate and bedside caregivers, discussions with consultants, evaluation of patient's response to treatment, examination of patient, ordering and performing treatments and interventions, ordering and review of laboratory studies, ordering and review of radiographic studies, pulse oximetry, re-evaluation of patient's condition. This critical care time did not overlap with that of any other provider or involve time for any procedures.    Thank you for your consult.  I will sign off. Please contact us if you have any additional questions.     Robb Zelaya MD  Critical Care Medicine  [facility]

## 2019-08-06 NOTE — SIGNIFICANT EVENT
Nursing called concerned that patient had hematemesis. Chart reviewed and order placed (protonix 80mg STAT, 40mg BID after, octreotide 50mcg IVP and infusion, lactic acid, PT/INR, zofran 8mg STAT, CBC STAT, PRBC, NPO diet ordered, GI consult and CCM consult). Patient assessed at bedside, encephalopathic on examination, unable to answer questions appropriately. VSS. Noted that ammonia has been elevated, lactulose already ordered. Patient on cipro for SBP prophylaxis. Dicussed plan of care with patient and with family. No further questions from family. Awaiting lab results. Case dicussed with [facility] medicine, who agree with plan of care and will also await lab results.  Assistance appreciated.

## 2019-08-06 NOTE — PROGRESS NOTES
PM&R consult follow up.  Please see original consult for detailed note.      Patient with therapy needs and likely good candidate for Inpatient Rehab; however, now with worsening mental status, hematemesis, and concern for sepsis.  Continue PT/OT/SLP as appropriate.  Will follow for post-acute recommendation once patient is medically stable and nearing discharge.     PATRICK Briscoe, FNP-C  Physical Medicine & Rehabilitation   08/06/2019  Spectralink: [id number]

## 2019-08-06 NOTE — SUBJECTIVE & OBJECTIVE
Past Medical History:   Diagnosis Date    Cirrhosis     Diabetes mellitus, type 2     Disorder of kidney and ureter     Hypertension     Hypothyroidism     NAFLD (nonalcoholic fatty liver disease)        History reviewed. No pertinent surgical history.    Review of patient's allergies indicates:  No Known Allergies    Family History     Problem Relation (Age of Onset)    No Known Problems Father        Tobacco Use    Smoking status: Never Smoker    Smokeless tobacco: Never Used   Substance and Sexual Activity    Alcohol use: Not Currently    Drug use: Never    Sexual activity: Not on file      Review of Systems   Unable to perform ROS: Mental status change     Objective:     Vital Signs (Most Recent):  Temp: 97.5 °F (36.4 °C) (08/06/19 1623)  Pulse: 76 (08/06/19 1623)  Resp: 18 (08/06/19 1623)  BP: 138/61 (08/06/19 1623)  SpO2: 99 % (08/06/19 1623) Vital Signs (24h Range):  Temp:  [96.2 °F (35.7 °C)-98.2 °F (36.8 °C)] 97.5 °F (36.4 °C)  Pulse:  [] 76  Resp:  [15-20] 18  SpO2:  [99 %-100 %] 99 %  BP: (118-175)/(53-81) 138/61   Weight: 56 kg (123 lb 7.3 oz)  Body mass index is 24.11 kg/m².      Intake/Output Summary (Last 24 hours) at 8/6/2019 1830  Last data filed at 8/6/2019 1623  Gross per 24 hour   Intake 1223.66 ml   Output 850 ml   Net 373.66 ml       Physical Exam   Constitutional: She appears distressed.   Frail  Moaning in discomfort  Unable to follow commands  Diffuse bruising  NG tube in place - rapidly pulled out by patient  Dry blood in oral cavity    Eyes: Pupils are equal, round, and reactive to light.   Neck: Neck supple.   Cardiovascular: Normal rate.   Pulmonary/Chest:   tachypneic    Abdominal: Soft.   Patient pulled NGT   Musculoskeletal: She exhibits no edema.   Neurological: She displays atrophy and tremor.   Profoundly encephalopathic   Unable to follow simple commands - nonverbal     Skin: Skin is dry.   Nursing note and vitals reviewed.      Vents:  Vent Mode: Spont (07/27/19  0912)  Ventilator Initiated: Yes (07/24/19 1312)  Set Rate: 0 bmp (07/27/19 0912)  Vt Set: 300 mL (07/27/19 0912)  Pressure Support: 5 cmH20 (07/27/19 0912)  PEEP/CPAP: 5 cmH20 (07/27/19 0912)  Oxygen Concentration (%): 28 (07/27/19 1113)  Peak Airway Pressure: 11 cmH2O (07/27/19 0912)  Plateau Pressure: 0 cmH20 (07/27/19 0912)  Total Ve: 8.93 mL (07/27/19 0912)  Negative Inspiratory Force (cm H2O): -35 (07/27/19 1105)  F/VT Ratio<105 (RSBI): (!) 36.25 (07/27/19 0912)  Lines/Drains/Airways     Drain                 Urethral Catheter 08/05/19 1138 Straight-tip 16 Fr. 1 day         NG/OG Tube 08/06/19 1034 [place] sump 16 Fr. Left nostril less than 1 day          Peripheral Intravenous Line                 Peripheral IV - Single Lumen 08/02/19 1407 20 G Right Antecubital 4 days         Midline Catheter Insertion/Assessment  - Single Lumen 08/06/19 1552 Right basilic vein (medial side of arm) 18g x 8cm less than 1 day         Peripheral IV - Single Lumen 08/06/19 20 G Left;Posterior Hand less than 1 day              Significant Labs:    CBC/Anemia Profile:  Recent Labs   Lab 08/05/19  0550 08/06/19  0354 08/06/19  1451   WBC 4.03 5.23 3.84*   HGB 8.1* 9.1* 8.5*   HCT 23.8* 27.7* 25.6*   PLT 46* 44* 64*   MCV 98 102* 103*   RDW 18.9* 19.8* 20.3*        Chemistries:  Recent Labs   Lab 08/05/19  0549 08/06/19  0354 08/06/19  1451   * 134* 139   K 3.3* 3.5 3.5   CL 98 99 101   CO2 18* 16* 20*   * 123* 125*   CREATININE 3.5* 3.8* 4.3*   CALCIUM 9.5 9.1 9.3   ALBUMIN 5.0 4.9  --    PROT 6.7 7.2  --    BILITOT 3.2* 4.3*  --    ALKPHOS 128 167*  --    ALT 25 25  --    AST 35 37  --    MG 3.3* 3.7*  --    PHOS 4.8* 6.3*  --        All pertinent labs within the past 24 hours have been reviewed.    Significant Imaging: I have reviewed all pertinent imaging results/findings within the past 24 hours.

## 2019-08-06 NOTE — CHAPLAIN
The daughter of the patient requested the Sacrament of the Anointing the sick  The  anointed her 6/8 around at 08:40 am.

## 2019-08-06 NOTE — SUBJECTIVE & OBJECTIVE
Interval History: Overnight, Pt with hematemesis and minimally responsive. GI and Critical Care consulted; pt given 1uPRBC but was otherwise hemodynamically stable and remained in 8088. Pt underwent upper endoscopy earlier today with grade 1 esophageal varices, hepatic gastropathy with contact bleeding, and diminutivemallory jaeger tear. Started on IV octreotide and PPI.       Review of patient's allergies indicates:  No Known Allergies  Current Facility-Administered Medications   Medication Frequency    0.9%  NaCl infusion (for blood administration) Q24H PRN    acetaminophen tablet 325 mg Q4H PRN    cefTRIAXone (ROCEPHIN) 2 g in dextrose 5 % 50 mL IVPB Q24H    dextrose 10% (D10W) Bolus PRN    dextrose 10% (D10W) Bolus PRN    glucagon (human recombinant) injection 1 mg PRN    glucose chewable tablet 16 g PRN    glucose chewable tablet 24 g PRN    insulin aspart U-100 pen 0-5 Units Q6H PRN    insulin detemir U-100 pen 12 Units Daily    lactulose 20 gram/30 mL solution Soln 45 g TID    levothyroxine tablet 88 mcg Before breakfast    midodrine tablet 15 mg TID    ondansetron injection 4 mg Q6H PRN    [START ON 8/9/2019] pantoprazole EC tablet 40 mg Daily    pantoprazole injection 40 mg Daily    prochlorperazine injection Soln 10 mg Q6H PRN    rifAXIMin tablet 550 mg BID    simethicone chewable tablet 80 mg TID PRN    sodium bicarbonate tablet 1,300 mg BID    Standard Custom Day One ADULT TPN for patient WITH electrolyte abnormality or renal dysfunction (PERIPHERAL) Continuous    zinc sulfate capsule 220 mg Daily       Objective:     Vital Signs (Most Recent):  Temp: 96.2 °F (35.7 °C) (08/06/19 1233)  Pulse: 83 (08/06/19 1233)  Resp: 15 (08/06/19 1233)  BP: 134/61 (08/06/19 1233)  SpO2: 100 % (08/06/19 1233)  O2 Device (Oxygen Therapy): room air (08/06/19 1145) Vital Signs (24h Range):  Temp:  [96.2 °F (35.7 °C)-98.2 °F (36.8 °C)] 96.2 °F (35.7 °C)  Pulse:  [] 83  Resp:  [15-20] 15  SpO2:  [99  %-100 %] 100 %  BP: (102-175)/(46-81) 134/61     Weight: 56 kg (123 lb 7.3 oz) (07/30/19 1000)  Body mass index is 24.11 kg/m².  Body surface area is 1.54 meters squared.    I/O last 3 completed shifts:  In: 761.4 [P.O.:340; I.V.:1.2; NG/GT:100]  Out: 4800 [Urine:1300; Emesis/NG output:400; Other:3100]    Physical Exam   Constitutional: She appears well-developed and well-nourished. She appears lethargic. She appears cachectic. No distress.   HENT:   Head: Normocephalic and atraumatic.   Eyes: EOM are normal. Scleral icterus is present.   Neck: Normal range of motion.   Cardiovascular: Normal rate, regular rhythm and normal heart sounds.   No murmur heard.  Pulmonary/Chest: Effort normal and breath sounds normal.   Abdominal: Soft. Bowel sounds are normal. She exhibits ascites. There is hepatosplenomegaly. There is no tenderness.   Musculoskeletal: She exhibits no edema.   Neurological: She appears lethargic. She is disoriented.   Skin: Skin is warm and dry. Ecchymosis noted. She is not diaphoretic.   Nursing note and vitals reviewed.      Significant Labs:  CBC:   Recent Labs   Lab 08/06/19  0354   WBC 5.23   RBC 2.72*   HGB 9.1*   HCT 27.7*   PLT 44*   *   MCH 33.5*   MCHC 32.9     CMP:   Recent Labs   Lab 08/06/19  0354   *   CALCIUM 9.1   ALBUMIN 4.9   PROT 7.2   *   K 3.5   CO2 16*   CL 99   *   CREATININE 3.8*   ALKPHOS 167*   ALT 25   AST 37   BILITOT 4.3*     Recent Labs   Lab 08/05/19  1607   COLORU Yellow   SPECGRAV 1.015   PHUR 5.0   PROTEINUA Negative   NITRITE Negative   LEUKOCYTESUR Negative   HYALINECASTS 10*      Results for MATHEW TYSON (MRN [id number]) as of 8/6/2019 14:14   Ref. Range 8/5/2019 11:53 8/6/2019 04:30   Ammonia Latest Ref Range: 10 - 50 umol/L 121 (H) 188 (H)       All labs within the past 24 hours have been reviewed.     Significant Imaging:  No new imaging in the past 24 hours

## 2019-08-06 NOTE — ASSESSMENT & PLAN NOTE
68 year old female with hepatic and kidney failure who is profoundly encephalopathic due to severe hyperammonemia and uremia. Discussed the clinical course of Ms. Ojeda with her children at bedside. She had temporarily improved but is now declining rapidly due to her multiorgan dysfunction. Per her primary team she has been deferred/denied for transplant. The family understands that she is dying and their wishes are for her to be comfortable. They truly wanted to get her back to her home state of [place] as their ultimate goal. We discussed that getting her to [place] is likely not possible and that there are things we can do and things that we should do to her. They agreed that they would not want her to be intubated again or undergo chest compressions/defibrillation. I discussed that giving her lactulose via the NG tube would be beneficial in lowering her ammonia and hopefully clearing her mental state so they could communicate with her. Unfortunately shortly after our meeting she removed her NG tube. The conversation I had with family was relayed to primary team MD, Dr. Jasso and the encounter was discussed with [facility] and Dr. Kim [facility] Attending.     Plan:  Change patient's code status as family desires this to be DNR  Focus on comfort as described by family  Initiate comfort measures at the direction of Ms. Ojeda's family

## 2019-08-06 NOTE — PROGRESS NOTES
Hepatology Progress Note    Michelle Ojeda is a 68 y.o. female admitted to hospital 7/18/2019 (Hospital Day: 20) due to Acute metabolic encephalopathy.     Subjective:  Encephalopathic this AM, very frail, barley answers to verbal commands. Yesterday, rectal enemas were not working per primary team, so NG tube was placed, patient received one dose of lactulose through NG tube had one BM, then early morning had hematemesis.     Interval History  68F w/IZAGUIRRE cirrhosis and CKD who came from [place] for liver-kidney transplant evaluation. She was admitted from Hepatology clinic few weeks ago for hypoNa. Course complicated by lieus, encephalopathy (in setting of holding lactulose for ileus), briefly in ICU and intubated but extubated and to the floor last week. Transplant committee declined her due to frailty. She was supposed to go to acute rehab last week, but worsening hypoNa and renal function in setting of decreased PO intake. Nephrology following and they think it is volume depletion so getting albumin. Other issue taking a back seat is an incidental R renal mass (too high risk for biopsy and just surveillance per Urology). Had hematemesis on 8/6: EGD showed   - LA Grade D reflux esophagitis, Grade 1 varices, no red signs, jennifer-jaeger tear, no active bleeding, and PHG w/ contact bleeding. Para on 8/5 negative for SBP, cultures pending.       Objective  Temp:  [96.2 °F (35.7 °C)-98.2 °F (36.8 °C)] 96.2 °F (35.7 °C) (08/06 1233)  Pulse:  [] 83 (08/06 1233)  BP: (102-175)/(46-81) 134/61 (08/06 1233)  Resp:  [15-20] 15 (08/06 1233)  SpO2:  [99 %-100 %] 100 % (08/06 1233)    General: aao X1, minimally responsive to verbal or tactile sensation, NG tube in place.   Eyes:scleral icterus absent  Neurologic: Asterixis unable to assess due to encephalopathy   Abdomen: Normoactive bowel sounds. distended. Normal tympany. Soft. Non-tender. No peritoneal signs.  Extremities: edema absent    Laboratory    Lab Results    Component Value Date    WBC 5.23 08/06/2019    HGB 9.1 (L) 08/06/2019    HCT 27.7 (L) 08/06/2019     (H) 08/06/2019    PLT 44 (L) 08/06/2019       Lab Results   Component Value Date     (L) 08/06/2019    K 3.5 08/06/2019    CL 99 08/06/2019    CO2 16 (L) 08/06/2019     (H) 08/06/2019    CREATININE 3.8 (H) 08/06/2019    CALCIUM 9.1 08/06/2019       Lab Results   Component Value Date    ALBUMIN 4.9 08/06/2019    ALT 25 08/06/2019    AST 37 08/06/2019    GGT 98 (H) 07/18/2019    ALKPHOS 167 (H) 08/06/2019    BILITOT 4.3 (H) 08/06/2019       Lab Results   Component Value Date    INR 1.5 (H) 08/06/2019    INR 1.7 (H) 08/05/2019    INR 1.6 (H) 08/04/2019       MELD-Na score: 30 at 8/6/2019  3:54 AM  MELD score: 29 at 8/6/2019  3:54 AM  Calculated from:  Serum Creatinine: 3.8 mg/dL at 8/6/2019  3:54 AM  Serum Sodium: 134 mmol/L at 8/6/2019  3:54 AM  Total Bilirubin: 4.3 mg/dL at 8/6/2019  3:54 AM  INR(ratio): 1.5 at 8/6/2019  3:54 AM  Age: 68 years    Assessment  Ms Ojeda is a 68 year old female with a history of IZAGUIRRE ESLD, with decompensations including Gr 1 HE, ascites, EV, hyponatremia, T2DM, HTN, hypothyroidism, who was admitted from hepatology clinic due to concern for hyponatremia. Also found to have ileus which has resolved. Unresponsive on 7/24, transferred to ICU and intubated for airway protection. Now extubated, transferred out of ICU with improved mentation. Hyponatremia initially resolved, back down to 125 and now improving. Worsening renal function with significant uremia. Other ongoing issues include incidentally discovered R renal mass too high risk for biopsy at this time, deconditioning requiring PT/OT and eventual SNF. Declined for transplant listing at this time. Repeat blood cultures, Had hematemesis on 8/6: EGD showed   - LA Grade D reflux esophagitis, Grade 1 varices, no red signs, jennifer-jaeger tear, no active bleeding, and PHG w/ contact bleeding. Para on 8/5 negative for  SBP, cultures pending.    Hepatic encephalopathy  Hematemesis vs nasal trauma from NGT   Esophagitis grade D       Plan  - Hyponatremia: Nephrology consulted, planning HD, continue albumin  - encephalopathy: Continue lactulose and rifaximin. NGT in place  - ascites: Negative for SBP on 07/19. Total protein 1.5. Cont ceftriaxone 2 g daily  - diuretics: hold given TERA  - TERA on CKD: unclear baseline. Nephrology consulted: planning HD.  - Protein calorie malnutrition: nutrition consult. High protein intake. Consider prehab supplements on discharge.  - Anemia: secondary anemia workup, monitor for overt GI bleed  - Appreciate Renal transplant evaluation re: SLK given GFR <30.  - recommend keeping platelets above 50k given recent active bleed  - keep hb >7.   -  Para on 8/5 negative for SBP, cultures pending.  - Transplant: Discussed at transplant committee, but declined for transplant at this time. May be able to revisit this after patient completes rehab. Discussed with family and patient. May need further evaluation of R renal mass.- please obtain daily CBC, BMP, LFT, INR      Thank you for involving us in the care of Michelle Ojeda. Please call with any additional concerns or questions.    Michael Horan MD  Gastroenterology Fellow

## 2019-08-06 NOTE — CARE UPDATE
RAPID RESPONSE NURSE PROACTIVE ROUNDING NOTE     Time of Visit: 042    Admit Date: 2019  LOS: 19  Code Status: Full Code   Date of Visit: 2019  : 1950  Age: 68 y.o.  Sex: female  Race: Unknown  Bed: 8088/8088 A:   MRN: [id number]  Was the patient discharged from an ICU this admission? yes   Was the patient discharged from a PACU within last 24 hours?  no  Did the patient receive conscious sedation/general anesthesia in last 24 hours?  no  Was the patient in the ED within the past 24 hours?  no  Was the patient started on NIPPV within the past 24 hours?  no  Attending Physician: Ty Breaux MD  Primary Service: [facility]     ASSESSMENT     Diagnosis: Acute metabolic encephalopathy    Abnormal Vital Signs: BP (!) 132/53 (BP Location: Left arm, Patient Position: Lying)   Pulse 69   Temp 97.9 °F (36.6 °C) (Oral)   Resp 18   Ht 5' (1.524 m)   Wt 56 kg (123 lb 7.3 oz)   LMP  (LMP Unknown)   SpO2 99%   Breastfeeding? No   BMI 24.11 kg/m²      Clinical Issues: Circulatory    Patient  has a past medical history of Cirrhosis, Diabetes mellitus, type 2, Disorder of kidney and ureter, Hypertension, Hypothyroidism, and NAFLD (nonalcoholic fatty liver disease).    Received call from of MACY Sams. Pt vomited large amount of blood per primary RN. On arrival to bedside, pt attached to in room monitor. BP stable 130s/70s, HR 75, Spo2 96% on RA. NP Christian to bedside.      INTERVENTIONS/ RECOMMENDATIONS     20G PIV placed to L Hand. Protonix and octreotide ordered per NP Christian. STAT CBC, PT/INR, LA, Type and Screen, ordered and collected. x1 unit PRBCs ordered per NP Christian. CCS consulted.     Pt remains hemodynamically stable. NG placed to to LIWS per NP Christian. Cardiac monitoring ordered. Follow up with labs, please notify RRT/Hospital Medicine with additional hematemesis, drop in BP, or increase in HR. .     Discussed plan of care with RNUrsula CN Christina.    PHYSICIAN ESCALATION      Yes/No  yes    Orders received and case discussed with ASIM Gonzales.    Disposition: Remain in room 8089.    FOLLOW-UP     Call back the Rapid Response Nurse, Ranjan Hamilton RN at [phone] for additional questions or concerns.

## 2019-08-06 NOTE — PROGRESS NOTES
[facility]  Nephrology  Progress Note    Patient Name: Michelle Ojeda  MRN: [id number]  Admission Date: 7/18/2019  Hospital Length of Stay: 19 days  Attending Provider: Stefany Jasso MD   Primary Care Physician: Primary Doctor No  Principal Problem:Acute metabolic encephalopathy    Subjective:     HPI: Rusty Martinez is 69 yo F with CKD stage 4 presenting from transplant hepatology clinic for hyponatremia on 7/18/19. She is originally from [place], here for liver transplant evaluation. She was diagnosed with IZAGUIRRE in grade 1 hepatic encephalopathy with severe ascites and esophogeal varices (s/p banding). During the month prior to admission she felt more lethargic and was requiring biweekly paracentesis. Of note, she is also being treated for Ileus her acute hepatic encephalopathy with lactulose. Recent 24 hour cr clearance study was near 21. Reported baseline Cr 1.6-1.8 and chronic hyponatremia range between 123-124.     Interval History: Overnight, Pt with hematemesis and minimally responsive. GI and Critical Care consulted; pt given 1uPRBC but was otherwise hemodynamically stable and remained in 8088. Pt underwent upper endoscopy earlier today with grade 1 esophageal varices, hepatic gastropathy with contact bleeding, and diminutivemallory jaeger tear. Started on IV octreotide and PPI.       Review of patient's allergies indicates:  No Known Allergies  Current Facility-Administered Medications   Medication Frequency    0.9%  NaCl infusion (for blood administration) Q24H PRN    acetaminophen tablet 325 mg Q4H PRN    cefTRIAXone (ROCEPHIN) 2 g in dextrose 5 % 50 mL IVPB Q24H    dextrose 10% (D10W) Bolus PRN    dextrose 10% (D10W) Bolus PRN    glucagon (human recombinant) injection 1 mg PRN    glucose chewable tablet 16 g PRN    glucose chewable tablet 24 g PRN    insulin aspart U-100 pen 0-5 Units Q6H PRN    insulin detemir U-100 pen 12 Units Daily    lactulose 20 gram/30 mL  solution Soln 45 g TID    levothyroxine tablet 88 mcg Before breakfast    midodrine tablet 15 mg TID    ondansetron injection 4 mg Q6H PRN    [START ON 8/9/2019] pantoprazole EC tablet 40 mg Daily    pantoprazole injection 40 mg Daily    prochlorperazine injection Soln 10 mg Q6H PRN    rifAXIMin tablet 550 mg BID    simethicone chewable tablet 80 mg TID PRN    sodium bicarbonate tablet 1,300 mg BID    Standard Custom Day One ADULT TPN for patient WITH electrolyte abnormality or renal dysfunction (PERIPHERAL) Continuous    zinc sulfate capsule 220 mg Daily       Objective:     Vital Signs (Most Recent):  Temp: 96.2 °F (35.7 °C) (08/06/19 1233)  Pulse: 83 (08/06/19 1233)  Resp: 15 (08/06/19 1233)  BP: 134/61 (08/06/19 1233)  SpO2: 100 % (08/06/19 1233)  O2 Device (Oxygen Therapy): room air (08/06/19 1145) Vital Signs (24h Range):  Temp:  [96.2 °F (35.7 °C)-98.2 °F (36.8 °C)] 96.2 °F (35.7 °C)  Pulse:  [] 83  Resp:  [15-20] 15  SpO2:  [99 %-100 %] 100 %  BP: (102-175)/(46-81) 134/61     Weight: 56 kg (123 lb 7.3 oz) (07/30/19 1000)  Body mass index is 24.11 kg/m².  Body surface area is 1.54 meters squared.    I/O last 3 completed shifts:  In: 761.4 [P.O.:340; I.V.:1.2; NG/GT:100]  Out: 4800 [Urine:1300; Emesis/NG output:400; Other:3100]    Physical Exam   Constitutional: She appears well-developed and well-nourished. She appears lethargic. She appears cachectic. No distress.   HENT:   Head: Normocephalic and atraumatic.   Eyes: EOM are normal. Scleral icterus is present.   Neck: Normal range of motion.   Cardiovascular: Normal rate, regular rhythm and normal heart sounds.   No murmur heard.  Pulmonary/Chest: Effort normal and breath sounds normal.   Abdominal: Soft. Bowel sounds are normal. She exhibits ascites. There is hepatosplenomegaly. There is no tenderness.   Musculoskeletal: She exhibits no edema.   Neurological: She appears lethargic. She is disoriented.   Skin: Skin is warm and dry.  Ecchymosis noted. She is not diaphoretic.   Nursing note and vitals reviewed.      Significant Labs:  CBC:   Recent Labs   Lab 08/06/19  0354   WBC 5.23   RBC 2.72*   HGB 9.1*   HCT 27.7*   PLT 44*   *   MCH 33.5*   MCHC 32.9     CMP:   Recent Labs   Lab 08/06/19  0354   *   CALCIUM 9.1   ALBUMIN 4.9   PROT 7.2   *   K 3.5   CO2 16*   CL 99   *   CREATININE 3.8*   ALKPHOS 167*   ALT 25   AST 37   BILITOT 4.3*     Recent Labs   Lab 08/05/19  1607   COLORU Yellow   SPECGRAV 1.015   PHUR 5.0   PROTEINUA Negative   NITRITE Negative   LEUKOCYTESUR Negative   HYALINECASTS 10*      Results for MATHEW TYSON (MRN [id number]) as of 8/6/2019 14:14   Ref. Range 8/5/2019 11:53 8/6/2019 04:30   Ammonia Latest Ref Range: 10 - 50 umol/L 121 (H) 188 (H)       All labs within the past 24 hours have been reviewed.     Significant Imaging:  No new imaging in the past 24 hours    Assessment/Plan:     * Acute metabolic encephalopathy  As TERA above    Acute kidney injury superimposed on chronic kidney disease  Labs from [place] nephrology demonstrated Cr of 1.77 in 3/12/19, 1.67 on 4/5/19, 1.96 in 6/13/19, and 1.83 on 6/24. Cr of 2.8 on 7/26/19 above her baseline due to ATN secondary to hypoperfusion of kidneys related to hypotensive episodes. Hepatorenal syndrome not suspected at this time as the patient's Cr and urine output have improved with an albumin challenge. TERA could be exacerbated with noted Hgb drop from 9.9 on 7/27 to 7.2 on 7/29.     Recommendations:   - Worsening of Cr, BUN, and ammonia despite medical therapy. Pt will need placement of dialysis catheter and will plan for 2h session of Hemodialysis. If pt's hemodynamics become unstable and she is transferred to ICU, will initiate 6h of CRRT.   - c/w Midodrine 10mg TID added to prevent hypoperfusion to kidneys. Goal MAP>70  - Recommend continuing albumin 50g IV daily; continue Sodium Bicarbonate 1300mg PO BID  - Monitor with BMP  - Strict  I/Os   - Avoid Nephrotoxic agents  - Monitor for signs of blood loss  - Hgb > 7     Hyponatremia  Rusty is a 67 yo F with liver cirrhosis and CKD admitted for hyponatremia. Hyponatremia may be related to several factors primarily liver cirrhosis requiring biweekly paracentesis. This leads to a hypovolemic hyponatremic state in which fluid is pulled out of the vasculature. Additional contributing factors related to ileus, NPO status, and TPN nutrition. Extra sodium in TPN has increased sodium. Now on renal diet with 1000mL fluid restriction. Baseline Na+ from labs in [place] between 123-124.   - 8/1 Na 125; while this is closer to pt's baseline, does represent a drop since 8/30 from 142. Improving with IV albumin and NS  - Midodrine 10 mg TID;  - Strict I/O monitoring     Liver cirrhosis secondary to IZAGUIRRE  Treatment plan per primary team   MELD-Na score: 30 at 8/6/2019  3:54 AM  MELD score: 29 at 8/6/2019  3:54 AM  Calculated from:  Serum Creatinine: 3.8 mg/dL at 8/6/2019  3:54 AM  Serum Sodium: 134 mmol/L at 8/6/2019  3:54 AM  Total Bilirubin: 4.3 mg/dL at 8/6/2019  3:54 AM  INR(ratio): 1.5 at 8/6/2019  3:54 AM  Age: 68 years        Thank you for your consult. I will follow-up with patient. Please contact us if you have any additional questions.    Otilio Gibson, DO  Nephrology  [facility]

## 2019-08-06 NOTE — CONSULTS
Single lumen 18G x 8CM midline placed right basilic vein. Max dwell date 9/4/19, Lot# [id number].  Needle advanced into the vessel under real time ultrasound guidance.  Image recorded and saved.

## 2019-08-06 NOTE — NURSING
Called to pt's room by tech around 315am, r/t active nosebleed, pt began vomiting bloody emesis dark in color with large clots present approx 400mL; placed call to charge nurse/rapid response nurse/oncall MD regarding condition change; pt seen by ANTHONY Gonzales from oncall team and rapid response nurses; new orders received and pt will be closely monitored at this time for any further decline in condition

## 2019-08-06 NOTE — CONSULTS
[facility]  Gastroenterology  Consult Note    Patient Name: Michelle Ojeda  MRN: [id number]  Admission Date: 7/18/2019  Hospital Length of Stay: 19 days  Code Status: Full Code   Attending Provider: Yuliana Jasso  Consulting Provider: Phil Gilbert MD  Primary Care Physician: Primary Doctor No  Principal Problem:Acute metabolic encephalopathy    Inpatient consult to Gastroenterology  Consult performed by: Phil Gilbert MD  Consult ordered by: Ita Gonzales NP        Subjective:     HPI:  This is a 67 yo F with IZAGUIRRE cirrhosis, HTN, and hypothyroidism who was directly admitted from liver transplant clinic on 7/18/19 for hyponatremia. Patient is originally from [place] but has been here for liver/kidney transplant evaluation. This hospitalization has been complicated by hepatic encephalopathy requiring ICU admission and intubation. She was extubated on 7/27 and stepped out of the ICU on 7/29. Patient currently altered and unable to obtain history from her. Per daughter at bedside, patient had been doing well for the past week or so. Early this morning, however, she had an episode of hematemesis. Nursing reported 300cc of bright red blood emesis. Patient then became more altered. Daughter reports that she had an EGD in June that did not find any varices. She does have a history of variceal bleed, last one being in 2015.      Subjective:     Interval History:   Transferred to ICU, intubated, sedated with propofol.  2BMs overnight.    Review of Systems   Unable to perform ROS: Mental status change     Objective:     Vital Signs (Most Recent):  Temp: 98 °F (36.7 °C) (07/25/19 1500)  Pulse: 68 (07/25/19 1544)  Resp: (!) 23 (07/25/19 1544)  BP: 132/62 (07/25/19 1500)  SpO2: 100 % (07/25/19 1544) Vital Signs (24h Range):  Temp:  [96.4 °F (35.8 °C)-98 °F (36.7 °C)] 98 °F (36.7 °C)  Pulse:  [50-70] 68  Resp:  [18-32] 23  SpO2:  [100 %] 100 %  BP: ()/(38-65) 132/62     Weight: 56 kg (123 lb  7.3 oz) (07/25/19 1300)  Body mass index is 24.11 kg/m².      Intake/Output Summary (Last 24 hours) at 7/25/2019 1603  Last data filed at 7/25/2019 1500  Gross per 24 hour   Intake 659.48 ml   Output 530 ml   Net 129.48 ml       Lines/Drains/Airways     Drain                 NG/OG Tube 07/24/19 1314 orogastric 14 Fr. Right mouth 1 day         Urethral Catheter 07/24/19 1346 Non-latex 16 Fr. 1 day         Rectal Tube 07/25/19 0943 rectal tube w/ balloon (indicate number of mLs) less than 1 day          Airway                 Airway - Non-Surgical 07/24/19 1310 Endotracheal Tube 1 day          Peripheral Intravenous Line                 Peripheral IV - Single Lumen 07/21/19 1100 20 G Distal;Left;Lateral Forearm 4 days         Peripheral IV - Single Lumen 07/24/19 1315 20 G Anterior;Distal;Right Forearm 1 day                Physical Exam   Constitutional: No distress.   HENT:   +NG tube in place   Eyes: Scleral icterus is present.   Cardiovascular: Normal rate and regular rhythm.   Pulmonary/Chest: Effort normal. No respiratory distress.   Intubated, on ventilator   Abdominal: Soft. She exhibits no distension and no mass. There is no tenderness. There is no guarding.   Musculoskeletal: She exhibits no edema or deformity.   Neurological: She is unresponsive.   Not oriented, does not respond to questions   Skin: Skin is warm and dry.   Vitals reviewed.      Significant Labs:  CBC:   Recent Labs   Lab 08/05/19  0550 08/06/19  0354   WBC 4.03 5.23   HGB 8.1* 9.1*   HCT 23.8* 27.7*   PLT 46* 44*     CMP:   Recent Labs   Lab 08/06/19  0354   *   CALCIUM 9.1   ALBUMIN 4.9   PROT 7.2   *   K 3.5   CO2 16*   CL 99   *   CREATININE 3.8*   ALKPHOS 167*   ALT 25   AST 37   BILITOT 4.3*     Coagulation:   Recent Labs   Lab 08/06/19  0354   INR 1.5*     All pertinent lab results from the last 24 hours have been reviewed.        Assessment/Plan:     Liver cirrhosis secondary to IZAGUIRRE  68 year old female with a  history of IZAGUIRRE ESLD, with decompensations including Gr 1 HE, ascites, EV, hyponatremia, T2DM, HTN, hypothyroidism, who is being admitted from hepatology clinic due to concern for hyponatremia. Found to have incidentally discovered R renal mass. Unresponsive on 7/24, transferred to ICU and intubated for airway protection. Extubated on 7/27. Had been doing well for past week. Overnight she developed hematemesis. She has a history of variceal bleed.    Recommendations:  - Keep NPO  - Octreotide gtt  - PPI IV VID  - Ceftriaxone 1gm IV x 5 days  - Plan for EGD today          Thank you for your consult. I will sign off. Please contact us if you have any additional questions.    Phil Gilbert MD  Gastroenterology  [facility]

## 2019-08-06 NOTE — SUBJECTIVE & OBJECTIVE
Past Medical History:   Diagnosis Date    Cirrhosis     Diabetes mellitus, type 2     Disorder of kidney and ureter     Hypertension     Hypothyroidism     NAFLD (nonalcoholic fatty liver disease)        History reviewed. No pertinent surgical history.    Review of patient's allergies indicates:  No Known Allergies    Family History     Problem Relation (Age of Onset)    No Known Problems Father        Tobacco Use    Smoking status: Never Smoker    Smokeless tobacco: Never Used   Substance and Sexual Activity    Alcohol use: Not Currently    Drug use: Never    Sexual activity: Not on file      Review of Systems   Constitutional: Negative for diaphoresis and fever.   HENT: Negative for drooling and sore throat.    Eyes: Negative for pain and redness.   Respiratory: Negative for cough and shortness of breath.    Cardiovascular: Negative for palpitations and leg swelling.   Gastrointestinal: Positive for diarrhea and vomiting. Negative for abdominal pain.   Genitourinary: Negative for hematuria and menstrual problem.   Musculoskeletal: Negative for joint swelling and neck stiffness.   Skin: Positive for wound (facial brusing and general body bruising). Negative for pallor.   Neurological: Negative for syncope and headaches.     Objective:     Vital Signs (Most Recent):  Temp: 97.9 °F (36.6 °C) (08/06/19 0331)  Pulse: 69 (08/06/19 0412)  Resp: 18 (08/05/19 2336)  BP: (!) 132/53 (08/06/19 0412)  SpO2: 99 % (08/06/19 0331) Vital Signs (24h Range):  Temp:  [97.6 °F (36.4 °C)-98.2 °F (36.8 °C)] 97.9 °F (36.6 °C)  Pulse:  [62-75] 69  Resp:  [18-20] 18  SpO2:  [99 %-100 %] 99 %  BP: (102-139)/(46-64) 132/53   Weight: 56 kg (123 lb 7.3 oz)  Body mass index is 24.11 kg/m².      Intake/Output Summary (Last 24 hours) at 8/6/2019 0752  Last data filed at 8/5/2019 1810  Gross per 24 hour   Intake --   Output 3100 ml   Net -3100 ml       Physical Exam   Constitutional: She appears well-developed.   Cachectic elderly  female lying in bed with NG tube with dark brown/maroon output. Patient opening eyes but appears confused and not oriented to person, place, time or situation.    HENT:   Head: Normocephalic and atraumatic.   Right Ear: External ear normal.   Left Ear: External ear normal.   Unwilling to open mouth for oral pharynx exam but visible dry blood in mouth. Appears dry.    Eyes: Pupils are equal, round, and reactive to light. Conjunctivae and EOM are normal. Right eye exhibits no discharge. Left eye exhibits no discharge.   Neck: Normal range of motion. Neck supple.   Cardiovascular: Normal rate, normal heart sounds and intact distal pulses.   No murmur heard.  Pulmonary/Chest: Effort normal.   Abdominal: Soft. Bowel sounds are normal. She exhibits no distension. There is tenderness.   Musculoskeletal: She exhibits no edema.   Neurological: She is alert.   Skin: Skin is warm. Capillary refill takes less than 2 seconds. She is not diaphoretic. No pallor.   Nursing note and vitals reviewed.      Vents:  Vent Mode: Spont (07/27/19 0912)  Ventilator Initiated: Yes (07/24/19 1312)  Set Rate: 0 bmp (07/27/19 0912)  Vt Set: 300 mL (07/27/19 0912)  Pressure Support: 5 cmH20 (07/27/19 0912)  PEEP/CPAP: 5 cmH20 (07/27/19 0912)  Oxygen Concentration (%): 28 (07/27/19 1113)  Peak Airway Pressure: 11 cmH2O (07/27/19 0912)  Plateau Pressure: 0 cmH20 (07/27/19 0912)  Total Ve: 8.93 mL (07/27/19 0912)  Negative Inspiratory Force (cm H2O): -35 (07/27/19 1105)  F/VT Ratio<105 (RSBI): (!) 36.25 (07/27/19 0912)  Lines/Drains/Airways     Drain                 NG/OG Tube 08/05/19 1928 nasogastric Right nostril less than 1 day         Urethral Catheter 08/05/19 1138 Straight-tip 16 Fr. less than 1 day          Peripheral Intravenous Line                 Peripheral IV - Single Lumen 08/02/19 1407 20 G Right Antecubital 3 days              Significant Labs:    CBC/Anemia Profile:  Recent Labs   Lab 08/05/19  0550 08/06/19  0354   WBC 4.03 5.23    HGB 8.1* 9.1*   HCT 23.8* 27.7*   PLT 46* 44*   MCV 98 102*   RDW 18.9* 19.8*        Chemistries:  Recent Labs   Lab 08/05/19  0549 08/06/19  0354   * 134*   K 3.3* 3.5   CL 98 99   CO2 18* 16*   * 123*   CREATININE 3.5* 3.8*   CALCIUM 9.5 9.1   ALBUMIN 5.0 4.9   PROT 6.7 7.2   BILITOT 3.2* 4.3*   ALKPHOS 128 167*   ALT 25 25   AST 35 37   MG 3.3* 3.7*   PHOS 4.8* 6.3*       All pertinent labs within the past 24 hours have been reviewed.    Significant Imaging: I have reviewed all pertinent imaging results/findings within the past 24 hours.

## 2019-08-06 NOTE — H&P
Short Stay Endoscopy History and Physical    PCP - Primary Doctor No     Procedure - EGD  ASA - per anesthesia  Mallampati - per anesthesia  History of Anesthesia problems - no  Family history Anesthesia problems -  no   Plan of anesthesia - General    HPI:  This is a 68 y.o. female with IZAGUIRRE cirrhosis here for evaluation of hematemesis.      Reflux - no  Dysphagia - no  Abdominal pain - no  Diarrhea - no    ROS:  Constitutional: No fevers, chills, No weight loss  CV: No chest pain  Pulm: No cough, No shortness of breath  Ophtho: No vision changes  GI: see HPI  Derm: No rash    Medical History:  has a past medical history of Cirrhosis, Diabetes mellitus, type 2, Disorder of kidney and ureter, Hypertension, Hypothyroidism, and NAFLD (nonalcoholic fatty liver disease).    Surgical History:  has no past surgical history on file.    Family History: family history includes No Known Problems in her father.. Otherwise no colon cancer, inflammatory bowel disease, or GI malignancies.    Social History:  reports that she has never smoked. She has never used smokeless tobacco. She reports that she drank alcohol. She reports that she does not use drugs.    Review of patient's allergies indicates:  No Known Allergies    Medications:   Medications Prior to Admission   Medication Sig Dispense Refill Last Dose    biotin 5 mg Cap Take 5,000 mcg by mouth once daily.   7/17/2019    calcium carbonate/vitamin D3 (CALTRATE-600 PLUS VITAMIN D3 ORAL) Take 1 tablet by mouth once daily.   7/17/2019    cholecalciferol, vitamin D3, (VITAMIN D3) 2,000 unit Cap Take 2,000 Units by mouth once daily.    7/17/2019    levothyroxine (SYNTHROID) 88 MCG tablet Take 88 mcg by mouth before breakfast.   7/18/2019    [DISCONTINUED] CINNAMON BARK ORAL Take 2,000 mg by mouth once daily.   7/17/2019    [DISCONTINUED] ciprofloxacin HCl (CIPRO) 500 MG tablet Take 500 mg by mouth once daily. FOR PARACENTESIS PROCEDURES ONLY 3 DAYS POST  0 Past Week     [DISCONTINUED] furosemide (LASIX) 40 MG tablet Take 40 mg by mouth once daily.   7/17/2019    [DISCONTINUED] magnesium 250 mg Tab Take 250 mg by mouth once daily.   7/17/2019    [DISCONTINUED] spironolactone (ALDACTONE) 50 MG tablet Take 50 mg by mouth once daily.   7/17/2019    [DISCONTINUED] XIFAXAN 200 mg Tab Take 200 mg by mouth 2 (two) times daily.  2 7/17/2019       Physical Exam:    Vital Signs:   Vitals:    08/06/19 0802   BP: 138/62   Pulse: 72   Resp: 18   Temp: 97.2 °F (36.2 °C)       General Appearance: Well appearing in no acute distress  Eyes:    No scleral icterus  ENT: Neck supple, Lips, mucosa, and tongue normal; teeth and gums normal  Lungs: CTA anteriorly  Heart:  Regular rate, S1, S2 normal, no murmurs heard.  Abdomen: Soft, non tender, non distended with normal bowel sounds. No hepatosplenomegaly, ascites, or mass.  Extremities: No edema  Skin: No rash    Labs:  Lab Results   Component Value Date    WBC 5.23 08/06/2019    HGB 9.1 (L) 08/06/2019    HCT 27.7 (L) 08/06/2019    PLT 44 (L) 08/06/2019    ALT 25 08/06/2019    AST 37 08/06/2019     (L) 08/06/2019    K 3.5 08/06/2019    CL 99 08/06/2019    CREATININE 3.8 (H) 08/06/2019     (H) 08/06/2019    CO2 16 (L) 08/06/2019    TSH 1.048 07/19/2019    INR 1.5 (H) 08/06/2019    HGBA1C 5.1 07/18/2019       I have explained the risks and benefits of endoscopy procedures to the patient including but not limited to bleeding, perforation, infection, and death.  The patient was asked if they understand and allowed to ask any further questions to their satisfaction.      Phil Gilbert MD PGY-VI  Gastroenterology Fellow  [facility]  P [phone]

## 2019-08-06 NOTE — TRANSFER OF CARE
Anesthesia Transfer of Care Note    Patient: Michelle Ojeda    Procedure(s) Performed: Procedure(s) (LRB):  EGD (ESOPHAGOGASTRODUODENOSCOPY) (N/A)    Patient location: PACU    Anesthesia Type: general    Transport from OR: Transported from OR on 6-10 L/min O2 by face mask with adequate spontaneous ventilation    Post pain: adequate analgesia    Post assessment: no apparent anesthetic complications and tolerated procedure well    Post vital signs: stable    Level of consciousness: responds to stimulation    Nausea/Vomiting: no nausea/vomiting    Complications: none    Transfer of care protocol was followed      Last vitals:   Visit Vitals  /62 (BP Location: Left arm, Patient Position: Lying)   Pulse 72   Temp 36.2 °C (97.2 °F) (Axillary)   Resp 18   Ht 5' (1.524 m)   Wt 56 kg (123 lb 7.3 oz)   LMP  (LMP Unknown)   SpO2 100%   Breastfeeding? No   BMI 24.11 kg/m²

## 2019-08-06 NOTE — PLAN OF CARE
Problem: Adult Inpatient Plan of Care  Goal: Plan of Care Review  Outcome: Ongoing (interventions implemented as appropriate)  No further events or changes since previous note, no further hematemesis noted/continuing to have presence of blood in mouth when suctioned, family instructed on how to provide suction and importance of HOB remaining elevated at this time (locked at 30 degree angle); pt alert only to self during this shift, vitals have remained stable, received 1 lactulose at start of shift and due to receive another enema at this time (waiting on supplies); family has been kept informed of POC throughout the shift, no further changes requiring rapid response nurse interventions, remains on bedside monitor while waiting for tele box and MD is aware; will continue to monitor

## 2019-08-06 NOTE — NURSING TRANSFER
Nursing Transfer Note      8/6/2019     Transfer To: 8088    Transfer via stretcher    Transfer with cardiac monitoring    Transported by pct, rn    Medicines sent: no    Chart send with patient: Yes    Notified: daughter

## 2019-08-06 NOTE — PLAN OF CARE
Problem: Adult Inpatient Plan of Care  Goal: Plan of Care Review  Outcome: Ongoing (interventions implemented as appropriate)  Recommendations    Recommendation:   1. Continue custom TPN, recommend TPN of 210 gm dextrose, 73 AA + IV lipids to provide pt with 1506 kcal, 73 gm protein and GIR of 2.6.    Meet adequate EEN/EPN for pt.    Check triglycerides weekly.  2. As medically able, advance to renal diet with novasource renal to increase intake  3. RD to monitor and follow up     Goals: Meet % EEN, EPN  Nutrition Goal Status: progressing towards goal  Communication of RD Recs: reviewed with RN

## 2019-08-06 NOTE — NURSING
Waiting on tele box at this time, will leave pt on bedside monitor and check for rate/rhythm changes frequently,.

## 2019-08-06 NOTE — PLAN OF CARE
Pt w/ hematemesis overnight and taken for EGD today - results pending. CM received a call from pt's dgtr, Tegan ([facility]) p [phone] - stated that when treated completed here, family would be interested in pt transferring to [facility] in [place], [place]. Tegan stated that pt has been under the of Dr Weber (liver) and Dr Quinn (kidney) in the past. This CM is covering IML so transfer info was relayed to entire IML team via secure chat.     08/06/19 1451   Discharge Reassessment   Assessment Type Discharge Planning Reassessment   Discharge Plan A Other  (transfer to another facility)   Discharge Plan B Skilled Nursing Facility   Anticipated Discharge Disposition Short Term   Post-Acute Status   Post-Acute Authorization Other   Post-Acute Placement Status Awaiting Internal Medical Clearance   Discharge Delays (!) Change in Medical Condition

## 2019-08-06 NOTE — HPI
This is a 67 yo F with IZAGUIRRE cirrhosis, HTN, and hypothyroidism who was directly admitted from liver transplant clinic on 7/18/19 for hyponatremia. Patient is originally from [place] but has been here for liver/kidney transplant evaluation. This hospitalization has been complicated by hepatic encephalopathy requiring ICU admission and intubation. She was extubated on 7/27 and stepped out of the ICU on 7/29. Patient currently altered and unable to obtain history from her. Per daughter at bedside, patient had been doing well for the past week or so. Early this morning, however, she had an episode of hematemesis. Nursing reported 300cc of bright red blood emesis. Patient then became more altered. Daughter reports that she had an EGD in June that did not find any varices. She does have a history of variceal bleed, last one being in 2015.

## 2019-08-06 NOTE — PROVATION PATIENT INSTRUCTIONS
Discharge Summary/Instructions after an Endoscopic Procedure  Patient Name: Michelle Ojeda  Patient MRN: [id number]  Patient YOB: 1950 Tuesday, August 06, 2019  Alberto Quintanilla MD  RESTRICTIONS:  During your procedure today, you received medications for sedation.  These   medications may affect your judgment, balance and coordination.  Therefore,   for 24 hours, you have the following restrictions:   - DO NOT drive a car, operate machinery, make legal/financial decisions,   sign important papers or drink alcohol.    ACTIVITY:  Today: no heavy lifting, straining or running due to procedural   sedation/anesthesia.  The following day: return to full activity including work.  DIET:  Eat and drink normally unless instructed otherwise.     TREATMENT FOR COMMON SIDE EFFECTS:  - Mild abdominal pain, nausea, belching, bloating or excessive gas:  rest,   eat lightly and use a heating pad.  - Sore Throat: treat with throat lozenges and/or gargle with warm salt   water.  - Because air was used during the procedure, expelling large amounts of air   from your rectum or belching is normal.  - If a bowel prep was taken, you may not have a bowel movement for 1-3 days.    This is normal.  SYMPTOMS TO WATCH FOR AND REPORT TO YOUR PHYSICIAN:  1. Abdominal pain or bloating, other than gas cramps.  2. Chest pain.  3. Back pain.  4. Signs of infection such as: chills or fever occurring within 24 hours   after the procedure.  5. Rectal bleeding, which would show as bright red, maroon, or black stools.   (A tablespoon of blood from the rectum is not serious, especially if   hemorrhoids are present.)  6. Vomiting.  7. Weakness or dizziness.  GO DIRECTLY TO THE NEAREST EMERGENCY ROOM IF YOU HAVE ANY OF THE FOLLOWING:      Difficulty breathing              Chills and/or fever over 101 F   Persistent vomiting and/or vomiting blood   Severe abdominal pain   Severe chest pain   Black, tarry stools   Bleeding- more than one  tablespoon   Any other symptom or condition that you feel may need urgent attention  Your doctor recommends these additional instructions:  If any biopsies were taken, your doctors clinic will contact you in 1 to 2   weeks with any results.  - Return patient to [facility] for [facility] care.   - Observe patient's clinical course.   - Use a proton pump inhibitor IV daily and thereafter transition to po.   - Repeat upper endoscopy in 12 weeks to check healing of esophagitis and re   evaluate esophageal varices.  For questions, problems or results please call your physician - Alberto Quintanilla MD at Work:  [phone].  [facility], EMERGENCY ROOM PHONE NUMBER: [phone]  IF A COMPLICATION OR EMERGENCY SITUATION ARISES AND YOU ARE UNABLE TO REACH   YOUR PHYSICIAN - GO DIRECTLY TO THE EMERGENCY ROOM.  Alberto Quintanilla MD  8/6/2019 10:46:01 AM  This report has been verified and signed electronically.  [employer]

## 2019-08-06 NOTE — ASSESSMENT & PLAN NOTE
Treatment plan per primary team   MELD-Na score: 30 at 8/6/2019  3:54 AM  MELD score: 29 at 8/6/2019  3:54 AM  Calculated from:  Serum Creatinine: 3.8 mg/dL at 8/6/2019  3:54 AM  Serum Sodium: 134 mmol/L at 8/6/2019  3:54 AM  Total Bilirubin: 4.3 mg/dL at 8/6/2019  3:54 AM  INR(ratio): 1.5 at 8/6/2019  3:54 AM  Age: 68 years

## 2019-08-06 NOTE — CARE UPDATE
Rapid Response Nurse Follow-up Note     Followed up with patient for proactive rounding.   No acute issues at this time. NGT in place, to low intermittent wall suction. VSS. Plan for EGD on 8/6 AM. Reviewed plan of care with primary RN, Iveth. Please call Rapid Response RN, Flaquita Ro, HERI with any questions or concerns at [phone].

## 2019-08-07 PROBLEM — K92.2 UPPER GI BLEED: Status: ACTIVE | Noted: 2019-08-07

## 2019-08-07 LAB
ALBUMIN SERPL BCP-MCNC: 3.8 G/DL (ref 3.5–5.2)
ALBUMIN SERPL BCP-MCNC: 3.9 G/DL (ref 3.5–5.2)
ALBUMIN SERPL BCP-MCNC: 4.4 G/DL (ref 3.5–5.2)
ALP SERPL-CCNC: 135 U/L (ref 55–135)
ALT SERPL W/O P-5'-P-CCNC: 24 U/L (ref 10–44)
AMMONIA PLAS-SCNC: 45 UMOL/L (ref 10–50)
ANION GAP SERPL CALC-SCNC: 10 MMOL/L (ref 8–16)
ANION GAP SERPL CALC-SCNC: 12 MMOL/L (ref 8–16)
ANION GAP SERPL CALC-SCNC: 16 MMOL/L (ref 8–16)
AST SERPL-CCNC: 33 U/L (ref 10–40)
BASOPHILS # BLD AUTO: 0.02 K/UL (ref 0–0.2)
BASOPHILS NFR BLD: 0.4 % (ref 0–1.9)
BILIRUB SERPL-MCNC: 3.2 MG/DL (ref 0.1–1)
BUN SERPL-MCNC: 37 MG/DL (ref 8–23)
BUN SERPL-MCNC: 44 MG/DL (ref 8–23)
BUN SERPL-MCNC: 56 MG/DL (ref 8–23)
CALCIUM SERPL-MCNC: 8.6 MG/DL (ref 8.7–10.5)
CALCIUM SERPL-MCNC: 8.9 MG/DL (ref 8.7–10.5)
CALCIUM SERPL-MCNC: 9.3 MG/DL (ref 8.7–10.5)
CHLORIDE SERPL-SCNC: 106 MMOL/L (ref 95–110)
CHLORIDE SERPL-SCNC: 106 MMOL/L (ref 95–110)
CHLORIDE SERPL-SCNC: 108 MMOL/L (ref 95–110)
CO2 SERPL-SCNC: 22 MMOL/L (ref 23–29)
CO2 SERPL-SCNC: 26 MMOL/L (ref 23–29)
CO2 SERPL-SCNC: 27 MMOL/L (ref 23–29)
CREAT SERPL-MCNC: 2.1 MG/DL (ref 0.5–1.4)
CREAT SERPL-MCNC: 2.1 MG/DL (ref 0.5–1.4)
CREAT SERPL-MCNC: 2.6 MG/DL (ref 0.5–1.4)
DIFFERENTIAL METHOD: ABNORMAL
EOSINOPHIL # BLD AUTO: 0 K/UL (ref 0–0.5)
EOSINOPHIL NFR BLD: 0.4 % (ref 0–8)
ERYTHROCYTE [DISTWIDTH] IN BLOOD BY AUTOMATED COUNT: 20.2 % (ref 11.5–14.5)
EST. GFR  (AFRICAN AMERICAN): 20.8 ML/MIN/1.73 M^2
EST. GFR  (AFRICAN AMERICAN): 26.9 ML/MIN/1.73 M^2
EST. GFR  (AFRICAN AMERICAN): 26.9 ML/MIN/1.73 M^2
EST. GFR  (NON AFRICAN AMERICAN): 18 ML/MIN/1.73 M^2
EST. GFR  (NON AFRICAN AMERICAN): 23.3 ML/MIN/1.73 M^2
EST. GFR  (NON AFRICAN AMERICAN): 23.3 ML/MIN/1.73 M^2
GLUCOSE SERPL-MCNC: 135 MG/DL (ref 70–110)
GLUCOSE SERPL-MCNC: 203 MG/DL (ref 70–110)
GLUCOSE SERPL-MCNC: 255 MG/DL (ref 70–110)
HCT VFR BLD AUTO: 27.3 % (ref 37–48.5)
HGB BLD-MCNC: 9.1 G/DL (ref 12–16)
IMM GRANULOCYTES # BLD AUTO: 0.05 K/UL (ref 0–0.04)
IMM GRANULOCYTES NFR BLD AUTO: 0.9 % (ref 0–0.5)
INR PPP: 1.5 (ref 0.8–1.2)
LACTATE SERPL-SCNC: 2.9 MMOL/L (ref 0.5–2.2)
LYMPHOCYTES # BLD AUTO: 0.3 K/UL (ref 1–4.8)
LYMPHOCYTES NFR BLD: 5.2 % (ref 18–48)
MAGNESIUM SERPL-MCNC: 2.5 MG/DL (ref 1.6–2.6)
MAGNESIUM SERPL-MCNC: 2.6 MG/DL (ref 1.6–2.6)
MAGNESIUM SERPL-MCNC: 2.7 MG/DL (ref 1.6–2.6)
MCH RBC QN AUTO: 34.1 PG (ref 27–31)
MCHC RBC AUTO-ENTMCNC: 33.3 G/DL (ref 32–36)
MCV RBC AUTO: 102 FL (ref 82–98)
MONOCYTES # BLD AUTO: 0.3 K/UL (ref 0.3–1)
MONOCYTES NFR BLD: 5.5 % (ref 4–15)
NEUTROPHILS # BLD AUTO: 4.9 K/UL (ref 1.8–7.7)
NEUTROPHILS NFR BLD: 87.6 % (ref 38–73)
NRBC BLD-RTO: 0 /100 WBC
PHOSPHATE SERPL-MCNC: 2.2 MG/DL (ref 2.7–4.5)
PHOSPHATE SERPL-MCNC: 2.3 MG/DL (ref 2.7–4.5)
PHOSPHATE SERPL-MCNC: 2.6 MG/DL (ref 2.7–4.5)
PLATELET # BLD AUTO: 58 K/UL (ref 150–350)
PMV BLD AUTO: 11.3 FL (ref 9.2–12.9)
POCT GLUCOSE: 138 MG/DL (ref 70–110)
POCT GLUCOSE: 224 MG/DL (ref 70–110)
POCT GLUCOSE: 260 MG/DL (ref 70–110)
POTASSIUM SERPL-SCNC: 3.2 MMOL/L (ref 3.5–5.1)
POTASSIUM SERPL-SCNC: 3.5 MMOL/L (ref 3.5–5.1)
POTASSIUM SERPL-SCNC: 3.5 MMOL/L (ref 3.5–5.1)
PROT SERPL-MCNC: 7.3 G/DL (ref 6–8.4)
PROTHROMBIN TIME: 14.9 SEC (ref 9–12.5)
RBC # BLD AUTO: 2.67 M/UL (ref 4–5.4)
SODIUM SERPL-SCNC: 143 MMOL/L (ref 136–145)
SODIUM SERPL-SCNC: 144 MMOL/L (ref 136–145)
SODIUM SERPL-SCNC: 146 MMOL/L (ref 136–145)
WBC # BLD AUTO: 5.62 K/UL (ref 3.9–12.7)

## 2019-08-07 PROCEDURE — 99233 SBSQ HOSP IP/OBS HIGH 50: CPT | Mod: GC,,, | Performed by: INTERNAL MEDICINE

## 2019-08-07 PROCEDURE — 83735 ASSAY OF MAGNESIUM: CPT | Mod: 91

## 2019-08-07 PROCEDURE — 20000000 HC ICU ROOM

## 2019-08-07 PROCEDURE — 83605 ASSAY OF LACTIC ACID: CPT

## 2019-08-07 PROCEDURE — 85025 COMPLETE CBC W/AUTO DIFF WBC: CPT

## 2019-08-07 PROCEDURE — 84100 ASSAY OF PHOSPHORUS: CPT

## 2019-08-07 PROCEDURE — 25000003 PHARM REV CODE 250: Performed by: STUDENT IN AN ORGANIZED HEALTH CARE EDUCATION/TRAINING PROGRAM

## 2019-08-07 PROCEDURE — 85610 PROTHROMBIN TIME: CPT

## 2019-08-07 PROCEDURE — 90945 DIALYSIS ONE EVALUATION: CPT

## 2019-08-07 PROCEDURE — 80069 RENAL FUNCTION PANEL: CPT

## 2019-08-07 PROCEDURE — 99233 PR SUBSEQUENT HOSPITAL CARE,LEVL III: ICD-10-PCS | Mod: GC,,, | Performed by: INTERNAL MEDICINE

## 2019-08-07 PROCEDURE — 63600175 PHARM REV CODE 636 W HCPCS: Performed by: HOSPITALIST

## 2019-08-07 PROCEDURE — 99232 SBSQ HOSP IP/OBS MODERATE 35: CPT | Mod: GC,,, | Performed by: INTERNAL MEDICINE

## 2019-08-07 PROCEDURE — 83735 ASSAY OF MAGNESIUM: CPT

## 2019-08-07 PROCEDURE — 94761 N-INVAS EAR/PLS OXIMETRY MLT: CPT

## 2019-08-07 PROCEDURE — 25000003 PHARM REV CODE 250: Performed by: INTERNAL MEDICINE

## 2019-08-07 PROCEDURE — 82140 ASSAY OF AMMONIA: CPT

## 2019-08-07 PROCEDURE — C9113 INJ PANTOPRAZOLE SODIUM, VIA: HCPCS | Performed by: HOSPITALIST

## 2019-08-07 PROCEDURE — 80053 COMPREHEN METABOLIC PANEL: CPT

## 2019-08-07 PROCEDURE — 99232 PR SUBSEQUENT HOSPITAL CARE,LEVL II: ICD-10-PCS | Mod: GC,,, | Performed by: INTERNAL MEDICINE

## 2019-08-07 RX ORDER — LACTULOSE 10 G/15ML
200 SOLUTION ORAL; RECTAL ONCE
Status: COMPLETED | OUTPATIENT
Start: 2019-08-07 | End: 2019-08-07

## 2019-08-07 RX ORDER — HYDROCODONE BITARTRATE AND ACETAMINOPHEN 500; 5 MG/1; MG/1
TABLET ORAL CONTINUOUS
Status: DISCONTINUED | OUTPATIENT
Start: 2019-08-07 | End: 2019-08-07

## 2019-08-07 RX ORDER — LACTULOSE 10 G/15ML
200 SOLUTION ORAL; RECTAL EVERY 6 HOURS
Status: DISCONTINUED | OUTPATIENT
Start: 2019-08-07 | End: 2019-08-08

## 2019-08-07 RX ORDER — MAGNESIUM SULFATE HEPTAHYDRATE 40 MG/ML
2 INJECTION, SOLUTION INTRAVENOUS
Status: ACTIVE | OUTPATIENT
Start: 2019-08-07 | End: 2019-08-08

## 2019-08-07 RX ADMIN — ONDANSETRON 4 MG: 2 INJECTION INTRAMUSCULAR; INTRAVENOUS at 01:08

## 2019-08-07 RX ADMIN — LACTULOSE 200 G: 10 SOLUTION ORAL at 02:08

## 2019-08-07 RX ADMIN — INSULIN ASPART 3 UNITS: 100 INJECTION, SOLUTION INTRAVENOUS; SUBCUTANEOUS at 06:08

## 2019-08-07 RX ADMIN — PANTOPRAZOLE SODIUM 40 MG: 40 INJECTION, POWDER, LYOPHILIZED, FOR SOLUTION INTRAVENOUS at 09:08

## 2019-08-07 RX ADMIN — INSULIN ASPART 2 UNITS: 100 INJECTION, SOLUTION INTRAVENOUS; SUBCUTANEOUS at 11:08

## 2019-08-07 RX ADMIN — SODIUM CHLORIDE: 0.9 INJECTION, SOLUTION INTRAVENOUS at 02:08

## 2019-08-07 RX ADMIN — CEFTRIAXONE SODIUM 2 G: 2 INJECTION, POWDER, FOR SOLUTION INTRAMUSCULAR; INTRAVENOUS at 11:08

## 2019-08-07 RX ADMIN — LACTULOSE 200 G: 10 SOLUTION ORAL at 09:08

## 2019-08-07 NOTE — CARE UPDATE
"Rapid Response Nurse Chart Check     Chart check completed, abnormal VS noted, bedside RN, Iveth contacted, no concerns verbalized at this time. RRT RN spoke with ADRIANNE Staples MD with CCM team. CCM team was consulted per primary team. After speaking with family, no plans to transfer pt to ICU at this time. Family would prefer to make pt "comfortable". Will continue to communicate plan of care with CCM team. RN instructed to call [phone] for further concerns or assistance.          "

## 2019-08-07 NOTE — ASSESSMENT & PLAN NOTE
Intubated for airway protection in setting of severe encephalopathy and episodes of emesis/concern for aspiration during admission during admission. Currently during well on RA. Family still interested in intubation pending remaining family coming to [place] from [place].    -Sputum culture negative from 7/24.  Off antibiotics.   -No focal signs of consolidation on CXR   -extubated on 7/27   -monitor oxygen saturation

## 2019-08-07 NOTE — H&P
[facility]  Critical Care Medicine  History & Physical    Patient Name: Michelle Ojeda  MRN: [id number]  Admission Date: 7/18/2019  Hospital Length of Stay: 20 days  Code Status: Partial Code  Attending Physician: Saravanan Batista MD   Primary Care Provider: Primary Doctor No   Principal Problem: Acute metabolic encephalopathy    Subjective:     HPI:  Michelle Ojeda is a 68 year old female with history of IZAGUIRRE cirrhosis, HTN and hypothyroidism who presented to [facility] on 7/18/2019 as a direct admit from Transplant Hepatology Clinic for hyponatremia. Patient is from [place] and requesting liver/kidney transplant evaluation. Her cirrhosis is complicated by ascites requiring biweekly paracentesis, sarcopenia, esophageal varices, hyponatremia and hepatic encephalopathy. Per daughter, patient with functional decline over the last 2 months with complaints of lethargy, decreased oral intake, and peripheral edema. Patient initially admitted to hospital medicine for treatment of hyponatremia with fluid restriction. During liver work up, incidental renal mass found and urology was consulted. Patient developed an ileus and unable to tolerate lactulose while on the floor. With acute respiratory failure, hepatic encephalopathy, and N/V, patient intubated on 7/24. Patient intubated during her ICU stay, extubated - stepped down to IM-L team (on 7/30) where her course has now been complicated by worsening hepatic encephalopathy due to lack of oral lactulose and worsening uremia as her renal function is again worsening. Patient became more confused on 8/5 with regular BM and work-up for infection. NG tube placed since patient not tolerating enemas for lactulose but started having bleeding concerning for varices. NG now removed. Patient remains altered.     Family initially expressed interest in DNR status and comfort measures but now feel they would like to attempt dialysis for her encephalopathy and  electrolyte abnormalities with the end goal of getting patient to [place] with family. Daughter and son understand patient's condition and would like to remain with no chest compressions but are interested in a short course of intubation for acute respiratory issues, while patient's children fly in to [place]. No new episodes of hematemesis after OG tube removed but patient remains altered today.     Hospital/ICU Course:  No notes on file     Past Medical History:   Diagnosis Date    Cirrhosis     Diabetes mellitus, type 2     Disorder of kidney and ureter     Hypertension     Hypothyroidism     NAFLD (nonalcoholic fatty liver disease)        History reviewed. No pertinent surgical history.    Review of patient's allergies indicates:  No Known Allergies    Family History     Problem Relation (Age of Onset)    No Known Problems Father        Tobacco Use    Smoking status: Never Smoker    Smokeless tobacco: Never Used   Substance and Sexual Activity    Alcohol use: Not Currently    Drug use: Never    Sexual activity: Not on file      Review of Systems   Constitutional: Positive for activity change. Negative for diaphoresis and fever.   HENT: Positive for nosebleeds (following OG placement). Negative for facial swelling and sneezing.    Eyes: Negative for pain and redness.   Respiratory: Negative for cough and shortness of breath.    Cardiovascular: Negative for palpitations and leg swelling.   Gastrointestinal: Negative for abdominal pain, blood in stool and vomiting.   Genitourinary: Negative for hematuria and vaginal bleeding.   Musculoskeletal: Negative for joint swelling and neck stiffness.   Skin: Positive for rash (ecchymosis throughout). Negative for pallor.   Neurological: Positive for tremors (intermittent tremors). Negative for seizures and syncope.     Objective:     Vital Signs (Most Recent):  Temp: 97.5 °F (36.4 °C) (08/06/19 1924)  Pulse: 70 (08/06/19 2012)  Resp: 20 (08/06/19  1924)  BP: 132/85 (08/06/19 1924)  SpO2: 100 % (08/06/19 1924) Vital Signs (24h Range):  Temp:  [96.2 °F (35.7 °C)-97.9 °F (36.6 °C)] 97.5 °F (36.4 °C)  Pulse:  [] 70  Resp:  [15-20] 20  SpO2:  [99 %-100 %] 100 %  BP: (124-175)/(53-85) 132/85   Weight: 56 kg (123 lb 7.3 oz)  Body mass index is 24.11 kg/m².      Intake/Output Summary (Last 24 hours) at 8/6/2019 2310  Last data filed at 8/6/2019 1900  Gross per 24 hour   Intake 1223.66 ml   Output 1000 ml   Net 223.66 ml       Physical Exam   Constitutional: She appears well-developed.   Thin cachetic female alert but not oriented, lying in bed curled in multiple sheets, intermittently moaning. Ecchymosis to face and bilateral arms. Dried blood in mouth with no signs of active bleeding. Not currently following commands.    HENT:   Head: Normocephalic and atraumatic.   Right Ear: External ear normal.   Left Ear: External ear normal.   Nose: Nose normal.   Eyes: Pupils are equal, round, and reactive to light. Conjunctivae and EOM are normal. Right eye exhibits no discharge. Left eye exhibits no discharge. No scleral icterus.   Neck: Neck supple. No JVD present.   Cardiovascular: Normal rate, regular rhythm, normal heart sounds and intact distal pulses.   No murmur heard.  Pulmonary/Chest: Effort normal and breath sounds normal. No stridor. No respiratory distress. She has no wheezes.   Abdominal: Soft. Bowel sounds are normal. She exhibits no distension. There is no tenderness.   Musculoskeletal: Normal range of motion. She exhibits no edema.   Thin extremities with bruising throughout.    Neurological: She is alert.   Skin: Skin is warm. Capillary refill takes less than 2 seconds. No erythema.   Nursing note and vitals reviewed.      Vents:  Vent Mode: Spont (07/27/19 0912)  Ventilator Initiated: Yes (07/24/19 1312)  Set Rate: 0 bmp (07/27/19 0912)  Vt Set: 300 mL (07/27/19 0912)  Pressure Support: 5 cmH20 (07/27/19 0912)  PEEP/CPAP: 5 cmH20 (07/27/19  0912)  Oxygen Concentration (%): 28 (07/27/19 1113)  Peak Airway Pressure: 11 cmH2O (07/27/19 0912)  Plateau Pressure: 0 cmH20 (07/27/19 0912)  Total Ve: 8.93 mL (07/27/19 0912)  Negative Inspiratory Force (cm H2O): -35 (07/27/19 1105)  F/VT Ratio<105 (RSBI): (!) 36.25 (07/27/19 0912)  Lines/Drains/Airways     Drain                 Urethral Catheter 08/05/19 1138 Straight-tip 16 Fr. 1 day          Peripheral Intravenous Line                 Midline Catheter Insertion/Assessment  - Single Lumen 08/06/19 1552 Right basilic vein (medial side of arm) 18g x 8cm less than 1 day         Peripheral IV - Single Lumen 08/06/19 20 G Left;Posterior Hand less than 1 day              Significant Labs:    CBC/Anemia Profile:  Recent Labs   Lab 08/05/19  0550 08/06/19  0354 08/06/19  1451   WBC 4.03 5.23 3.84*   HGB 8.1* 9.1* 8.5*   HCT 23.8* 27.7* 25.6*   PLT 46* 44* 64*   MCV 98 102* 103*   RDW 18.9* 19.8* 20.3*        Chemistries:  Recent Labs   Lab 08/05/19  0549 08/06/19  0354 08/06/19  1451   * 134* 139   K 3.3* 3.5 3.5   CL 98 99 101   CO2 18* 16* 20*   * 123* 125*   CREATININE 3.5* 3.8* 4.3*   CALCIUM 9.5 9.1 9.3   ALBUMIN 5.0 4.9  --    PROT 6.7 7.2  --    BILITOT 3.2* 4.3*  --    ALKPHOS 128 167*  --    ALT 25 25  --    AST 35 37  --    MG 3.3* 3.7*  --    PHOS 4.8* 6.3*  --        All pertinent labs within the past 24 hours have been reviewed.    Significant Imaging: I have reviewed all pertinent imaging results/findings within the past 24 hours.    Assessment/Plan:     Neuro  * Acute metabolic encephalopathy  68 year old female with hepatic and kidney failure who is profoundly encephalopathic due to severe hyperammonemia and uremia. She had temporarily improved with lactulose but is now declining due to her multiorgan dysfunction. Pt has been denied for transplant. The family understands that she is dying but hope to get her better enough to get back to [place] to be with family. Patient recently had  issues tolerating enemas and had NG placed with hematemesis. Concern for gastric varices and NG was removed. GI performed EGD that showed not actively bleeding varices, but did show esophagitis and jennifer jaeger tears. Patient remains altered with last ammonia 188. Plan for dialysis today.   -CRRT today   -continue lactulose enemas   -daily ammonia levels   -CMP daily    Pulmonary  Acute hypoxemic respiratory failure  Intubated for airway protection in setting of severe encephalopathy and episodes of emesis/concern for aspiration during admission during admission. Currently during well on RA. Family still interested in intubation pending remaining family coming to [place] from [place].    -Sputum culture negative from 7/24.  Off antibiotics.   -No focal signs of consolidation on CXR   -extubated on 7/27   -monitor oxygen saturation      Renal/  Right kidney mass  R kidney mass measures 2.4 cm x 2.3 cm found on CT abd/pelvis during transplant workup. Urology following with plans to hold on any further work up in setting of acute decompensation   -PRN pain control as indicated     Acute kidney injury superimposed on chronic kidney disease  Baseline reportedly sCr 1.6. Nephrology following,concern iATN given recent shock.  Also concern for intravascular volume depletion given significant stool output.   Retroperitoneal US on 7/23 negative for hydronephrosis. With rising sCr and concern for uremia with BUN of 125, RIJ trialysis catheter placed and plan for CRRT.    -urine studies concerning for pre-renal etiology.  Started on albumin and continued on midodrine, NPO with no access   -Strict I/Os   -RRT today   -nephrology following, appreciate recs    Hematology  Coagulopathy  See cirrhosis    Oncology  Pancytopenia  See cirrhosis    Endocrine  Hyperglycemia  Patient with Hx of DM however was taken off metformin due to improvement  --A1c 5.1  --insulin infusion discontinued on 7/27 when enteral feedings where  held.   --frequent glucose checks with SSI  --glucose goal 140-180    Hypothyroidism  --Continue home levothyroxine    GI  Upper GI bleed  History of hematemesis after OG line placement. EGD read with not actively bleeding varices. Will continue to monitor for bleeding.   -GI recommendations   -trend CBC   -avoid NG tubes.     Other dysphagia  --SLP following.  Dental soft diet with thin liquids    Ileus  --improved.  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 MELD score of 24 today before dilaysis.    -Continue lactulose and rifaximin   -Hepatology following   -Paracentesis 7/24 with 4L removed;negative for SBP. Cytology pending.      Other  Shock, unspecified  --Likely due to sedating medications however patient at risk for infection  --Blood culture, sputum culture, and peritoneal fluid negative  --UA appearing noninfectious  --Received 4 doses of piptazo,  Received 3 days of vancomycin.  --Resolved; weaned off norepinephrine infusion on 7/25  --Continue midodrine 10 mg TID    Physical debility  --PT/OT consulted      Critical Care Daily Checklist:    A: Awake: RASS Goal/Actual Goal: RASS Goal: 0-->alert and calm  Actual: Kelsey Agitation Sedation Scale (RASS): Restless   B: Spontaneous Breathing Trial Performed? Spon. Breathing Trial Initiated?: Initiated (07/27/19 1105)   C: SAT & SBT Coordinated?  n/a                   D: Delirium: CAM-ICU Overall CAM-ICU: Positive   E: Early Mobility Performed? Yes   F: Feeding Goal: Goals: Meet % EEN, EPN  Status: Nutrition Goal Status: progressing towards goal   Current Diet Order   Procedures    Diet NPO      AS: Analgesia/Sedation n/a   T: Thromboembolic Prophylaxis n/a   H: HOB > 300 Yes   U: Stress Ulcer Prophylaxis (if needed) N/a   G: Glucose Control SSI   B: Bowel Function Stool Occurrence: 1   I: Indwelling Catheter (Lines & Webster)  Necessity Webster as indicated   D: De-escalation of Antimicrobials/Pharmacotherapies No active infection concerns. Discuss goals of care with patient.     Plan for the day/ETD CRRT    Code Status:  Family/Goals of Care: Partial Code         Critical secondary to Patient has a condition that poses threat to life and bodily function: encephalopathy requiring dialysis      Critical care was time spent personally by me on the following activities: development of treatment plan with patient or surrogate and bedside caregivers, discussions with consultants, evaluation of patient's response to treatment, examination of patient, ordering and performing treatments and interventions, ordering and review of laboratory studies, ordering and review of radiographic studies, pulse oximetry, re-evaluation of patient's condition. This critical care time did not overlap with that of any other provider or involve time for any procedures.     Elver Huertas MD  Critical Care Medicine  [facility]

## 2019-08-07 NOTE — ASSESSMENT & PLAN NOTE
Rusty is a 69 yo F with liver cirrhosis and CKD admitted for hyponatremia. Hyponatremia may be related to several factors primarily liver cirrhosis requiring biweekly paracentesis. This leads to a hypovolemic hyponatremic state in which fluid is pulled out of the vasculature. Additional contributing factors related to ileus, NPO status, and TPN nutrition. Extra sodium in TPN has increased sodium. Now on renal diet with 1000mL fluid restriction. Baseline Na+ from labs in [place] between 123-124.   - Pt now appears slightly hypernatremic, despite 140meq dialysate. Unexpected finding, possibly from free water loss due to lactulose  - recommend holding lactulose at this time  - Midodrine 10 mg TID  - Strict I/O monitoring

## 2019-08-07 NOTE — CARE UPDATE
RAPID RESPONSE NURSE PROACTIVE ROUNDING NOTE     Time of Visit:     Admit Date: 2019  LOS: 19  Code Status: Partial Code   Date of Visit: 2019  : 1950  Age: 68 y.o.  Sex: female  Race: Unknown  Bed: 8088/8088 A:   MRN: [id number]  Was the patient discharged from an ICU this admission? yes   Was the patient discharged from a PACU within last 24 hours?  no  Did the patient receive conscious sedation/general anesthesia in last 24 hours?  no  Was the patient in the ED within the past 24 hours?  no  Was the patient started on NIPPV within the past 24 hours?  no  Attending Physician: Stefany Jasso MD  Primary Service: [facility] MED     ASSESSMENT     Diagnosis: Acute metabolic encephalopathy    Abnormal Vital Signs: /85 (BP Location: Left arm, Patient Position: Lying)   Pulse 70   Temp 97.5 °F (36.4 °C) (Axillary)   Resp 20   Ht 5' (1.524 m)   Wt 56 kg (123 lb 7.3 oz)   LMP  (LMP Unknown)   SpO2 100%   Breastfeeding? No   BMI 24.11 kg/m²      Clinical Issues: Respiratory, Encephalopathy     Patient  has a past medical history of Cirrhosis, Diabetes mellitus, type 2, Disorder of kidney and ureter, Hypertension, Hypothyroidism, and NAFLD (nonalcoholic fatty liver disease).    Met with family members to discuss goals of care after call from bedside RN. RN concerned about orders to place NGT at bedside with patient at high risk for bleeding. Patient's family interested in escalating pt's care if interventions can be provided short term in hopes of adequately stabilizing patient for transfer home. Critical care contacted and have agreed to accept patient to ICU.     INTERVENTIONS/ RECOMMENDATIONS     Transfer to ICU for airway watch/protection.     Discussed plan of care with Romina LIRIANO.    PHYSICIAN ESCALATION     Yes/No  yes    Orders received and case discussed with Critical Care Fellow.    Disposition: To be transferred to ICU once bed is available.     FOLLOW-UP     Call back the  Rapid Response Nurse, Casimiro May RN at [phone] for additional questions or concerns.

## 2019-08-07 NOTE — ASSESSMENT & PLAN NOTE
Treatment plan per primary team   MELD-Na score: 22 at 8/7/2019  2:13 PM  MELD score: 22 at 8/7/2019  2:13 PM  Calculated from:  Serum Creatinine: 2.1 mg/dL at 8/7/2019  2:13 PM  Serum Sodium: 143 mmol/L (Rounded to 137 mmol/L) at 8/7/2019  2:13 PM  Total Bilirubin: 3.2 mg/dL at 8/7/2019  5:01 AM  INR(ratio): 1.5 at 8/7/2019  5:01 AM  Age: 68 years

## 2019-08-07 NOTE — PT/OT/SLP DISCHARGE
Physical Therapy Discharge Summary    Name: Michelle Ojeda  MRN: [id number]   Principal Problem: Acute metabolic encephalopathy     Patient Discharged from acute Physical Therapy on 2019.  Please refer to prior PT noted date on 2019 for functional status.     Assessment:     Pt transferred to ICU for CRRT needs.     Objective:     GOALS:   Multidisciplinary Problems     Physical Therapy Goals        Problem: Physical Therapy Goal    Goal Priority Disciplines Outcome Goal Variances Interventions   Physical Therapy Goal     PT, PT/OT Ongoing (interventions implemented as appropriate)     Description:  Goals to be met by: 2019     Patient will increase functional independence with mobility by performin. Supine to sit with contact guard assistance   2. Sit to supine with contact guard assistance   3. Sit to stand transfer with stand by assistance with LRAD   4. Gait  x 50 feet with stand by assistance using Rolling Walker.   5. Ascend/descend 3 stair with bilateral Handrails Stand-by Assistance                      Reasons for Discontinuation of Therapy Services  Transfer to ICU       Plan:     Patient Discharged to: ICU .    Paula Espinoza, PT  2019

## 2019-08-07 NOTE — ASSESSMENT & PLAN NOTE
Labs from [place] nephrology demonstrated Cr of 1.77 in 3/12/19, 1.67 on 4/5/19, 1.96 in 6/13/19, and 1.83 on 6/24. Cr of 2.8 on 7/26/19 above her baseline due to ATN secondary to hypoperfusion of kidneys related to hypotensive episodes. Hepatorenal syndrome not suspected at this time as the patient's Cr and urine output have improved with an albumin challenge. TERA could be exacerbated with noted Hgb drop from 9.9 on 7/27 to 7.2 on 7/29.     Recommendations:   - Will continue to dialyze pt as needed. No acute indication at this time. With family discussing comfort care measures and possibly wanting to transfer patient back to [place], can orchestrate dialysis to time with the day before transport  - c/w Midodrine 10mg TID   - Recommend continuing albumin 50g IV daily; continue Sodium Bicarbonate 1300mg PO BID  - Monitor with BMP  - Strict I/Os   - Avoid Nephrotoxic agents  - Monitor for signs of blood loss  - Hgb > 7

## 2019-08-07 NOTE — PLAN OF CARE
Problem: Adult Inpatient Plan of Care  Goal: Plan of Care Review  Pt admitted from floor for higher level of care, Trialysis catheter placement and CRRT. Pt arrived confused, mostly nonverbal and extremely weak. Line placed successfully. After CRRT started and another bowel movement, pt appears to be answering more questions and following commands. TPN and lipids infusing. Plan reviewed with son and daughter. Continuing to monitor patient.

## 2019-08-07 NOTE — PT/OT/SLP DISCHARGE
Occupational Therapy Discharge Summary    Michelle Ojeda  MRN: [id number]   Principal Problem: Acute metabolic encephalopathy      Patient Discharged from acute Occupational Therapy on 8/7/19.  Please refer to prior OT note dated 8/5/19 for functional status.    Assessment:      Patient was discharged unexpectedly.  Information required to complete an accurate discharge summary is unknown.  Refer to therapy initial evaluation and last progress note for initial and most recent functional status and goal achievement.  Recommendations made may be found in medical record.    Objective:     GOALS:   Multidisciplinary Problems     Occupational Therapy Goals        Problem: Occupational Therapy Goal    Goal Priority Disciplines Outcome Interventions   Occupational Therapy Goal     OT, PT/OT Unable to achieve outcome(s) by discharge    Description:  Goals to be met by: 8/29/19     Patient will increase functional independence with ADLs by performing:    UE Dressing with Modified [place].  LE Dressing with Modified [place] and Assistive Devices as needed.  Grooming while standing at sink with Modified [place].  Toileting from bedside commode with Modified [place] for hygiene and clothing management.   Bathing from  shower chair/bench with Modified [place].  Toilet transfer to bedside commode with Modified [place].  Increased functional strength to WFL for B UE.  Upper extremity exercise program x15 reps per handout, with assistance as needed.                      Reasons for Discontinuation of Therapy Services  Transfer to alternate level of care.      Plan:     Patient Discharged to: ICU for CRRT.  Please re-consult when pt is medically stable.    ALLISON Elizalde  8/7/2019

## 2019-08-07 NOTE — CARE UPDATE
Rapid Response Nurse Follow-up Note     Followed up with patient for proactive rounding.   No acute issues at this time. Pt returned to room from Endoscopy following EGD. RN collecting CBC and administering platelets at this time. No new orders per primary team at this time. Reviewed plan of care with primary RNIveth.   Please call Rapid Response RN, Flaquita Ro RN with any questions or concerns at [phone].

## 2019-08-07 NOTE — PLAN OF CARE
Problem: Adult Inpatient Plan of Care  Goal: Plan of Care Review  Outcome: Ongoing (interventions implemented as appropriate)  Pt AAx1, breathing even and unlabored, call light in reach, bed in lowest position, NG to L nare connected to low intermittent suction. Pt went for EGD in AM. Medications administered via tube. Pt had one episode of bloody vomittus. Platelets administered per order. Pt had multiple BM throughout day. [place] draining to gravity. Wound care completed per order. TPN infusing following midline placement. Pt pulled NG tube, consulted Dr. Jasso, advised not to re-place at this time. Will continue to monitor.

## 2019-08-07 NOTE — SUBJECTIVE & OBJECTIVE
Interval History: Pt transferred to ICU overnight, underwent SLED for metabolic clearance. Now much more alert and interactive. Family noting that given her tenuous prognosis, they will be having a family discussion in the coming days and possibly pursuing comfort care.      Review of patient's allergies indicates:  No Known Allergies  Current Facility-Administered Medications   Medication Frequency    0.9%  NaCl infusion (for blood administration) Q24H PRN    acetaminophen tablet 325 mg Q4H PRN    cefTRIAXone (ROCEPHIN) 2 g in dextrose 5 % 50 mL IVPB Q24H    dextrose 10% (D10W) Bolus PRN    dextrose 10% (D10W) Bolus PRN    glucagon (human recombinant) injection 1 mg PRN    glucose chewable tablet 16 g PRN    glucose chewable tablet 24 g PRN    insulin aspart U-100 pen 0-5 Units Q6H PRN    lactulose 10 gram/15 mL solution (enema) 200 g Q6H    magnesium sulfate 2g in water 50mL IVPB (premix) PRN    ondansetron injection 4 mg Q6H PRN    pantoprazole injection 40 mg Daily    prochlorperazine injection Soln 10 mg Q6H PRN    simethicone chewable tablet 80 mg TID PRN    sodium chloride 0.9% flush 10 mL PRN    sodium phosphate 20.01 mmol in dextrose 5 % 250 mL IVPB PRN    sodium phosphate 30 mmol in dextrose 5 % 250 mL IVPB PRN    sodium phosphate 39.99 mmol in dextrose 5 % 250 mL IVPB PRN    TPN ADULT PERIPHERAL CUSTOM Continuous       Objective:     Vital Signs (Most Recent):  Temp: 97.9 °F (36.6 °C) (08/07/19 1505)  Pulse: 83 (08/07/19 1600)  Resp: (!) 25 (08/07/19 1600)  BP: (!) 104/56 (08/07/19 1600)  SpO2: 100 % (08/07/19 1600)  O2 Device (Oxygen Therapy): room air (08/07/19 1600) Vital Signs (24h Range):  Temp:  [97.5 °F (36.4 °C)-97.9 °F (36.6 °C)] 97.9 °F (36.6 °C)  Pulse:  [70-98] 83  Resp:  [17-49] 25  SpO2:  [99 %-100 %] 100 %  BP: (104-169)/(56-85) 104/56     Weight: 56 kg (123 lb 7.3 oz) (07/30/19 1000)  Body mass index is 24.11 kg/m².  Body surface area is 1.54 meters squared.    I/O last  3 completed shifts:  In: 2930.1 [I.V.:1101.2; Blood:502.3; NG/GT:300]  Out: 2382 [Urine:1110; Emesis/NG output:400; Other:872]    Physical Exam   Constitutional: She appears well-developed and well-nourished. She appears cachectic. No distress.   HENT:   Head: Normocephalic and atraumatic.   Eyes: EOM are normal. Scleral icterus is present.   Neck: Normal range of motion.   Cardiovascular: Normal rate, regular rhythm and normal heart sounds.   No murmur heard.  Pulmonary/Chest: Effort normal and breath sounds normal.   Abdominal: Soft. Bowel sounds are normal. She exhibits ascites. There is hepatosplenomegaly. There is no tenderness.   Musculoskeletal: She exhibits no edema.   Neurological: She is alert. She is disoriented.   Skin: Skin is warm and dry. Ecchymosis noted. She is not diaphoretic.   Nursing note and vitals reviewed.      Significant Labs:  CBC:   Recent Labs   Lab 08/07/19  0501   WBC 5.62   RBC 2.67*   HGB 9.1*   HCT 27.3*   PLT 58*   *   MCH 34.1*   MCHC 33.3     CMP:   Recent Labs   Lab 08/07/19  0501 08/07/19  1413   * 255*   CALCIUM 9.3 8.6*   ALBUMIN 4.4 3.9   PROT 7.3  --    * 143   K 3.2* 3.5   CO2 22* 27    106   BUN 56* 37*   CREATININE 2.1* 2.1*   ALKPHOS 135  --    ALT 24  --    AST 33  --    BILITOT 3.2*  --      Recent Labs   Lab 08/05/19  1607   COLORU Yellow   SPECGRAV 1.015   PHUR 5.0   PROTEINUA Negative   NITRITE Negative   LEUKOCYTESUR Negative   HYALINECASTS 10*         All labs within the past 24 hours have been reviewed.     Significant Imaging:  No new imaging in the past 24 hours

## 2019-08-07 NOTE — ASSESSMENT & PLAN NOTE
R kidney mass measures 2.4 cm x 2.3 cm found on CT abd/pelvis during transplant workup. Urology following with plans to hold on any further work up in setting of acute decompensation   -PRN pain control as indicated

## 2019-08-07 NOTE — ASSESSMENT & PLAN NOTE
History of hematemesis after OG line placement. EGD read with not actively bleeding varices. Will continue to monitor for bleeding.   -GI recommendations   -trend CBC   -avoid NG tubes.

## 2019-08-07 NOTE — PROCEDURES
"Michelle Ojeda is a 68 y.o. female patient.    Temp: 97.5 °F (36.4 °C) (08/07/19 0300)  Pulse: 93 (08/07/19 0600)  Resp: (!) 25 (08/07/19 0600)  BP: 130/64 (08/07/19 0600)  SpO2: 100 % (08/07/19 0600)  Weight: 56 kg (123 lb 7.3 oz) (07/30/19 1000)  Height: 5' (152.4 cm) (07/30/19 1000)       Central Line  Date/Time: 8/7/2019 6:55 AM  Location procedure was performed: [facility]IT[facility]  Performed by: Behram Khan, MD  Pre-operative Diagnosis: Uremia  Post-operative diagnosis: Uremia  Consent Done: Yes  Time out: Immediately prior to procedure a "time out" was called to verify the correct patient, procedure, equipment, support staff and site/side marked as required.  Indications: hemodialysis  Anesthesia: local infiltration    Anesthesia:  Local Anesthetic: lidocaine 1% with epinephrine  Preparation: skin prepped with ChloraPrep  Skin prep agent dried: skin prep agent completely dried prior to procedure  Sterile barriers: all five maximum sterile barriers used - cap, mask, sterile gown, sterile gloves, and large sterile sheet  Hand hygiene: hand hygiene performed prior to central venous catheter insertion  Location details: right internal jugular  Catheter type: trialysis  Catheter size: 12 Fr  Catheter Length: 16cm    Ultrasound guidance: yes  Vessel Caliber: small, medium, patent, compressibility normal  Needle advanced into vessel with real time Ultrasound guidance.  Guidewire confirmed in vessel.  Sterile sheath used.  Manometry: Yes  Number of attempts: 1  Assessment: successful placement  Complications: none  Estimated blood loss (mL): 5  Specimens: No  Implants: No  Post-procedure: line sutured  Complications: No  Comments: Insertion site was located with ultrasound and marked prior to procedure. Provider donned mask, gown and sterile gloves. Insertion site was sterilized and pt was draped. Ultrasound probe was sheathed sterilely and right IJ vein was found with ultrasound. Finder needle was " inserted into central vein with ultrasound guidance. Placement in venous site was confirmed with manometry and identification of guidewire in venous space with ultrasound. Catheter was inserted after dilation of site. Catheter was sutured in place and chlorhexadine button was placed. Catheter was then dressed with fenestrated dressing.             Behram Khan  8/7/2019

## 2019-08-07 NOTE — NURSING
Patient does not appear to be a safe swallower - she does not answer q's appropriately/moans/restless.  Called CC team regarding concern with taking po medications.  No NGT in place.  Awaiting orders from MD.

## 2019-08-07 NOTE — SIGNIFICANT EVENT
Met with family following with assessment by ICU team. Family is overwhelmed with events of today. They have expressed some confusion and concerns in the change in goals of care proposed by ICU team (DNR / DNI and comfort measures starting now). Re- discussed their short term goals for the patient. Their goal is to get patient back to [place], they are understanding that patient is not an SLK candidate and hospice would be a longer term recommendation. Discussed with the family trial of HD to see if her mental status and clinical condition would improve. They are understanding that she may need HD going forward.     Family would like to proceed with any measures that would improve pt's mentation at this time, including aggressive lactulose therapy as well as HD/ CRRT. They are agreeable to central line placement. Patient has an advanced directive that states that she does not want long term and futile life support, however, family is agreeable to short term ventilatory support/ intubation if necessary.     Contacted ICU team and re- addressed family's wishes above.   Nephrology has offered HD/ CRRT as they believe this would improve patient's condition/ mentation .   Order to replace NG tube placed, soft restraints order placed. Will continue aggressive lactulose therapy.   Will honor family's wishes of full code at this time  Strongly recommend transfer to the ICU for continuation of all aggressive measures at this time, as believe patient may improve with RRT and lactulose therapy. Needs ICU admission for closer monitoring of mental status/ possible intubation watch, increased level of nursing, as well as central line placement in a controlled environment    Signing Physician:     Stefany Jasso MD  Department of Hospital Medicine   [facility]  Pager [phone]  [facility] [id number]  8/6/2019  8:01 PM

## 2019-08-07 NOTE — PROGRESS NOTES
Progress Note   Hospital Medicine         Patient Name: Michelle Ojeda  MRN:  [id number]  Hospital Medicine Team: [facility] Stefany Jasso MD  Date of Admission:  7/18/2019     Length of Stay:  LOS: 19 days   Expected Discharge Date: 8/9/2019  Principal Problem:  Acute metabolic encephalopathy       Subjective:     Interval History/Overnight Events:    Patient developed hematemesis over night. IV PPI and octreotide gtt, and IV CTX started.  GI scoped patient and showed esophagitis, PGH, and small jennifer jaeger tear , with no bleeding varices. Transfused 1 U PLTs, hbg has been stable with no necessity for PRBCs. HDS over all  Significantly encephalopathic, altered, intermittently orients to self. NG tube in place , receiving lactulose. Having some BMs. On lactulose enemas.   Cr up at 6 with BUN of 125 . Nephrology recommend RRT initiation for uremia.   Labs obtained in the afternoon and reviewed    Had a long goals of care discussion with family at bedside. Noted to the family that patient was declined for SLK, given frailty as well as likely RCC. Family is understanding. Their short term goals would attempt to bring patient home, wether it is with hospice (inpatietn or outpatient ) or as a transfer to facility closer to home. In the mean time, they would like to proceed with aggressive measures to attempt to reverse the encephalopathy. See significant event note from 8/6.  Family is understanding that if RRT and further HE therapies are futile, they will proceed with hospice.     ICU consulted.  Initiation of comfort measures was discussed by ICU.  After further discussion with family, palliation at this time was not their goal. See significant event note from 8/6.  ICU contacted again for transfer for higher level of care, RRT initiation.        sodium chloride 0.9%   Intravenous Once    cefTRIAXone (ROCEPHIN) IVPB  2 g Intravenous Q24H    fat emulsion 20%  250 mL Intravenous Daily    insulin detemir  U-100  12 Units Subcutaneous Daily    lactulose  45 g Per NG tube Q6H    levothyroxine  88 mcg Oral Before breakfast    midodrine  15 mg Oral TID    [START ON 8/9/2019] pantoprazole  40 mg Oral Daily    pantoprazole  40 mg Intravenous Daily    rifAXImin  550 mg Oral BID    sodium bicarbonate  1,300 mg Oral BID    zinc sulfate  220 mg Oral Daily     sodium chloride, acetaminophen, Dextrose 10% Bolus, Dextrose 10% Bolus, glucagon (human recombinant), glucose, glucose, insulin aspart U-100, ondansetron, prochlorperazine, simethicone, sodium chloride 0.9%     Review of Systems   Unable to obtain due to HE    Objective:     Temp:  [96.2 °F (35.7 °C)-97.9 °F (36.6 °C)]   Pulse:  []   Resp:  [15-20]   BP: (124-175)/(53-85)   SpO2:  [99 %-100 %]       Physical Exam:  Constitutional: apears weak and frail   Cardiovascular: Normal rate and regular rhythm.  No murmur heard.  Pulmonary/Chest: Effort normal and breath sounds normal. Decreased BS bilat   Abdominal: Soft. Bowel sounds are normal. Positive distension; no TTP  Musculoskeletal: Normal range of motion. No edema.   Neurological: intermittently alert , groans in pain, significant encephalopathy   Skin: Skin is warm and dry.     Recent Labs   Lab 08/02/19  1559 08/03/19  0352 08/04/19  0628 08/05/19  0550 08/06/19  0354 08/06/19  1451   WBC 5.21 4.98 4.01 4.03 5.23 3.84*   HGB 7.9* 8.8* 9.0* 8.1* 9.1* 8.5*   HCT 24.5* 25.9* 26.7* 23.8* 27.7* 25.6*   PLT 37* 42* 45* 46* 44* 64*     Recent Labs   Lab 08/04/19  0628 08/05/19  0549 08/06/19  0354 08/06/19  1451   * 132* 134* 139   K 3.2* 3.3* 3.5 3.5   CL 98 98 99 101   CO2 19* 18* 16* 20*   * 114* 123* 125*   CREATININE 3.3* 3.5* 3.8* 4.3*   * 113* 257* 238*   CALCIUM 8.8 9.5 9.1 9.3   MG 3.4* 3.3* 3.7*  --    PHOS 4.4 4.8* 6.3*  --      Recent Labs   Lab 08/04/19  0628 08/05/19  0549 08/05/19  0550 08/06/19  0354   ALKPHOS 147* 128  --  167*   ALT 33 25  --  25   AST 42* 35  --  37    ALBUMIN 4.3 5.0  --  4.9   PROT 6.5 6.7  --  7.2   BILITOT 2.9* 3.2*  --  4.3*   INR 1.6*  --  1.7* 1.5*     Recent Labs   Lab 08/05/19  1219 08/05/19  1651 08/05/19  2319 08/06/19  0610 08/06/19  1103 08/06/19  1657   POCTGLUCOSE 143* 129* 192* 200* 216* 206*       EGD 8/6    Impression:     - LA Grade D reflux esophagitis.                        - Grade I esophageal varices with no red signs.                        - Diminutive [place]io tear with no active                         bleeding.                        - Portal hypertensive gastropathy with contact                         bleeding      Assessment and Plan       Active Hospital Problems    Diagnosis  POA    *Acute metabolic encephalopathy [G93.41]  Yes    Palliative care encounter [Z51.5]  Not Applicable    Metabolic acidosis [E87.2]  No    Other dysphagia [R13.19]  Clinically Undetermined    Acute hypoxemic respiratory failure [J96.01]  No    Shock, unspecified [R57.9]  No    Hyperglycemia [R73.9]  No    Acute hepatic encephalopathy [K72.00]  Yes    Right kidney mass [N28.89]  No    Kidney transplant candidate [Z76.82]  Not Applicable    Ileus [K56.7]  No    Physical debility [R53.81]  Yes    Ascites [R18.8]  Yes    Pancytopenia [D61.818]  Yes    Hypothyroidism [E03.9]  Yes    Moderate malnutrition [E44.0]  Yes    Coagulopathy [D68.9]  Yes    Hyponatremia [E87.1]  Yes    Organ transplant candidate [Z76.82]  Not Applicable    Acute kidney injury superimposed on chronic kidney disease [N17.9, N18.9]  No    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       Acute hepatic encephalopathy  - ammonia 121, with grade 3 HE   - cont rifaximin and zinc  - lactulose per NG tube  - lactulose enemas  - ICU consulted on 8/6 for transfer to ICU for higher level of care        Acute kidney injury superimposed on  chronic kidney disease  Metabolic acidosis   Uremia   Metabolic encephalopathy   --Baseline reportedly sCr 1.6. Possible candidate for liver/kidney transplant  --Nephrology following,recommend RRT initiation on 8/6   - pending central line placement      Anemia of chronic disease  Upper GI bleed   - GI bleed developed on 8/6/    -  IV PPI and octreotide gtt, and IV CTX started.  GI scoped patient and showed esophagitis, PGH, and small jennifer jaeger tear , with no bleeding varices. Transfused 1 U PLTs, hbg has been stable with no necessity for PRBCs.    Hyperglycemia  History of DM however was taken off metformin due to improvement  --A1c 5.1  --insulin infusion discontinued on 7/27 when enteral feedings where held.   --frequent glucose checks with SSI  --glucose goal 140-180        Liver cirrhosis secondary to IZAGUIRRE  MELD-Na score: 30 at 8/6/2019  2:51 PM  MELD score: 30 at 8/6/2019  2:51 PM  Calculated from:  Serum Creatinine: 4.3 mg/dL (Rounded to 4 mg/dL) at 8/6/2019  2:51 PM  Serum Sodium: 139 mmol/L (Rounded to 137 mmol/L) at 8/6/2019  2:51 PM  Total Bilirubin: 4.3 mg/dL at 8/6/2019  3:54 AM  INR(ratio): 1.5 at 8/6/2019  3:54 AM  Age: 68 years  --Complicated by ascites, thrombocytopenia, esophageal varices, hyponatremia and HE  - patient not currently a liver transplant candidate due to frailty and likely RCC/ malignancy       Moderate protein payton malnutrition  - on TPN now      Renal/  Right kidney mass  R kidney mass measures 2.4 cm x 2.3 cm found on CT abd/pelvis during transplant workup  --Urology following with plans to hold on any further work up in setting of acute decompensation  - will just monitor for now;    Code status - pending further discussion, as per my discussion with family, full code at this time    Disposition: Strongly recommend transfer to the ICU for continuation of all aggressive measures at this time, as believe patient may improve with RRT and lactulose therapy. Needs ICU admission  for closer monitoring of mental status/ possible intubation watch, increased level of nursing, as well as central line placement in a controlled environment    Critical Care Time: 45 minutes  Critical care was time spent personally by me on the following activities: evaluating this patient's organ dysfunction, development of treatment plan, discussing treatment plan with patient or surrogate and bedside caregivers, discussions with consultants, evaluation of patient's response to treatment, examination of patient, ordering and performing treatments and interventions, ordering and review of laboratory studies, ordering and review of radiographic studies, re-evaluation of patient's condition. This critical care time did not overlap with that of any other provider or involve time for any procedures      Signing Physician:     Stefany Jasso MD  Department of Hospital Medicine   [facility]  Pager [phone] [id number]  8/6/2019

## 2019-08-07 NOTE — SUBJECTIVE & OBJECTIVE
Past Medical History:   Diagnosis Date    Cirrhosis     Diabetes mellitus, type 2     Disorder of kidney and ureter     Hypertension     Hypothyroidism     NAFLD (nonalcoholic fatty liver disease)        History reviewed. No pertinent surgical history.    Review of patient's allergies indicates:  No Known Allergies    Family History     Problem Relation (Age of Onset)    No Known Problems Father        Tobacco Use    Smoking status: Never Smoker    Smokeless tobacco: Never Used   Substance and Sexual Activity    Alcohol use: Not Currently    Drug use: Never    Sexual activity: Not on file      Review of Systems   Constitutional: Positive for activity change. Negative for diaphoresis and fever.   HENT: Positive for nosebleeds (following OG placement). Negative for facial swelling and sneezing.    Eyes: Negative for pain and redness.   Respiratory: Negative for cough and shortness of breath.    Cardiovascular: Negative for palpitations and leg swelling.   Gastrointestinal: Negative for abdominal pain, blood in stool and vomiting.   Genitourinary: Negative for hematuria and vaginal bleeding.   Musculoskeletal: Negative for joint swelling and neck stiffness.   Skin: Positive for rash (ecchymosis throughout). Negative for pallor.   Neurological: Positive for tremors (intermittent tremors). Negative for seizures and syncope.     Objective:     Vital Signs (Most Recent):  Temp: 97.5 °F (36.4 °C) (08/06/19 1924)  Pulse: 70 (08/06/19 2012)  Resp: 20 (08/06/19 1924)  BP: 132/85 (08/06/19 1924)  SpO2: 100 % (08/06/19 1924) Vital Signs (24h Range):  Temp:  [96.2 °F (35.7 °C)-97.9 °F (36.6 °C)] 97.5 °F (36.4 °C)  Pulse:  [] 70  Resp:  [15-20] 20  SpO2:  [99 %-100 %] 100 %  BP: (124-175)/(53-85) 132/85   Weight: 56 kg (123 lb 7.3 oz)  Body mass index is 24.11 kg/m².      Intake/Output Summary (Last 24 hours) at 8/6/2019 2310  Last data filed at 8/6/2019 1900  Gross per 24 hour   Intake 1223.66 ml   Output 1000 ml    Net 223.66 ml       Physical Exam   Constitutional: She appears well-developed.   Thin cachetic female alert but not oriented, lying in bed curled in multiple sheets, intermittently moaning. Ecchymosis to face and bilateral arms. Dried blood in mouth with no signs of active bleeding. Not currently following commands.    HENT:   Head: Normocephalic and atraumatic.   Right Ear: External ear normal.   Left Ear: External ear normal.   Nose: Nose normal.   Eyes: Pupils are equal, round, and reactive to light. Conjunctivae and EOM are normal. Right eye exhibits no discharge. Left eye exhibits no discharge. No scleral icterus.   Neck: Neck supple. No JVD present.   Cardiovascular: Normal rate, regular rhythm, normal heart sounds and intact distal pulses.   No murmur heard.  Pulmonary/Chest: Effort normal and breath sounds normal. No stridor. No respiratory distress. She has no wheezes.   Abdominal: Soft. Bowel sounds are normal. She exhibits no distension. There is no tenderness.   Musculoskeletal: Normal range of motion. She exhibits no edema.   Thin extremities with bruising throughout.    Neurological: She is alert.   Skin: Skin is warm. Capillary refill takes less than 2 seconds. No erythema.   Nursing note and vitals reviewed.      Vents:  Vent Mode: Spont (07/27/19 0912)  Ventilator Initiated: Yes (07/24/19 1312)  Set Rate: 0 bmp (07/27/19 0912)  Vt Set: 300 mL (07/27/19 0912)  Pressure Support: 5 cmH20 (07/27/19 0912)  PEEP/CPAP: 5 cmH20 (07/27/19 0912)  Oxygen Concentration (%): 28 (07/27/19 1113)  Peak Airway Pressure: 11 cmH2O (07/27/19 0912)  Plateau Pressure: 0 cmH20 (07/27/19 0912)  Total Ve: 8.93 mL (07/27/19 0912)  Negative Inspiratory Force (cm H2O): -35 (07/27/19 1105)  F/VT Ratio<105 (RSBI): (!) 36.25 (07/27/19 0912)  Lines/Drains/Airways     Drain                 Urethral Catheter 08/05/19 1138 Straight-tip 16 Fr. 1 day          Peripheral Intravenous Line                 Midline Catheter  Insertion/Assessment  - Single Lumen 08/06/19 1552 Right basilic vein (medial side of arm) 18g x 8cm less than 1 day         Peripheral IV - Single Lumen 08/06/19 20 G Left;Posterior Hand less than 1 day              Significant Labs:    CBC/Anemia Profile:  Recent Labs   Lab 08/05/19  0550 08/06/19  0354 08/06/19  1451   WBC 4.03 5.23 3.84*   HGB 8.1* 9.1* 8.5*   HCT 23.8* 27.7* 25.6*   PLT 46* 44* 64*   MCV 98 102* 103*   RDW 18.9* 19.8* 20.3*        Chemistries:  Recent Labs   Lab 08/05/19  0549 08/06/19  0354 08/06/19  1451   * 134* 139   K 3.3* 3.5 3.5   CL 98 99 101   CO2 18* 16* 20*   * 123* 125*   CREATININE 3.5* 3.8* 4.3*   CALCIUM 9.5 9.1 9.3   ALBUMIN 5.0 4.9  --    PROT 6.7 7.2  --    BILITOT 3.2* 4.3*  --    ALKPHOS 128 167*  --    ALT 25 25  --    AST 35 37  --    MG 3.3* 3.7*  --    PHOS 4.8* 6.3*  --        All pertinent labs within the past 24 hours have been reviewed.    Significant Imaging: I have reviewed all pertinent imaging results/findings within the past 24 hours.

## 2019-08-07 NOTE — ASSESSMENT & PLAN NOTE
Patient has new Right renal mass found on CT abd on 7/22/19. Previous Ct from 2016 and MRI studies from 2019 in [place] did not demonstrate this finding.     Recommendations  - Urology recommends f/u outpatient as she is not a good surgical or biopsy candidate.   - further workup pending goals of care discussion.

## 2019-08-07 NOTE — ASSESSMENT & PLAN NOTE
Baseline reportedly sCr 1.6. Nephrology following,concern iATN given recent shock.  Also concern for intravascular volume depletion given significant stool output.   Retroperitoneal US on 7/23 negative for hydronephrosis. With rising sCr and concern for uremia with BUN of 125, RIJ trialysis catheter placed and plan for CRRT.    -urine studies concerning for pre-renal etiology.  Started on albumin and continued on midodrine, NPO with no access   -Strict I/Os   -RRT today   -nephrology following, appreciate recs

## 2019-08-07 NOTE — PLAN OF CARE
Problem: Adult Inpatient Plan of Care  Goal: Plan of Care Review  Outcome: Ongoing (interventions implemented as appropriate)    Patient more alert throughout day.  Only oriented to self.   VSS all shift.  NSR on telemetry monitor.      PPN @ 60cc/hr.  PPN to stop tonight at 2200.      FlexiSeal in place.  2 lactulose enemas given.  Held 1800 dose due to nephrology's rec to stop enema due to patient's hypernatremia.  CC team to clarify with Nephro regarding future enemas.      CRRT has been off since 0930.  Plan - no CRRT tonight.  Webster intact - scant urine.      Daughter and son @ bedside all day.  2 more daughters and patient's  en route tonight to visit patient tonight from [place].  Family wants patient to get back to [place] - SW spoke with patient's daughter at length today.      Problem: Skin Injury Risk Increased  Goal: Skin Health and Integrity    Intervention: Optimize Skin Protection  Patient refused heel mepilex dressings (they were causing agitation).  Sarcal mepilex and bilateral arm mepilex dressings in place.  Multiple skin tears and lots of bruising  - very fragile skin,

## 2019-08-07 NOTE — NURSING
FlexiSeal that was placed earlier in shift now out (balloon intact).  Patient has no rectal tone.  Called CC team to notify.  Unable to give entire lactulose enema.

## 2019-08-07 NOTE — ASSESSMENT & PLAN NOTE
Complicated by ascites, thrombocytopenia, esophageal varices, hyponatremia and HE. MELD score of 24 today before dilaysis.    -Continue lactulose and rifaximin   -Hepatology following   -Paracentesis 7/24 with 4L removed;negative for SBP. Cytology pending.

## 2019-08-07 NOTE — PROGRESS NOTES
CRRT:    ACCESS: newly inserted right IJ trialysis, with ready to use order, both ports with good flow. UF rate set to 200ml/hr

## 2019-08-07 NOTE — PROGRESS NOTES
"Hepatology Progress Note    Michelle Ojeda is a 68 y.o. female admitted to hospital 7/18/2019 (Hospital Day: 21) due to Acute metabolic encephalopathy.     Subjective:  Encephalopathy improved this am. Daughter feels patient is more alert. Patient spoke to me saying "how are you" which shows better mentation compared to last two days.     Interval History  68F w/IZAGUIRRE cirrhosis and CKD who came from [place] for liver-kidney transplant evaluation. She was admitted from Hepatology clinic few weeks ago for hypoNa. Course complicated by lieus, encephalopathy (in setting of holding lactulose for ileus), briefly in ICU and intubated but extubated and to the floor last week. Transplant committee declined her due to frailty. She was supposed to go to acute rehab last week, but worsening hypoNa and renal function in setting of decreased PO intake. Nephrology following and they think it is volume depletion so getting albumin. Other issue taking a back seat is an incidental R renal mass (too high risk for biopsy and just surveillance per Urology). Had hematemesis on 8/6: EGD showed   - LA Grade D reflux esophagitis, Grade 1 varices, no red signs, jennifer-jaeger tear, no active bleeding, and PHG w/ contact bleeding. Para on 8/5 negative for SBP, cultures pending.       Objective  Temp:  [97.5 °F (36.4 °C)-97.8 °F (36.6 °C)] 97.8 °F (36.6 °C) (08/07 1100)  Pulse:  [70-98] 83 (08/07 1100)  BP: (115-169)/(58-85) 117/66 (08/07 1100)  Resp:  [17-49] 33 (08/07 1100)  SpO2:  [99 %-100 %] 100 % (08/07 1100)    General: aao X1,  Eyes:scleral icterus absent  Neurologic: Asterixis unable to assess due to encephalopathy   Abdomen: Normoactive bowel sounds. distended. Normal tympany. Soft. Non-tender. No peritoneal signs.  Extremities: edema absent    Laboratory    Lab Results   Component Value Date    WBC 5.62 08/07/2019    HGB 9.1 (L) 08/07/2019    HCT 27.3 (L) 08/07/2019     (H) 08/07/2019    PLT 58 (L) 08/07/2019       Lab " Results   Component Value Date     (H) 08/07/2019    K 3.2 (L) 08/07/2019     08/07/2019    CO2 22 (L) 08/07/2019    BUN 56 (H) 08/07/2019    CREATININE 2.1 (H) 08/07/2019    CALCIUM 9.3 08/07/2019       Lab Results   Component Value Date    ALBUMIN 4.4 08/07/2019    ALT 24 08/07/2019    AST 33 08/07/2019    GGT 98 (H) 07/18/2019    ALKPHOS 135 08/07/2019    BILITOT 3.2 (H) 08/07/2019       Lab Results   Component Value Date    INR 1.5 (H) 08/07/2019    INR 1.5 (H) 08/06/2019    INR 1.7 (H) 08/05/2019       MELD-Na score: 22 at 8/7/2019  5:01 AM  MELD score: 22 at 8/7/2019  5:01 AM  Calculated from:  Serum Creatinine: 2.1 mg/dL at 8/7/2019  5:01 AM  Serum Sodium: 146 mmol/L (Rounded to 137 mmol/L) at 8/7/2019  5:01 AM  Total Bilirubin: 3.2 mg/dL at 8/7/2019  5:01 AM  INR(ratio): 1.5 at 8/7/2019  5:01 AM  Age: 68 years    Assessment  Ms Ojeda is a 68 year old female with a history of IZAGUIRRE ESLD, with decompensations including Gr 1 HE, ascites, EV, hyponatremia, T2DM, HTN, hypothyroidism, who was admitted from hepatology clinic due to concern for hyponatremia. Also found to have ileus which has resolved. Unresponsive on 7/24, transferred to ICU and intubated for airway protection. Now extubated, transferred out of ICU with improved mentation. Hyponatremia initially resolved, back down to 125 and now improving. Worsening renal function with significant uremia. Other ongoing issues include incidentally discovered R renal mass too high risk for biopsy at this time, deconditioning requiring PT/OT and eventual SNF. Declined for transplant listing at this time. Repeat blood cultures, Had hematemesis on 8/6: EGD showed   - LA Grade D reflux esophagitis, Grade 1 varices, no red signs, jennifer-jaeger tear, no active bleeding, and PHG w/ contact bleeding. Para on 8/5 negative for SBP, cultures pending.    Hepatic encephalopathy  Hematemesis vs nasal trauma from NGT   Esophagitis grade D       Plan  - Hyponatremia:  Nephrology consulted, continue HD,   - encephalopathy: Continue lactulose enema and rifaximin. Recommend r/o infectious disease  - ascites: Negative for SBP on 07/19. Total protein 1.5. sbp 8/5: negative for SBP. Cont ceftriaxone 2 g daily  - diuretics: hold given TERA  - TERA on CKD: unclear baseline. Nephrology consulted  - Protein calorie malnutrition: nutrition consult. High protein intake. Consider prehab supplements on discharge.  - Anemia: secondary anemia workup, monitor for overt GI bleed  - Appreciate Renal transplant evaluation re: SLK given GFR <30.  - recommend keeping platelets above 50k given recent active bleed  - keep hb >7.   -  Para on 8/5 negative for SBP, cultures pending.  - Transplant: Discussed at transplant committee, but declined for transplant at this time. May be able to revisit this after patient completes rehab. Discussed with family and patient. May need further evaluation of R renal mass.- please obtain daily CBC, BMP, LFT, INR      Thank you for involving us in the care of Michelle Ojeda. Please call with any additional concerns or questions.    Michael Horan MD  Gastroenterology Fellow

## 2019-08-07 NOTE — ASSESSMENT & PLAN NOTE
68 year old female with hepatic and kidney failure who is profoundly encephalopathic due to severe hyperammonemia and uremia. She had temporarily improved with lactulose but is now declining due to her multiorgan dysfunction. Pt has been denied for transplant. The family understands that she is dying but hope to get her better enough to get back to [place] to be with family. Patient recently had issues tolerating enemas and had NG placed with hematemesis. Concern for gastric varices and NG was removed. GI performed EGD that showed not actively bleeding varices, but did show esophagitis and jennifer jaeger tears. Patient remains altered with last ammonia 188. Plan for dialysis today.   -CRRT today   -continue lactulose enemas   -daily ammonia levels   -CMP daily

## 2019-08-07 NOTE — PROGRESS NOTES
[facility]  Nephrology  Progress Note    Patient Name: Michelle Ojeda  MRN: [id number]  Admission Date: 7/18/2019  Hospital Length of Stay: 20 days  Attending Provider: Saravanan Batista MD   Primary Care Physician: Primary Doctor No  Principal Problem:Acute metabolic encephalopathy    Subjective:     HPI: Rusty Martinez is 69 yo F with CKD stage 4 presenting from transplant hepatology clinic for hyponatremia on 7/18/19. She is originally from [place], here for liver transplant evaluation. She was diagnosed with IZAGUIRRE in grade 1 hepatic encephalopathy with severe ascites and esophogeal varices (s/p banding). During the month prior to admission she felt more lethargic and was requiring biweekly paracentesis. Of note, she is also being treated for Ileus her acute hepatic encephalopathy with lactulose. Recent 24 hour cr clearance study was near 21. Reported baseline Cr 1.6-1.8 and chronic hyponatremia range between 123-124.     Interval History: Pt transferred to ICU overnight, underwent SLED for metabolic clearance. Now much more alert and interactive. Family noting that given her tenuous prognosis, they will be having a family discussion in the coming days and possibly pursuing comfort care.      Review of patient's allergies indicates:  No Known Allergies  Current Facility-Administered Medications   Medication Frequency    0.9%  NaCl infusion (for blood administration) Q24H PRN    acetaminophen tablet 325 mg Q4H PRN    cefTRIAXone (ROCEPHIN) 2 g in dextrose 5 % 50 mL IVPB Q24H    dextrose 10% (D10W) Bolus PRN    dextrose 10% (D10W) Bolus PRN    glucagon (human recombinant) injection 1 mg PRN    glucose chewable tablet 16 g PRN    glucose chewable tablet 24 g PRN    insulin aspart U-100 pen 0-5 Units Q6H PRN    lactulose 10 gram/15 mL solution (enema) 200 g Q6H    magnesium sulfate 2g in water 50mL IVPB (premix) PRN    ondansetron injection 4 mg Q6H PRN    pantoprazole  injection 40 mg Daily    prochlorperazine injection Soln 10 mg Q6H PRN    simethicone chewable tablet 80 mg TID PRN    sodium chloride 0.9% flush 10 mL PRN    sodium phosphate 20.01 mmol in dextrose 5 % 250 mL IVPB PRN    sodium phosphate 30 mmol in dextrose 5 % 250 mL IVPB PRN    sodium phosphate 39.99 mmol in dextrose 5 % 250 mL IVPB PRN    TPN ADULT PERIPHERAL CUSTOM Continuous       Objective:     Vital Signs (Most Recent):  Temp: 97.9 °F (36.6 °C) (08/07/19 1505)  Pulse: 83 (08/07/19 1600)  Resp: (!) 25 (08/07/19 1600)  BP: (!) 104/56 (08/07/19 1600)  SpO2: 100 % (08/07/19 1600)  O2 Device (Oxygen Therapy): room air (08/07/19 1600) Vital Signs (24h Range):  Temp:  [97.5 °F (36.4 °C)-97.9 °F (36.6 °C)] 97.9 °F (36.6 °C)  Pulse:  [70-98] 83  Resp:  [17-49] 25  SpO2:  [99 %-100 %] 100 %  BP: (104-169)/(56-85) 104/56     Weight: 56 kg (123 lb 7.3 oz) (07/30/19 1000)  Body mass index is 24.11 kg/m².  Body surface area is 1.54 meters squared.    I/O last 3 completed shifts:  In: 2930.1 [I.V.:1101.2; Blood:502.3; NG/GT:300]  Out: 2382 [Urine:1110; Emesis/NG output:400; Other:872]    Physical Exam   Constitutional: She appears well-developed and well-nourished. She appears cachectic. No distress.   HENT:   Head: Normocephalic and atraumatic.   Eyes: EOM are normal. Scleral icterus is present.   Neck: Normal range of motion.   Cardiovascular: Normal rate, regular rhythm and normal heart sounds.   No murmur heard.  Pulmonary/Chest: Effort normal and breath sounds normal.   Abdominal: Soft. Bowel sounds are normal. She exhibits ascites. There is hepatosplenomegaly. There is no tenderness.   Musculoskeletal: She exhibits no edema.   Neurological: She is alert. She is disoriented.   Skin: Skin is warm and dry. Ecchymosis noted. She is not diaphoretic.   Nursing note and vitals reviewed.      Significant Labs:  CBC:   Recent Labs   Lab 08/07/19  0501   WBC 5.62   RBC 2.67*   HGB 9.1*   HCT 27.3*   PLT 58*   *    MCH 34.1*   MCHC 33.3     CMP:   Recent Labs   Lab 08/07/19  0501 08/07/19  1413   * 255*   CALCIUM 9.3 8.6*   ALBUMIN 4.4 3.9   PROT 7.3  --    * 143   K 3.2* 3.5   CO2 22* 27    106   BUN 56* 37*   CREATININE 2.1* 2.1*   ALKPHOS 135  --    ALT 24  --    AST 33  --    BILITOT 3.2*  --      Recent Labs   Lab 08/05/19  1607   COLORU Yellow   SPECGRAV 1.015   PHUR 5.0   PROTEINUA Negative   NITRITE Negative   LEUKOCYTESUR Negative   HYALINECASTS 10*         All labs within the past 24 hours have been reviewed.     Significant Imaging:  No new imaging in the past 24 hours    Assessment/Plan:     * Acute metabolic encephalopathy  Improved with dialysis    Right kidney mass  Patient has new Right renal mass found on CT abd on 7/22/19. Previous Ct from 2016 and MRI studies from 2019 in [place] did not demonstrate this finding.     Recommendations  - Urology recommends f/u outpatient as she is not a good surgical or biopsy candidate.   - further workup pending goals of care discussion.     Acute kidney injury superimposed on chronic kidney disease  Labs from [place] nephrology demonstrated Cr of 1.77 in 3/12/19, 1.67 on 4/5/19, 1.96 in 6/13/19, and 1.83 on 6/24. Cr of 2.8 on 7/26/19 above her baseline due to ATN secondary to hypoperfusion of kidneys related to hypotensive episodes. Hepatorenal syndrome not suspected at this time as the patient's Cr and urine output have improved with an albumin challenge. TERA could be exacerbated with noted Hgb drop from 9.9 on 7/27 to 7.2 on 7/29.     Recommendations:   - Will continue to dialyze pt as needed. No acute indication at this time. With family discussing comfort care measures and possibly wanting to transfer patient back to [place], can orchestrate dialysis to time with the day before transport  - c/w Midodrine 10mg TID   - Recommend continuing albumin 50g IV daily; continue Sodium Bicarbonate 1300mg PO BID  - Monitor with BMP  - Strict I/Os   -  Avoid Nephrotoxic agents  - Monitor for signs of blood loss  - Hgb > 7     Hyponatremia  Rusty is a 67 yo F with liver cirrhosis and CKD admitted for hyponatremia. Hyponatremia may be related to several factors primarily liver cirrhosis requiring biweekly paracentesis. This leads to a hypovolemic hyponatremic state in which fluid is pulled out of the vasculature. Additional contributing factors related to ileus, NPO status, and TPN nutrition. Extra sodium in TPN has increased sodium. Now on renal diet with 1000mL fluid restriction. Baseline Na+ from labs in [place] between 123-124.   - Pt now appears slightly hypernatremic, despite 140meq dialysate. 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 I will follow-up with patient. Please contact us if you have any additional questions.    Otilio Gibson,   Nephrology  [facility]

## 2019-08-08 LAB
ALBUMIN SERPL BCP-MCNC: 3.8 G/DL (ref 3.5–5.2)
ALBUMIN SERPL BCP-MCNC: 3.9 G/DL (ref 3.5–5.2)
ALBUMIN SERPL BCP-MCNC: 3.9 G/DL (ref 3.5–5.2)
ALLENS TEST: ABNORMAL
ALP SERPL-CCNC: 114 U/L (ref 55–135)
ALT SERPL W/O P-5'-P-CCNC: 20 U/L (ref 10–44)
ANION GAP SERPL CALC-SCNC: 12 MMOL/L (ref 8–16)
ANION GAP SERPL CALC-SCNC: 13 MMOL/L (ref 8–16)
ANION GAP SERPL CALC-SCNC: 13 MMOL/L (ref 8–16)
ANION GAP SERPL CALC-SCNC: 15 MMOL/L (ref 8–16)
ANION GAP SERPL CALC-SCNC: 15 MMOL/L (ref 8–16)
AST SERPL-CCNC: 31 U/L (ref 10–40)
BASOPHILS # BLD AUTO: 0.03 K/UL (ref 0–0.2)
BASOPHILS NFR BLD: 0.9 % (ref 0–1.9)
BILIRUB SERPL-MCNC: 3 MG/DL (ref 0.1–1)
BUN SERPL-MCNC: 38 MG/DL (ref 8–23)
BUN SERPL-MCNC: 48 MG/DL (ref 8–23)
BUN SERPL-MCNC: 48 MG/DL (ref 8–23)
BUN SERPL-MCNC: 57 MG/DL (ref 8–23)
BUN SERPL-MCNC: 57 MG/DL (ref 8–23)
CALCIUM SERPL-MCNC: 8.6 MG/DL (ref 8.7–10.5)
CALCIUM SERPL-MCNC: 8.6 MG/DL (ref 8.7–10.5)
CALCIUM SERPL-MCNC: 9 MG/DL (ref 8.7–10.5)
CALCIUM SERPL-MCNC: 9.1 MG/DL (ref 8.7–10.5)
CALCIUM SERPL-MCNC: 9.1 MG/DL (ref 8.7–10.5)
CHLORIDE SERPL-SCNC: 105 MMOL/L (ref 95–110)
CHLORIDE SERPL-SCNC: 105 MMOL/L (ref 95–110)
CHLORIDE SERPL-SCNC: 106 MMOL/L (ref 95–110)
CHLORIDE SERPL-SCNC: 106 MMOL/L (ref 95–110)
CHLORIDE SERPL-SCNC: 107 MMOL/L (ref 95–110)
CO2 SERPL-SCNC: 23 MMOL/L (ref 23–29)
CO2 SERPL-SCNC: 24 MMOL/L (ref 23–29)
CO2 SERPL-SCNC: 24 MMOL/L (ref 23–29)
CO2 SERPL-SCNC: 25 MMOL/L (ref 23–29)
CO2 SERPL-SCNC: 25 MMOL/L (ref 23–29)
CREAT SERPL-MCNC: 2.1 MG/DL (ref 0.5–1.4)
CREAT SERPL-MCNC: 2.9 MG/DL (ref 0.5–1.4)
CREAT SERPL-MCNC: 2.9 MG/DL (ref 0.5–1.4)
CREAT SERPL-MCNC: 3.3 MG/DL (ref 0.5–1.4)
CREAT SERPL-MCNC: 3.3 MG/DL (ref 0.5–1.4)
DELSYS: ABNORMAL
DIFFERENTIAL METHOD: ABNORMAL
EOSINOPHIL # BLD AUTO: 0.1 K/UL (ref 0–0.5)
EOSINOPHIL NFR BLD: 2.1 % (ref 0–8)
ERYTHROCYTE [DISTWIDTH] IN BLOOD BY AUTOMATED COUNT: 20.2 % (ref 11.5–14.5)
EST. GFR  (AFRICAN AMERICAN): 15.6 ML/MIN/1.73 M^2
EST. GFR  (AFRICAN AMERICAN): 15.6 ML/MIN/1.73 M^2
EST. GFR  (AFRICAN AMERICAN): 18.2 ML/MIN/1.73 M^2
EST. GFR  (AFRICAN AMERICAN): 18.2 ML/MIN/1.73 M^2
EST. GFR  (AFRICAN AMERICAN): 26.9 ML/MIN/1.73 M^2
EST. GFR  (NON AFRICAN AMERICAN): 13.5 ML/MIN/1.73 M^2
EST. GFR  (NON AFRICAN AMERICAN): 13.5 ML/MIN/1.73 M^2
EST. GFR  (NON AFRICAN AMERICAN): 15.8 ML/MIN/1.73 M^2
EST. GFR  (NON AFRICAN AMERICAN): 15.8 ML/MIN/1.73 M^2
EST. GFR  (NON AFRICAN AMERICAN): 23.3 ML/MIN/1.73 M^2
GLUCOSE SERPL-MCNC: 115 MG/DL (ref 70–110)
GLUCOSE SERPL-MCNC: 153 MG/DL (ref 70–110)
GLUCOSE SERPL-MCNC: 153 MG/DL (ref 70–110)
GLUCOSE SERPL-MCNC: 154 MG/DL (ref 70–110)
GLUCOSE SERPL-MCNC: 154 MG/DL (ref 70–110)
HCO3 UR-SCNC: 23.4 MMOL/L (ref 24–28)
HCT VFR BLD AUTO: 23.3 % (ref 37–48.5)
HGB BLD-MCNC: 7.6 G/DL (ref 12–16)
IMM GRANULOCYTES # BLD AUTO: 0.02 K/UL (ref 0–0.04)
IMM GRANULOCYTES NFR BLD AUTO: 0.6 % (ref 0–0.5)
INR PPP: 1.7 (ref 0.8–1.2)
LYMPHOCYTES # BLD AUTO: 0.5 K/UL (ref 1–4.8)
LYMPHOCYTES NFR BLD: 15 % (ref 18–48)
MAGNESIUM SERPL-MCNC: 2.4 MG/DL (ref 1.6–2.6)
MAGNESIUM SERPL-MCNC: 2.5 MG/DL (ref 1.6–2.6)
MAGNESIUM SERPL-MCNC: 2.5 MG/DL (ref 1.6–2.6)
MCH RBC QN AUTO: 33.5 PG (ref 27–31)
MCHC RBC AUTO-ENTMCNC: 32.6 G/DL (ref 32–36)
MCV RBC AUTO: 103 FL (ref 82–98)
MONOCYTES # BLD AUTO: 0.5 K/UL (ref 0.3–1)
MONOCYTES NFR BLD: 14.4 % (ref 4–15)
NEUTROPHILS # BLD AUTO: 2.2 K/UL (ref 1.8–7.7)
NEUTROPHILS NFR BLD: 67 % (ref 38–73)
NRBC BLD-RTO: 0 /100 WBC
PCO2 BLDA: 28.1 MMHG (ref 35–45)
PH SMN: 7.53 [PH] (ref 7.35–7.45)
PHOSPHATE SERPL-MCNC: 3.4 MG/DL (ref 2.7–4.5)
PHOSPHATE SERPL-MCNC: 4.6 MG/DL (ref 2.7–4.5)
PHOSPHATE SERPL-MCNC: 4.6 MG/DL (ref 2.7–4.5)
PLATELET # BLD AUTO: 29 K/UL (ref 150–350)
PMV BLD AUTO: 11.5 FL (ref 9.2–12.9)
PO2 BLDA: 23 MMHG (ref 40–60)
POC BE: 1 MMOL/L
POC SATURATED O2: 50 % (ref 95–100)
POC TCO2: 24 MMOL/L (ref 24–29)
POCT GLUCOSE: 147 MG/DL (ref 70–110)
POCT GLUCOSE: 176 MG/DL (ref 70–110)
POCT GLUCOSE: 176 MG/DL (ref 70–110)
POCT GLUCOSE: 178 MG/DL (ref 70–110)
POTASSIUM SERPL-SCNC: 3.5 MMOL/L (ref 3.5–5.1)
POTASSIUM SERPL-SCNC: 3.5 MMOL/L (ref 3.5–5.1)
POTASSIUM SERPL-SCNC: 3.8 MMOL/L (ref 3.5–5.1)
POTASSIUM SERPL-SCNC: 3.8 MMOL/L (ref 3.5–5.1)
POTASSIUM SERPL-SCNC: 3.9 MMOL/L (ref 3.5–5.1)
PROT SERPL-MCNC: 6.5 G/DL (ref 6–8.4)
PROTHROMBIN TIME: 16.5 SEC (ref 9–12.5)
RBC # BLD AUTO: 2.27 M/UL (ref 4–5.4)
SAMPLE: ABNORMAL
SITE: ABNORMAL
SODIUM SERPL-SCNC: 142 MMOL/L (ref 136–145)
SODIUM SERPL-SCNC: 143 MMOL/L (ref 136–145)
SODIUM SERPL-SCNC: 143 MMOL/L (ref 136–145)
SODIUM SERPL-SCNC: 145 MMOL/L (ref 136–145)
SODIUM SERPL-SCNC: 145 MMOL/L (ref 136–145)
WBC # BLD AUTO: 3.34 K/UL (ref 3.9–12.7)

## 2019-08-08 PROCEDURE — 84100 ASSAY OF PHOSPHORUS: CPT

## 2019-08-08 PROCEDURE — A4217 STERILE WATER/SALINE, 500 ML: HCPCS | Performed by: STUDENT IN AN ORGANIZED HEALTH CARE EDUCATION/TRAINING PROGRAM

## 2019-08-08 PROCEDURE — 80053 COMPREHEN METABOLIC PANEL: CPT

## 2019-08-08 PROCEDURE — 99233 SBSQ HOSP IP/OBS HIGH 50: CPT | Mod: GC,,, | Performed by: INTERNAL MEDICINE

## 2019-08-08 PROCEDURE — 25000003 PHARM REV CODE 250: Performed by: STUDENT IN AN ORGANIZED HEALTH CARE EDUCATION/TRAINING PROGRAM

## 2019-08-08 PROCEDURE — 94761 N-INVAS EAR/PLS OXIMETRY MLT: CPT

## 2019-08-08 PROCEDURE — 63600175 PHARM REV CODE 636 W HCPCS: Performed by: INTERNAL MEDICINE

## 2019-08-08 PROCEDURE — 99233 PR SUBSEQUENT HOSPITAL CARE,LEVL III: ICD-10-PCS | Mod: GC,,, | Performed by: INTERNAL MEDICINE

## 2019-08-08 PROCEDURE — 83735 ASSAY OF MAGNESIUM: CPT

## 2019-08-08 PROCEDURE — 83735 ASSAY OF MAGNESIUM: CPT | Mod: 91

## 2019-08-08 PROCEDURE — 85025 COMPLETE CBC W/AUTO DIFF WBC: CPT

## 2019-08-08 PROCEDURE — 20000000 HC ICU ROOM

## 2019-08-08 PROCEDURE — C9113 INJ PANTOPRAZOLE SODIUM, VIA: HCPCS | Performed by: INTERNAL MEDICINE

## 2019-08-08 PROCEDURE — 90945 DIALYSIS ONE EVALUATION: CPT

## 2019-08-08 PROCEDURE — 99291 PR CRITICAL CARE, E/M 30-74 MINUTES: ICD-10-PCS | Mod: GC,,, | Performed by: INTERNAL MEDICINE

## 2019-08-08 PROCEDURE — 63600175 PHARM REV CODE 636 W HCPCS: Performed by: STUDENT IN AN ORGANIZED HEALTH CARE EDUCATION/TRAINING PROGRAM

## 2019-08-08 PROCEDURE — 80100008 HC CRRT DAILY MAINTENANCE

## 2019-08-08 PROCEDURE — 80069 RENAL FUNCTION PANEL: CPT

## 2019-08-08 PROCEDURE — 99291 CRITICAL CARE FIRST HOUR: CPT | Mod: GC,,, | Performed by: INTERNAL MEDICINE

## 2019-08-08 PROCEDURE — 85610 PROTHROMBIN TIME: CPT

## 2019-08-08 PROCEDURE — 63600175 PHARM REV CODE 636 W HCPCS: Performed by: HOSPITALIST

## 2019-08-08 RX ORDER — LACTULOSE 10 G/15ML
200 SOLUTION ORAL; RECTAL 3 TIMES DAILY
Status: DISCONTINUED | OUTPATIENT
Start: 2019-08-08 | End: 2019-08-09

## 2019-08-08 RX ORDER — MAGNESIUM SULFATE HEPTAHYDRATE 40 MG/ML
2 INJECTION, SOLUTION INTRAVENOUS
Status: DISCONTINUED | OUTPATIENT
Start: 2019-08-08 | End: 2019-08-09

## 2019-08-08 RX ORDER — PANTOPRAZOLE SODIUM 40 MG/10ML
40 INJECTION, POWDER, LYOPHILIZED, FOR SOLUTION INTRAVENOUS ONCE
Status: COMPLETED | OUTPATIENT
Start: 2019-08-08 | End: 2019-08-08

## 2019-08-08 RX ORDER — HYDROCODONE BITARTRATE AND ACETAMINOPHEN 500; 5 MG/1; MG/1
TABLET ORAL
Status: DISCONTINUED | OUTPATIENT
Start: 2019-08-08 | End: 2019-08-09

## 2019-08-08 RX ORDER — HYDROCODONE BITARTRATE AND ACETAMINOPHEN 500; 5 MG/1; MG/1
TABLET ORAL CONTINUOUS
Status: DISCONTINUED | OUTPATIENT
Start: 2019-08-08 | End: 2019-08-09

## 2019-08-08 RX ADMIN — CEFTRIAXONE SODIUM 2 G: 2 INJECTION, POWDER, FOR SOLUTION INTRAMUSCULAR; INTRAVENOUS at 10:08

## 2019-08-08 RX ADMIN — LACTULOSE 200 G: 10 SOLUTION ORAL at 09:08

## 2019-08-08 RX ADMIN — PANTOPRAZOLE SODIUM 40 MG: 40 INJECTION, POWDER, LYOPHILIZED, FOR SOLUTION INTRAVENOUS at 10:08

## 2019-08-08 RX ADMIN — ASCORBIC ACID, VITAMIN A PALMITATE, CHOLECALCIFEROL, THIAMINE HYDROCHLORIDE, RIBOFLAVIN-5 PHOSPHATE SODIUM, PYRIDOXINE HYDROCHLORIDE, NIACINAMIDE, DEXPANTHENOL, ALPHA-TOCOPHEROL ACETATE, VITAMIN K1, FOLIC ACID, BIOTIN, CYANOCOBALAMIN: 200; 3300; 200; 6; 3.6; 6; 40; 15; 10; 150; 600; 60; 5 INJECTION, SOLUTION INTRAVENOUS at 09:08

## 2019-08-08 RX ADMIN — SODIUM CHLORIDE: 0.9 INJECTION, SOLUTION INTRAVENOUS at 08:08

## 2019-08-08 NOTE — SUBJECTIVE & OBJECTIVE
Interval History: Pt underwent SLED for metabolic clearance overnight 8/6 and was much more alert and interactive. Pt resting comfortably in bed this AM. Family pursuing comfort care and transport of patient back to [place].     Review of patient's allergies indicates:  No Known Allergies  Current Facility-Administered Medications   Medication Frequency    0.9%  NaCl infusion (for blood administration) Q24H PRN    acetaminophen tablet 325 mg Q4H PRN    cefTRIAXone (ROCEPHIN) 2 g in dextrose 5 % 50 mL IVPB Q24H    dextrose 10% (D10W) Bolus PRN    dextrose 10% (D10W) Bolus PRN    glucagon (human recombinant) injection 1 mg PRN    glucose chewable tablet 16 g PRN    glucose chewable tablet 24 g PRN    insulin aspart U-100 pen 0-5 Units Q6H PRN    magnesium sulfate 2g in water 50mL IVPB (premix) PRN    ondansetron injection 4 mg Q6H PRN    pantoprazole injection 40 mg Daily    prochlorperazine injection Soln 10 mg Q6H PRN    simethicone chewable tablet 80 mg TID PRN    sodium chloride 0.9% flush 10 mL PRN    sodium phosphate 20.01 mmol in dextrose 5 % 250 mL IVPB PRN    sodium phosphate 30 mmol in dextrose 5 % 250 mL IVPB PRN    sodium phosphate 39.99 mmol in dextrose 5 % 250 mL IVPB PRN       Objective:     Vital Signs (Most Recent):  Temp: 97.4 °F (36.3 °C) (08/08/19 0700)  Pulse: 86 (08/08/19 0900)  Resp: 16 (08/08/19 0900)  BP: 139/65 (08/08/19 0900)  SpO2: 100 % (08/08/19 0900)  O2 Device (Oxygen Therapy): room air (08/08/19 0900) Vital Signs (24h Range):  Temp:  [97.4 °F (36.3 °C)-98.4 °F (36.9 °C)] 97.4 °F (36.3 °C)  Pulse:  [74-87] 86  Resp:  [13-41] 16  SpO2:  [96 %-100 %] 100 %  BP: ()/(54-77) 139/65     Weight: 56 kg (123 lb 7.3 oz) (07/30/19 1000)  Body mass index is 24.11 kg/m².  Body surface area is 1.54 meters squared.    I/O last 3 completed shifts:  In: 4088.8 [I.V.:1690; NG/GT:690; IV Piggyback:100]  Out: 2355 [Urine:425; Other:1354; Stool:576]    Physical Exam    Constitutional: She appears well-developed and well-nourished. She appears cachectic. No distress.   HENT:   Head: Normocephalic and atraumatic.   Eyes: EOM are normal. Scleral icterus is present.   Neck: Normal range of motion.   Cardiovascular: Normal rate, regular rhythm and normal heart sounds.   No murmur heard.  Pulmonary/Chest: Effort normal and breath sounds normal.   Abdominal: Soft. Bowel sounds are normal. She exhibits ascites. There is hepatosplenomegaly. There is no tenderness.   Musculoskeletal: She exhibits no edema.   Neurological: She is alert. She is disoriented.   Skin: Skin is warm and dry. Ecchymosis noted. She is not diaphoretic.   Nursing note and vitals reviewed.      Significant Labs:  CBC:   Recent Labs   Lab 08/07/19  0501 08/08/19  0414   WBC 5.62 3.34*   RBC 2.67* 2.27*   HGB 9.1* 7.6*   HCT 27.3* 23.3*   PLT 58*  --    * 103*   MCH 34.1* 33.5*   MCHC 33.3 32.6     CMP:   Recent Labs   Lab 08/08/19  0414   *  154*   CALCIUM 9.1  9.1   ALBUMIN 3.9  3.9   PROT 6.5     145   K 3.5  3.5   CO2 25  25     105   BUN 48*  48*   CREATININE 2.9*  2.9*   ALKPHOS 114   ALT 20   AST 31   BILITOT 3.0*     Recent Labs   Lab 08/05/19  1607   COLORU Yellow   SPECGRAV 1.015   PHUR 5.0   PROTEINUA Negative   NITRITE Negative   LEUKOCYTESUR Negative   HYALINECASTS 10*         All labs within the past 24 hours have been reviewed.     Significant Imaging:  No new imaging in the past 24 hours

## 2019-08-08 NOTE — ASSESSMENT & PLAN NOTE
Treatment plan per primary team   MELD-Na score: 27 at 8/8/2019  4:14 AM  MELD score: 27 at 8/8/2019  4:14 AM  Calculated from:  Serum Creatinine: 2.9 mg/dL at 8/8/2019  4:14 AM  Serum Sodium: 145 mmol/L (Rounded to 137 mmol/L) at 8/8/2019  4:14 AM  Total Bilirubin: 3.0 mg/dL at 8/8/2019  4:14 AM  INR(ratio): 1.7 at 8/8/2019  4:14 AM  Age: 68 years

## 2019-08-08 NOTE — PROGRESS NOTES
[facility]  Critical Care Medicine  Progress Note    Patient Name: Michelle Ojeda  MRN: [id number]  Admission Date: 7/18/2019  Hospital Length of Stay: 21 days  Code Status: Partial Code  Attending Provider: Saravanan Batista MD  Primary Care Provider: Primary Doctor No   Principal Problem: Acute metabolic encephalopathy    Subjective:     HPI:  Michelle Ojeda is a 68 year old female with history of IZAGUIRRE cirrhosis, HTN and hypothyroidism who presented to [facility] on 7/18/2019 as a direct admit from [facility] for hyponatremia. Patient is from [place] and requesting liver/kidney transplant evaluation. Her cirrhosis is complicated by ascites requiring biweekly paracentesis, sarcopenia, esophageal varices, hyponatremia and hepatic encephalopathy. Per daughter, patient with functional decline over the last 2 months with complaints of lethargy, decreased oral intake, and peripheral edema. Patient initially admitted to hospital medicine for treatment of hyponatremia with fluid restriction. During liver work up, incidental renal mass found and urology was consulted. Patient developed an ileus and unable to tolerate lactulose while on the floor. With acute respiratory failure, hepatic encephalopathy, and N/V, patient intubated on 7/24. Patient intubated during her ICU stay, extubated - stepped down to IM-L team (on 7/30) where her course has now been complicated by worsening hepatic encephalopathy due to lack of oral lactulose and worsening uremia as her renal function is again worsening. Patient became more confused on 8/5 with regular BM and work-up for infection. NG tube placed since patient not tolerating enemas for lactulose but started having bleeding concerning for varices. NG now removed. Patient remains altered.     Family initially expressed interest in DNR status and comfort measures but now feel they would like to attempt dialysis for her encephalopathy and  electrolyte abnormalities with the end goal of getting patient to [place] with family. Daughter and son understand patient's condition and would like to remain with no chest compressions but are interested in a short course of intubation for acute respiratory issues, while patient's children fly in to [place]. No new episodes of hematemesis after OG tube removed but patient remains altered today.     Hospital/ICU Course:  Nephrology and hepatology assisting.  PPN discontinued. Patient's encephalopathy improving.     Interval History/Significant Events: please see hospital course    Review of Systems   Constitutional: Negative for chills and fever.   Eyes: Negative for visual disturbance.   Cardiovascular: Negative for chest pain and palpitations.   Gastrointestinal: Negative for abdominal pain, blood in stool, constipation, diarrhea, nausea and vomiting.   Genitourinary: Negative for difficulty urinating, dysuria and hematuria.   Musculoskeletal: Negative for back pain and myalgias.   Skin: Negative for rash.   Neurological: Negative for weakness and headaches.   Hematological: Bruises/bleeds easily.     Objective:     Vital Signs (Most Recent):  Temp: 98.4 °F (36.9 °C) (08/08/19 0400)  Pulse: 82 (08/08/19 0754)  Resp: (!) 37 (08/08/19 0754)  BP: (!) 119/59 (08/08/19 0700)  SpO2: 97 % (08/08/19 0754) Vital Signs (24h Range):  Temp:  [97.8 °F (36.6 °C)-98.4 °F (36.9 °C)] 98.4 °F (36.9 °C)  Pulse:  [74-87] 82  Resp:  [13-41] 37  SpO2:  [96 %-100 %] 97 %  BP: ()/(54-77) 119/59   Weight: 56 kg (123 lb 7.3 oz)  Body mass index is 24.11 kg/m².      Intake/Output Summary (Last 24 hours) at 8/8/2019 0811  Last data filed at 8/8/2019 0700  Gross per 24 hour   Intake 2101.6 ml   Output 994 ml   Net 1107.6 ml       Physical Exam   Constitutional: She appears cachectic.  Non-toxic appearance. She has a sickly appearance. She appears ill. No distress.   Frail / cachectic  Diffuse bruising, purpura   Flexi in  place  [place] in place   Eyes: Right eye exhibits no discharge. Left eye exhibits no discharge. No scleral icterus.   Cardiovascular: Normal rate, S1 normal and S2 normal.   No murmur heard.  Pulmonary/Chest: Effort normal. No respiratory distress. She has no wheezes.   Abdominal: Soft.   Musculoskeletal: She exhibits no edema.   Neurological: She is alert.   Skin: Skin is warm. She is not diaphoretic.   Nursing note and vitals reviewed.        Lines/Drains/Airways     Central Venous Catheter Line                 Trialysis (Dialysis) Catheter 08/07/19 0135 right internal jugular 1 day          Drain                 Urethral Catheter 08/05/19 1138 Straight-tip 16 Fr. 2 days         Rectal Tube 08/07/19 0930 rectal tube w/ balloon (indicate number of mLs) less than 1 day          Peripheral Intravenous Line                 Peripheral IV - Single Lumen 08/06/19 20 G Left;Posterior Hand 2 days         Midline Catheter Insertion/Assessment  - Single Lumen 08/06/19 1552 Right basilic vein (medial side of arm) 18g x 8cm 1 day              Significant Labs:    CBC/Anemia Profile:  Recent Labs   Lab 08/06/19  1451 08/07/19  0501 08/08/19  0414   WBC 3.84* 5.62 3.34*   HGB 8.5* 9.1* 7.6*   HCT 25.6* 27.3* 23.3*   PLT 64* 58*  --    * 102* 103*   RDW 20.3* 20.2* 20.2*        Chemistries:  Recent Labs   Lab 08/07/19  0501 08/07/19  1413 08/07/19  2237 08/08/19  0414   * 143 144 145  145   K 3.2* 3.5 3.5 3.5  3.5    106 106 105  105   CO2 22* 27 26 25  25   BUN 56* 37* 44* 48*  48*   CREATININE 2.1* 2.1* 2.6* 2.9*  2.9*   CALCIUM 9.3 8.6* 8.9 9.1  9.1   ALBUMIN 4.4 3.9 3.8 3.9  3.9   PROT 7.3  --   --  6.5   BILITOT 3.2*  --   --  3.0*   ALKPHOS 135  --   --  114   ALT 24  --   --  20   AST 33  --   --  31   MG 2.6 2.7* 2.5 2.5  2.5   PHOS 2.3* 2.2* 2.6* 3.4  3.4       All pertinent labs within the past 24 hours have been reviewed.    Significant Imaging:  I have reviewed all pertinent imaging  results/findings within the past 24 hours.      ABG  Recent Labs   Lab 08/06/19  1604   PH 7.528*   PO2 23*   PCO2 28.1*   HCO3 23.4*   BE 1     Assessment/Plan:     Neuro  * Acute metabolic encephalopathy  68 year old female with hepatic and kidney failure who is profoundly encephalopathic due to severe hyperammonemia and uremia. She had temporarily improved with lactulose but is now declining due to her multiorgan dysfunction. Pt has been denied for transplant. The family understands that she is dying but hope to get her better enough to get back to [place] to be with family. Patient recently had issues tolerating enemas and had NG placed with hematemesis. Concern for gastric varices and NG was removed. GI performed EGD that showed not actively bleeding varices, but did show esophagitis and jennifer jaeger tears. Patient remains altered with last ammonia 188.   -continue lactulose enemas if flexiseal remains in place   -[facility] daily    Pulmonary  Acute hypoxemic respiratory failure  Intubated for airway protection in setting of severe encephalopathy and episodes of emesis/concern for aspiration during admission during admission. Currently during well on RA. Family still interested in intubation pending remaining family coming to [place] from [place].    - rocephin for GIB coverage in setting of cirrhosis   -No focal signs of consolidation on CXR   -extubated on 7/27   -monitor oxygen saturation      Renal/  Right kidney mass  R kidney mass measures 2.4 cm x 2.3 cm found on CT abd/pelvis during transplant workup. Urology following with plans to hold on any further work up in setting of acute decompensation   -PRN pain control as indicated     Acute kidney injury superimposed on chronic kidney disease  Baseline reportedly sCr 1.6. Nephrology following,concern iATN given recent shock.  Also concern for intravascular volume depletion given significant stool output.   Retroperitoneal US on 7/23 negative for  hydronephrosis. With rising sCr and concern for uremia with BUN of 125, RIJ trialysis catheter placed   -urine studies concerning for pre-renal etiology.  Started on albumin and continued on midodrine, NPO with no access   -Strict I/Os   -nephrology following    Hematology  Coagulopathy  See cirrhosis    Oncology  Pancytopenia  See cirrhosis    Endocrine  Hyperglycemia  Patient with Hx of DM however was taken off metformin due to improvement  --A1c 5.1  --insulin infusion discontinued on 7/27 when enteral feedings where held.   --frequent glucose checks with SSI  --glucose goal 140-180    Hypothyroidism  --Continue home levothyroxine    GI  Upper GI bleed  History of hematemesis after OG line placement. EGD read with not actively bleeding varices. Will continue to monitor for bleeding.   -GI recommendations   -trend CBC   -avoid NG tubes.     Other dysphagia  --SLP following.  Dental soft diet with thin liquids    Ileus  --improved.  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 MELD score of 24 today before dilaysis.    -Continue lactulose and rifaximin   -Hepatology following - declined/deferred for transplant      Other  Shock, unspecified  --Likely due to sedating medications however patient at risk for infection  --Blood culture, sputum culture, and peritoneal fluid negative  --UA appearing noninfectious  --Continue midodrine 10 mg TID    Physical debility  --PT/OT consulted       Critical Care Daily Checklist:    A: Awake: RASS Goal/Actual Goal: RASS Goal: 0-->alert and calm  Actual: Kelsey Agitation Sedation Scale (RASS): Alert and calm   B: Spontaneous Breathing Trial Performed? NA on RA   C: SAT & SBT Coordinated?  NA          D: Delirium: CAM-ICU Overall CAM-ICU: Negative   E: Early Mobility Performed? No   F: Feeding Goal: Goals: Meet % EEN, EPN  Status: Nutrition Goal Status:  progressing towards goal   Current Diet Order   Procedures    Diet NPO      AS: Analgesia/Sedation none   T: Thromboembolic Prophylaxis Platelets <50, SCDs   H: HOB > 300 YES   U: Stress Ulcer Prophylaxis (if needed) PPI IV   G: Glucose Control none   B: Bowel Function Stool Occurrence: 1   I: Indwelling Catheter (Lines & Leigh) Necessity Flexi, leigh, CVC, PIV   D: De-escalation of Antimicrobials/Pharmacotherapies Rocephin for now - will de-escalate pending GOC    Plan for the day/ETD GOC     Code Status:  Family/Goals of Care: Partial Code         Critical secondary to Patient has a condition that poses threat to life and bodily function: Acute Renal Failure and liver failure, debility       Critical care was time spent personally by me on the following activities: development of treatment plan with patient or surrogate and bedside caregivers, discussions with consultants, evaluation of patient's response to treatment, examination of patient, ordering and performing treatments and interventions, ordering and review of laboratory studies, ordering and review of radiographic studies, pulse oximetry, re-evaluation of patient's condition. This critical care time did not overlap with that of any other provider or involve time for any procedures.     Robb Zelaya MD  Critical Care Medicine  [facility]

## 2019-08-08 NOTE — PLAN OF CARE
Problem: Adult Inpatient Plan of Care  Goal: Plan of Care Review  Outcome: Ongoing (interventions implemented as appropriate)  No acute events throughout day. See vital signs and assessments in flowsheets. See below for updates on today's progress.     Pulmonary: SpO2 % RA. No dyspnea observed or reported.     Cardiovascular: NSR BP 110s/50s. All pulses palpable. Plan for SLED CRRT tonight.     Neurological: AAOx4. Anxious re: dialysis, tearful at times. Easily consoled.     Gastrointestinal: Flexi for liquid dark brown stool. Minimal output today but kept for resumption of lactulose enemas tonight. Speech eval needed for diet orders.     Genitourinary: Oliguric 5-10cc/hr    Endocrine: BG wnl.     Skin/Bath:  Date of last CHG bath given: 8/8 AM shift    Infusions: N/A    Patient progressing towards goals as tolerated, plan of care communicated and reviewed with patient and family. All concerns addressed. Will continue to monitor.

## 2019-08-08 NOTE — ASSESSMENT & PLAN NOTE
Rusty is a 67 yo F with liver cirrhosis and CKD admitted for hyponatremia. Hyponatremia may be related to several factors primarily liver cirrhosis requiring biweekly paracentesis. This leads to a hypovolemic hyponatremic state in which fluid is pulled out of the vasculature. Additional contributing factors related to ileus, NPO status, and TPN nutrition. Extra sodium in TPN has increased sodium. Now on renal diet with 1000mL fluid restriction. Baseline Na+ from labs in [place] between 123-124.   - Pt now appears slightly hypernatremic, despite 140meq dialysate. Unexpected finding, possibly from free water loss due to lactulose  - recommend holding lactulose at this time  - Midodrine 10 mg TID  - Strict I/O monitoring

## 2019-08-08 NOTE — PROGRESS NOTES
[facility]  Nephrology  Progress Note    Patient Name: Michelle Ojeda  MRN: [id number]  Admission Date: 7/18/2019  Hospital Length of Stay: 21 days  Attending Provider: Saravanan Batista MD   Primary Care Physician: Primary Doctor No  Principal Problem:Acute metabolic encephalopathy    Subjective:     HPI: Rusty Martinez is 67 yo F with CKD stage 4 presenting from transplant hepatology clinic for hyponatremia on 7/18/19. She is originally from [place], here for liver transplant evaluation. She was diagnosed with IZAGUIRRE in grade 1 hepatic encephalopathy with severe ascites and esophogeal varices (s/p banding). During the month prior to admission she felt more lethargic and was requiring biweekly paracentesis. Of note, she is also being treated for Ileus her acute hepatic encephalopathy with lactulose. Recent 24 hour cr clearance study was near 21. Reported baseline Cr 1.6-1.8 and chronic hyponatremia range between 123-124.     Interval History: Pt underwent SLED for metabolic clearance overnight 8/6 and was much more alert and interactive. Pt resting comfortably in bed this AM. Family pursuing comfort care and transport of patient back to [place].     Review of patient's allergies indicates:  No Known Allergies  Current Facility-Administered Medications   Medication Frequency    0.9%  NaCl infusion (for blood administration) Q24H PRN    acetaminophen tablet 325 mg Q4H PRN    cefTRIAXone (ROCEPHIN) 2 g in dextrose 5 % 50 mL IVPB Q24H    dextrose 10% (D10W) Bolus PRN    dextrose 10% (D10W) Bolus PRN    glucagon (human recombinant) injection 1 mg PRN    glucose chewable tablet 16 g PRN    glucose chewable tablet 24 g PRN    insulin aspart U-100 pen 0-5 Units Q6H PRN    magnesium sulfate 2g in water 50mL IVPB (premix) PRN    ondansetron injection 4 mg Q6H PRN    pantoprazole injection 40 mg Daily    prochlorperazine injection Soln 10 mg Q6H PRN    simethicone chewable tablet 80  mg TID PRN    sodium chloride 0.9% flush 10 mL PRN    sodium phosphate 20.01 mmol in dextrose 5 % 250 mL IVPB PRN    sodium phosphate 30 mmol in dextrose 5 % 250 mL IVPB PRN    sodium phosphate 39.99 mmol in dextrose 5 % 250 mL IVPB PRN       Objective:     Vital Signs (Most Recent):  Temp: 97.4 °F (36.3 °C) (08/08/19 0700)  Pulse: 86 (08/08/19 0900)  Resp: 16 (08/08/19 0900)  BP: 139/65 (08/08/19 0900)  SpO2: 100 % (08/08/19 0900)  O2 Device (Oxygen Therapy): room air (08/08/19 0900) Vital Signs (24h Range):  Temp:  [97.4 °F (36.3 °C)-98.4 °F (36.9 °C)] 97.4 °F (36.3 °C)  Pulse:  [74-87] 86  Resp:  [13-41] 16  SpO2:  [96 %-100 %] 100 %  BP: ()/(54-77) 139/65     Weight: 56 kg (123 lb 7.3 oz) (07/30/19 1000)  Body mass index is 24.11 kg/m².  Body surface area is 1.54 meters squared.    I/O last 3 completed shifts:  In: 4088.8 [I.V.:1690; NG/GT:690; IV Piggyback:100]  Out: 2355 [Urine:425; Other:1354; Stool:576]    Physical Exam   Constitutional: She appears well-developed and well-nourished. She appears cachectic. No distress.   HENT:   Head: Normocephalic and atraumatic.   Eyes: EOM are normal. Scleral icterus is present.   Neck: Normal range of motion.   Cardiovascular: Normal rate, regular rhythm and normal heart sounds.   No murmur heard.  Pulmonary/Chest: Effort normal and breath sounds normal.   Abdominal: Soft. Bowel sounds are normal. She exhibits ascites. There is hepatosplenomegaly. There is no tenderness.   Musculoskeletal: She exhibits no edema.   Neurological: She is alert. She is disoriented.   Skin: Skin is warm and dry. Ecchymosis noted. She is not diaphoretic.   Nursing note and vitals reviewed.      Significant Labs:  CBC:   Recent Labs   Lab 08/07/19  0501 08/08/19 0414   WBC 5.62 3.34*   RBC 2.67* 2.27*   HGB 9.1* 7.6*   HCT 27.3* 23.3*   PLT 58*  --    * 103*   MCH 34.1* 33.5*   MCHC 33.3 32.6     CMP:   Recent Labs   Lab 08/08/19  0414   *  154*   CALCIUM 9.1  9.1    ALBUMIN 3.9  3.9   PROT 6.5     145   K 3.5  3.5   CO2 25  25     105   BUN 48*  48*   CREATININE 2.9*  2.9*   ALKPHOS 114   ALT 20   AST 31   BILITOT 3.0*     Recent Labs   Lab 08/05/19  1607   COLORU Yellow   SPECGRAV 1.015   PHUR 5.0   PROTEINUA Negative   NITRITE Negative   LEUKOCYTESUR Negative   HYALINECASTS 10*         All labs within the past 24 hours have been reviewed.     Significant Imaging:  No new imaging in the past 24 hours    Assessment/Plan:     * Acute metabolic encephalopathy  Improved with dialysis    Right kidney mass  Patient has new Right renal mass found on CT abd on 7/22/19. Previous Ct from 2016 and MRI studies from 2019 in [place] did not demonstrate this finding.     Recommendations  - Urology recommends f/u outpatient as she is not a good surgical or biopsy candidate.   - further workup pending goals of care discussion.     Acute kidney injury superimposed on chronic kidney disease  Labs from [place] nephrology demonstrated Cr of 1.77 in 3/12/19, 1.67 on 4/5/19, 1.96 in 6/13/19, and 1.83 on 6/24. Cr of 2.8 on 7/26/19 above her baseline due to ATN secondary to hypoperfusion of kidneys related to hypotensive episodes. Hepatorenal syndrome not suspected at this time as the patient's Cr and urine output have improved with an albumin challenge. TERA could be exacerbated with noted Hgb drop from 9.9 on 7/27 to 7.2 on 7/29.     Recommendations:   - Will continue to dialyze pt as needed. No acute indication at this time. With family discussing comfort care measures and possibly wanting to transfer patient back to [place], can orchestrate dialysis to time with the day before transport  - c/w Midodrine 10mg TID   - Recommend continuing albumin 50g IV daily; continue Sodium Bicarbonate 1300mg PO BID  - Monitor with BMP  - Strict I/Os   - Avoid Nephrotoxic agents  - Monitor for signs of blood loss  - Hgb > 7     Hyponatremia  Rusty is a 67 yo F with liver cirrhosis  and CKD admitted for hyponatremia. Hyponatremia may be related to several factors primarily liver cirrhosis requiring biweekly paracentesis. This leads to a hypovolemic hyponatremic state in which fluid is pulled out of the vasculature. Additional contributing factors related to ileus, NPO status, and TPN nutrition. Extra sodium in TPN has increased sodium. Now on renal diet with 1000mL fluid restriction. Baseline Na+ from labs in [place] between 123-124.   - Pt now appears slightly hypernatremic, despite 140meq dialysate. 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 I will follow-up with patient. Please contact us if you have any additional questions.    Otilio Gibson, DO  Nephrology  [facility]

## 2019-08-08 NOTE — PROGRESS NOTES
Hepatology Progress Note    Michelle Ojeda is a 68 y.o. female admitted to hospital 7/18/2019 (Hospital Day: 22) due to Acute metabolic encephalopathy.     Subjective:  Encephalopathy improved this am. Daughter feels patient is more alert. She is AAOX2 today, more alert compared to yesterday.     Interval History  68F w/IZAGUIRRE cirrhosis and CKD who came from [place] for liver-kidney transplant evaluation. She was admitted from Hepatology clinic few weeks ago for hypoNa. Course complicated by lieus, encephalopathy (in setting of holding lactulose for ileus), briefly in ICU and intubated but extubated and to the floor last week. Transplant committee declined her due to frailty. She was supposed to go to acute rehab last week, but worsening hypoNa and renal function in setting of decreased PO intake. Nephrology following and they think it is volume depletion so getting albumin. Other issue taking a back seat is an incidental R renal mass (too high risk for biopsy and just surveillance per Urology). Had hematemesis on 8/6: EGD showed   - LA Grade D reflux esophagitis, Grade 1 varices, no red signs, jennifer-jaeger tear, no active bleeding, and PHG w/ contact bleeding. Para on 8/5 negative for SBP, cultures pending.       Objective  Temp:  [97.4 °F (36.3 °C)-98.4 °F (36.9 °C)] 97.4 °F (36.3 °C) (08/08 0701)  Pulse:  [74-87] 78 (08/08 1000)  BP: ()/(54-77) 115/55 (08/08 1000)  Resp:  [13-37] 33 (08/08 1000)  SpO2:  [96 %-100 %] 97 % (08/08 1000)    General: aao X2,  Eyes:scleral icterus absent  Neurologic: Asterixis unable to assess due to encephalopathy   Abdomen: Normoactive bowel sounds. distended. Normal tympany. Soft. Non-tender. No peritoneal signs.  Extremities: edema absent    Laboratory    Lab Results   Component Value Date    WBC 3.34 (L) 08/08/2019    HGB 7.6 (L) 08/08/2019    HCT 23.3 (L) 08/08/2019     (H) 08/08/2019    PLT 29 (LL) 08/08/2019       Lab Results   Component Value Date      08/08/2019     08/08/2019    K 3.5 08/08/2019    K 3.5 08/08/2019     08/08/2019     08/08/2019    CO2 25 08/08/2019    CO2 25 08/08/2019    BUN 48 (H) 08/08/2019    BUN 48 (H) 08/08/2019    CREATININE 2.9 (H) 08/08/2019    CREATININE 2.9 (H) 08/08/2019    CALCIUM 9.1 08/08/2019    CALCIUM 9.1 08/08/2019       Lab Results   Component Value Date    ALBUMIN 3.9 08/08/2019    ALBUMIN 3.9 08/08/2019    ALT 20 08/08/2019    AST 31 08/08/2019    GGT 98 (H) 07/18/2019    ALKPHOS 114 08/08/2019    BILITOT 3.0 (H) 08/08/2019       Lab Results   Component Value Date    INR 1.7 (H) 08/08/2019    INR 1.5 (H) 08/07/2019    INR 1.5 (H) 08/06/2019       MELD-Na score: 27 at 8/8/2019  4:14 AM  MELD score: 27 at 8/8/2019  4:14 AM  Calculated from:  Serum Creatinine: 2.9 mg/dL at 8/8/2019  4:14 AM  Serum Sodium: 145 mmol/L (Rounded to 137 mmol/L) at 8/8/2019  4:14 AM  Total Bilirubin: 3.0 mg/dL at 8/8/2019  4:14 AM  INR(ratio): 1.7 at 8/8/2019  4:14 AM  Age: 68 years    Assessment  Ms Ojeda is a 68 year old female with a history of IZAGUIRRE ESLD, with decompensations including Gr 1 HE, ascites, EV, hyponatremia, T2DM, HTN, hypothyroidism, who was admitted from hepatology clinic due to concern for hyponatremia. Also found to have ileus which has resolved. Unresponsive on 7/24, transferred to ICU and intubated for airway protection. Now extubated, transferred out of ICU with improved mentation. Hyponatremia initially resolved, back down to 125 and now improving. Worsening renal function with significant uremia. Other ongoing issues include incidentally discovered R renal mass too high risk for biopsy at this time, deconditioning requiring PT/OT and eventual SNF. Declined for transplant listing at this time. Repeat blood cultures, Had hematemesis on 8/6: EGD showed   - LA Grade D reflux esophagitis, Grade 1 varices, no red signs, jennifer-jaeger tear, no active bleeding, and PHG w/ contact bleeding. Para on 8/5 negative  for SBP, cultures pending.    Hepatic encephalopathy  Hematemesis vs nasal trauma from NGT   Esophagitis grade D       Plan  - Hyponatremia: Nephrology consulted, continue HD,   - encephalopathy: Continue lactulose enema and rifaximin. Recommend r/o infectious disease  - ascites: Negative for SBP on 07/19. Total protein 1.5. sbp 8/5: negative for SBP. Cont ceftriaxone 2 g daily given recent GI bleed, for total of 7 days  - diuretics: hold given TERA  - TERA on CKD: unclear baseline. Nephrology consulted  - Protein calorie malnutrition: nutrition consult. High protein intake. Consider prehab supplements on discharge.  - Anemia: secondary anemia workup, monitor for overt GI bleed  - Appreciate Renal transplant evaluation re: SLK given GFR <30.  - recommend keeping platelets above 50k given recent active bleed  - keep hb >7.   -  Para on 8/5 negative for SBP, cultures pending.  - Transplant: Declined for transplant at this time. Discussed with family and patient. May need further evaluation of R renal mass.  - please obtain daily CBC, BMP, LFT, INR      Thank you for involving us in the care of Michelle Ojeda. Please call with any additional concerns or questions.    Michael Horan MD  Gastroenterology Fellow

## 2019-08-08 NOTE — SUBJECTIVE & OBJECTIVE
Interval History/Significant Events: please see hospital course    Review of Systems   Constitutional: Negative for chills and fever.   Eyes: Negative for visual disturbance.   Cardiovascular: Negative for chest pain and palpitations.   Gastrointestinal: Negative for abdominal pain, blood in stool, constipation, diarrhea, nausea and vomiting.   Genitourinary: Negative for difficulty urinating, dysuria and hematuria.   Musculoskeletal: Negative for back pain and myalgias.   Skin: Negative for rash.   Neurological: Negative for weakness and headaches.   Hematological: Bruises/bleeds easily.     Objective:     Vital Signs (Most Recent):  Temp: 98.4 °F (36.9 °C) (08/08/19 0400)  Pulse: 82 (08/08/19 0754)  Resp: (!) 37 (08/08/19 0754)  BP: (!) 119/59 (08/08/19 0700)  SpO2: 97 % (08/08/19 0754) Vital Signs (24h Range):  Temp:  [97.8 °F (36.6 °C)-98.4 °F (36.9 °C)] 98.4 °F (36.9 °C)  Pulse:  [74-87] 82  Resp:  [13-41] 37  SpO2:  [96 %-100 %] 97 %  BP: ()/(54-77) 119/59   Weight: 56 kg (123 lb 7.3 oz)  Body mass index is 24.11 kg/m².      Intake/Output Summary (Last 24 hours) at 8/8/2019 0811  Last data filed at 8/8/2019 0700  Gross per 24 hour   Intake 2101.6 ml   Output 994 ml   Net 1107.6 ml       Physical Exam   Constitutional: She appears cachectic.  Non-toxic appearance. She has a sickly appearance. She appears ill. No distress.   Frail / cachectic  Diffuse bruising, purpura   Flexi in place  Webster in place   Eyes: Right eye exhibits no discharge. Left eye exhibits no discharge. No scleral icterus.   Cardiovascular: Normal rate, S1 normal and S2 normal.   No murmur heard.  Pulmonary/Chest: Effort normal. No respiratory distress. She has no wheezes.   Abdominal: Soft.   Musculoskeletal: She exhibits no edema.   Neurological: She is alert.   Skin: Skin is warm. She is not diaphoretic.   Nursing note and vitals reviewed.        Lines/Drains/Airways     Central Venous Catheter Line                 Trialysis  (Dialysis) Catheter 08/07/19 0135 right internal jugular 1 day          Drain                 Urethral Catheter 08/05/19 1138 Straight-tip 16 Fr. 2 days         Rectal Tube 08/07/19 0930 rectal tube w/ balloon (indicate number of mLs) less than 1 day          Peripheral Intravenous Line                 Peripheral IV - Single Lumen 08/06/19 20 G Left;Posterior Hand 2 days         Midline Catheter Insertion/Assessment  - Single Lumen 08/06/19 1552 Right basilic vein (medial side of arm) 18g x 8cm 1 day              Significant Labs:    CBC/Anemia Profile:  Recent Labs   Lab 08/06/19  1451 08/07/19  0501 08/08/19  0414   WBC 3.84* 5.62 3.34*   HGB 8.5* 9.1* 7.6*   HCT 25.6* 27.3* 23.3*   PLT 64* 58*  --    * 102* 103*   RDW 20.3* 20.2* 20.2*        Chemistries:  Recent Labs   Lab 08/07/19  0501 08/07/19  1413 08/07/19  2237 08/08/19  0414   * 143 144 145  145   K 3.2* 3.5 3.5 3.5  3.5    106 106 105  105   CO2 22* 27 26 25  25   BUN 56* 37* 44* 48*  48*   CREATININE 2.1* 2.1* 2.6* 2.9*  2.9*   CALCIUM 9.3 8.6* 8.9 9.1  9.1   ALBUMIN 4.4 3.9 3.8 3.9  3.9   PROT 7.3  --   --  6.5   BILITOT 3.2*  --   --  3.0*   ALKPHOS 135  --   --  114   ALT 24  --   --  20   AST 33  --   --  31   MG 2.6 2.7* 2.5 2.5  2.5   PHOS 2.3* 2.2* 2.6* 3.4  3.4       All pertinent labs within the past 24 hours have been reviewed.    Significant Imaging:  I have reviewed all pertinent imaging results/findings within the past 24 hours.

## 2019-08-09 LAB
ABO + RH BLD: NORMAL
ALBUMIN SERPL BCP-MCNC: 3.4 G/DL (ref 3.5–5.2)
ALBUMIN SERPL BCP-MCNC: 3.6 G/DL (ref 3.5–5.2)
ALBUMIN SERPL BCP-MCNC: 3.6 G/DL (ref 3.5–5.2)
ALP SERPL-CCNC: 112 U/L (ref 55–135)
ALT SERPL W/O P-5'-P-CCNC: 20 U/L (ref 10–44)
ANION GAP SERPL CALC-SCNC: 6 MMOL/L (ref 8–16)
ANION GAP SERPL CALC-SCNC: 6 MMOL/L (ref 8–16)
ANION GAP SERPL CALC-SCNC: 8 MMOL/L (ref 8–16)
ANISOCYTOSIS BLD QL SMEAR: SLIGHT
ANISOCYTOSIS BLD QL SMEAR: SLIGHT
AST SERPL-CCNC: 35 U/L (ref 10–40)
BASO STIPL BLD QL SMEAR: ABNORMAL
BASOPHILS # BLD AUTO: 0.03 K/UL (ref 0–0.2)
BASOPHILS # BLD AUTO: 0.04 K/UL (ref 0–0.2)
BASOPHILS # BLD AUTO: 0.04 K/UL (ref 0–0.2)
BASOPHILS NFR BLD: 0.7 % (ref 0–1.9)
BASOPHILS NFR BLD: 0.8 % (ref 0–1.9)
BASOPHILS NFR BLD: 0.8 % (ref 0–1.9)
BILIRUB SERPL-MCNC: 4.5 MG/DL (ref 0.1–1)
BLD GP AB SCN CELLS X3 SERPL QL: NORMAL
BLD PROD TYP BPU: NORMAL
BLOOD UNIT EXPIRATION DATE: NORMAL
BLOOD UNIT TYPE CODE: 600
BLOOD UNIT TYPE CODE: 6200
BLOOD UNIT TYPE: NORMAL
BUN SERPL-MCNC: 11 MG/DL (ref 8–23)
BUN SERPL-MCNC: 11 MG/DL (ref 8–23)
BUN SERPL-MCNC: 12 MG/DL (ref 8–23)
CALCIUM SERPL-MCNC: 8 MG/DL (ref 8.7–10.5)
CALCIUM SERPL-MCNC: 8 MG/DL (ref 8.7–10.5)
CALCIUM SERPL-MCNC: 8.2 MG/DL (ref 8.7–10.5)
CHLORIDE SERPL-SCNC: 104 MMOL/L (ref 95–110)
CHLORIDE SERPL-SCNC: 106 MMOL/L (ref 95–110)
CHLORIDE SERPL-SCNC: 106 MMOL/L (ref 95–110)
CO2 SERPL-SCNC: 24 MMOL/L (ref 23–29)
CO2 SERPL-SCNC: 24 MMOL/L (ref 23–29)
CO2 SERPL-SCNC: 25 MMOL/L (ref 23–29)
CODING SYSTEM: NORMAL
CREAT SERPL-MCNC: 0.8 MG/DL (ref 0.5–1.4)
CREAT SERPL-MCNC: 0.8 MG/DL (ref 0.5–1.4)
CREAT SERPL-MCNC: 1 MG/DL (ref 0.5–1.4)
DIFFERENTIAL METHOD: ABNORMAL
DISPENSE STATUS: NORMAL
EOSINOPHIL # BLD AUTO: 0.1 K/UL (ref 0–0.5)
EOSINOPHIL # BLD AUTO: 0.1 K/UL (ref 0–0.5)
EOSINOPHIL # BLD AUTO: 0.2 K/UL (ref 0–0.5)
EOSINOPHIL NFR BLD: 2.1 % (ref 0–8)
EOSINOPHIL NFR BLD: 3.4 % (ref 0–8)
EOSINOPHIL NFR BLD: 3.4 % (ref 0–8)
ERYTHROCYTE [DISTWIDTH] IN BLOOD BY AUTOMATED COUNT: 19 % (ref 11.5–14.5)
ERYTHROCYTE [DISTWIDTH] IN BLOOD BY AUTOMATED COUNT: 19 % (ref 11.5–14.5)
ERYTHROCYTE [DISTWIDTH] IN BLOOD BY AUTOMATED COUNT: 19.5 % (ref 11.5–14.5)
ERYTHROCYTE [DISTWIDTH] IN BLOOD BY AUTOMATED COUNT: 19.8 % (ref 11.5–14.5)
EST. GFR  (AFRICAN AMERICAN): >60 ML/MIN/1.73 M^2
EST. GFR  (NON AFRICAN AMERICAN): 57.2 ML/MIN/1.73 M^2
EST. GFR  (NON AFRICAN AMERICAN): >60 ML/MIN/1.73 M^2
EST. GFR  (NON AFRICAN AMERICAN): >60 ML/MIN/1.73 M^2
GLUCOSE SERPL-MCNC: 143 MG/DL (ref 70–110)
GLUCOSE SERPL-MCNC: 143 MG/DL (ref 70–110)
GLUCOSE SERPL-MCNC: 199 MG/DL (ref 70–110)
HCT VFR BLD AUTO: 22.7 % (ref 37–48.5)
HCT VFR BLD AUTO: 23.2 % (ref 37–48.5)
HCT VFR BLD AUTO: 25.3 % (ref 37–48.5)
HCT VFR BLD AUTO: 27.6 % (ref 37–48.5)
HGB BLD-MCNC: 7.3 G/DL (ref 12–16)
HGB BLD-MCNC: 7.4 G/DL (ref 12–16)
HGB BLD-MCNC: 8.2 G/DL (ref 12–16)
HGB BLD-MCNC: 8.9 G/DL (ref 12–16)
HYPOCHROMIA BLD QL SMEAR: ABNORMAL
HYPOCHROMIA BLD QL SMEAR: ABNORMAL
IMM GRANULOCYTES # BLD AUTO: 0.02 K/UL (ref 0–0.04)
IMM GRANULOCYTES # BLD AUTO: 0.03 K/UL (ref 0–0.04)
IMM GRANULOCYTES # BLD AUTO: 0.04 K/UL (ref 0–0.04)
IMM GRANULOCYTES # BLD AUTO: 0.05 K/UL (ref 0–0.04)
IMM GRANULOCYTES NFR BLD AUTO: 0.5 % (ref 0–0.5)
IMM GRANULOCYTES NFR BLD AUTO: 0.7 % (ref 0–0.5)
IMM GRANULOCYTES NFR BLD AUTO: 1 % (ref 0–0.5)
INR PPP: 2.2 (ref 0.8–1.2)
LYMPHOCYTES # BLD AUTO: 0.3 K/UL (ref 1–4.8)
LYMPHOCYTES # BLD AUTO: 0.4 K/UL (ref 1–4.8)
LYMPHOCYTES # BLD AUTO: 0.4 K/UL (ref 1–4.8)
LYMPHOCYTES NFR BLD: 6.8 % (ref 18–48)
LYMPHOCYTES NFR BLD: 7.3 % (ref 18–48)
LYMPHOCYTES NFR BLD: 7.9 % (ref 18–48)
MAGNESIUM SERPL-MCNC: 1.9 MG/DL (ref 1.6–2.6)
MAGNESIUM SERPL-MCNC: 2 MG/DL (ref 1.6–2.6)
MCH RBC QN AUTO: 32.7 PG (ref 27–31)
MCH RBC QN AUTO: 33.2 PG (ref 27–31)
MCH RBC QN AUTO: 33.3 PG (ref 27–31)
MCH RBC QN AUTO: 33.6 PG (ref 27–31)
MCHC RBC AUTO-ENTMCNC: 31.9 G/DL (ref 32–36)
MCHC RBC AUTO-ENTMCNC: 32.2 G/DL (ref 32–36)
MCHC RBC AUTO-ENTMCNC: 32.2 G/DL (ref 32–36)
MCHC RBC AUTO-ENTMCNC: 32.4 G/DL (ref 32–36)
MCV RBC AUTO: 102 FL (ref 82–98)
MCV RBC AUTO: 103 FL (ref 82–98)
MCV RBC AUTO: 103 FL (ref 82–98)
MCV RBC AUTO: 106 FL (ref 82–98)
MONOCYTES # BLD AUTO: 0.3 K/UL (ref 0.3–1)
MONOCYTES # BLD AUTO: 0.4 K/UL (ref 0.3–1)
MONOCYTES # BLD AUTO: 0.4 K/UL (ref 0.3–1)
MONOCYTES NFR BLD: 7.1 % (ref 4–15)
MONOCYTES NFR BLD: 7.1 % (ref 4–15)
MONOCYTES NFR BLD: 7.5 % (ref 4–15)
NEUTROPHILS # BLD AUTO: 2.9 K/UL (ref 1.8–7.7)
NEUTROPHILS # BLD AUTO: 3.1 K/UL (ref 1.8–7.7)
NEUTROPHILS # BLD AUTO: 4 K/UL (ref 1.8–7.7)
NEUTROPHILS # BLD AUTO: 4.6 K/UL (ref 1.8–7.7)
NEUTROPHILS NFR BLD: 79.8 % (ref 38–73)
NEUTROPHILS NFR BLD: 80.5 % (ref 38–73)
NEUTROPHILS NFR BLD: 82.6 % (ref 38–73)
NRBC BLD-RTO: 0 /100 WBC
OVALOCYTES BLD QL SMEAR: ABNORMAL
PHOSPHATE SERPL-MCNC: 1.6 MG/DL (ref 2.7–4.5)
PHOSPHATE SERPL-MCNC: 1.7 MG/DL (ref 2.7–4.5)
PHOSPHATE SERPL-MCNC: 1.7 MG/DL (ref 2.7–4.5)
PLATELET # BLD AUTO: 28 K/UL (ref 150–350)
PLATELET # BLD AUTO: 33 K/UL (ref 150–350)
PLATELET # BLD AUTO: 54 K/UL (ref 150–350)
PLATELET # BLD AUTO: 55 K/UL (ref 150–350)
PLATELET BLD QL SMEAR: ABNORMAL
PMV BLD AUTO: 10.3 FL (ref 9.2–12.9)
PMV BLD AUTO: 10.5 FL (ref 9.2–12.9)
PMV BLD AUTO: 11.6 FL (ref 9.2–12.9)
PMV BLD AUTO: 9.8 FL (ref 9.2–12.9)
POCT GLUCOSE: 159 MG/DL (ref 70–110)
POCT GLUCOSE: 170 MG/DL (ref 70–110)
POCT GLUCOSE: 213 MG/DL (ref 70–110)
POCT GLUCOSE: 242 MG/DL (ref 70–110)
POIKILOCYTOSIS BLD QL SMEAR: SLIGHT
POLYCHROMASIA BLD QL SMEAR: ABNORMAL
POLYCHROMASIA BLD QL SMEAR: ABNORMAL
POTASSIUM SERPL-SCNC: 4.4 MMOL/L (ref 3.5–5.1)
PROT SERPL-MCNC: 6.2 G/DL (ref 6–8.4)
PROTHROMBIN TIME: 21.4 SEC (ref 9–12.5)
RBC # BLD AUTO: 2.2 M/UL (ref 4–5.4)
RBC # BLD AUTO: 2.2 M/UL (ref 4–5.4)
RBC # BLD AUTO: 2.46 M/UL (ref 4–5.4)
RBC # BLD AUTO: 2.72 M/UL (ref 4–5.4)
SODIUM SERPL-SCNC: 136 MMOL/L (ref 136–145)
SODIUM SERPL-SCNC: 136 MMOL/L (ref 136–145)
SODIUM SERPL-SCNC: 137 MMOL/L (ref 136–145)
TARGETS BLD QL SMEAR: ABNORMAL
TRANS ERYTHROCYTES VOL PATIENT: NORMAL ML
TRANS ERYTHROCYTES VOL PATIENT: NORMAL ML
TRANS PLATPHERESIS VOL PATIENT: NORMAL ML
WBC # BLD AUTO: 3.78 K/UL (ref 3.9–12.7)
WBC # BLD AUTO: 3.85 K/UL (ref 3.9–12.7)
WBC # BLD AUTO: 4.96 K/UL (ref 3.9–12.7)
WBC # BLD AUTO: 5.6 K/UL (ref 3.9–12.7)

## 2019-08-09 PROCEDURE — 85610 PROTHROMBIN TIME: CPT

## 2019-08-09 PROCEDURE — 25000003 PHARM REV CODE 250

## 2019-08-09 PROCEDURE — 80069 RENAL FUNCTION PANEL: CPT

## 2019-08-09 PROCEDURE — 36430 TRANSFUSION BLD/BLD COMPNT: CPT

## 2019-08-09 PROCEDURE — 84100 ASSAY OF PHOSPHORUS: CPT

## 2019-08-09 PROCEDURE — 99291 CRITICAL CARE FIRST HOUR: CPT | Mod: GC,,, | Performed by: INTERNAL MEDICINE

## 2019-08-09 PROCEDURE — P9021 RED BLOOD CELLS UNIT: HCPCS

## 2019-08-09 PROCEDURE — 25000003 PHARM REV CODE 250: Performed by: STUDENT IN AN ORGANIZED HEALTH CARE EDUCATION/TRAINING PROGRAM

## 2019-08-09 PROCEDURE — 27201040 HC RC 50 FILTER

## 2019-08-09 PROCEDURE — 63600175 PHARM REV CODE 636 W HCPCS: Performed by: HOSPITALIST

## 2019-08-09 PROCEDURE — P9035 PLATELET PHERES LEUKOREDUCED: HCPCS

## 2019-08-09 PROCEDURE — 99291 PR CRITICAL CARE, E/M 30-74 MINUTES: ICD-10-PCS | Mod: GC,,, | Performed by: INTERNAL MEDICINE

## 2019-08-09 PROCEDURE — B4185 PARENTERAL SOL 10 GM LIPIDS: HCPCS | Performed by: STUDENT IN AN ORGANIZED HEALTH CARE EDUCATION/TRAINING PROGRAM

## 2019-08-09 PROCEDURE — 86850 RBC ANTIBODY SCREEN: CPT

## 2019-08-09 PROCEDURE — 83735 ASSAY OF MAGNESIUM: CPT | Mod: 91

## 2019-08-09 PROCEDURE — 99232 SBSQ HOSP IP/OBS MODERATE 35: CPT | Mod: GC,,, | Performed by: INTERNAL MEDICINE

## 2019-08-09 PROCEDURE — 63600175 PHARM REV CODE 636 W HCPCS: Performed by: STUDENT IN AN ORGANIZED HEALTH CARE EDUCATION/TRAINING PROGRAM

## 2019-08-09 PROCEDURE — 85025 COMPLETE CBC W/AUTO DIFF WBC: CPT

## 2019-08-09 PROCEDURE — 85027 COMPLETE CBC AUTOMATED: CPT

## 2019-08-09 PROCEDURE — 80053 COMPREHEN METABOLIC PANEL: CPT

## 2019-08-09 PROCEDURE — 94761 N-INVAS EAR/PLS OXIMETRY MLT: CPT

## 2019-08-09 PROCEDURE — 99232 PR SUBSEQUENT HOSPITAL CARE,LEVL II: ICD-10-PCS | Mod: GC,,, | Performed by: INTERNAL MEDICINE

## 2019-08-09 PROCEDURE — 20000000 HC ICU ROOM

## 2019-08-09 PROCEDURE — 83735 ASSAY OF MAGNESIUM: CPT

## 2019-08-09 PROCEDURE — C9113 INJ PANTOPRAZOLE SODIUM, VIA: HCPCS | Performed by: STUDENT IN AN ORGANIZED HEALTH CARE EDUCATION/TRAINING PROGRAM

## 2019-08-09 PROCEDURE — A4217 STERILE WATER/SALINE, 500 ML: HCPCS | Performed by: STUDENT IN AN ORGANIZED HEALTH CARE EDUCATION/TRAINING PROGRAM

## 2019-08-09 PROCEDURE — 92610 EVALUATE SWALLOWING FUNCTION: CPT

## 2019-08-09 RX ORDER — HYDROCODONE BITARTRATE AND ACETAMINOPHEN 500; 5 MG/1; MG/1
TABLET ORAL
Status: DISCONTINUED | OUTPATIENT
Start: 2019-08-09 | End: 2019-08-10 | Stop reason: HOSPADM

## 2019-08-09 RX ORDER — PANTOPRAZOLE SODIUM 40 MG/10ML
40 INJECTION, POWDER, LYOPHILIZED, FOR SOLUTION INTRAVENOUS DAILY
Status: DISCONTINUED | OUTPATIENT
Start: 2019-08-09 | End: 2019-08-10 | Stop reason: HOSPADM

## 2019-08-09 RX ORDER — MAGNESIUM SULFATE HEPTAHYDRATE 40 MG/ML
2 INJECTION, SOLUTION INTRAVENOUS
Status: DISCONTINUED | OUTPATIENT
Start: 2019-08-09 | End: 2019-08-09

## 2019-08-09 RX ORDER — NOREPINEPHRINE BITARTRATE/D5W 4MG/250ML
PLASTIC BAG, INJECTION (ML) INTRAVENOUS
Status: COMPLETED
Start: 2019-08-09 | End: 2019-08-09

## 2019-08-09 RX ORDER — LACTULOSE 10 G/15ML
30 SOLUTION ORAL 3 TIMES DAILY
Status: DISCONTINUED | OUTPATIENT
Start: 2019-08-09 | End: 2019-08-10 | Stop reason: HOSPADM

## 2019-08-09 RX ORDER — NOREPINEPHRINE BITARTRATE/D5W 4MG/250ML
0.02 PLASTIC BAG, INJECTION (ML) INTRAVENOUS CONTINUOUS
Status: DISCONTINUED | OUTPATIENT
Start: 2019-08-09 | End: 2019-08-09

## 2019-08-09 RX ORDER — MIDODRINE HYDROCHLORIDE 5 MG/1
5 TABLET ORAL EVERY 8 HOURS
Status: DISCONTINUED | OUTPATIENT
Start: 2019-08-09 | End: 2019-08-10 | Stop reason: HOSPADM

## 2019-08-09 RX ORDER — HYDROCODONE BITARTRATE AND ACETAMINOPHEN 500; 5 MG/1; MG/1
TABLET ORAL CONTINUOUS
Status: DISCONTINUED | OUTPATIENT
Start: 2019-08-09 | End: 2019-08-09

## 2019-08-09 RX ADMIN — PHYTONADIONE 10 MG: 10 INJECTION, EMULSION INTRAMUSCULAR; INTRAVENOUS; SUBCUTANEOUS at 12:08

## 2019-08-09 RX ADMIN — PANTOPRAZOLE SODIUM 40 MG: 40 INJECTION, POWDER, FOR SOLUTION INTRAVENOUS at 12:08

## 2019-08-09 RX ADMIN — LACTULOSE 30 G: 20 SOLUTION ORAL at 02:08

## 2019-08-09 RX ADMIN — LACTULOSE 30 G: 20 SOLUTION ORAL at 09:08

## 2019-08-09 RX ADMIN — CALCIUM GLUCONATE: 98 INJECTION, SOLUTION INTRAVENOUS at 09:08

## 2019-08-09 RX ADMIN — SOYBEAN OIL 250 ML: 20 INJECTION, SOLUTION INTRAVENOUS at 09:08

## 2019-08-09 RX ADMIN — Medication 0.02 MCG/KG/MIN: at 01:08

## 2019-08-09 RX ADMIN — CEFTRIAXONE SODIUM 2 G: 2 INJECTION, POWDER, FOR SOLUTION INTRAMUSCULAR; INTRAVENOUS at 10:08

## 2019-08-09 RX ADMIN — INSULIN ASPART 2 UNITS: 100 INJECTION, SOLUTION INTRAVENOUS; SUBCUTANEOUS at 01:08

## 2019-08-09 RX ADMIN — INSULIN ASPART 2 UNITS: 100 INJECTION, SOLUTION INTRAVENOUS; SUBCUTANEOUS at 08:08

## 2019-08-09 NOTE — PLAN OF CARE
Problem: SLP Goal  Goal: SLP Goal    Pt seen for clinical swallow re-evaluation. Pt appearing safe for small single sips of thin liquids and soft chopped solid for pleasure. SLP discussed with family and pt at length types of foods to offer and reviewed clinical signs of aspiration. Diet recommendations for comfort/pleasure at this time not to meet volume needs. Family demonstrated awareness and understanding of recommendations and plan. No further skilled speech services warranted in the acute care setting at this time.     Leatha Bonilla MS, CCC-SLP  Speech Language Pathologist  Pager: [phone]  Date 8/9/2019

## 2019-08-09 NOTE — SUBJECTIVE & OBJECTIVE
Interval History/Significant Events: Pt's mental status improving as she continues to anticipate transfer back to [place]. Family at bedside holding lee. Drop in Hgb overnight treated with pRBC, but pt with only minimal increase in count.     Review of Systems   Constitutional: Negative for chills and fever.   Eyes: Negative for visual disturbance.   Cardiovascular: Negative for chest pain and palpitations.   Gastrointestinal: Negative for abdominal pain, blood in stool, constipation, diarrhea, nausea and vomiting.   Genitourinary: Negative for difficulty urinating, dysuria and hematuria.   Musculoskeletal: Negative for back pain and myalgias.   Skin: Negative for rash.   Neurological: Negative for weakness and headaches.   Hematological: Bruises/bleeds easily.     Objective:     Vital Signs (Most Recent):  Temp: 97.4 °F (36.3 °C) (08/09/19 1353)  Pulse: 72 (08/09/19 1353)  Resp: (!) 29 (08/09/19 1353)  BP: (!) 105/59 (08/09/19 1353)  SpO2: 98 % (08/09/19 1353) Vital Signs (24h Range):  Temp:  [96.9 °F (36.1 °C)-98.2 °F (36.8 °C)] 97.4 °F (36.3 °C)  Pulse:  [68-91] 72  Resp:  [11-34] 29  SpO2:  [96 %-100 %] 98 %  BP: ()/(36-63) 105/59   Weight: 56 kg (123 lb 7.3 oz)  Body mass index is 24.11 kg/m².      Intake/Output Summary (Last 24 hours) at 8/9/2019 1355  Last data filed at 8/9/2019 1232  Gross per 24 hour   Intake 4533 ml   Output 2717 ml   Net 1816 ml       Physical Exam   Constitutional: She appears cachectic.  Non-toxic appearance. She has a sickly appearance. She appears ill. No distress.   Frail / cachectic  Diffuse bruising, purpura   Flexi in place draining bright blood  Webster in place   Eyes: Right eye exhibits no discharge. Left eye exhibits no discharge. No scleral icterus.   Cardiovascular: Normal rate, S1 normal and S2 normal.   No murmur heard.  Pulmonary/Chest: Effort normal. No respiratory distress. She has no wheezes.   Abdominal: Soft.   Musculoskeletal: She exhibits no edema.    Neurological: She is alert.   Skin: Skin is warm. She is not diaphoretic.   Nursing note and vitals reviewed.      Vents:  Vent Mode: Spont (07/27/19 0912)  Ventilator Initiated: Yes (07/24/19 1312)  Set Rate: 0 bmp (07/27/19 0912)  Vt Set: 300 mL (07/27/19 0912)  Pressure Support: 5 cmH20 (07/27/19 0912)  PEEP/CPAP: 5 cmH20 (07/27/19 0912)  Oxygen Concentration (%): 28 (07/27/19 1113)  Peak Airway Pressure: 11 cmH2O (07/27/19 0912)  Plateau Pressure: 0 cmH20 (07/27/19 0912)  Total Ve: 8.93 mL (07/27/19 0912)  Negative Inspiratory Force (cm H2O): -35 (07/27/19 1105)  F/VT Ratio<105 (RSBI): (!) 36.25 (07/27/19 0912)  Lines/Drains/Airways     Central Venous Catheter Line                 Trialysis (Dialysis) Catheter 08/07/19 0135 right internal jugular 2 days          Drain                 Urethral Catheter 08/05/19 1138 Straight-tip 16 Fr. 4 days         Rectal Tube 08/07/19 0930 rectal tube w/ balloon (indicate number of mLs) 2 days          Peripheral Intravenous Line                 Peripheral IV - Single Lumen 08/06/19 20 G Left;Posterior Hand 3 days         Midline Catheter Insertion/Assessment  - Single Lumen 08/06/19 1552 Right basilic vein (medial side of arm) 18g x 8cm 2 days              Significant Labs:    CBC/Anemia Profile:  Recent Labs   Lab 08/08/19  0414 08/09/19  0324 08/09/19  0743   WBC 3.34* 5.60 3.85*   HGB 7.6* 8.2* 7.4*   HCT 23.3* 25.3* 23.2*   PLT 29* 28* 33*   * 103* 106*   RDW 20.2* 19.0* 19.5*        Chemistries:  Recent Labs   Lab 08/08/19  0414 08/08/19  1344 08/08/19  2144 08/09/19  0324     145 143  143 142 136  136   K 3.5  3.5 3.8  3.8 3.9 4.4  4.4     105 106  106 107 106  106   CO2 25  25 24  24 23 24  24   BUN 48*  48* 57*  57* 38* 11  11   CREATININE 2.9*  2.9* 3.3*  3.3* 2.1* 0.8  0.8   CALCIUM 9.1  9.1 8.6*  8.6* 9.0 8.0*  8.0*   ALBUMIN 3.9  3.9 3.8  3.8 3.8 3.6  3.6   PROT 6.5  --   --  6.2   BILITOT 3.0*  --   --  4.5*   ALKPHOS  114  --   --  112   ALT 20  --   --  20   AST 31  --   --  35   MG 2.5  2.5 2.4  2.4 2.4 2.0   PHOS 3.4  3.4 4.6*  4.6* 3.4 1.7*  1.7*       All pertinent labs within the past 24 hours have been reviewed.    Significant Imaging:  I have reviewed all pertinent imaging results/findings within the past 24 hours.

## 2019-08-09 NOTE — SUBJECTIVE & OBJECTIVE
Interval History: Pt underwent SLED for metabolic clearance overnight 8/8; pt remains alert and interactive. Pt resting comfortably in bed this AM. Family pursuing comfort care and transport of patient back to [place], potentially as early as tomorrow per son at bedside.     Review of patient's allergies indicates:  No Known Allergies  Current Facility-Administered Medications   Medication Frequency    0.9%  NaCl infusion (CRRT USE ONLY) Continuous    0.9%  NaCl infusion (for blood administration) Q24H PRN    0.9%  NaCl infusion (for blood administration) Q24H PRN    0.9%  NaCl infusion (for blood administration) Q24H PRN    acetaminophen tablet 325 mg Q4H PRN    cefTRIAXone (ROCEPHIN) 2 g in dextrose 5 % 50 mL IVPB Q24H    dextrose 10% (D10W) Bolus PRN    dextrose 10% (D10W) Bolus PRN    glucagon (human recombinant) injection 1 mg PRN    glucose chewable tablet 16 g PRN    glucose chewable tablet 24 g PRN    insulin aspart U-100 pen 0-5 Units Q6H PRN    lactulose 10 gram/15 mL solution (enema) 200 g TID    magnesium sulfate 2g in water 50mL IVPB (premix) PRN    ondansetron injection 4 mg Q6H PRN    prochlorperazine injection Soln 10 mg Q6H PRN    simethicone chewable tablet 80 mg TID PRN    sodium chloride 0.9% flush 10 mL PRN    sodium phosphate 20.01 mmol in dextrose 5 % 250 mL IVPB PRN    sodium phosphate 30 mmol in dextrose 5 % 250 mL IVPB PRN    sodium phosphate 39.99 mmol in dextrose 5 % 250 mL IVPB PRN    TPN ADULT PERIPHERAL CUSTOM Continuous       Objective:     Vital Signs (Most Recent):  Temp: 96.9 °F (36.1 °C) (08/09/19 0701)  Pulse: 81 (08/09/19 1000)  Resp: (!) 27 (08/09/19 1000)  BP: (!) 110/57 (08/09/19 1000)  SpO2: 99 % (08/09/19 1000)  O2 Device (Oxygen Therapy): room air (08/09/19 1000) Vital Signs (24h Range):  Temp:  [96.9 °F (36.1 °C)-98.2 °F (36.8 °C)] 96.9 °F (36.1 °C)  Pulse:  [70-91] 81  Resp:  [11-34] 27  SpO2:  [96 %-100 %] 99 %  BP: ()/(47-64) 110/57      Weight: 56 kg (123 lb 7.3 oz) (07/30/19 1000)  Body mass index is 24.11 kg/m².  Body surface area is 1.54 meters squared.    I/O last 3 completed shifts:  In: 3562 [P.O.:100; I.V.:2140; Blood:502; IV Piggyback:100]  Out: 2456 [Urine:190; Other:1916; Stool:350]    Physical Exam   Constitutional: She appears well-developed and well-nourished. She appears cachectic. No distress.   HENT:   Head: Normocephalic and atraumatic.   Eyes: EOM are normal. Scleral icterus is present.   Neck: Normal range of motion.   Cardiovascular: Normal rate, regular rhythm and normal heart sounds.   No murmur heard.  Pulmonary/Chest: Effort normal and breath sounds normal.   Abdominal: Soft. Bowel sounds are normal. She exhibits ascites. There is hepatosplenomegaly. There is no tenderness.   Musculoskeletal: She exhibits no edema.   Neurological: She is alert. She is disoriented.   Skin: Skin is warm and dry. Ecchymosis noted. She is not diaphoretic.   Nursing note and vitals reviewed.      Significant Labs:  CBC:   Recent Labs   Lab 08/09/19  0743   WBC 3.85*   RBC 2.20*   HGB 7.4*   HCT 23.2*   PLT 33*   *   MCH 33.6*   MCHC 31.9*     CMP:   Recent Labs   Lab 08/09/19  0324   *  143*   CALCIUM 8.0*  8.0*   ALBUMIN 3.6  3.6   PROT 6.2     136   K 4.4  4.4   CO2 24  24     106   BUN 11  11   CREATININE 0.8  0.8   ALKPHOS 112   ALT 20   AST 35   BILITOT 4.5*     Recent Labs   Lab 08/05/19  1607   COLORU Yellow   SPECGRAV 1.015   PHUR 5.0   PROTEINUA Negative   NITRITE Negative   LEUKOCYTESUR Negative   HYALINECASTS 10*         All labs within the past 24 hours have been reviewed.     Significant Imaging:  No new imaging in the past 24 hours

## 2019-08-09 NOTE — PROGRESS NOTES
12hr CRRT SLED initiated via RIJ trialysis. Lines reversed for better flows. UF net even 200ml/hr. Alarms set and all lines secured.

## 2019-08-09 NOTE — ASSESSMENT & PLAN NOTE
Labs from [place] nephrology demonstrated Cr of 1.77 in 3/12/19, 1.67 on 4/5/19, 1.96 in 6/13/19, and 1.83 on 6/24. Cr of 2.8 on 7/26/19 above her baseline due to ATN secondary to hypoperfusion of kidneys related to hypotensive episodes. Hepatorenal syndrome not suspected at this time as the patient's Cr and urine output have improved with an albumin challenge. TERA could be exacerbated with noted Hgb drop from 9.9 on 7/27 to 7.2 on 7/29.     Recommendations:   - Will continue to dialyze pt as needed. No acute indication at this time. With family discussing comfort care measures and possibly wanting to transfer patient back to [place], have contacted primary team to attempt to orchestrate dialysis to time with the night before transport  - c/w Midodrine 10mg TID   - Recommend continuing albumin 50g IV daily; continue Sodium Bicarbonate 1300mg PO BID  - Monitor with BMP  - Strict I/Os   - Avoid Nephrotoxic agents  - Monitor for signs of blood loss  - Hgb > 7

## 2019-08-09 NOTE — ASSESSMENT & PLAN NOTE
History of hematemesis after OG line placement. EGD read with not actively bleeding varices. Will continue to monitor for bleeding.   -GI recommendations   -trend CBC   -avoid NG tubes

## 2019-08-09 NOTE — ASSESSMENT & PLAN NOTE
--Likely due to sedating medications however patient at risk for infection  --Blood culture, sputum culture, and peritoneal fluid negative  --UA appearing noninfectious  --Received 4 doses of pip/tazo,  Received 3 days of vancomycin.  --Resolved; weaned off norepinephrine infusion on 7/25  --Continue midodrine 10 mg TID

## 2019-08-09 NOTE — PLAN OF CARE
Problem: Adult Inpatient Plan of Care  Goal: Plan of Care Review  Recommendations  Recommendation/Intervention:   1. Current PPN not meeting EEN and EPN. Recommend modifying to Custom TPN 75g AA and 260g Dextrose + IV lipids daily - to provide 1684 kcal/day and 75g protein/day.    -If unable to transition to TPN and PPN warranted, recommend Custom PPN 65g AA and 240g Dextrose + IV lipids daily - to provide 1576 kcal/day and 65g protein/day.   2. If able to obtain enteral access, recommend initiating TF of Isosource 1.5 at a goal rate of 45 mL/hr - to provide 1620 kcal/day, 73g protein/day, and 725mL free fluid/day.   3. PO diet per MD and SLP.  RD to monitor.    Goals: Meet % EEN, EPN  Nutrition Goal Status: goal not met    Full assessment completed, see RD Note 8/9/2019.

## 2019-08-09 NOTE — ASSESSMENT & PLAN NOTE
Treatment plan per primary team   MELD-Na score: 22 at 8/9/2019  3:24 AM  MELD score: 21 at 8/9/2019  3:24 AM  Calculated from:  Serum Creatinine: 0.8 mg/dL (Rounded to 1 mg/dL) at 8/9/2019  3:24 AM  Serum Sodium: 136 mmol/L at 8/9/2019  3:24 AM  Total Bilirubin: 4.5 mg/dL at 8/9/2019  3:24 AM  INR(ratio): 2.2 at 8/9/2019  3:24 AM  Age: 68 years

## 2019-08-09 NOTE — PROGRESS NOTES
[facility]  Nephrology  Progress Note    Patient Name: Michelle Ojeda  MRN: [id number]  Admission Date: 7/18/2019  Hospital Length of Stay: 22 days  Attending Provider: Saravanan Batista MD   Primary Care Physician: Miguel Angel Weber MD  Principal Problem:Acute metabolic encephalopathy    Subjective:     HPI: Rusty Martinez is 67 yo F with CKD stage 4 presenting from transplant hepatology clinic for hyponatremia on 7/18/19. She is originally from [place], here for liver transplant evaluation. She was diagnosed with IZAGUIRRE in grade 1 hepatic encephalopathy with severe ascites and esophogeal varices (s/p banding). During the month prior to admission she felt more lethargic and was requiring biweekly paracentesis. Of note, she is also being treated for Ileus her acute hepatic encephalopathy with lactulose. Recent 24 hour cr clearance study was near 21. Reported baseline Cr 1.6-1.8 and chronic hyponatremia range between 123-124.     Interval History: Pt underwent SLED for metabolic clearance overnight 8/8; pt remains alert and interactive. Pt resting comfortably in bed this AM. Family pursuing comfort care and transport of patient back to [place], potentially as early as tomorrow per son at bedside.     Review of patient's allergies indicates:  No Known Allergies  Current Facility-Administered Medications   Medication Frequency    0.9%  NaCl infusion (CRRT USE ONLY) Continuous    0.9%  NaCl infusion (for blood administration) Q24H PRN    0.9%  NaCl infusion (for blood administration) Q24H PRN    0.9%  NaCl infusion (for blood administration) Q24H PRN    acetaminophen tablet 325 mg Q4H PRN    cefTRIAXone (ROCEPHIN) 2 g in dextrose 5 % 50 mL IVPB Q24H    dextrose 10% (D10W) Bolus PRN    dextrose 10% (D10W) Bolus PRN    glucagon (human recombinant) injection 1 mg PRN    glucose chewable tablet 16 g PRN    glucose chewable tablet 24 g PRN    insulin aspart U-100 pen 0-5 Units Q6H PRN     lactulose 10 gram/15 mL solution (enema) 200 g TID    magnesium sulfate 2g in water 50mL IVPB (premix) PRN    ondansetron injection 4 mg Q6H PRN    prochlorperazine injection Soln 10 mg Q6H PRN    simethicone chewable tablet 80 mg TID PRN    sodium chloride 0.9% flush 10 mL PRN    sodium phosphate 20.01 mmol in dextrose 5 % 250 mL IVPB PRN    sodium phosphate 30 mmol in dextrose 5 % 250 mL IVPB PRN    sodium phosphate 39.99 mmol in dextrose 5 % 250 mL IVPB PRN    TPN ADULT PERIPHERAL CUSTOM Continuous       Objective:     Vital Signs (Most Recent):  Temp: 96.9 °F (36.1 °C) (08/09/19 0701)  Pulse: 81 (08/09/19 1000)  Resp: (!) 27 (08/09/19 1000)  BP: (!) 110/57 (08/09/19 1000)  SpO2: 99 % (08/09/19 1000)  O2 Device (Oxygen Therapy): room air (08/09/19 1000) Vital Signs (24h Range):  Temp:  [96.9 °F (36.1 °C)-98.2 °F (36.8 °C)] 96.9 °F (36.1 °C)  Pulse:  [70-91] 81  Resp:  [11-34] 27  SpO2:  [96 %-100 %] 99 %  BP: ()/(47-64) 110/57     Weight: 56 kg (123 lb 7.3 oz) (07/30/19 1000)  Body mass index is 24.11 kg/m².  Body surface area is 1.54 meters squared.    I/O last 3 completed shifts:  In: 3562 [P.O.:100; I.V.:2140; Blood:502; IV Piggyback:100]  Out: 2456 [Urine:190; Other:1916; Stool:350]    Physical Exam   Constitutional: She appears well-developed and well-nourished. She appears cachectic. No distress.   HENT:   Head: Normocephalic and atraumatic.   Eyes: EOM are normal. Scleral icterus is present.   Neck: Normal range of motion.   Cardiovascular: Normal rate, regular rhythm and normal heart sounds.   No murmur heard.  Pulmonary/Chest: Effort normal and breath sounds normal.   Abdominal: Soft. Bowel sounds are normal. She exhibits ascites. There is hepatosplenomegaly. There is no tenderness.   Musculoskeletal: She exhibits no edema.   Neurological: She is alert. She is disoriented.   Skin: Skin is warm and dry. Ecchymosis noted. She is not diaphoretic.   Nursing note and vitals  reviewed.      Significant Labs:  CBC:   Recent Labs   Lab 08/09/19  0743   WBC 3.85*   RBC 2.20*   HGB 7.4*   HCT 23.2*   PLT 33*   *   MCH 33.6*   MCHC 31.9*     CMP:   Recent Labs   Lab 08/09/19  0324   *  143*   CALCIUM 8.0*  8.0*   ALBUMIN 3.6  3.6   PROT 6.2     136   K 4.4  4.4   CO2 24  24     106   BUN 11  11   CREATININE 0.8  0.8   ALKPHOS 112   ALT 20   AST 35   BILITOT 4.5*     Recent Labs   Lab 08/05/19  1607   COLORU Yellow   SPECGRAV 1.015   PHUR 5.0   PROTEINUA Negative   NITRITE Negative   LEUKOCYTESUR Negative   HYALINECASTS 10*         All labs within the past 24 hours have been reviewed.     Significant Imaging:  No new imaging in the past 24 hours    Assessment/Plan:     Acute kidney injury superimposed on chronic kidney disease  Labs from [place] nephrology demonstrated Cr of 1.77 in 3/12/19, 1.67 on 4/5/19, 1.96 in 6/13/19, and 1.83 on 6/24. Cr of 2.8 on 7/26/19 above her baseline due to ATN secondary to hypoperfusion of kidneys related to hypotensive episodes. Hepatorenal syndrome not suspected at this time as the patient's Cr and urine output have improved with an albumin challenge. TERA could be exacerbated with noted Hgb drop from 9.9 on 7/27 to 7.2 on 7/29.     Recommendations:   - Will continue to dialyze pt as needed. No acute indication at this time. With family discussing comfort care measures and possibly wanting to transfer patient back to [place], have contacted primary team to attempt to orchestrate dialysis to time with the night before transport  - c/w Midodrine 10mg TID   - Recommend continuing albumin 50g IV daily; continue Sodium Bicarbonate 1300mg PO BID  - Monitor with BMP  - Strict I/Os   - Avoid Nephrotoxic agents  - Monitor for signs of blood loss  - Hgb > 7     Liver cirrhosis secondary to IZAGUIRRE  Treatment plan per primary team   MELD-Na score: 22 at 8/9/2019  3:24 AM  MELD score: 21 at 8/9/2019  3:24 AM  Calculated from:  Serum  Creatinine: 0.8 mg/dL (Rounded to 1 mg/dL) at 8/9/2019  3:24 AM  Serum Sodium: 136 mmol/L at 8/9/2019  3:24 AM  Total Bilirubin: 4.5 mg/dL at 8/9/2019  3:24 AM  INR(ratio): 2.2 at 8/9/2019  3:24 AM  Age: 68 years      Will discuss with Dr. Taylor, nephrology attending physician    Thank you for your consult. I will follow-up with patient. Please contact us if you have any additional questions.    Otilio Gibson, DO  Nephrology  [facility]

## 2019-08-09 NOTE — PLAN OF CARE
08/06/19  Patient was transferred to MICU this afternoon. MICU-CM received a call from Page GOLDMAN covering IM-L to update on recent family request. CM went to patients room and met with patient (altered mental status), No family at the bedside at this time. CM placed call to the patients daughter/POA Tegan ([phone]) who will meet with the CM at the hospital tomorrow to discuss the patients situation and their needs for transportation services home. CM called [employer] [place] [phone] to request information on if patients secondary policy covers travel expenses for medical necessity. CM unable to get a representative to verify or deny if patient has coverage and could not get past the automated service.  ZULLY called and spoke with Gil at [facility] service to request a price quote for medically assisted Air Transportation to patients home zip code: [id number].  CM quoted retail cost of air transportation $41,437.57 however if the patient/family could come up with the total cost (out of pocket) the discounted price would be $24,580.  Gil also stated that [facility] would not have a flight jet available until approximately August 15-16th.  All of the above information was relayed back to the CCM team by ZULLY.  CM will update family on the out of pocket quotes upon meeting with the family tomorrow.     08/07/19  CM met with Tegan-daughter/POA and patients son at the bedside. Patients children expressed concern for their mothers condition and the urgency of getting mom back to [place] for family support if Transplant is no longer an option. ZULLY was notified by the patients children that she had been brought here from [place] for Transplant evaluation upon the referral from patients home physician Dr Weber. Patients children stated that the plan relayed to them was for mom to come for transplant evaluation, labs were drawn by home MD and sent to the Transplant team 2 days prior to patient  arrival in [place] via commercial flight with daughter Tegan. Upon [facility], patient and daughter were told that due to abnormal labs patient should be checked out in the ED. (Patients daughter Tegan expressed concern. Stated she is upset that if the Transplant team had read and knew that patients labs were abnormal and required ED assessment, why would they not call and tell the family the patient is not stable to fly to [place] for evaluation).  Once patient was hospitalized and the Transplant team began to evaluate the patient, the patients children were told that: Discussed at transplant committee, but declined for transplant at this time. May be able to revisit this after patient completes rehab.  Patients children expressed that they are upset due to the lack of communication between themselves and the Transplant team. They state that they were always under the impression that what was best for mom was to keep her here for strengthening/conditioning at an [facility] for the Transplant team to be able to follow care of the patient and defer patient for transplant evaluation once medically stable. Patients children stated that they were never notified that the patient was No longer considered a candidate for Transplant indefinitely. If that had been relayed to them they would have taken the proper steps to have her discharged from the hospital during the 3 day window that she was well enough to take her back home (via commercial airlines) to [place] for comfort care so she could be surrounded by family support.  CM relayed family concerns to the CCM team, who in turn called Hepatology to come speak to the family at the bedside and clarify their determination of transplant status.      08/08/19  CM met with Patients family at the bedside who verified that a Hepatology resident did come speak to them yesterday evening. Patients children stated that at this point  it is evident that patient is no longer a candidate for transplant and their only concerned with finding a way to get mom back home to [place] that is financially feasible for the patient and family. ZULLY discussed with the family that there has been no one to verify if the patient has transportation coverage through [employer]/ of [place] as CM cannot get through the automated system to speak with a representative.  ZULLY notified the family that the quote for medically assisted Air Transportation to patients home zip code: [phone] via [employer] $[phone] however if the patient/family could come up with the total cost (out of pocket) the discounted price would be $24,580.  [employer] also stated that they would not have a flight jet available until approximately August 15-16th.  Patients family stated that the family is financially strained and are requesting if there are any other services available to assist with medical transport of the patient.  CM continues to assist in the set up of transportation for the patient to get back home to [place]. CM remains available for any further needs or concerns.     08/09/19  ZULLY called and spoke to Gerson with [employer] and requested quote for patient and family.  Gerson was able to escalate the patients case and per Iveth with [facility]-patient was approved for medically assisted transport via [facility]. Patients family have spoken to Gerson with [id number] and have been offered and agreed to the cost of $21,831 (10-12% discount).  Patient is scheduled for 09:00am discharge and flight home to [place] via [employer] on Saturday 08/10/19. Patient has an accepting physician (Dr Weber-Hematology) at [facility]. Dr Batista notified of D/C plan and will call report to accepting MD upon discharge tomorrow morning. ZULLY spoke with Gerson at [employer] confirming payment arrangements had been made by the family and CM provided  Gerson with patient demographics for flight finalization. CM remains available for any further patient/family needs or concerns.       Claudia Abdul RN, [facility]  Case Management-Critical Care     [phone]

## 2019-08-09 NOTE — PROGRESS NOTES
[facility]  Critical Care Medicine  Progress Note    Patient Name: Michelle Ojeda  MRN: [id number]  Admission Date: 7/18/2019  Hospital Length of Stay: 22 days  Code Status: Partial Code  Attending Provider: Saravanan Batista MD  Primary Care Provider: Miguel Angel Weber MD   Principal Problem: Acute metabolic encephalopathy    Subjective:     HPI:  Michelle Ojeda is a 68 year old female with history of IZAGUIRRE cirrhosis, HTN and hypothyroidism who presented to [facility] on 7/18/2019 as a direct admit from [facility] for hyponatremia. Patient is from [place] and requesting liver/kidney transplant evaluation. Her cirrhosis is complicated by ascites requiring biweekly paracentesis, sarcopenia, esophageal varices, hyponatremia and hepatic encephalopathy. Per daughter, patient with functional decline over the last 2 months with complaints of lethargy, decreased oral intake, and peripheral edema. Patient initially admitted to hospital medicine for treatment of hyponatremia with fluid restriction. During liver work up, incidental renal mass found and urology was consulted. Patient developed an ileus and unable to tolerate lactulose while on the floor. With acute respiratory failure, hepatic encephalopathy, and N/V, patient intubated on 7/24. Patient intubated during her ICU stay, extubated - stepped down to IM-L team (on 7/30) where her course has now been complicated by worsening hepatic encephalopathy due to lack of oral lactulose and worsening uremia as her renal function is again worsening. Patient became more confused on 8/5 with regular BM and work-up for infection. NG tube placed since patient not tolerating enemas for lactulose but started having bleeding concerning for varices. NG now removed. Patient remains altered.     Family initially expressed interest in DNR status and comfort measures but now feel they would like to attempt dialysis for her encephalopathy and  electrolyte abnormalities with the end goal of getting patient to [place] with family. Daughter and son understand patient's condition and would like to remain with no chest compressions but are interested in a short course of intubation for acute respiratory issues, while patient's children fly in to [place]. No new episodes of hematemesis after OG tube removed but patient remains altered today.     Hospital/ICU Course:  Nephrology and hepatology assisting.  TPN started through central access.. Patient's encephalopathy improving. Developed BRBPR via flexiseal alongside minimal elevation in Hgb despite transfusion of one unit of pRBC. Platelets of ~30, prompting transfusion of two units.     Interval History/Significant Events: Pt's mental status improving as she continues to anticipate transfer back to [place]. Family at bedside holding lee. Drop in Hgb overnight treated with pRBC, but pt with only minimal increase in count.     Review of Systems   Constitutional: Negative for chills and fever.   Eyes: Negative for visual disturbance.   Cardiovascular: Negative for chest pain and palpitations.   Gastrointestinal: Negative for abdominal pain, blood in stool, constipation, diarrhea, nausea and vomiting.   Genitourinary: Negative for difficulty urinating, dysuria and hematuria.   Musculoskeletal: Negative for back pain and myalgias.   Skin: Negative for rash.   Neurological: Negative for weakness and headaches.   Hematological: Bruises/bleeds easily.     Objective:     Vital Signs (Most Recent):  Temp: 97.4 °F (36.3 °C) (08/09/19 1353)  Pulse: 72 (08/09/19 1353)  Resp: (!) 29 (08/09/19 1353)  BP: (!) 105/59 (08/09/19 1353)  SpO2: 98 % (08/09/19 1353) Vital Signs (24h Range):  Temp:  [96.9 °F (36.1 °C)-98.2 °F (36.8 °C)] 97.4 °F (36.3 °C)  Pulse:  [68-91] 72  Resp:  [11-34] 29  SpO2:  [96 %-100 %] 98 %  BP: ()/(36-63) 105/59   Weight: 56 kg (123 lb 7.3 oz)  Body mass index is 24.11  kg/m².      Intake/Output Summary (Last 24 hours) at 8/9/2019 1355  Last data filed at 8/9/2019 1232  Gross per 24 hour   Intake 4533 ml   Output 2717 ml   Net 1816 ml       Physical Exam   Constitutional: She appears cachectic.  Non-toxic appearance. She has a sickly appearance. She appears ill. No distress.   Frail / cachectic  Diffuse bruising, purpura   Flexi in place draining bright blood  Webster in place   Eyes: Right eye exhibits no discharge. Left eye exhibits no discharge. No scleral icterus.   Cardiovascular: Normal rate, S1 normal and S2 normal.   No murmur heard.  Pulmonary/Chest: Effort normal. No respiratory distress. She has no wheezes.   Abdominal: Soft.   Musculoskeletal: She exhibits no edema.   Neurological: She is alert.   Skin: Skin is warm. She is not diaphoretic.   Nursing note and vitals reviewed.      Vents:  Vent Mode: Spont (07/27/19 0912)  Ventilator Initiated: Yes (07/24/19 1312)  Set Rate: 0 bmp (07/27/19 0912)  Vt Set: 300 mL (07/27/19 0912)  Pressure Support: 5 cmH20 (07/27/19 0912)  PEEP/CPAP: 5 cmH20 (07/27/19 0912)  Oxygen Concentration (%): 28 (07/27/19 1113)  Peak Airway Pressure: 11 cmH2O (07/27/19 0912)  Plateau Pressure: 0 cmH20 (07/27/19 0912)  Total Ve: 8.93 mL (07/27/19 0912)  Negative Inspiratory Force (cm H2O): -35 (07/27/19 1105)  F/VT Ratio<105 (RSBI): (!) 36.25 (07/27/19 0912)  Lines/Drains/Airways     Central Venous Catheter Line                 Trialysis (Dialysis) Catheter 08/07/19 0135 right internal jugular 2 days          Drain                 Urethral Catheter 08/05/19 1138 Straight-tip 16 Fr. 4 days         Rectal Tube 08/07/19 0930 rectal tube w/ balloon (indicate number of mLs) 2 days          Peripheral Intravenous Line                 Peripheral IV - Single Lumen 08/06/19 20 G Left;Posterior Hand 3 days         Midline Catheter Insertion/Assessment  - Single Lumen 08/06/19 1552 Right basilic vein (medial side of arm) 18g x 8cm 2 days              Significant  Labs:    CBC/Anemia Profile:  Recent Labs   Lab 08/08/19  0414 08/09/19  0324 08/09/19  0743   WBC 3.34* 5.60 3.85*   HGB 7.6* 8.2* 7.4*   HCT 23.3* 25.3* 23.2*   PLT 29* 28* 33*   * 103* 106*   RDW 20.2* 19.0* 19.5*        Chemistries:  Recent Labs   Lab 08/08/19  0414 08/08/19  1344 08/08/19  2144 08/09/19  0324     145 143  143 142 136  136   K 3.5  3.5 3.8  3.8 3.9 4.4  4.4     105 106  106 107 106  106   CO2 25  25 24  24 23 24  24   BUN 48*  48* 57*  57* 38* 11  11   CREATININE 2.9*  2.9* 3.3*  3.3* 2.1* 0.8  0.8   CALCIUM 9.1  9.1 8.6*  8.6* 9.0 8.0*  8.0*   ALBUMIN 3.9  3.9 3.8  3.8 3.8 3.6  3.6   PROT 6.5  --   --  6.2   BILITOT 3.0*  --   --  4.5*   ALKPHOS 114  --   --  112   ALT 20  --   --  20   AST 31  --   --  35   MG 2.5  2.5 2.4  2.4 2.4 2.0   PHOS 3.4  3.4 4.6*  4.6* 3.4 1.7*  1.7*       All pertinent labs within the past 24 hours have been reviewed.    Significant Imaging:  I have reviewed all pertinent imaging results/findings within the past 24 hours.      ABG  Recent Labs   Lab 08/06/19  1604   PH 7.528*   PO2 23*   PCO2 28.1*   HCO3 23.4*   BE 1     Assessment/Plan:     Neuro  * Acute metabolic encephalopathy  68 year old female with hepatic and kidney failure who is profoundly encephalopathic due to severe hyperammonemia and uremia. She had temporarily improved with lactulose but is now declining due to her multiorgan dysfunction. Pt has been denied for transplant. The family understands that she is dying but hope to get her better enough to get back to [place] to be with family. Patient recently had issues tolerating enemas and had NG placed with hematemesis. Concern for gastric varices and NG was removed. GI performed EGD that showed not actively bleeding varices, but did show esophagitis and jennifer jaeger tears. Patient remains altered with last ammonia 188. Plan for dialysis today.   -CRRT to clear uremic toxins    -continue lactulose  enemas   -CMP daily    Pulmonary  Acute hypoxemic respiratory failure  Intubated for airway protection in setting of severe encephalopathy and episodes of emesis/concern for aspiration during admission during admission. Currently during well on RA. Family still interested in intubation pending remaining family coming to [place] from [place].    -Sputum culture negative from 7/24.  Off antibiotics.   -No focal signs of consolidation on CXR   -extubated on 7/27   -monitor oxygen saturation      Renal/  Right kidney mass  R kidney mass measures 2.4 cm x 2.3 cm found on CT abd/pelvis during transplant workup. Urology following with plans to hold on any further work up in setting of acute decompensation   -PRN pain control as indicated     Acute kidney injury superimposed on chronic kidney disease  Baseline reportedly sCr 1.6. Nephrology following,concern iATN given recent shock.  Also concern for intravascular volume depletion given significant stool output.   Retroperitoneal US on 7/23 negative for hydronephrosis. With rising sCr and concern for uremia with BUN of 125, RIJ trialysis catheter placed and plan for CRRT.    -urine studies concerning for pre-renal etiology.  Started on albumin and continued on midodrine, NPO with no access   -Strict I/Os   -RRT today   -nephrology following, appreciate recs    Hematology  Coagulopathy  See cirrhosis    Oncology  Pancytopenia  See cirrhosis    Endocrine  Hyperglycemia  Patient with Hx of DM however was taken off metformin due to improvement  --A1c 5.1  --insulin infusion discontinued on 7/27 when enteral feedings where held.   --frequent glucose checks with SSI  --glucose goal 140-180    Hypothyroidism  --Continue home levothyroxine    GI  Upper GI bleed  History of hematemesis after OG line placement. EGD read with not actively bleeding varices. Will continue to monitor for bleeding.   -GI recommendations   -trend CBC   -avoid NG tubes    Other dysphagia  --SLP  following.  Dental soft diet with thin liquids    Ileus  --improved.  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 MELD score of 24 today before dilaysis.    -Continue lactulose and rifaximin   -Hepatology following   -Paracentesis 7/24 with 4L removed;negative for SBP. Cytology pending.      Other  Shock, unspecified  --Likely due to sedating medications however patient at risk for infection  --Blood culture, sputum culture, and peritoneal fluid negative  --UA appearing noninfectious  --Received 4 doses of pip/tazo,  Received 3 days of vancomycin.  --Resolved; weaned off norepinephrine infusion on 7/25  --Continue midodrine 10 mg TID    Physical debility  --PT/OT consulted       Critical Care Daily Checklist:    A: Awake: RASS Goal/Actual Goal: RASS Goal: 0-->alert and calm  Actual: Kelsey Agitation Sedation Scale (RASS): Alert and calm   B: Spontaneous Breathing Trial Performed? Spon. Breathing Trial Initiated?: Initiated (07/27/19 1105)   C: SAT & SBT Coordinated?  n/a                      D: Delirium: CAM-ICU Overall CAM-ICU: Negative   E: Early Mobility Performed? Yes   F: Feeding Goal: Goals: Meet % EEN, EPN  Status: Nutrition Goal Status: goal not met   Current Diet Order   Procedures    Diet NPO Except for: Ice Chips, Sips with Medication     Order Specific Question:   Except for     Answer:   Ice Chips     Order Specific Question:   Except for     Answer:   Sips with Medication      AS: Analgesia/Sedation n/a   T: Thromboembolic Prophylaxis SCD   H: HOB > 300 Yes   U: Stress Ulcer Prophylaxis (if needed) n/a   G: Glucose Control n/a   B: Bowel Function Stool Occurrence: 1   I: Indwelling Catheter (Lines & Webster) Necessity Webster and flexi in place   D: De-escalation of Antimicrobials/Pharmacotherapies Rifaximin and ceftriaxone    Plan for the day/ETD n/a    Code  Status:  Family/Goals of Care: Partial Code  n/a       Critical secondary to Patient has a condition that poses threat to life and bodily function: Acute Renal Failure and hepatic encephalopathy and acute blood loss anemia related to GI bleed.       Critical care was time spent personally by me on the following activities: development of treatment plan with patient or surrogate and bedside caregivers, discussions with consultants, evaluation of patient's response to treatment, examination of patient, ordering and performing treatments and interventions, ordering and review of laboratory studies, ordering and review of radiographic studies, pulse oximetry, re-evaluation of patient's condition. This critical care time did not overlap with that of any other provider or involve time for any procedures.     Behram Khan, MD  Critical Care Medicine  [facility]

## 2019-08-09 NOTE — PROGRESS NOTES
[facility]  Adult Nutrition  Progress Note    SUMMARY       Recommendations  Recommendation/Intervention:   1. Current PPN not meeting EEN and EPN. Recommend modifying to Custom TPN 75g AA and 260g Dextrose + IV lipids daily - to provide 1684 kcal/day and 75g protein/day.    -If unable to transition to TPN and PPN warranted, recommend Custom PPN 65g AA and 240g Dextrose + IV lipids daily - to provide 1576 kcal/day and 65g protein/day.   2. If able to obtain enteral access, recommend initiating TF of Isosource 1.5 at a goal rate of 45 mL/hr - to provide 1620 kcal/day, 73g protein/day, and 725mL free fluid/day.   3. PO diet per MD and SLP.  RD to monitor.    Goals: Meet % EEN, EPN  Nutrition Goal Status: goal not met  Communication of RD Recs: reviewed with RN    Reason for Assessment  Reason For Assessment: RD follow-up  Diagnosis: (acute metabolic encephalopathy)  Relevant Medical History: IZAGUIRRE cirrhosis, DM2, HTN  Interdisciplinary Rounds: did not attend  General Information Comments: Transferred to ICU 8/7. Patient with AMS/confusion. On CRRT. On PPN but no lipids. NFPE completed 7/25, patient continues with moderate malnutrition.  Nutrition Discharge Planning: Unable to determine at this time.    Nutrition Risk Screen  Nutrition Risk Screen: tube feeding or parenteral nutrition, dysphagia or difficulty swallowing    Nutrition/Diet History  Patient Reported Diet/Restrictions/Preferences: low salt  Spiritual, Cultural Beliefs, Rastafari Practices, Values that Affect Care: no  Supplemental Drinks or Food Habits: Ensure Plus  Food Allergies: NKFA  Factors Affecting Nutritional Intake: NPO    Anthropometrics  Temp: 96.9 °F (36.1 °C)  Height Method: Stated  Height: 5' (152.4 cm)  Height (inches): 60 in  Weight Method: Bed Scale  Weight: 56 kg (123 lb 7.3 oz)  Weight (lb): 123.46 lb  Ideal Body Weight (IBW), Female: 100 lb  % Ideal Body Weight, Female (lb): 123.46 lb  BMI (Calculated):  24.2  BMI Grade: 18.5-24.9 - normal  Usual Body Weight (UBW), k kg  % Usual Body Weight: 93.68  % Weight Change From Usual Weight: -6.52 %    Lab/Procedures/Meds  Pertinent Labs Reviewed: reviewed  Pertinent Labs Comments: Glu 143, POCT Glu 147-178, HgbA1c 5.1, Ca 8.0, Phos 1.7  Pertinent Medications Reviewed: reviewed  Pertinent Medications Comments: pantoprazole    Estimated/Assessed Needs  Weight Used For Calorie Calculations: 56 kg (123 lb 7.3 oz)  Energy Calorie Requirements (kcal): 1680 kcal/d  Energy Need Method: Kcal/kg(30 kcal/kg)  Protein Requirements: 73 g/d (1.3 g/kg)  Weight Used For Protein Calculations: 56 kg (123 lb 7.3 oz)  Fluid Requirements (mL): 1 mL/kcal or per MD  Estimated Fluid Requirement Method: RDA Method  RDA Method (mL): 1680    Nutrition Prescription Ordered  Current Diet Order: NPO  Current Nutrition Support Formula Ordered: (Custom PPN 2.75% AA and 10% Dextrose)  Current Nutrition Support Rate Ordered: 60 (ml)  Current Nutrition Support Frequency Ordered: mL/hr     Evaluation of Received Nutrient/Fluid Intake  Parenteral Calories (kcal): 648  Parenteral Protein (gm): 40  Parenteral Fluid (mL): 1440  GIR (Glucose Infusion Rate) (mg/kg/min): 1.79 mg/kg/min  % Kcal Needs: 39%  % Protein Needs: 55%  I/O: -10.3L since admit  Energy Calories Required: not meeting needs  Protein Required: not meeting needs  Fluid Required: (per MD)  Comments: LBM 8/9  Tolerance: tolerating  % Intake of Estimated Energy Needs: 25 - 50 %  % Meal Intake: NPO    Nutrition Risk  Level of Risk/Frequency of Follow-up: high(2x/week)     Assessment and Plan  Moderate malnutrition  Nutrition Problem  Malnutrition in the context of Chronic Illness/Injury    Related to (etiology):  Inadequate energy intake    Signs and Symptoms (as evidenced by):  Energy Intake: <75% of estimated energy requirement for 4 months   Body Fat Depletion: moderate depletion of orbitals and triceps   Muscle Mass Depletion: moderate  depletion of temples and clavicle region   Weight Loss: 7% x 7 months     Interventions/Recommendations (treatment strategy):  Collaboration of nutrition care w/ other providers     Nutrition Diagnosis Status:  Continues    Monitor and Evaluation  Food and Nutrient Intake: energy intake, parenteral nutrition intake  Food and Nutrient Adminstration: enteral and parenteral nutrition administration  Physical Activity and Function: nutrition-related ADLs and IADLs  Anthropometric Measurements: weight, weight change  Biochemical Data, Medical Tests and Procedures: lipid profile, inflammatory profile, glucose/endocrine profile, gastrointestinal profile, electrolyte and renal panel  Nutrition-Focused Physical Findings: overall appearance     Malnutrition Assessment  Weight Loss (Malnutrition): other (see comments)(7% x 7 months)  Energy Intake (Malnutrition): less than 75% for greater than or equal to 3 months  Fluid Accumulation (Malnutrition): moderate   Orbital Region (Subcutaneous Fat Loss): moderate depletion  Upper Arm Region (Subcutaneous Fat Loss): moderate depletion   Pentecostal Region (Muscle Loss): moderate depletion  Clavicle and Acromion Bone Region (Muscle Loss): moderate depletion     Nutrition Follow-Up  RD Follow-up?: Yes

## 2019-08-09 NOTE — ASSESSMENT & PLAN NOTE
Intubated for airway protection in setting of severe encephalopathy and episodes of emesis/concern for aspiration during admission during admission. Currently during well on RA. Family still interested in intubation pending remaining family coming to [place] from [place].    -Sputum culture negative from 7/24.  Off antibiotics.   -No focal signs of consolidation on CXR   -extubated on 7/27   -monitor oxygen saturation

## 2019-08-09 NOTE — PT/OT/SLP EVAL
"Speech Language Pathology Evaluation  Bedside Swallow/ Discharge Summary     Patient Name:  Michelle Ojeda   MRN:  [id number]  Admitting Diagnosis: Acute metabolic encephalopathy    Recommendations:                 General Recommendations:  Follow-up not indicated  Diet recommendations:  Dental Soft, Thin   Aspiration Precautions: 1 bite/sip at a time, Assistance with meals, Meds crushed in puree, Small bites/sips and Standard aspiration precautions   General Precautions: Standard, fall  Communication strategies:  none    History:     Past Medical History:   Diagnosis Date    Cirrhosis     Diabetes mellitus, type 2     Disorder of kidney and ureter     Hypertension     Hypothyroidism     NAFLD (nonalcoholic fatty liver disease)        Past Surgical History:   Procedure Laterality Date    EGD (ESOPHAGOGASTRODUODENOSCOPY) N/A 8/6/2019    Performed by Alberto Quintanilla MD at [facility] ([address] [place] [address])       Social History: Patient is planning to discharge to [facility] for ongoing comfort care with family     Prior diet: currently NPO,  TPN , was previously evaluated and discharged by speech service on 7/28 with diet of dental soft and thin liquids       Subjective     "I've been doing really well with ice chips and sips of water" Pt referring to PO intake     Pain/Comfort:  · Pain Rating 1: 0/10  · Pain Rating Post-Intervention 1: 0/10    Objective:     Oral Musculature Evaluation  · Oral Musculature: WFL  · Dentition: present and adequate  · Secretion Management: adequate  · Mucosal Quality: adequate  · Mandibular Strength and Mobility: WFL  · Oral Labial Strength and Mobility: WFL  · Lingual Strength and Mobility: WFL  · Velar Elevation: WFL  · Buccal Strength and Mobility: WFL  · Volitional Cough: fair  · Volitional Swallow: no delay  · Voice Prior to PO Intake: wfl    Bedside Swallow Eval:   Consistencies Assessed:  · Thin liquids 2oz via small single sips at a time from cup edge and straw   · Soft solids x3 "     Oral Phase:   · WFL - pt with general global weakness and difficulty with self feeding will warrant ongoing assistance with meals     Pharyngeal Phase:   · no overt clinical signs/symptoms of aspiration  · no overt clinical signs/symptoms of pharyngeal dysphagia      Treatment:  Education provided to Pt re: SLP role in acute care setting, overall impressions and therapeutic goals. Whiteboard updated.    Pt seen for clinical swallow re-evaluation. Pt appearing safe for small single sips of thin liquids and soft chopped solid for pleasure. SLP discussed with family and pt at length types of foods to offer and reviewed clinical signs of aspiration. Diet recommendations for comfort/pleasure at this time not to meet volume needs. Family demonstrated awareness and understanding of recommendations and plan. No further skilled speech services warranted in the acute care setting at this time.     Assessment:     Michelle Ojeda is a 68 y.o. female with an SLP diagnosis of essentially intact oral feeding and swallowing skills to continue to consume soft foods and thin for pleasure and comfort.  Given overall medical picture no further skilled speech therapy services warranted in the acute care setting.     Goals:   Multidisciplinary Problems     SLP Goals        Problem: SLP Goal    Goal Priority Disciplines Outcome   SLP Goal     SLP Ongoing (interventions implemented as appropriate)                   Plan:   · Plan of Care reviewed with:  patient, family   · SLP Follow-Up:  No       Discharge recommendations:  nursing facility, skilled   Barriers to Discharge:  None per ST     Time Tracking:     SLP Treatment Date:   08/09/19  Speech Start Time:  1103  Speech Stop Time:  1118     Speech Total Time (min):  15 min    Billable Minutes: Eval Swallow and Oral Function 15    Leatha Bonilla CCC-SLP  08/09/2019

## 2019-08-09 NOTE — ASSESSMENT & PLAN NOTE
68 year old female with hepatic and kidney failure who is profoundly encephalopathic due to severe hyperammonemia and uremia. She had temporarily improved with lactulose but is now declining due to her multiorgan dysfunction. Pt has been denied for transplant. The family understands that she is dying but hope to get her better enough to get back to [place] to be with family. Patient recently had issues tolerating enemas and had NG placed with hematemesis. Concern for gastric varices and NG was removed. GI performed EGD that showed not actively bleeding varices, but did show esophagitis and jennifer jaeger tears. Patient remains altered with last ammonia 188. Plan for dialysis today.   -CRRT to clear uremic toxins    -continue lactulose enemas   -CMP daily

## 2019-08-09 NOTE — PLAN OF CARE
Problem: Adult Inpatient Plan of Care  Goal: Plan of Care Review  Outcome: Ongoing (interventions implemented as appropriate)    See vital signs and assessments in flowsheets. See below for updates on today's progress.     Pulmonary: Pt lung sounds CTA. No SOB reported. O2 sats 94-99% on RA.    Cardiovascular: Pt is in NSR, 70s-80s. SBP 100s-110s.     Neurological: Prior to lactulose enema, pt disoriented to time. After lactulose enema, pt AAOx4 throughout night. Afebrile. Pt is able to move all extremities spontaneously.     Gastrointestinal: Pt has flexi seal in place. 200cc dark brown UO noted. Pt was able to tolerate lactulose enema. Bowel sounds audible and active.     Genitourinary: Pt has leigh catheter in place. Pt is oliguric with UO 5-15mL of concentrated yellow urine. Pt currently on CRRT - 12 hour SLED with goal 200cc/hr.     Endocrine: Pt has q6 Accuchecks.     Skin/Bath: Pt has bilateral UA skin tears dressed with Mepilex foam. Pt also has sacral skin tear covered with foam, HAPI bundle in place.  Date of last CHG bath given: 8/8 on AM shift.     Infusions: Pt has TPN infusing at 60mL/hr. Pt also received 1Unit PRBCs and 1Unit plts.     Patient progressing towards goals as tolerated, plan of care communicated and reviewed with Michelle Ojeda and family. All concerns addressed. Will continue to monitor.

## 2019-08-10 VITALS
BODY MASS INDEX: 24.23 KG/M2 | OXYGEN SATURATION: 100 % | SYSTOLIC BLOOD PRESSURE: 102 MMHG | HEIGHT: 60 IN | RESPIRATION RATE: 20 BRPM | HEART RATE: 77 BPM | WEIGHT: 123.44 LBS | DIASTOLIC BLOOD PRESSURE: 52 MMHG | TEMPERATURE: 98 F

## 2019-08-10 LAB
ALBUMIN SERPL BCP-MCNC: 3.2 G/DL (ref 3.5–5.2)
ALBUMIN SERPL BCP-MCNC: 3.2 G/DL (ref 3.5–5.2)
ALP SERPL-CCNC: 149 U/L (ref 55–135)
ALT SERPL W/O P-5'-P-CCNC: 27 U/L (ref 10–44)
ANION GAP SERPL CALC-SCNC: 9 MMOL/L (ref 8–16)
ANION GAP SERPL CALC-SCNC: 9 MMOL/L (ref 8–16)
ANISOCYTOSIS BLD QL SMEAR: SLIGHT
AST SERPL-CCNC: 51 U/L (ref 10–40)
BACTERIA BLD CULT: NORMAL
BACTERIA BLD CULT: NORMAL
BASO STIPL BLD QL SMEAR: ABNORMAL
BASOPHILS # BLD AUTO: 0.03 K/UL (ref 0–0.2)
BASOPHILS NFR BLD: 0.8 % (ref 0–1.9)
BILIRUB SERPL-MCNC: 8.7 MG/DL (ref 0.1–1)
BLD PROD TYP BPU: NORMAL
BLOOD UNIT EXPIRATION DATE: NORMAL
BLOOD UNIT TYPE CODE: 600
BLOOD UNIT TYPE: NORMAL
BUN SERPL-MCNC: 32 MG/DL (ref 8–23)
BUN SERPL-MCNC: 32 MG/DL (ref 8–23)
CALCIUM SERPL-MCNC: 8.5 MG/DL (ref 8.7–10.5)
CALCIUM SERPL-MCNC: 8.5 MG/DL (ref 8.7–10.5)
CHLORIDE SERPL-SCNC: 102 MMOL/L (ref 95–110)
CHLORIDE SERPL-SCNC: 102 MMOL/L (ref 95–110)
CO2 SERPL-SCNC: 21 MMOL/L (ref 23–29)
CO2 SERPL-SCNC: 21 MMOL/L (ref 23–29)
CODING SYSTEM: NORMAL
CREAT SERPL-MCNC: 1.9 MG/DL (ref 0.5–1.4)
CREAT SERPL-MCNC: 1.9 MG/DL (ref 0.5–1.4)
DIFFERENTIAL METHOD: ABNORMAL
DISPENSE STATUS: NORMAL
EOSINOPHIL # BLD AUTO: 0.1 K/UL (ref 0–0.5)
EOSINOPHIL NFR BLD: 3.3 % (ref 0–8)
ERYTHROCYTE [DISTWIDTH] IN BLOOD BY AUTOMATED COUNT: 18.9 % (ref 11.5–14.5)
EST. GFR  (AFRICAN AMERICAN): 30.4 ML/MIN/1.73 M^2
EST. GFR  (AFRICAN AMERICAN): 30.4 ML/MIN/1.73 M^2
EST. GFR  (NON AFRICAN AMERICAN): 26.3 ML/MIN/1.73 M^2
EST. GFR  (NON AFRICAN AMERICAN): 26.3 ML/MIN/1.73 M^2
GLUCOSE SERPL-MCNC: 232 MG/DL (ref 70–110)
GLUCOSE SERPL-MCNC: 232 MG/DL (ref 70–110)
HCT VFR BLD AUTO: 25.9 % (ref 37–48.5)
HGB BLD-MCNC: 8.3 G/DL (ref 12–16)
HYPOCHROMIA BLD QL SMEAR: ABNORMAL
IMM GRANULOCYTES # BLD AUTO: 0.04 K/UL (ref 0–0.04)
IMM GRANULOCYTES NFR BLD AUTO: 1 % (ref 0–0.5)
INR PPP: 1.6 (ref 0.8–1.2)
LYMPHOCYTES # BLD AUTO: 0.3 K/UL (ref 1–4.8)
LYMPHOCYTES NFR BLD: 6.6 % (ref 18–48)
MAGNESIUM SERPL-MCNC: 1.8 MG/DL (ref 1.6–2.6)
MCH RBC QN AUTO: 32.4 PG (ref 27–31)
MCHC RBC AUTO-ENTMCNC: 32 G/DL (ref 32–36)
MCV RBC AUTO: 101 FL (ref 82–98)
MONOCYTES # BLD AUTO: 0.3 K/UL (ref 0.3–1)
MONOCYTES NFR BLD: 7.6 % (ref 4–15)
NEUTROPHILS # BLD AUTO: 3.2 K/UL (ref 1.8–7.7)
NEUTROPHILS NFR BLD: 80.7 % (ref 38–73)
NRBC BLD-RTO: 0 /100 WBC
PHOSPHATE SERPL-MCNC: 1.4 MG/DL (ref 2.7–4.5)
PHOSPHATE SERPL-MCNC: 1.4 MG/DL (ref 2.7–4.5)
PLATELET # BLD AUTO: 61 K/UL (ref 150–350)
PLATELET BLD QL SMEAR: ABNORMAL
PMV BLD AUTO: 10.3 FL (ref 9.2–12.9)
POCT GLUCOSE: 231 MG/DL (ref 70–110)
POCT GLUCOSE: 264 MG/DL (ref 70–110)
POCT GLUCOSE: 418 MG/DL (ref 70–110)
POLYCHROMASIA BLD QL SMEAR: ABNORMAL
POTASSIUM SERPL-SCNC: 4.3 MMOL/L (ref 3.5–5.1)
POTASSIUM SERPL-SCNC: 4.3 MMOL/L (ref 3.5–5.1)
PROT SERPL-MCNC: 5.9 G/DL (ref 6–8.4)
PROTHROMBIN TIME: 15.7 SEC (ref 9–12.5)
RBC # BLD AUTO: 2.56 M/UL (ref 4–5.4)
SODIUM SERPL-SCNC: 132 MMOL/L (ref 136–145)
SODIUM SERPL-SCNC: 132 MMOL/L (ref 136–145)
TRANS PLATPHERESIS VOL PATIENT: NORMAL ML
WBC # BLD AUTO: 3.93 K/UL (ref 3.9–12.7)

## 2019-08-10 PROCEDURE — P9035 PLATELET PHERES LEUKOREDUCED: HCPCS

## 2019-08-10 PROCEDURE — 36430 TRANSFUSION BLD/BLD COMPNT: CPT

## 2019-08-10 PROCEDURE — 80053 COMPREHEN METABOLIC PANEL: CPT

## 2019-08-10 PROCEDURE — 25000003 PHARM REV CODE 250: Performed by: STUDENT IN AN ORGANIZED HEALTH CARE EDUCATION/TRAINING PROGRAM

## 2019-08-10 PROCEDURE — 25000003 PHARM REV CODE 250: Performed by: INTERNAL MEDICINE

## 2019-08-10 PROCEDURE — 85610 PROTHROMBIN TIME: CPT

## 2019-08-10 PROCEDURE — C9113 INJ PANTOPRAZOLE SODIUM, VIA: HCPCS | Performed by: STUDENT IN AN ORGANIZED HEALTH CARE EDUCATION/TRAINING PROGRAM

## 2019-08-10 PROCEDURE — 63600175 PHARM REV CODE 636 W HCPCS: Performed by: HOSPITALIST

## 2019-08-10 PROCEDURE — 85025 COMPLETE CBC W/AUTO DIFF WBC: CPT

## 2019-08-10 PROCEDURE — 63600175 PHARM REV CODE 636 W HCPCS: Performed by: STUDENT IN AN ORGANIZED HEALTH CARE EDUCATION/TRAINING PROGRAM

## 2019-08-10 PROCEDURE — 83735 ASSAY OF MAGNESIUM: CPT

## 2019-08-10 PROCEDURE — 94761 N-INVAS EAR/PLS OXIMETRY MLT: CPT

## 2019-08-10 PROCEDURE — 84100 ASSAY OF PHOSPHORUS: CPT

## 2019-08-10 RX ORDER — MIDODRINE HYDROCHLORIDE 5 MG/1
5 TABLET ORAL EVERY 8 HOURS
Qty: 90 TABLET | Refills: 11 | Status: SHIPPED | OUTPATIENT
Start: 2019-08-10 | End: 2020-08-09

## 2019-08-10 RX ORDER — SODIUM,POTASSIUM PHOSPHATES 280-250MG
2 POWDER IN PACKET (EA) ORAL EVERY 4 HOURS
Status: COMPLETED | OUTPATIENT
Start: 2019-08-10 | End: 2019-08-10

## 2019-08-10 RX ORDER — MIDODRINE HYDROCHLORIDE 5 MG/1
5 TABLET ORAL EVERY 8 HOURS
Qty: 90 TABLET | Refills: 11 | OUTPATIENT
Start: 2019-08-10 | End: 2020-08-09

## 2019-08-10 RX ADMIN — MIDODRINE HYDROCHLORIDE 5 MG: 5 TABLET ORAL at 06:08

## 2019-08-10 RX ADMIN — INSULIN ASPART 1 UNITS: 100 INJECTION, SOLUTION INTRAVENOUS; SUBCUTANEOUS at 12:08

## 2019-08-10 RX ADMIN — MIDODRINE HYDROCHLORIDE 5 MG: 5 TABLET ORAL at 12:08

## 2019-08-10 RX ADMIN — INSULIN ASPART 3 UNITS: 100 INJECTION, SOLUTION INTRAVENOUS; SUBCUTANEOUS at 06:08

## 2019-08-10 RX ADMIN — PANTOPRAZOLE SODIUM 40 MG: 40 INJECTION, POWDER, FOR SOLUTION INTRAVENOUS at 08:08

## 2019-08-10 RX ADMIN — CEFTRIAXONE SODIUM 2 G: 2 INJECTION, POWDER, FOR SOLUTION INTRAMUSCULAR; INTRAVENOUS at 10:08

## 2019-08-10 RX ADMIN — POTASSIUM & SODIUM PHOSPHATES POWDER PACK 280-160-250 MG 2 PACKET: 280-160-250 PACK at 10:08

## 2019-08-10 RX ADMIN — LACTULOSE 30 G: 20 SOLUTION ORAL at 08:08

## 2019-08-10 RX ADMIN — POTASSIUM & SODIUM PHOSPHATES POWDER PACK 280-160-250 MG 2 PACKET: 280-160-250 PACK at 06:08

## 2019-08-10 NOTE — PLAN OF CARE
Problem: Fall Injury Risk  Goal: Absence of Fall and Fall-Related Injury  Outcome: Resolved for Upgrade Date Met: 08/10/19

## 2019-08-10 NOTE — PLAN OF CARE
Problem: Diabetes Comorbidity  Goal: Blood Glucose Level Within Desired Range  Outcome: Resolved for Upgrade Date Met: 08/10/19

## 2019-08-10 NOTE — SUBJECTIVE & OBJECTIVE
Interval History/Significant Events: Pt had no acute or notable events overnight. Per pt and daughter, pt was able to get a comfortable night's sleep. Upon bedside exam pt was relaxed and comfortable.    Review of Systems   Constitutional: Negative for chills and fever.   Eyes: Negative for visual disturbance.   Cardiovascular: Negative for chest pain and palpitations.   Gastrointestinal: Negative for abdominal pain, blood in stool, constipation, diarrhea, nausea and vomiting.   Genitourinary: Negative for difficulty urinating, dysuria and hematuria.   Musculoskeletal: Negative for back pain and myalgias.   Skin: Negative for rash.   Neurological: Negative for weakness and headaches.   Hematological: Bruises/bleeds easily.     Objective:     Vital Signs (Most Recent):  Temp: 97.9 °F (36.6 °C) (08/10/19 0715)  Pulse: 79 (08/10/19 0715)  Resp: (!) 26 (08/10/19 0715)  BP: (!) 118/58 (08/10/19 0715)  SpO2: 100 % (08/10/19 0715) Vital Signs (24h Range):  Temp:  [97.4 °F (36.3 °C)-98.2 °F (36.8 °C)] 97.9 °F (36.6 °C)  Pulse:  [68-83] 79  Resp:  [15-47] 26  SpO2:  [97 %-100 %] 100 %  BP: ()/(36-68) 118/58   Weight: 56 kg (123 lb 7.3 oz)  Body mass index is 24.11 kg/m².      Intake/Output Summary (Last 24 hours) at 8/10/2019 0750  Last data filed at 8/10/2019 0715  Gross per 24 hour   Intake 3314.81 ml   Output 1302 ml   Net 2012.81 ml       Physical Exam   Constitutional: She appears cachectic.  Non-toxic appearance. She has a sickly appearance. She appears ill. No distress.   Frail / cachectic  Diffuse bruising, purpura   Flexi in place draining bright blood  Webster in place   Eyes: Right eye exhibits no discharge. Left eye exhibits no discharge. No scleral icterus.   Cardiovascular: Normal rate, S1 normal and S2 normal.   No murmur heard.  Pulmonary/Chest: Effort normal. No respiratory distress. She has no wheezes.   Abdominal: Soft.   Musculoskeletal: She exhibits no edema.   Neurological: She is alert.   Skin:  Skin is warm. She is not diaphoretic.   Psychiatric: She has a normal mood and affect.   Nursing note and vitals reviewed.      Vents:  Vent Mode: Spont (07/27/19 0912)  Ventilator Initiated: Yes (07/24/19 1312)  Set Rate: 0 bmp (07/27/19 0912)  Vt Set: 300 mL (07/27/19 0912)  Pressure Support: 5 cmH20 (07/27/19 0912)  PEEP/CPAP: 5 cmH20 (07/27/19 0912)  Oxygen Concentration (%): 28 (07/27/19 1113)  Peak Airway Pressure: 11 cmH2O (07/27/19 0912)  Plateau Pressure: 0 cmH20 (07/27/19 0912)  Total Ve: 8.93 mL (07/27/19 0912)  Negative Inspiratory Force (cm H2O): -35 (07/27/19 1105)  F/VT Ratio<105 (RSBI): (!) 36.25 (07/27/19 0912)  Lines/Drains/Airways     Central Venous Catheter Line                 Trialysis (Dialysis) Catheter 08/07/19 0135 right internal jugular 3 days          Drain                 Urethral Catheter 08/05/19 1138 Straight-tip 16 Fr. 4 days         Rectal Tube 08/07/19 0930 rectal tube w/ balloon (indicate number of mLs) 2 days          Peripheral Intravenous Line                 Midline Catheter Insertion/Assessment  - Single Lumen 08/06/19 1552 Right basilic vein (medial side of arm) 18g x 8cm 3 days              Significant Labs:    CBC/Anemia Profile:  Recent Labs   Lab 08/09/19  1357 08/09/19  1750 08/10/19  0255   WBC 3.78* 4.96 3.93   HGB 7.3* 8.9* 8.3*   HCT 22.7* 27.6* 25.9*   PLT 54* 55* 61*   * 102* 101*   RDW 19.8* 19.0* 18.9*        Chemistries:  Recent Labs   Lab 08/09/19  0324 08/09/19  1316 08/10/19  0255     136 137 132*  132*   K 4.4  4.4 4.4 4.3  4.3     106 104 102  102   CO2 24  24 25 21*  21*   BUN 11  11 12 32*  32*   CREATININE 0.8  0.8 1.0 1.9*  1.9*   CALCIUM 8.0*  8.0* 8.2* 8.5*  8.5*   ALBUMIN 3.6  3.6 3.4* 3.2*  3.2*   PROT 6.2  --  5.9*   BILITOT 4.5*  --  8.7*   ALKPHOS 112  --  149*   ALT 20  --  27   AST 35  --  51*   MG 2.0 1.9 1.8   PHOS 1.7*  1.7* 1.6* 1.4*  1.4*       All pertinent labs within the past 24 hours have been  reviewed.    Significant Imaging:  I have reviewed all pertinent imaging results/findings within the past 24 hours.

## 2019-08-10 NOTE — PROGRESS NOTES
[facility]  Critical Care Medicine  Progress Note    Patient Name: Michelle Ojeda  MRN: [id number]  Admission Date: 7/18/2019  Hospital Length of Stay: 23 days  Code Status: Partial Code  Attending Provider: Saravanan Batista MD  Primary Care Provider: Miguel Angel Weber MD   Principal Problem: Acute metabolic encephalopathy    Subjective:     HPI:  Michelle Ojeda is a 68 year old female with history of IZAGUIRRE cirrhosis, HTN and hypothyroidism who presented to [facility] on 7/18/2019 as a direct admit [facility] for hyponatremia. Patient is from [place] and requesting liver/kidney transplant evaluation. Her cirrhosis is complicated by ascites requiring biweekly paracentesis, sarcopenia, esophageal varices, hyponatremia and hepatic encephalopathy. Per daughter, patient with functional decline over the last 2 months with complaints of lethargy, decreased oral intake, and peripheral edema. Patient initially admitted to hospital medicine for treatment of hyponatremia with fluid restriction. During liver work up, incidental renal mass found and urology was consulted. Patient developed an ileus and unable to tolerate lactulose while on the floor. With acute respiratory failure, hepatic encephalopathy, and N/V, patient intubated on 7/24. Patient intubated during her ICU stay, extubated - stepped down to IM-L team (on 7/30) where her course has now been complicated by worsening hepatic encephalopathy due to lack of oral lactulose and worsening uremia as her renal function is again worsening. Patient became more confused on 8/5 with regular BM and work-up for infection. NG tube placed since patient not tolerating enemas for lactulose but started having bleeding concerning for varices. NG now removed. Patient remains altered.     Family initially expressed interest in DNR status and comfort measures but now feel they would like to attempt dialysis for her encephalopathy and  electrolyte abnormalities with the end goal of getting patient to [place] with family. Daughter and son understand patient's condition and would like to remain with no chest compressions but are interested in a short course of intubation for acute respiratory issues, while patient's children fly in to [place]. No new episodes of hematemesis after OG tube removed but patient remains altered today.     Hospital/ICU Course:  Upon admission to the MICU, pt was severely encephalopathic and required rectal tube placement for lactulose enemas. On night of admission to MICU, central venous catheter was placed and patient was initiated on renal replacement therapy. As her uremic and hepatic encephalopathy improved with lactulose enemas and RRT, respectively, she became more engaged in discussions with her family and agreed to the plan for transfer back to [place]. Given the improvement in her encephalopathy, pt was able to pass a swallow evaluation. On the night of 8/8-8/9, pt experienced a drop in hemoglobin, concurrent drop in blood pressure and bright red blood noted in her rectal tube. She was administered a single unit of pRBC's, two units of platelets (since plt count was 33) and a dose of vit K. This appeared to stymie the bleeding as blood count responded well to this intervention. Her rectal lactulose was ceased, but rectal tube was kept in place d/t concern for her tenuous mental status. Her TPN was continued despite passed swallow assessment also d/t concern for her tenuous mental status.     On the night prior to discharge pt had no acute events. Pt stable for discharge via medical flight to [place].    Interval History/Significant Events: Pt had no acute or notable events overnight. Per pt and daughter, pt was able to get a comfortable night's sleep. Upon bedside exam pt was relaxed and comfortable.    Review of Systems   Constitutional: Negative for chills and fever.   Eyes: Negative for visual  disturbance.   Cardiovascular: Negative for chest pain and palpitations.   Gastrointestinal: Negative for abdominal pain, blood in stool, constipation, diarrhea, nausea and vomiting.   Genitourinary: Negative for difficulty urinating, dysuria and hematuria.   Musculoskeletal: Negative for back pain and myalgias.   Skin: Negative for rash.   Neurological: Negative for weakness and headaches.   Hematological: Bruises/bleeds easily.     Objective:     Vital Signs (Most Recent):  Temp: 97.9 °F (36.6 °C) (08/10/19 0715)  Pulse: 79 (08/10/19 0715)  Resp: (!) 26 (08/10/19 0715)  BP: (!) 118/58 (08/10/19 0715)  SpO2: 100 % (08/10/19 0715) Vital Signs (24h Range):  Temp:  [97.4 °F (36.3 °C)-98.2 °F (36.8 °C)] 97.9 °F (36.6 °C)  Pulse:  [68-83] 79  Resp:  [15-47] 26  SpO2:  [97 %-100 %] 100 %  BP: ()/(36-68) 118/58   Weight: 56 kg (123 lb 7.3 oz)  Body mass index is 24.11 kg/m².      Intake/Output Summary (Last 24 hours) at 8/10/2019 0750  Last data filed at 8/10/2019 0715  Gross per 24 hour   Intake 3314.81 ml   Output 1302 ml   Net 2012.81 ml       Physical Exam   Constitutional: She appears cachectic.  Non-toxic appearance. She has a sickly appearance. She appears ill. No distress.   Frail / cachectic  Diffuse bruising, purpura   Flexi in place draining bright blood  Webster in place   Eyes: Right eye exhibits no discharge. Left eye exhibits no discharge. No scleral icterus.   Cardiovascular: Normal rate, S1 normal and S2 normal.   No murmur heard.  Pulmonary/Chest: Effort normal. No respiratory distress. She has no wheezes.   Abdominal: Soft.   Musculoskeletal: She exhibits no edema.   Neurological: She is alert.   Skin: Skin is warm. She is not diaphoretic.   Psychiatric: She has a normal mood and affect.   Nursing note and vitals reviewed.      Vents:  Vent Mode: Spont (07/27/19 0912)  Ventilator Initiated: Yes (07/24/19 1312)  Set Rate: 0 bmp (07/27/19 0912)  Vt Set: 300 mL (07/27/19 0912)  Pressure Support: 5  cmH20 (07/27/19 0912)  PEEP/CPAP: 5 cmH20 (07/27/19 0912)  Oxygen Concentration (%): 28 (07/27/19 1113)  Peak Airway Pressure: 11 cmH2O (07/27/19 0912)  Plateau Pressure: 0 cmH20 (07/27/19 0912)  Total Ve: 8.93 mL (07/27/19 0912)  Negative Inspiratory Force (cm H2O): -35 (07/27/19 1105)  F/VT Ratio<105 (RSBI): (!) 36.25 (07/27/19 0912)  Lines/Drains/Airways     Central Venous Catheter Line                 Trialysis (Dialysis) Catheter 08/07/19 0135 right internal jugular 3 days          Drain                 Urethral Catheter 08/05/19 1138 Straight-tip 16 Fr. 4 days         Rectal Tube 08/07/19 0930 rectal tube w/ balloon (indicate number of mLs) 2 days          Peripheral Intravenous Line                 Midline Catheter Insertion/Assessment  - Single Lumen 08/06/19 1552 Right basilic vein (medial side of arm) 18g x 8cm 3 days              Significant Labs:    CBC/Anemia Profile:  Recent Labs   Lab 08/09/19  1357 08/09/19  1750 08/10/19  0255   WBC 3.78* 4.96 3.93   HGB 7.3* 8.9* 8.3*   HCT 22.7* 27.6* 25.9*   PLT 54* 55* 61*   * 102* 101*   RDW 19.8* 19.0* 18.9*        Chemistries:  Recent Labs   Lab 08/09/19  0324 08/09/19  1316 08/10/19  0255     136 137 132*  132*   K 4.4  4.4 4.4 4.3  4.3     106 104 102  102   CO2 24  24 25 21*  21*   BUN 11  11 12 32*  32*   CREATININE 0.8  0.8 1.0 1.9*  1.9*   CALCIUM 8.0*  8.0* 8.2* 8.5*  8.5*   ALBUMIN 3.6  3.6 3.4* 3.2*  3.2*   PROT 6.2  --  5.9*   BILITOT 4.5*  --  8.7*   ALKPHOS 112  --  149*   ALT 20  --  27   AST 35  --  51*   MG 2.0 1.9 1.8   PHOS 1.7*  1.7* 1.6* 1.4*  1.4*       All pertinent labs within the past 24 hours have been reviewed.    Significant Imaging:  I have reviewed all pertinent imaging results/findings within the past 24 hours.      AB  Recent Labs   Lab 08/06/19  1604   PH 7.528*   PO2 23*   PCO2 28.1*   HCO3 23.4*   BE 1     Assessment/Plan:     Neuro  * Acute metabolic encephalopathy  68 year old female  with hepatic and kidney failure who is profoundly encephalopathic due to severe hyperammonemia and uremia. She had temporarily improved with lactulose but is now declining due to her multiorgan dysfunction. Pt has been denied for transplant. The family understands that she is dying but hope to get her better enough to get back to [place] to be with family. Patient recently had issues tolerating enemas and had NG placed with hematemesis. Concern for gastric varices and NG was removed. GI performed EGD that showed not actively bleeding varices, but did show esophagitis and jennifer jaeger tears.   -Last ammonia 45 on 8/7.   -Pt no longer AMS since ammonia lowered  -Now s/p CRRT yesterday.  -CRRT to clear uremic toxins   -continue lactulose enemas  -CMP daily    Pulmonary  Acute hypoxemic respiratory failure  Intubated for airway protection in setting of severe encephalopathy and episodes of emesis/concern for aspiration during admission during admission. Currently during well on RA. Family still interested in intubation pending remaining family coming to [place] from [place].    -Sputum culture negative from 7/24.  Off antibiotics.   -No focal signs of consolidation on CXR   -extubated on 7/27   -monitor oxygen saturation      Renal/  Right kidney mass  R kidney mass measures 2.4 cm x 2.3 cm found on CT abd/pelvis during transplant workup. Urology following with plans to hold on any further work up in setting of acute decompensation   -PRN pain control as indicated     Acute kidney injury superimposed on chronic kidney disease  Baseline reportedly sCr 1.6. Nephrology following,concern iATN given recent shock.  Also concern for intravascular volume depletion given significant stool output.   Retroperitoneal US on 7/23 negative for hydronephrosis. With rising sCr and concern for uremia with BUN of 125, RIJ trialysis catheter placed in place. S/p RRT 8/9    -urine studies concerning for pre-renal etiology.   Started on albumin and continued on midodrine, NPO with no access   -Strict I/Os   -nephrology following, appreciate recs    Hematology  Coagulopathy  See cirrhosis    Thrombocytopenia due to hypersplenism  -S/p 1u FFP on 8/9  -S/p 1u pRBC on 8/9    Oncology  Pancytopenia  See cirrhosis    Endocrine  Hyperglycemia  Patient with Hx of DM however was taken off metformin due to improvement  --A1c 5.1  --insulin infusion discontinued on 7/27 when enteral feedings where held.   --frequent glucose checks with SSI  --glucose goal 140-180    Hypothyroidism  --Continue home levothyroxine    GI  Upper GI bleed  History of hematemesis after OG line placement. EGD read with not actively bleeding varices. Will continue to monitor for bleeding.   -GI recommendations   -trend CBC   -avoid NG tubes    Other dysphagia  --SLP following.  Dental soft diet with thin liquids    Ileus  --improved.  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 MELD score of 24 today before dilaysis.    -Continue lactulose and rifaximin   -Hepatology following   -Paracentesis 7/24 with 4L removed;negative for SBP.      Other  Shock, unspecified  --Likely due to sedating medications however patient at risk for infection  --Blood culture, sputum culture, and peritoneal fluid negative  --UA appearing noninfectious  --Received 4 doses of pip/tazo,  Received 3 days of vancomycin.  --Resolved; weaned off norepinephrine infusion on 7/25  --Continue midodrine 10 mg TID    Physical debility  --PT/OT consulted       Critical Care Daily Checklist:    A: Awake: RASS Goal/Actual Goal: RASS Goal: 0-->alert and calm  Actual: Kelsey Agitation Sedation Scale (RASS): Alert and calm   B: Spontaneous Breathing Trial Performed? Spon. Breathing Trial Initiated?: Initiated (07/27/19 1764)   C: SAT & SBT Coordinated?  n/a                      D:  Delirium: CAM-ICU Overall CAM-ICU: Negative   E: Early Mobility Performed? Yes   F: Feeding Goal: Goals: Meet % EEN, EPN  Status: Nutrition Goal Status: goal not met   Current Diet Order   Procedures    Diet full liquid [facility]; Bariatric Regular; Thin     Order Specific Question:   Indicate patient location for additional diet options:     Answer:   [facility]     Order Specific Question:   Additional Diet Options:     Answer:   Bariatric Regular     Order Specific Question:   Fluid consistency:     Answer:   Thin      AS: Analgesia/Sedation n/a   T: Thromboembolic Prophylaxis SCD   H: HOB > 300 Yes   U: Stress Ulcer Prophylaxis (if needed) n/a   G: Glucose Control n/a   B: Bowel Function Stool Occurrence: 1   I: Indwelling Catheter (Lines & Webster) Necessity Webster and flexi   D: De-escalation of Antimicrobials/Pharmacotherapies Rifaximin and ceftriaxone    Plan for the day/ETD discharge    Code Status:  Family/Goals of Care: Partial Code  n/a       Critical secondary to Patient has a condition that poses threat to life and bodily function: Acute Renal Failure and hepatic encephalopathy and acute blood loss anemia related to GI bleed.       Critical care was time spent personally by me on the following activities: development of treatment plan with patient or surrogate and bedside caregivers, discussions with consultants, evaluation of patient's response to treatment, examination of patient, ordering and performing treatments and interventions, ordering and review of laboratory studies, ordering and review of radiographic studies, pulse oximetry, re-evaluation of patient's condition. This critical care time did not overlap with that of any other provider or involve time for any procedures.    SWATI Patel MD  OBGYN PGY1  Critical Care Medicine  [facility]

## 2019-08-10 NOTE — PLAN OF CARE
Problem: Adult Inpatient Plan of Care  Goal: Plan of Care Review  Outcome: Ongoing (interventions implemented as appropriate)  No acute events throughout day. See vital signs and assessments in flowsheets. See below for updates on today's progress.     Pulmonary: SpO2 99% on RA    Cardiovascular: NSR, HR 70s, Levo started for hypotension (70s/40s) and inability to maintain map goal >65. 1 unit of blood and 1 unit of platelets given for Hgb 7.3.    Neurological: AAOx4. Speech clear. No pain    Gastrointestinal: Dark liquid stool. Flexi in place. Diet advanced to sugar free- full liquid.     Genitourinary: Oliguric. 5 cc/hr.     Endocrine: BG wnl, PRN insulin  coverage provided    Skin/Bath:  Date of last CHG bath given: 8/9 AM shift    Infusions: Levo    Patient progressing towards goals as tolerated, plan of care communicated and reviewed with patient and family. All concerns addressed. Will continue to monitor. Pt to D/C home tomorrow AM at 9.

## 2019-08-10 NOTE — ASSESSMENT & PLAN NOTE
68 year old female with hepatic and kidney failure who is profoundly encephalopathic due to severe hyperammonemia and uremia. She had temporarily improved with lactulose but is now declining due to her multiorgan dysfunction. Pt has been denied for transplant. The family understands that she is dying but hope to get her better enough to get back to [place] to be with family. Patient recently had issues tolerating enemas and had NG placed with hematemesis. Concern for gastric varices and NG was removed. GI performed EGD that showed not actively bleeding varices, but did show esophagitis and jennifer jaeger tears.   -Last ammonia 45 on 8/7.   -Pt no longer AMS since ammonia lowered  -Now s/p CRRT yesterday.  -CRRT to clear uremic toxins   -continue lactulose enemas  -CMP daily

## 2019-08-10 NOTE — DISCHARGE SUMMARY
[facility]  Critical Care Medicine  Discharge Summary      Patient Name: Michelle Ojeda  MRN: [id number]  Admission Date: 7/18/2019  Hospital Length of Stay: 23 days  Discharge Date and Time:  08/10/2019 8:18 AM  Attending Physician: Saravanan Batista MD   Discharging Provider: Zuly Carolina DO  Primary Care Provider: Miguel Angel Weber MD  Reason for Admission: Liver transplant evaluation    HPI:   Michelle Ojeda is a 68 year old female with history of IZAGUIRRE cirrhosis, HTN and hypothyroidism who presented to [facility] on 7/18/2019 as a direct admit [facility] for hyponatremia. Patient is from [place] and requesting liver/kidney transplant evaluation. Her cirrhosis is complicated by ascites requiring biweekly paracentesis, sarcopenia, esophageal varices, hyponatremia and hepatic encephalopathy. Per daughter, patient with functional decline over the last 2 months with complaints of lethargy, decreased oral intake, and peripheral edema. Patient initially admitted to hospital medicine for treatment of hyponatremia with fluid restriction. During liver work up, incidental renal mass found and urology was consulted. Patient developed an ileus and unable to tolerate lactulose while on the floor. With acute respiratory failure, hepatic encephalopathy, and N/V, patient intubated on 7/24. Patient intubated during her ICU stay, extubated - stepped down to IM-L team (on 7/30) where her course has now been complicated by worsening hepatic encephalopathy due to lack of oral lactulose and worsening uremia as her renal function is again worsening. Patient became more confused on 8/5 with regular BM and work-up for infection. NG tube placed since patient not tolerating enemas for lactulose but started having bleeding concerning for varices. NG now removed. Patient remains altered.     Family initially expressed interest in DNR status and comfort measures but now feel they would like  to attempt dialysis for her encephalopathy and electrolyte abnormalities with the end goal of getting patient to [place] with family. Daughter and son understand patient's condition and would like to remain with no chest compressions but are interested in a short course of intubation for acute respiratory issues, while patient's children fly in to [place]. No new episodes of hematemesis after OG tube removed but patient remains altered today.     Procedure(s) (LRB):  EGD (ESOPHAGOGASTRODUODENOSCOPY) (N/A)    Indwelling Lines/Drains at Time of Discharge:   Lines/Drains/Airways     Central Venous Catheter Line                 Trialysis (Dialysis) Catheter 08/07/19 0135 right internal jugular 3 days          Drain                 Urethral Catheter 08/05/19 1138 Straight-tip 16 Fr. 4 days         Rectal Tube 08/07/19 0930 rectal tube w/ balloon (indicate number of mLs) 2 days              Hospital Course:   Upon admission to the MICU, pt was severely encephalopathic and required rectal tube placement for lactulose enemas. On night of admission to MICU, central venous catheter was placed and patient was initiated on renal replacement therapy. As her uremic and hepatic encephalopathy improved with lactulose enemas and RRT, respectively, she became more engaged in discussions with her family and agreed to the plan for transfer back to [place]. Given the improvement in her encephalopathy, pt was able to pass a swallow evaluation. On the night of 8/8-8/9, pt experienced a drop in hemoglobin, concurrent drop in blood pressure and bright red blood noted in her rectal tube. She was administered a single unit of pRBC's, two units of platelets (since plt count was 33) and a dose of vit K. This appeared to stymie the bleeding as blood count responded well to this intervention. Her rectal lactulose was ceased, but rectal tube was kept in place d/t concern for her tenuous mental status. Her TPN was continued despite passed  swallow assessment also d/t concern for her tenuous mental status.     On the night prior to discharge pt had no acute events. Pt stable for discharge via medical flight to [place].    Consults (From admission, onward)        Status Ordering Provider     Inpatient consult to Critical Care Medicine  Once     Provider:  (Not yet assigned)    Completed EUGENE MONROY     Inpatient consult to Critical Care Medicine  Once     Provider:  (Not yet assigned)    Completed EDILSON ELIAS     Inpatient consult to Critical Care Medicine  Once     Provider:  (Not yet assigned)    Completed EUGENE MONROY     Inpatient consult to Gastroenterology  Once     Provider:  (Not yet assigned)    Completed EDILSON ELIAS     Inpatient consult to Gynecology  Once     Provider:  (Not yet assigned)    Completed KERR, ABEL     Inpatient consult to Hepatology  Once     Provider:  (Not yet assigned)    Completed KERR, ABEL     Inpatient consult to Infectious Diseases  Once     Provider:  (Not yet assigned)    Completed KERR, ABEL     Inpatient consult to Kidney/Pancreas Transplant Medicine  Once     Provider:  (Not yet assigned)    Completed KERR, ABEL     Inpatient consult to Midline team  Once     Provider:  (Not yet assigned)    Completed BAKSA, ANABEL S     Inpatient consult to Midline team  Once     Provider:  (Not yet assigned)    Completed BAKSA, ANABEL S     Inpatient consult to Nephrology  Once     Provider:  (Not yet assigned)    Completed KERR, ABEL     Inpatient consult to Physical Medicine Rehab  Once     Provider:  (Not yet assigned)    Completed KERR, ABEL     Inpatient consult to PICC team (NIAS)  Once     Provider:  (Not yet assigned)    Completed KERR, ABEL     Inpatient consult to Registered Dietitian/Nutritionist  Once     Provider:  (Not yet assigned)    Completed KERR, ABEL     Inpatient consult to Registered Dietitian/Nutritionist  Once     Provider:  (Not yet assigned)    Completed  ABEL KERR     Inpatient consult to Registered Dietitian/Nutritionist  Once     Provider:  (Not yet assigned)    Completed ABEL KERR     Inpatient consult to Urology  Once     Provider:  (Not yet assigned)    Completed EUGENE MONROY     Pharmacy to dose Vancomycin consult  Once     Provider:  (Not yet assigned)    Completed SAMPSON MONTANO        Significant Labs:  Recent Lab Results       08/10/19  0610   08/10/19  0255   08/10/19  0022   08/10/19  0020   08/09/19  2053        Unit Blood Type Code               Unit Expiration               Unit Blood Type               Albumin   3.2              3.2           Alkaline Phosphatase   149           ALT   27           Anion Gap   9              9           Aniso   Slight           AST   51           Baso #   0.03           Basophilic Stippling   Occasional           Basophil%   0.8           BILIRUBIN TOTAL   8.7  Comment:  For infants and newborns, interpretation of results should be based  on gestational age, weight and in agreement with clinical  observations.  Premature Infant recommended reference ranges:  Up to 24 hours.............<8.0 mg/dL  Up to 48 hours............<12.0 mg/dL  3-5 days..................<15.0 mg/dL  6-29 days.................<15.0 mg/dL             BUN, Bld   32              32           Calcium   8.5              8.5           Chloride   102              102           CO2   21              21           CODING SYSTEM               Creatinine   1.9              1.9           Differential Method   Automated           DISPENSE STATUS               eGFR if    30.4              30.4           eGFR if non    26.3  Comment:  Calculation used to obtain the estimated glomerular filtration  rate (eGFR) is the CKD-EPI equation.                 26.3  Comment:  Calculation used to obtain the estimated glomerular filtration  rate (eGFR) is the CKD-EPI equation.              Eos #   0.1            Eosinophil%   3.3           Glucose   232              232           Gran # (ANC)   3.2           Gran%   80.7           Group & Rh               Hematocrit   25.9           Hemoglobin   8.3           Hypo   Occasional           Immature Grans (Abs)   0.04  Comment:  Mild elevation in immature granulocytes is non specific and   can be seen in a variety of conditions including stress response,   acute inflammation, trauma and pregnancy. Correlation with other   laboratory and clinical findings is essential.             Immature Granulocytes   1.0           INDIRECT SEAN               Coumadin Monitoring INR   1.6  Comment:  Coumadin Therapy:  2.0 - 3.0 for INR for all indicators except mechanical heart valves  and antiphospholipid syndromes which should use 2.5 - 3.5.             Lymph #   0.3           Lymph%   6.6           Magnesium   1.8           MCH   32.4           MCHC   32.0           MCV   101           Mono #   0.3           Mono%   7.6           MPV   10.3           nRBC   0           Phosphorus   1.4              1.4           Platelet Estimate   Decreased           Platelets   61           POCT Glucose 264   231 418 242     Poly   Occasional           Potassium   4.3              4.3           Product Code               PROTEIN TOTAL   5.9           Protime   15.7           RBC   2.56           RDW   18.9           Sodium   132              132           UNIT NUMBER               WBC   3.93                            08/09/19  1750   08/09/19  1357   08/09/19  1347   08/09/19  1316   08/09/19  1038        Unit Blood Type Code         0600[P]              0600              0600     Unit Expiration         [id number][P]              [id number]              [id number]     Unit Blood Type         A NEG[P]              A NEG              A NEG     Albumin       3.4       Alkaline Phosphatase               ALT               Anion Gap       8       Aniso               AST               Baso # 0.04              Basophilic Stippling               Basophil% 0.8             BILIRUBIN TOTAL               BUN, Bld       12       Calcium       8.2       Chloride       104       CO2       25       CODING SYSTEM         LGTL606[P]              ABEE294              SHEM397     Creatinine       1.0       Differential Method Automated             DISPENSE STATUS         ISSUED[P]              TRANSFUSED              TRANSFUSED     eGFR if        >60.0       eGFR if non        57.2  Comment:  Calculation used to obtain the estimated glomerular filtration  rate (eGFR) is the CKD-EPI equation.          Eos # 0.2             Eosinophil% 3.4             Glucose       199       Gran # (ANC) 4.0 2.9  Comment:  The ANC is based on a white cell differential from an   automated cell counter. It has not been microscopically   reviewed for the presence of abnormal cells. Clinical   correlation is required.             Gran% 79.8             Group & Rh         A NEG     Hematocrit 27.6 22.7           Hemoglobin 8.9 7.3           Hypo               Immature Grans (Abs) 0.05  Comment:  Mild elevation in immature granulocytes is non specific and   can be seen in a variety of conditions including stress response,   acute inflammation, trauma and pregnancy. Correlation with other   laboratory and clinical findings is essential.   0.03  Comment:  Mild elevation in immature granulocytes is non specific and   can be seen in a variety of conditions including stress response,   acute inflammation, trauma and pregnancy. Correlation with other   laboratory and clinical findings is essential.             Immature Granulocytes 1.0             INDIRECT SEAN         NEG     Coumadin Monitoring INR               Lymph # 0.4             Lymph% 7.9             Magnesium       1.9       MCH 32.7 33.2           MCHC 32.2 32.2            103           Mono # 0.4             Mono% 7.1             MPV 10.5 9.8            nRBC 0             Phosphorus       1.6       Platelet Estimate               Platelets 55 54           POCT Glucose     213         Poly               Potassium       4.4       Product Code         K0248CP3[P]              A2045WM7              R2033M41     PROTEIN TOTAL               Protime               RBC 2.72 2.20           RDW 19.0 19.8           Sodium       137       UNIT NUMBER         [id number][P]              [id number]              [id number]     WBC 4.96 3.78                                Significant Imaging:  I have reviewed all pertinent imaging results/findings within the past 24 hours.    Pending Diagnostic Studies:     Procedure Component Value Units Date/Time    CBC auto differential [[id number]]     Order Status:  Sent Lab Status:  No result     Specimen:  Blood     Cytology Specimen-Medical Cytology (Fluid/Wash/Brush) [[id number]] Collected:  07/24/19 1654    Order Status:  Sent Lab Status:  No result     Specimen:  Peritoneal/abdominal/pelvic wash     Magnesium [[id number]] Collected:  08/09/19 0658    Order Status:  Sent Lab Status:  In process Updated:  08/09/19 0658    Specimen:  Blood     Magnesium [[id number]] Collected:  08/09/19 0658    Order Status:  Sent Lab Status:  In process Updated:  08/09/19 0658    Specimen:  Blood     Magnesium [[id number]] Collected:  08/09/19 0658    Order Status:  Sent Lab Status:  In process Updated:  08/09/19 0658    Specimen:  Blood     Protime-INR [[id number]]     Order Status:  Sent Lab Status:  No result     Specimen:  Blood     Renal function panel [[id number]] Collected:  08/09/19 0658    Order Status:  Sent Lab Status:  In process Updated:  08/09/19 0658    Specimen:  Blood     Renal function panel [[id number]] Collected:  08/09/19 0658    Order Status:  Sent Lab Status:  In process Updated:  08/09/19 0658    Specimen:  Blood     Renal function panel [[id number]] Collected:  08/07/19 0501    Order Status:  Sent Lab Status:  In process Updated:   08/07/19 0501    Specimen:  Blood     Narrative:       Collection has been rescheduled by KERI at 08/07/2019 01:48 Reason:   Nurse Draw    Urinalysis [[id number]] Collected:  08/01/19 2047    Order Status:  Sent Lab Status:  In process Updated:  08/01/19 2048    Specimen:  Urine         Final Active Diagnoses:    Diagnosis Date Noted POA    PRINCIPAL PROBLEM:  Acute metabolic encephalopathy [G93.41] 07/19/2019 Yes    Upper GI bleed [K92.2] 08/07/2019 Unknown    Palliative care encounter [Z51.5] 08/06/2019 Not Applicable    Metabolic acidosis [E87.2] 08/02/2019 No    Other dysphagia [R13.19] 07/28/2019 Clinically Undetermined    Acute hypoxemic respiratory failure [J96.01] 07/24/2019 No    Shock, unspecified [R57.9] 07/24/2019 No    Hyperglycemia [R73.9] 07/24/2019 No    Acute hepatic encephalopathy [K72.00]  Yes    Right kidney mass [N28.89] 07/23/2019 No    Kidney transplant candidate [Z76.82] 07/21/2019 Not Applicable    Ileus [K56.7] 07/20/2019 No    Physical debility [R53.81] 07/19/2019 Yes    Ascites [R18.8] 07/19/2019 Yes    Pancytopenia [D61.818] 07/19/2019 Yes    Hypothyroidism [E03.9] 07/19/2019 Yes    Moderate malnutrition [E44.0] 07/19/2019 Yes    Coagulopathy [D68.9] 07/19/2019 Yes    Hyponatremia [E87.1] 07/18/2019 Yes    Organ transplant candidate [Z76.82] 07/18/2019 Not Applicable    Acute kidney injury superimposed on chronic kidney disease [N17.9, N18.9] 07/18/2019 No    Thrombocytopenia due to hypersplenism [D69.59] 07/18/2019 Yes    Liver cirrhosis secondary to IZAGUIRRE [K75.81, K74.60] 05/31/2019 Yes      Problems Resolved During this Admission:     No new Assessment & Plan notes have been filed under this hospital service since the last note was generated.  Service: Critical Care Medicine    Discharged Condition: stable    Disposition: Hospice/Medical Facility      Patient Instructions:      Diet full liquid     Skilled Nurse to perform Infusion Therapy / Central Line Care      Scrub the Hub, prior to accessing the line a 30 second alcohol scrub will always be performed     Blood Draw Procedure   Order Comments: 1. Aspirate at least 5 mL of blood and discard;  2. Obtain specimen;  3. Change posi-flow cap;  4. Flush with 20 mL Normal Saline followed by a 3-5 mL heparin flush (100 units/mL)     Central    Order Comments: 1. Sterile dressing changes are done weekly and as needed;  2. Use chlor-hexidine scrub to cleanse site, apply Biopatch to insertion site, apply securement device dressing;  3. Posi-flow caps are changed weekly and after EVERY lab draw;  4. If sterile gauze is under dressing to control oozing, dressing change must be performed every 24 hours until gauze is not needed     Central Line Care Checklist   Order Comments: Steps of Care:  - Wash your hands for 15-30 seconds or use alcohol hand .  - Put masks on the patient and the caregiver. Put non-sterile gloves on both your hands.  - Remove and throw away the old dressing.  - Look at the site for redness, swelling, tenderness, or drainage. If you see any of these    report it to your doctor.  - Remove the non-sterile gloves. Wash and dry your hands.  - Open the sterile dressing kit and put on the sterile gloves.  - Scrub the central line insertion site with Chlorhexidine skin prep for 30-60 seconds     using back and forth strokes. Let the site dry completely. Do not fan or blow on the area.  - Place a BioPatch disc around the catheter. Make sure the printed side is up and the slit is facing down.  - Put on the new dressing from the sterile dressing kit.  - Clamp the line, change the PosiFlow injection (white) cap, and unclamp the line.  - Write the date and time on the sticker and place it on the new dressing.  - Throw away the used supplies and wash your hands.     Central Line Flush Checklist   Order Comments: Steps of Care:  - Wash your hands for 15-30 seconds or use alcohol hand .  -  Using a Chloraprep wipe, scrub the hub of the PosiFlow (white) injection cap for 15 turns.  - Allow the PosiFlow (white) injection cap to dry for 15 seconds.  - Flush the line with 10 cc of normal saline before and after giving any medication.  - Flush with 10-20cc of normal saline after each blood draw or blood transfusion and change the PosiFlow (white) injection cap.   - Flush twice a day with 10-20cc of normal saline using the steps above to make sure the line does not close or clot.       Activity as tolerated     Medications:  Reconciled Home Medications:      Medication List      START taking these medications    insulin detemir U-100 100 unit/mL (3 mL) Inpn pen  Commonly known as:  LEVEMIR FLEXTOUCH  Inject 10 Units into the skin once daily.     lactulose 20 gram/30 mL Soln  Commonly known as:  CHRONULAC  Take 22.5 mLs (15 g total) by mouth 3 (three) times daily. for 10 days     * midodrine 10 MG tablet  Commonly known as:  PROAMATINE  Take 1 tablet (10 mg total) by mouth 3 (three) times daily.     * midodrine 5 MG Tab  Commonly known as:  PROAMATINE  Take 1 tablet (5 mg total) by mouth every 8 (eight) hours.     sterile water SolP 356.57 mL with parenteral amino acid 10% No.2 10 % SolP 48 g, dextrose 70% SolP 86.401 g  Inject 40 mL/hr into the vein continuous.     zinc sulfate 220 (50) mg capsule  Commonly known as:  ZINCATE  Take 1 capsule (220 mg total) by mouth once daily.         * This list has 2 medication(s) that are the same as other medications prescribed for you. Read the directions carefully, and ask your doctor or other care provider to review them with you.            CHANGE how you take these medications    ciprofloxacin HCl 250 MG tablet  Commonly known as:  CIPRO  Take 1 tablet (250 mg total) by mouth once daily.  What changed:    · medication strength  · how much to take  · additional instructions     rifAXIMin 550 mg Tab  Commonly known as:  XIFAXAN  Take 1 tablet (550 mg total) by mouth 2  (two) times daily.  What changed:    · medication strength  · how much to take        CONTINUE taking these medications    biotin 5 mg Cap  Take 5,000 mcg by mouth once daily.     CALTRATE-600 PLUS VITAMIN D3 ORAL  Take 1 tablet by mouth once daily.     cholecalciferol (vitamin D3) 2,000 unit Cap  Commonly known as:  VITAMIN D3  Take 2,000 Units by mouth once daily.     levothyroxine 88 MCG tablet  Commonly known as:  SYNTHROID  Take 88 mcg by mouth before breakfast.        STOP taking these medications    CINNAMON BARK ORAL     furosemide 40 MG tablet  Commonly known as:  LASIX     magnesium 250 mg Tab     spironolactone 50 MG tablet  Commonly known as:  ALDACTONE            SWATI Patel MD  OBGYN PGY1   Critical Care Medicine  [facility]

## 2019-08-10 NOTE — ASSESSMENT & PLAN NOTE
Complicated by ascites, thrombocytopenia, esophageal varices, hyponatremia and HE. MELD score of 24 today before dilaysis.    -Continue lactulose and rifaximin   -Hepatology following   -Paracentesis 7/24 with 4L removed;negative for SBP.

## 2019-08-10 NOTE — ASSESSMENT & PLAN NOTE
Baseline reportedly sCr 1.6. Nephrology following,concern iATN given recent shock.  Also concern for intravascular volume depletion given significant stool output.   Retroperitoneal US on 7/23 negative for hydronephrosis. With rising sCr and concern for uremia with BUN of 125, RIJ trialysis catheter placed in place. S/p RRT 8/9    -urine studies concerning for pre-renal etiology.  Started on albumin and continued on midodrine, NPO with no access   -Strict I/Os   -nephrology following, appreciate recs

## 2019-08-10 NOTE — PLAN OF CARE
Problem: Adult Inpatient Plan of Care  Goal: Plan of Care Review  Outcome: Resolved for Upgrade Date Met: 08/10/19

## 2019-08-10 NOTE — PLAN OF CARE
Problem: Infection  Goal: Infection Symptom Resolution  Outcome: Resolved for Upgrade Date Met: 08/10/19

## 2019-08-10 NOTE — NURSING
Report given to Rodolfo from [facility] who will be part of the team transporting with the patient tomorrow at 9AM. If any changes in plans occur overnight call [phone].

## 2019-08-10 NOTE — PLAN OF CARE
Problem: Adult Inpatient Plan of Care  Goal: Plan of Care Review  Outcome: Ongoing (interventions implemented as appropriate)    See vital signs and assessments in flowsheets. See below for updates on today's progress.     Pulmonary: Pt is on RA, O2 sats 94-99%. No SOB reported. Lung sounds CTA.     Cardiovascular: Pt is in NSR, HR 70s-80s. SBP 100s-110s.     Neurological: Pt is AAOx4. Afebrile. Able to JUSTICE spontaneously with generalized weakness.     Gastrointestinal: Pt has flexi in place, moderate-large amount of liquid dark brown output noted. No bloody output noted. Bowel sounds audible and active. Pt tolerates ice chips for comfort feeds well.     Genitourinary: Pt has leigh in place, UO 5-15cc/hr of dark concentrated urine. Pt is oliguric.     Endocrine: Accuchecks q6h.    Skin/Bath: Pt wounds re-dressed. All dressings CDI. HAPI bundle in place.   Date of last CHG bath given: 8/9 on AM shift.     Infusions: Pt is on TPN at 40cc/hr and Lipids at 20.8cc/hr. Levo currently off and MAP > 65. Pt also received 1U of plts.     Patient progressing towards goals as tolerated, plan of care communicated and reviewed with Michelle Ojeda and family. All concerns addressed. Plan remains for pt to be picked up from [facility] around 0930 in the AM. Will continue to monitor.

## 2019-08-12 NOTE — PLAN OF CARE
Patient discharged 08/10/19 08:55am via [facility] for a lateral transfer to [facility] in [place]. Patient was transported via EMS stretcher with family by her side to the 6 wing for transfer of services in order to get patient back home to [place].      08/12/19 09:11am  ZULLY spoke with Tegan, daughter/POA ([phone]) who called to state that while the request to speak to Patient Relations was made several times, no one came to the bedside to speak with the family prior to the patient being discharged. CM provided Tegan with the phone number ([phone]) to Patient Relations for her to call and speak with a representative. Tegan also stated that for the record the patient and family were told that [facility] would provide their own flight crew to accompany the patient on the flight to [place] and that the patient's flight crew were from [place] and have no affiliation to [facility]. ZULLY stated that she would call [facility] to report her concern.        08/12/19 0855   Final Note   Assessment Type Final Discharge Note   Anticipated Discharge Disposition ANOTHER INST  (Lateral Transfer to [facility] in [place])   Hospital Follow Up  Appt(s) scheduled? No   Discharge plans and expectations educations in teach back method with documentation complete? Yes   Right Care Referral Info   Post Acute Recommendation Other   Referral Type Lateral Transfer   Facility Name [facility]       Claudia Abdul RN, CLNC  Case Management-Critical Care     [phone]\

## 2019-08-14 ENCOUNTER — PATIENT MESSAGE (OUTPATIENT)
Dept: TRANSPLANT | Facility: CLINIC | Age: 69
End: 2019-08-14

## 2019-08-19 LAB
ALLENS TEST: ABNORMAL
DELSYS: ABNORMAL
HCO3 UR-SCNC: 5.3 MMOL/L (ref 24–28)
PCO2 BLDA: 9.6 MMHG (ref 35–45)
PH SMN: 7.35 [PH] (ref 7.35–7.45)
PO2 BLDA: 40 MMHG (ref 40–60)
POC BE: -20 MMOL/L
POC SATURATED O2: 74 % (ref 95–100)
POC TCO2: 6 MMOL/L (ref 24–29)
SAMPLE: ABNORMAL
SITE: ABNORMAL

## 2019-08-20 ENCOUNTER — TELEPHONE (OUTPATIENT)
Dept: TRANSPLANT | Facility: CLINIC | Age: 69
End: 2019-08-20

## 2019-08-25 NOTE — PROCEDURES
DATE OF SERVICE:  07/24/2019    EEG NUMBER:  [id number]    REFERRING PHYSICIAN:  Dr. Knowles     This EEG was performed to assess for subclinical seizures.      ELECTROENCEPHALOGRAM REPORT     METHODOLOGY:  Electroencephalographic (EEG) recording is recorded with   electrodes placed according to the International 10-20 placement system.  Thirty   two (32) channels of digital signal (sampling rate of 512/sec), including T1   and T2, were simultaneously recorded from the scalp and may include EKG, EMG,   and/or eye monitors.  Recording band pass was 0.1 to 512 Hz.  Digital video   recording of the patient is simultaneously recorded with the EEG.  The patient   is instructed to report clinical symptoms which may occur during the recording   session.  EEG and video recording are stored and archived in digital format.    Activation procedures, which include photic stimulation, hyperventilation and   instructing patients to perform simple tasks, are done in selected patients  The EEG is displayed on a monitor screen and can be reviewed using different   montages.  Computer assisted-analysis is employed to detect spike and   electrographic seizure activity.   The entire record is submitted for computer   analysis.  The entire recording is visually reviewed, and the times identified   by computer analysis as being spikes or seizures are reviewed again.    Compressed spectral analysis (CSA) is also performed on the activity recorded   from each individual channel.  This is displayed as a power display of   frequencies from 0 to 30 Hz over time.   The CSA is reviewed looking for   asymmetries in power between homologous areas of the scalp, then compared with   the original EEG recording.    Mobile365 (fka InphoMatch) software was also utilized in the review of this study.  This software   suite analyzes the EEG recording in multiple domains.  Coherence and rhythmicity   are computed to identify EEG sections which may contain organized  seizures.    Each channel undergoes analysis to detect the presence of spike and sharp waves   which have special and morphological characteristics of epileptic activity.  The   routine EEG recording is converted from special into frequency domain.  This is   then displayed comparing homologous areas to identify areas of significant   asymmetry.  Algorithm to identify non-cortically generated artifact is used to   separate artifact from the EEG.      EEG FINDINGS:  The recording was obtained with a number of standard bipolar and   referential montages during lethargic state.  In this state, the background was   diffusely disorganized and comprised of an admixture of theta range frequencies   with a nonsustained posterior dominant rhythm of 6 Hz, which was symmetric.    Activation procedures not performed.  No sleep was recorded.  The background was   punctated by frequent 1 per second triphasic waves.  There were no interictal   epileptiform abnormalities and no clinical or electrographic seizures were   recorded.    The EKG channel revealed sinus rhythm.    IMPRESSION:  This is an abnormal EEG during lethargic state.  Diffuse   disorganized slowing of the background was noted.  Frequent triphasic waves were   noted.    CLINICAL CORRELATION:  The patient is a 68-year-old female who presented with   hepatic encephalopathy and is currently not maintained on any anti-seizure   medications.  This is an abnormal EEG during lethargic state.  The overall   degree of slowing and disorganization along with frequent triphasic waves   suggestive of a moderate-to-severe encephalopathy, likely a toxic metabolic   encephalopathy.  There is no evidence of an epileptic process on this recording.    No seizures were recorded during this study.      FAK/HN  dd: 08/25/2019 12:43:58 (CDT)  td: 08/25/2019 12:51:08 (CDT)  Doc ID   #[id number]  Job ID #[id number]    CC:

## 2019-08-27 NOTE — NURSING
Saw patient in the ICU.  Patient was sedated and intubated.  Patient's two daughters and son were at her bedside.  One of her daughters (SANDY) and her son were present for pre -liver outpatient session.  Explained to them at a Liver/Kidney consent needs to be obtained due to Liver only consented previously.  Reviewed the information in the Liver /Kidney consent form. Allowed time for questions and answers . Daughter , Tegan, (SANDY) signed the consent at this time.  
Patient does not take medications to lower blood pressure, (last measured 147/51)  Monitor blood pressure  DASH diet

## 2020-01-09 ENCOUNTER — POST MORTEM DOCUMENTATION (OUTPATIENT)
Dept: TRANSPLANT | Facility: CLINIC | Age: 70
End: 2020-01-09

## 2020-11-10 NOTE — PROGRESS NOTES
--------------  Appropriate for inpatient status per guidelines for cough and body aches due to COVID pneumonia with hypoxia.       ADMISSION REVIEW     Payor: [address] [place] [address] #:  [id number]  Authorization Number: [id number]       [facility]  Nephrology  Progress Note    Patient Name: Michelle Ojeda  MRN: [id number]  Admission Date: 7/18/2019  Hospital Length of Stay: 7 days  Attending Provider: Mike Burns*   Primary Care Physician: Primary Doctor No  Principal Problem:Acute metabolic encephalopathy    Subjective:     HPI: Rusty Martinez is 69 yo F with CKD stage 4 presenting from transplant hepatology clinic for hyponatremia on 7/18/19. She is originally from [place], here for liver transplant evaluation. She was diagnosed with IZAGUIRRE in grade 1 hepatic encephalopathy with severe ascites and esophogeal varices (s/p banding). During the month prior to admission she felt more lethargic and was requiring biweekly paracentesis. Of note, she is also being treated for Ileus her acute hepatic encephalopathy with lactulose. Recent 24 hour cr clearance study was near 21. Reported baseline Cr 1.6-1.8 and chronic hyponatremia range between 123-124.     Interval History: Patient intubated. Son-in law present at bedside. She is not responding to questions but per nurse is responding to painful stimuli. Urine assessed per leigh catheter. Non-bloody.     Review of patient's allergies indicates:  No Known Allergies  Current Facility-Administered Medications   Medication Frequency    acetaminophen tablet 325 mg Q4H PRN    chlorhexidine 0.12 % solution 15 mL BID    dexmedetomidine (PRECEDEX) 400mcg/100mL 0.9% NaCL infusion Continuous    dextrose 10% (D10W) Bolus PRN    dextrose 10% (D10W) Bolus PRN    dicyclomine capsule 10 mg QID PRN    famotidine tablet 20 mg Daily    fentaNYL injection 50 mcg Q1H PRN    Followed by    [START ON 7/28/2019] fentaNYL injection 50 mcg Q1H PRN    glucagon (human recombinant) injection 1 mg PRN    glucose chewable tablet 16 g PRN    glucose chewable tablet 24 g PRN    insulin aspart U-100 pen 0-5 Units Q6H PRN    insulin detemir U-100 pen 10 Units Daily    iohexol (OMNIPAQUE)  1955   • VASECTOMY     • YAG IRIDOTOMY - OU - BOTH EYES  07/2019     Review of Systems    Positive for stated complaint: fever, cough, chills, fatigue for one week  Other systems are as noted in HPI. Constitutional and vital signs reviewed.       All other oral solution 15 mL PRN    lactulose 20 gram/30 mL solution Soln 20 g TID    levothyroxine tablet 88 mcg Before breakfast    midodrine tablet 10 mg TID    norepinephrine 4 mg in dextrose 5% 250 mL infusion (premix) (titrating) Continuous    ondansetron injection 4 mg Q6H PRN    piperacillin-tazobactam 4.5 g in sodium chloride 0.9% 100 mL IVPB (ready to mix system) Q8H    rifAXIMin tablet 550 mg BID    simethicone chewable tablet 80 mg TID PRN    sodium chloride 0.9% flush 10 mL PRN    sodium chloride 0.9% flush 10 mL PRN    vancomycin 750 mg in dextrose 5 % 250 mL IVPB (ready to mix system) Q24H    zinc sulfate capsule 220 mg Daily       Objective:     Vital Signs (Most Recent):  Temp: 97.6 °F (36.4 °C) (07/25/19 1000)  Pulse: 60 (07/25/19 1000)  Resp: 18 (07/25/19 1000)  BP: (!) 105/58 (07/25/19 1000)  SpO2: 100 % (07/25/19 1000)  O2 Device (Oxygen Therapy): ventilator (07/25/19 1000) Vital Signs (24h Range):  Temp:  [96.4 °F (35.8 °C)-98 °F (36.7 °C)] 97.6 °F (36.4 °C)  Pulse:  [50-92] 60  Resp:  [14-40] 18  SpO2:  [100 %] 100 %  BP: ()/(38-75) 105/58     Weight: 56 kg (123 lb 7.3 oz) (07/23/19 0629)  Body mass index is 24.11 kg/m².  Body surface area is 1.54 meters squared.    I/O last 3 completed shifts:  In: 610.8 [I.V.:160.8; IV Piggyback:450]  Out: 1120 [Urine:1120]    Physical Exam   HENT:   Head: Normocephalic and atraumatic.   Eyes: Scleral icterus is present.   Cardiovascular: Normal rate, regular rhythm and normal heart sounds.   No murmur heard.  Pulmonary/Chest: Effort normal and breath sounds normal.   On ventilator    Abdominal: She exhibits distension (Ascites ).   Hyperactive BS throughout    Musculoskeletal: She exhibits no edema.   Neurological:   Not responding to commands. Responding to painful stimuli   Skin: Skin is warm and dry. She is not diaphoretic.       Significant Labs:  ABGs:   Recent Labs   Lab 07/25/19  0859   PH 7.501*   PCO2 28.4*   HCO3 22.2*   POCSATURATED 96    BE -1     BMP:   Recent Labs   Lab 07/25/19  0557   *   CL 92*   CO2 20*   BUN 69*   CREATININE 2.2*   CALCIUM 8.7   MG 2.7*     CBC:   Recent Labs   Lab 07/25/19  0557   WBC 5.90   RBC 2.89*   HGB 9.9*   HCT 28.5*   PLT 43*   MCV 99*   MCH 34.3*   MCHC 34.7     CMP:   Recent Labs   Lab 07/25/19  0557   *   CALCIUM 8.7   ALBUMIN 3.5   PROT 6.2   *   K 4.1   CO2 20*   CL 92*   BUN 69*   CREATININE 2.2*   ALKPHOS 102   ALT 26   AST 47*   BILITOT 2.7*     LFTs:   Recent Labs   Lab 07/25/19  0557   ALT 26   AST 47*   ALKPHOS 102   BILITOT 2.7*   PROT 6.2   ALBUMIN 3.5     All labs within the past 24 hours have been reviewed.     Significant Imaging:  CT Head Reviewed     Assessment/Plan:     * Acute metabolic encephalopathy  Treatment plan per primary team     Right kidney mass  Patient has new Right renal mass found on CT abd on 7/22/19. Previous Ct from 2016 and MRI studies from 2019 in [place] did not demonstrate this finding.     Recommendations  - Urology recommends f/u outpatient as she is not a good surgical or biopsy candidate.   - Would prefer not expose kidneys to contrast but will discuss with other teams if necessary.    Stage 4 chronic kidney disease  Labs from [place] nephrology demonstrated Cr of 1.77 in 3/12/19, 1.67 on 4/5/19, 1.96 in 6/13/19, and 1.83 on 6/24. Cr is 2.2 today (7/25/19) above her baseline due to ATN secondary to hypoperfusion of kidneys related to hypotensive episodes. Hepatorenal syndrome not suspected at this time. Received 3X35g doses of albumin on 7/24/19.  - Midodrine 10mg TID added to prevent hypoperfusion to kidneys. Titrate NE drip to keep MAP>70.  - Monitor with daily BMP  - Nephrology will continue to follow closely.   - Considering dialysis if worsening Cr   - Consider Octreotide and Albumin if further blood pressure support is needed.    Hyponatremia  Rusty is a 67 yo F with liver cirrhosis and CKD admitted for hyponatremia. Hyponatremia may be  following components:    Rapid SARS-CoV-2 by PCR Detected (*)     All other components within normal limits   CBC W/ DIFFERENTIAL - Abnormal; Notable for the following components:    WBC 3.1 (*)     Lymphocyte Absolute 0.74 (*)     All other components wit ago- negative.            FINDINGS:  Subsegmental atelectasis or scarring noted at the right lung base is stable since previous study. Dandre Cleveland is no airspace consolidation.  There are no pleural effusions.  The heart is normal in size.                The pat related to several factors primarily liver cirrhosis requiring biweekly paracentesis. This leads to a hypovolemic hyponatremic state in which fluid is pulled out of the vasculature. Additional contributing factors related to ileus, NPO status, and TPN nutrition. Extra sodium in TPN has increased sodium. Now on tube feeds. Baseline Na+ from labs in [place] between 123-124. She is above her baseline at 127 (7/25/19). Patient is now intubated on ventilator.   - If volume expansion required recommend albumin   - Increased Midodrine to 10 mg TID scheduled and NE drip to keep MAP>70  - Strict I/O monitoring     Liver cirrhosis secondary to IZAGUIRRE  Treatment plan per primary team       Thank you for your consult. I will follow-up with patient. Please contact us if you have any additional questions.    Josh Sampson MD  Nephrology  [facility]   refill.         ED Course     Labs Reviewed   RAPID SARS-COV-2 BY PCR - Abnormal; Notable for the following components:       Result Value    Rapid SARS-CoV-2 by PCR Detected (*)     All other components within normal limits   CBC WITH DIFFERENTIAL WITH REGLA 0.74.  CMP: BUN 11. Creatinine 0.9. Glucose 99. Bicarb 26.  proBNP 36. . CRP 1.37. Troponin negative. CK 55. D-dimer slightly elevated 0.96. Blood culture sent x2 and pending. Rapid Covid was positive.       Patient is Covid positive with and rhythm. No murmurs, rubs or gallops. Equal pulses. Chest and Back: No tenderness or deformity. Abdomen: Soft, nontender, nondistended. Positive bowel sounds. No rebound, guarding or organomegaly. Neurologic: No focal neurological deficits.  CNII-XI if develops hypoxia with O2 requirements above 6L  - prone as able, otherwise encourage ambulation and IS  - trend inflammatory markers     2. acute hypoxic resp failure due to above  - wean O2 as able  - prone as tolerated, should also encourage ambulatio

## 2020-11-27 NOTE — CONSULTS
[facility]  Urology  Consult Note    Patient Name: Michelle Ojeda  MRN: [id number]  Admission Date: 7/18/2019  Hospital Length of Stay: 5   Code Status: Full Code   Attending Provider: Stefany Jasso MD   Consulting Provider: Caryl Haley MD  Primary Care Physician: Primary Doctor No  Principal Problem:Hyponatremia    Inpatient consult to Urology  Consult performed by: Caryl Haley MD  Consult ordered by: Stefany Jasso MD          Subjective:     HPI:  Michelle Ojeda is a 68 y.o. female  with IZAGUIRRE ESLD, DM2, HTN, and hypothyroidism that initially presented to Liver transplant Clinic for Liver/Kidney Transplant Evaluation.   She was found to have significant hyponatremia in clinic and was admitted for correction and close monitoring. This morning, she had nausea and emesis x 4. She was given phenergan and became more somnolent afterwards. Per daughter and son-in-law at bedside, she has been getting progressively weaker with decreased oral intake over the past month. According to previous records, she has baseline Cr 1.6-1.8 and baseline Na 123-124.      Urology was consulted to evaluate R kidney lesion found on work up for liver/kidney transplant.     Ms. Ojeda denies hx gross hematuria, dysuria, flank pain, hx kidney stones, hx urologic cancer.     Her creatinine on admission 2.0, WBC 3.87, plts 33, INR 1.3     Renal scan shows differential renal function 69 % on the left and 31 % on the right.  Renal US done today shows the right kidney with a mass in the superior pole of the right kidney measuring 2.4 x 2.3 x 2.3 cm. No renal stone. No hydronephrosis. The left kidney had no mass, no renal stone, and no hydronephrosis.    Past Medical History:   Diagnosis Date    Cirrhosis     Diabetes mellitus, type 2     Disorder of kidney and ureter     Hypertension     Hypothyroidism     NAFLD (nonalcoholic fatty liver disease)        History reviewed. No pertinent surgical  Pt needs appt in next 2 weeks as per note from Dr Inés Smith for any further refills history.    Review of patient's allergies indicates:  No Known Allergies    Family History     Problem Relation (Age of Onset)    No Known Problems Father          Tobacco Use    Smoking status: Never Smoker    Smokeless tobacco: Never Used   Substance and Sexual Activity    Alcohol use: Not Currently    Drug use: Never    Sexual activity: Not on file       Review of Systems   Constitutional: Positive for activity change, appetite change (decreased) and fatigue. Negative for fever.   HENT: Negative for facial swelling.    Eyes: Negative for discharge and itching.   Respiratory: Negative for apnea and shortness of breath.    Cardiovascular: Negative for chest pain and leg swelling.   Gastrointestinal: Positive for abdominal distention, nausea and vomiting.   Genitourinary: Negative for decreased urine volume, difficulty urinating, flank pain, frequency and hematuria.   Musculoskeletal: Negative for arthralgias, back pain and neck stiffness.   Skin: Positive for color change (jaundiced).   Neurological: Negative for dizziness and facial asymmetry.   Psychiatric/Behavioral: Negative for agitation, behavioral problems and confusion.       Objective:     Temp:  [97.4 °F (36.3 °C)-98.5 °F (36.9 °C)] 98 °F (36.7 °C)  Pulse:  [75-83] 80  Resp:  [16-18] 16  SpO2:  [97 %-99 %] 99 %  BP: (102-122)/(53-59) 115/57     Body mass index is 24.11 kg/m².    Date 07/23/19 0700 - 07/24/19 0659   Shift 9465-8096 3986-0650 7710-3406 24 Hour Total   INTAKE   Shift Total(mL/kg)       OUTPUT   Urine(mL/kg/hr) 500(1.1)   500   Shift Total(mL/kg) 500(8.9)   500(8.9)   Weight (kg) 56 56 56 56          Drains     Drain                 NG/OG Tube 07/23/19 0930 small diameter Left nostril less than 1 day                Physical Exam   Constitutional: She appears well-developed.   Lethargic   HENT:   Head: Normocephalic and atraumatic.   Eyes: Scleral icterus is present.   Neck: No tracheal deviation present.   Cardiovascular: Normal rate.     Pulmonary/Chest: Effort normal. No respiratory distress.   Abdominal: She exhibits distension. There is no tenderness.   Musculoskeletal: Normal range of motion.   Neurological: She is alert.   Appropriately responsive to questions    Skin: Skin is warm and dry.     Jaundiced   Psychiatric: She has a normal mood and affect. Her behavior is normal.       Significant Labs:    BMP:  Recent Labs   Lab 07/21/19  0402 07/22/19  0448 07/23/19  0355   * 123* 124*   K 4.1 3.9 3.8   CL 90* 90* 89*   CO2 21* 24 27   BUN 36* 45* 56*   CREATININE 1.8* 1.8* 2.0*   CALCIUM 9.2 8.4* 8.4*       CBC:  Recent Labs   Lab 07/21/19  0402 07/22/19  0448 07/23/19  0355   WBC 6.87 4.76 3.87*   HGB 10.1* 8.8* 8.8*   HCT 29.2* 25.3* 25.8*   PLT 31* 27* 33*       All pertinent labs results from the past 24 hours have been reviewed.    Significant Imaging:  CT: I have reviewed all results within the past 24 hours and my personal findings are:  Mass in the superior pole of the right kidney measuring 2.4 x 2.3 x 2.3 cm.  No hydronephrosis bilaterally. No kidney stones seen.         Assessment and Plan:     Right kidney mass  68 YOF with IZAGUIRRE ESLD admitted for hyponatremia. Incidentally found R kidney mass on work up.     - continue with adequate nutrition as tolerated   - R kidney mass measures 2.4 cm x 2.3 cm, would recommend active surveillance as outpatient given size, although we will defer to transplant team as this could delay possible transplantation.   - Regarding inpatient surgery, she is at high risk for complications   - Regarding renal biopsy, she is at high risk for complications   - we will discuss with transplant team          Caryl Haley MD  Urology  [facility]    I have reviewed the notes, assessments, and/or procedures performed by Dr. Haley, I concur with her/his documentation of Michelle Ojeda.  We will need a multi-disciplinary discussion regarding manage renal mass in the setting of  end-stage liver disease.  I do not feel she is a surgical candidate in her current condition.  Nephrectomy at the time of liver transplantation or after recovery from transplantation with successful outcome would be feasible.    Any renal procedure including biopsy or ablation would be high risk given the patient's low platelets and risk for bleeding.  However, if it a renal biopsy confirms oncocytoma, transplantation could continue without any concerns for immunosuppression in the setting of a malignancy.    Please call to discuss further.  She will need follow-up as an outpatient.

## 2023-11-08 NOTE — ASSESSMENT & PLAN NOTE
4 Eyes Skin Assessment     NAME:  Jada Messina  YOB: 1950  MEDICAL RECORD NUMBER:  [id number]    The patient is being assessed for  Admission    I agree that at least one RN has performed a thorough Head to Toe Skin Assessment on the patient. ALL assessment sites listed below have been assessed. Areas assessed by both nurses:    Head, Face, Ears, Shoulders, Back, Chest, Arms, Elbows, Hands, Sacrum. Buttock, Coccyx, Ischium, Legs. Feet and Heels, and Under Medical Devices         Does the Patient have a Wound?  No noted wound(s)       Barry Prevention initiated by RN: Yes  Wound Care Orders initiated by RN: No    Pressure Injury (Stage 3,4, Unstageable, DTI, NWPT, and Complex wounds) if present, place Wound referral order by RN under : No    New Ostomies, if present place, Ostomy referral order under : No     Nurse 1 eSignature: Electronically signed by Sunitha Perkins RN on 11/8/23 at 5:45 AM EST    **SHARE this note so that the co-signing nurse can place an eSignature**    Nurse 2 eSignature: Electronically signed by Ralph Szymanski RN on 11/8/23 at 6:03 AM EST See cirrhosis

## 2024-08-15 NOTE — ASSESSMENT & PLAN NOTE
Intubated for airway protection in setting of severe encephalopathy and episodes of emesis/concern for aspiration during admission during admission. Currently during well on RA. Family still interested in intubation pending remaining family coming to [place] from [place].    -Sputum culture negative from 7/24.  Off antibiotics.   -No focal signs of consolidation on CXR   -extubated on 7/27   -monitor oxygen saturation     No